# Patient Record
Sex: MALE | Race: WHITE | URBAN - METROPOLITAN AREA
[De-identification: names, ages, dates, MRNs, and addresses within clinical notes are randomized per-mention and may not be internally consistent; named-entity substitution may affect disease eponyms.]

---

## 2017-01-29 ENCOUNTER — TELEPHONE (OUTPATIENT)
Dept: FAMILY MEDICINE | Facility: CLINIC | Age: 72
End: 2017-01-29

## 2017-01-29 NOTE — Clinical Note
[facility]  [address]  [facility] [address]  [phone]      January 29, 2017      Abebe Christensen  [address]  [facility]    [place] [address]        Dear Abebe,    The last tests showed abnormal blood count [anemia] that was too low. Please come in for recheck appointment, to see how this is going. Also need blood sugar recheck.         Sincerely,      Steven Tapia MD

## 2017-03-29 DIAGNOSIS — E53.8 FOLATE DEFICIENCY: ICD-10-CM

## 2017-03-30 NOTE — TELEPHONE ENCOUNTER
folic acid (FOLVITE) 1 MG tablet      Last Written Prescription Date: 11/23/16  Last Fill Quantity: 100,  # refills: 3   Last Office Visit with [facility], [facility] or [facility] prescribing provider: 11/23/16

## 2017-04-03 RX ORDER — FOLIC ACID 1 MG/1
TABLET ORAL
Qty: 120 TABLET | Refills: 7 | Status: SHIPPED | OUTPATIENT
Start: 2017-04-03 | End: 2018-02-07

## 2017-04-05 ENCOUNTER — OFFICE VISIT (OUTPATIENT)
Dept: FAMILY MEDICINE | Facility: CLINIC | Age: 72
End: 2017-04-05
Payer: COMMERCIAL

## 2017-04-05 VITALS
OXYGEN SATURATION: 100 % | BODY MASS INDEX: 25.47 KG/M2 | TEMPERATURE: 97.9 F | HEART RATE: 91 BPM | SYSTOLIC BLOOD PRESSURE: 94 MMHG | RESPIRATION RATE: 18 BRPM | WEIGHT: 188 LBS | DIASTOLIC BLOOD PRESSURE: 60 MMHG | HEIGHT: 72 IN

## 2017-04-05 DIAGNOSIS — D64.9 ANEMIA, UNSPECIFIED TYPE: ICD-10-CM

## 2017-04-05 DIAGNOSIS — K21.00 GASTROESOPHAGEAL REFLUX DISEASE WITH ESOPHAGITIS: Primary | ICD-10-CM

## 2017-04-05 DIAGNOSIS — R82.90 NONSPECIFIC FINDING ON EXAMINATION OF URINE: ICD-10-CM

## 2017-04-05 DIAGNOSIS — E11.9 TYPE 2 DIABETES MELLITUS WITHOUT COMPLICATION, WITHOUT LONG-TERM CURRENT USE OF INSULIN (H): ICD-10-CM

## 2017-04-05 DIAGNOSIS — Z83.3 FAMILY HISTORY OF DIABETES MELLITUS: ICD-10-CM

## 2017-04-05 DIAGNOSIS — R30.0 DYSURIA: ICD-10-CM

## 2017-04-05 DIAGNOSIS — I10 ESSENTIAL HYPERTENSION, BENIGN: ICD-10-CM

## 2017-04-05 DIAGNOSIS — R53.82 CHRONIC FATIGUE: ICD-10-CM

## 2017-04-05 DIAGNOSIS — Z13.220 LIPID SCREENING: ICD-10-CM

## 2017-04-05 LAB
ALBUMIN UR-MCNC: ABNORMAL MG/DL
APPEARANCE UR: CLEAR
BILIRUB UR QL STRIP: ABNORMAL
COLOR UR AUTO: YELLOW
ERYTHROCYTE [DISTWIDTH] IN BLOOD BY AUTOMATED COUNT: 13.4 % (ref 10–15)
ERYTHROCYTE [SEDIMENTATION RATE] IN BLOOD BY WESTERGREN METHOD: 18 MM/H (ref 0–20)
FOLATE SERPL-MCNC: 20.8 NG/ML
GLUCOSE UR STRIP-MCNC: 100 MG/DL
HCT VFR BLD AUTO: 38 % (ref 40–53)
HGB BLD-MCNC: 13.5 G/DL (ref 13.3–17.7)
HGB UR QL STRIP: NEGATIVE
HYALINE CASTS #/AREA URNS LPF: ABNORMAL /LPF (ref 0–2)
KETONES UR STRIP-MCNC: ABNORMAL MG/DL
LEUKOCYTE ESTERASE UR QL STRIP: NEGATIVE
MCH RBC QN AUTO: 38.1 PG (ref 26.5–33)
MCHC RBC AUTO-ENTMCNC: 35.5 G/DL (ref 31.5–36.5)
MCV RBC AUTO: 107 FL (ref 78–100)
NITRATE UR QL: POSITIVE
PH UR STRIP: 5.5 PH (ref 5–7)
PLATELET # BLD AUTO: 253 10E9/L (ref 150–450)
RBC # BLD AUTO: 3.54 10E12/L (ref 4.4–5.9)
RBC #/AREA URNS AUTO: ABNORMAL /HPF (ref 0–2)
RETICS # AUTO: 96.3 10E9/L (ref 25–95)
RETICS/RBC NFR AUTO: 2.6 % (ref 0.5–2)
SP GR UR STRIP: 1.01 (ref 1–1.03)
URN SPEC COLLECT METH UR: ABNORMAL
UROBILINOGEN UR STRIP-ACNC: >=8 EU/DL (ref 0.2–1)
VIT B12 SERPL-MCNC: 450 PG/ML (ref 193–986)
WBC # BLD AUTO: 12.3 10E9/L (ref 4–11)
WBC #/AREA URNS AUTO: ABNORMAL /HPF (ref 0–2)

## 2017-04-05 PROCEDURE — 80053 COMPREHEN METABOLIC PANEL: CPT | Performed by: FAMILY MEDICINE

## 2017-04-05 PROCEDURE — 87086 URINE CULTURE/COLONY COUNT: CPT | Performed by: FAMILY MEDICINE

## 2017-04-05 PROCEDURE — 80061 LIPID PANEL: CPT | Performed by: FAMILY MEDICINE

## 2017-04-05 PROCEDURE — 82746 ASSAY OF FOLIC ACID SERUM: CPT | Performed by: FAMILY MEDICINE

## 2017-04-05 PROCEDURE — 85652 RBC SED RATE AUTOMATED: CPT | Performed by: FAMILY MEDICINE

## 2017-04-05 PROCEDURE — 81001 URINALYSIS AUTO W/SCOPE: CPT | Performed by: FAMILY MEDICINE

## 2017-04-05 PROCEDURE — 84443 ASSAY THYROID STIM HORMONE: CPT | Performed by: FAMILY MEDICINE

## 2017-04-05 PROCEDURE — 83540 ASSAY OF IRON: CPT | Performed by: FAMILY MEDICINE

## 2017-04-05 PROCEDURE — 85045 AUTOMATED RETICULOCYTE COUNT: CPT | Performed by: FAMILY MEDICINE

## 2017-04-05 PROCEDURE — 85027 COMPLETE CBC AUTOMATED: CPT | Performed by: FAMILY MEDICINE

## 2017-04-05 PROCEDURE — 83550 IRON BINDING TEST: CPT | Performed by: FAMILY MEDICINE

## 2017-04-05 PROCEDURE — 82607 VITAMIN B-12: CPT | Performed by: FAMILY MEDICINE

## 2017-04-05 PROCEDURE — 36415 COLL VENOUS BLD VENIPUNCTURE: CPT | Performed by: FAMILY MEDICINE

## 2017-04-05 PROCEDURE — 99215 OFFICE O/P EST HI 40 MIN: CPT | Performed by: FAMILY MEDICINE

## 2017-04-05 NOTE — NURSING NOTE
Chief Complaint   Patient presents with     Numbness     Hypertension       Initial BP 94/60  Pulse 91  Temp 97.9  F (36.6  C)  Resp 18  Ht 6' (1.829 m)  Wt 188 lb (85.3 kg)  SpO2 100%  BMI 25.5 kg/m2 Estimated body mass index is 25.5 kg/(m^2) as calculated from the following:    Height as of this encounter: 6' (1.829 m).    Weight as of this encounter: 188 lb (85.3 kg).  Medication Reconciliation: josué Alarcon CMA

## 2017-04-05 NOTE — PROGRESS NOTES
SUBJECTIVE:                                                    Abebe Christensen is a 71 year old male who presents to clinic today for the following health issues:    Health Maintenance Due   Topic Date Due     AORTIC ANEURYSM SCREENING (SYSTEM ASSIGNED)  11/24/2010     PNEUMOCOCCAL (2 of 2 - PPSV23) 05/14/2016     Health Maintenance reviewed at today's visit patient asked to schedule/complete:   Immunizations:  Patient declined        Hypertension Follow-up      Outpatient blood pressures are not being checked.    Low Salt Diet: no added salt       Amount of exercise or physical activity: None    Problems taking medications regularly: No    Medication side effects: none    Diet: low salt and low fat/cholesterol    Numbness      Duration: ongoing    Description (location/character/radiation): pt is having numbness in feet.  Pt says he was given folic acid for this and its not helping    Intensity:  moderate    Accompanying signs and symptoms: none    History (similar episodes/previous evaluation): None    Precipitating or alleviating factors: None    Therapies tried and outcome: None     Fatigue decreased appetite; previous abnomral lft, hx polycythemia vera with hx of phlebotimies at [facility]> reviewed las t labs showed low hemoglobin low folate nl vit b-12, elevated bilirubin.   PROBLEMS TO ADD ON...    Problem list and histories reviewed & adjusted, as indicated.  Additional history: as documented    Decreased energy, decreased appetite, has cut down on alcohol use. No cough or wheezing. Has numbness of bottom of feet, some swelling of anles. States since 1-17 sleeps a lot. Has some ear ringing and dizziness. previoiusly had elevated bilirubin.   Has hx of hypertension   In past HAD CONTACTED HIM ABOUT CT OF ABDOMEN IN VIEW OF ABN LFT AND ELEVATED BILIRUBIN, THIS WAS NOT DONE THUS FAR, DISCUSSED GETTING REPEAT LABS.   - has reflux sx. Has never had upper gi endoscopy to his recollection.     Patient Active Problem  List   Diagnosis     GERD (gastroesophageal reflux disease)     CKD (chronic kidney disease) stage 3, GFR 30-59 ml/min     Essential hypertension, benign     H/O polycythemia vera     Glucose intolerance (impaired glucose tolerance)     Family history of diabetes mellitus     Coryza for 20 yrs      Screening for prostate cancer     Tobacco abuse: [age]y/o @ 1ppd=19pk yr hx;only cigars since      COPD (chronic obstructive pulmonary disease): spirometry 5-14-15      Risk for falls     Past Surgical History:   Procedure Laterality Date     EYE SURGERY  2008    cataract and glaucoma       Social History   Substance Use Topics     Smoking status: Current Every Day Smoker     Types: Cigarettes     Smokeless tobacco: Never Used     Alcohol use Yes     Family History   Problem Relation Age of Onset     Eye Disorder Mother      Cancer - colorectal Father            Reviewed and updated as needed this visit by clinical staff       Reviewed and updated as needed this visit by Provider         ROS:  CONSTITUTIONAL:NEGATIVE for fever, chills, change in weight  INTEGUMENTARY/SKIN: some bruising on arms states seems to bruise more easily.   EYES: NEGATIVE for vision changes or irritation  ENT/MOUTH: NEGATIVE for ear, mouth and throat problems  RESP:NEGATIVE for significant cough or SOB  CV: NEGATIVE for chest pain, palpitations or peripheral edema  GI: decreased appetite and gets reflux a lot. No n,v,d,c. PREVIOUSLY HAD ELEVATED BILIRUBIN AND LIVER FUNCTION TESTS WITH NEGATIVE HEPATITIS STUDIES FOR A,B,C. HAS NOT HAD PREVIOUSLY RECOMMENDED CT OF ABDOMEN.   : negative for dysuria, hematuria, decreased urinary stream, erectile dysfunction  MUSCULOSKELETAL: NEGATIVE for significant arthralgias or myalgia  NEURO: feles weak a lot low energy sleeps too much.   ENDOCRINE: NEGATIVE for temperature intolerance, skin/hair changes and has decreased energy.   HEME/ALLERGY/IMMUNE: bruises easily in past had POLYCYTHEMIA VERA AND HAD  PHLEBOTOMIES AT [facility], NOW HAS HAD ANEMIA. PREVIOUSLY FOLATE AS LOW.   PSYCHIATRIC: DECREASED FOCUS AND EMOTIONAL LY FEELS LESS ENERGY. USED TO DRINK MORE HAS CUT DOWN A GREAT DEAL.     OBJECTIVE:                                                    BP 94/60  Pulse 91  Temp 97.9  F (36.6  C)  Resp 18  Ht 6' (1.829 m)  Wt 188 lb (85.3 kg)  SpO2 100%  BMI 25.5 kg/m2  Body mass index is 25.5 kg/(m^2).  GENERAL APPEARANCE: healthy, alert and moderate distress  EYES: Eyes grossly normal to inspection, PERRL and conjunctivae and sclerae normal  NECK: no adenopathy, no asymmetry, masses, or scars and no bruits  RESP: lungs clear to auscultation - no rales, rhonchi or wheezes  CV: regular rates and rhythm, normal S1 S2, no S3 or S4 and no murmur, click or rub  ABDOMEN: soft, nontender, without hepatosplenomegaly or masses and bowel sounds normal  SKIN: no suspicious lesions or rashes  NEURO: Normal strength and tone, mentation intact, speech normal and cranial nerves 2-12 intact  PSYCH: affect flat, anxious and fatigued    Diagnostic test results:  Diagnostic Test Results:  Labs as ordered discussed doing test for previous anemia and previous low folate and abnrmal lft. Also he has reflux and have ordered h. Pylori test. Also hx of family hx of thyroid diseas and diabetes and have orderd thyroid and diabetes tests.      ASSESSMENT/PLAN:                                                    1. Gastroesophageal reflux disease with esophagitis    - H Pylori antigen, stool; Future  - Urine Microscopic    2. Anemia, unspecified type    - Vitamin B12  - Folate  - CBC with platelets  - Reticulocyte count  - Iron and iron binding capacity    3. Essential hypertension, benign      4. Family history of diabetes mellitus      5. Chronic fatigue    - Vitamin B12  - Folate  - ESR: Erythrocyte sedimentation rate  - Comprehensive metabolic panel  - TSH  - UA reflex to Microscopic and Culture    6. Lipid screening    - Lipid Profile  (Chol, Trig, HDL, LDL calc)    Labs as ordered.   Follow up with Provider - await labs disucssed if has diabetes may need near-term follow up.      Steven Tapia MD  [facility]

## 2017-04-05 NOTE — MR AVS SNAPSHOT
"              After Visit Summary   4/5/2017    Abebe Christensen    MRN: [id number]           Patient Information     Date Of Birth          1945        Visit Information        Provider Department      4/5/2017 11:15 AM Steven Tapia MD [facility]        Today's Diagnoses     Gastroesophageal reflux disease with esophagitis    -  1    Anemia, unspecified type        Essential hypertension, benign        Family history of diabetes mellitus        Chronic fatigue        Lipid screening        Nonspecific finding on examination of urine           Follow-ups after your visit        Future tests that were ordered for you today     Open Future Orders        Priority Expected Expires Ordered    H Pylori antigen, stool Routine  5/5/2017 4/5/2017            Who to contact     If you have questions or need follow up information about today's clinic visit or your schedule please contact [facility] directly at [phone].  Normal or non-critical lab and imaging results will be communicated to you by MyChart, letter or phone within 4 business days after the clinic has received the results. If you do not hear from us within 7 days, please contact the clinic through MyChart or phone. If you have a critical or abnormal lab result, we will notify you by phone as soon as possible.  Submit refill requests through [employer] or call your pharmacy and they will forward the refill request to us. Please allow 3 business days for your refill to be completed.          Additional Information About Your Visit        MyChart Information     [employer] lets you send messages to your doctor, view your test results, renew your prescriptions, schedule appointments and more. To sign up, go to [email or web address] . Click on \"Log in\" on the left side of the screen, which will take you to the Welcome page. Then click on \"Sign up Now\" on the right side of the page.     You will " be asked to enter the access code listed below, as well as some personal information. Please follow the directions to create your username and password.     Your access code is: [email or web address]  Expires: 2017  6:24 PM     Your access code will  in 90 days. If you need help or a new code, please call your [facility] or [phone].        Care EveryWhere ID     This is your Care EveryWhere ID. This could be used by other organizations to access your [place] medical records  [id number]        Your Vitals Were     Pulse Temperature Respirations Height Pulse Oximetry BMI (Body Mass Index)    91 97.9  F (36.6  C) 18 6' (1.829 m) 100% 25.5 kg/m2       Blood Pressure from Last 3 Encounters:   17 94/60   16 120/80   16 110/80    Weight from Last 3 Encounters:   17 188 lb (85.3 kg)   16 186 lb (84.4 kg)   16 186 lb (84.4 kg)              We Performed the Following     CBC with platelets     Comprehensive metabolic panel     ESR: Erythrocyte sedimentation rate     Folate     Iron and iron binding capacity     Lipid Profile (Chol, Trig, HDL, LDL calc)     Reticulocyte count     TSH     UA reflex to Microscopic and Culture     Urine Culture Aerobic Bacterial     Urine Microscopic     Vitamin B12        Primary Care Provider Office Phone # Fax #    Dulce Heather Mendoza [phone]       [facility] [address] [facility] [address]        Thank you!     Thank you for choosing [facility]  for your care. Our goal is always to provide you with excellent care. Hearing back from our patients is one way we can continue to improve our services. Please take a few minutes to complete the written survey that you may receive in the mail after your visit with us. Thank you!             Your Updated Medication List - Protect others around you: Learn how to safely use, store and throw away your medicines at  www.disposemymeds.org.          This list is accurate as of: 4/5/17  6:24 PM.  Always use your most recent med list.                   Brand Name Dispense Instructions for use    aspirin 325 MG tablet      Take 81 mg by mouth daily       diltiazem 240 MG 24 hr capsule    CARTIA XT    90 capsule    TAKE 1 CAPSULE BY MOUTH DAILY       dorzolamide-timolol 2-0.5 % ophthalmic solution    COSOPT     1 drop 2 times daily.       folic acid 1 MG tablet    FOLVITE    120 tablet    TAKE 4 TABLETS BY MOUTH DAILY       lisinopril-hydrochlorothiazide 20-12.5 MG per tablet    PRINZIDE/ZESTORETIC    90 tablet    Take 1 tablet by mouth daily       omeprazole 20 MG tablet    priLOSEC OTC    90 tablet    Take 1 tablet (20 mg) by mouth daily       TRAVATAN 0.004 % ophthalmic solution   Generic drug:  travoprost Z (benzalkonium)      1 drop every evening.       vitamin D 2000 UNITS Caps      Take 1 capsule by mouth daily

## 2017-04-05 NOTE — LETTER
[facility]  [address]  [address]  [place] [address] [phone]                                                                                                           Abebe Christensen  [address]  [facility]    [place] [address]    April 10, 2017      Dear Abebe,    The results of your recent tests were reviewed and are enclosed.     Results for orders placed or performed in visit on 04/05/17   Vitamin B12   Result Value Ref Range    Vitamin B12 450 193 - 986 pg/mL   Folate   Result Value Ref Range    Folate 20.8 >5.4 ng/mL   CBC with platelets   Result Value Ref Range    WBC 12.3 (H) 4.0 - 11.0 10e9/L    RBC Count 3.54 (L) 4.4 - 5.9 10e12/L    Hemoglobin 13.5 13.3 - 17.7 g/dL    Hematocrit 38.0 (L) 40.0 - 53.0 %     (H) 78 - 100 fl    MCH 38.1 (H) 26.5 - 33.0 pg    MCHC 35.5 31.5 - 36.5 g/dL    RDW 13.4 10.0 - 15.0 %    Platelet Count 253 150 - 450 10e9/L   ESR: Erythrocyte sedimentation rate   Result Value Ref Range    Sed Rate 18 0 - 20 mm/h   Comprehensive metabolic panel   Result Value Ref Range    Sodium 138 133 - 144 mmol/L    Potassium 4.0 3.4 - 5.3 mmol/L    Chloride 103 94 - 109 mmol/L    Carbon Dioxide 27 20 - 32 mmol/L    Anion Gap 8 3 - 14 mmol/L    Glucose 119 (H) 70 - 99 mg/dL    Urea Nitrogen 9 7 - 30 mg/dL    Creatinine 1.26 (H) 0.66 - 1.25 mg/dL    GFR Estimate 56 (L) >60 mL/min/1.7m2    GFR Estimate If Black 68 >60 mL/min/1.7m2    Calcium 8.8 8.5 - 10.1 mg/dL    Bilirubin Total 1.6 (H) 0.2 - 1.3 mg/dL    Albumin 2.5 (L) 3.4 - 5.0 g/dL    Protein Total 6.8 6.8 - 8.8 g/dL    Alkaline Phosphatase 252 (H) 40 - 150 U/L    ALT 21 0 - 70 U/L     (H) 0 - 45 U/L   TSH   Result Value Ref Range    TSH 6.53 (H) 0.40 - 4.00 mU/L   UA reflex to Microscopic and Culture   Result Value Ref Range    Color Urine Yellow     Appearance Urine Clear     Glucose Urine 100 (A) NEG mg/dL    Bilirubin Urine (A) NEG     Moderate  This is an  unconfirmed screening test result. A positive result may be false.      Ketones Urine Trace (A) NEG mg/dL    Specific Gravity Urine 1.010 1.003 - 1.035    Blood Urine Negative NEG    pH Urine 5.5 5.0 - 7.0 pH    Protein Albumin Urine Trace (A) NEG mg/dL    Urobilinogen Urine >=8.0 0.2 - 1.0 EU/dL    Nitrite Urine Positive (A) NEG    Leukocyte Esterase Urine Negative NEG    Source Midstream Urine    Reticulocyte count   Result Value Ref Range    % Retic 2.6 (H) 0.5 - 2.0 %    Absolute Retic 96.3 (H) 25 - 95 10e9/L   Iron and iron binding capacity   Result Value Ref Range    Iron 150 35 - 180 ug/dL    Iron Binding Cap 158 (L) 240 - 430 ug/dL    Iron Saturation Index 95 (H) 15 - 46 %   Lipid Profile (Chol, Trig, HDL, LDL calc)   Result Value Ref Range    Cholesterol 141 <200 mg/dL    Triglycerides 179 (H) <150 mg/dL    HDL Cholesterol 22 (L) >39 mg/dL    LDL Cholesterol Calculated 83 <100 mg/dL    Non HDL Cholesterol 119 <130 mg/dL   Urine Microscopic   Result Value Ref Range    WBC Urine  0 - 2 /HPF     O - 2  unconcentrated, urine was QNS for concentration      RBC Urine  0 - 2 /HPF     O - 2  unconcentrated, urine was QNS for concentration      Hyaline Casts (A) 0 - 2 /LPF     2-5  unconcentrated, urine was QNS for concentration     Urine Culture Aerobic Bacterial   Result Value Ref Range    Specimen Description Midstream Urine     Culture Micro       10,000 to 50,000 colonies/mL mixed urogenital seda Susceptibility testing not   routinely done      Micro Report Status FINAL 04/06/2017                Result was abnormal please make an appointment to come in and review the tests and schedule the liver test. .         Thank you for choosing [facility].  We appreciate the opportunity to serve you and look forward to supporting your healthcare needs in the future.    If you have any questions or concerns, please call me or my staff at [phone].      Sincerely,    Steven Tapia MD

## 2017-04-06 ENCOUNTER — TELEPHONE (OUTPATIENT)
Dept: FAMILY MEDICINE | Facility: CLINIC | Age: 72
End: 2017-04-06

## 2017-04-06 LAB
ALBUMIN SERPL-MCNC: 2.5 G/DL (ref 3.4–5)
ALP SERPL-CCNC: 252 U/L (ref 40–150)
ALT SERPL W P-5'-P-CCNC: 21 U/L (ref 0–70)
ANION GAP SERPL CALCULATED.3IONS-SCNC: 8 MMOL/L (ref 3–14)
AST SERPL W P-5'-P-CCNC: 157 U/L (ref 0–45)
BACTERIA SPEC CULT: NORMAL
BILIRUB SERPL-MCNC: 1.6 MG/DL (ref 0.2–1.3)
BUN SERPL-MCNC: 9 MG/DL (ref 7–30)
CALCIUM SERPL-MCNC: 8.8 MG/DL (ref 8.5–10.1)
CHLORIDE SERPL-SCNC: 103 MMOL/L (ref 94–109)
CHOLEST SERPL-MCNC: 141 MG/DL
CO2 SERPL-SCNC: 27 MMOL/L (ref 20–32)
CREAT SERPL-MCNC: 1.26 MG/DL (ref 0.66–1.25)
GFR SERPL CREATININE-BSD FRML MDRD: 56 ML/MIN/1.7M2
GLUCOSE SERPL-MCNC: 119 MG/DL (ref 70–99)
HDLC SERPL-MCNC: 22 MG/DL
IRON SATN MFR SERPL: 95 % (ref 15–46)
IRON SERPL-MCNC: 150 UG/DL (ref 35–180)
LDLC SERPL CALC-MCNC: 83 MG/DL
MICRO REPORT STATUS: NORMAL
NONHDLC SERPL-MCNC: 119 MG/DL
POTASSIUM SERPL-SCNC: 4 MMOL/L (ref 3.4–5.3)
PROT SERPL-MCNC: 6.8 G/DL (ref 6.8–8.8)
SODIUM SERPL-SCNC: 138 MMOL/L (ref 133–144)
SPECIMEN SOURCE: NORMAL
TIBC SERPL-MCNC: 158 UG/DL (ref 240–430)
TRIGL SERPL-MCNC: 179 MG/DL
TSH SERPL DL<=0.05 MIU/L-ACNC: 6.53 MU/L (ref 0.4–4)

## 2017-04-06 RX ORDER — CIPROFLOXACIN 500 MG/1
500 TABLET, FILM COATED ORAL 2 TIMES DAILY
Qty: 20 TABLET | Refills: 0 | Status: SHIPPED | OUTPATIENT
Start: 2017-04-06 | End: 2017-08-23

## 2017-04-06 NOTE — TELEPHONE ENCOUNTER
cipro rx for 500 mg bid, 10 days and metformin 500 mg prior to dinner, one daily #30 sen to phamracy please let him know to start these and follow up next week. I will be in [place] on Wednesday.     Steven Tapia MD

## 2017-04-06 NOTE — TELEPHONE ENCOUNTER
i called him no answer. Message left on ans machine needs to start antibiotic and metformin abnomral urinalyssis and has glycosuria. Appears willneed additional eval related to anemia, low iron, elevated bilirubin.  Previously ct of abdomen was ordered he did not have this done yet.       Steven Tapia MD

## 2017-04-07 DIAGNOSIS — K21.00 GASTROESOPHAGEAL REFLUX DISEASE WITH ESOPHAGITIS: ICD-10-CM

## 2017-04-07 PROCEDURE — 87338 HPYLORI STOOL AG IA: CPT | Performed by: FAMILY MEDICINE

## 2017-04-09 NOTE — PROGRESS NOTES
Result was abnormal please make an appointment to come in and review the tests and schedule the liver test. .      Please let patient know by calling or sending a letter.

## 2017-04-11 LAB
H PYLORI AG STL QL IA: NORMAL
MICRO REPORT STATUS: NORMAL
SPECIMEN SOURCE: NORMAL

## 2017-04-12 ENCOUNTER — OFFICE VISIT (OUTPATIENT)
Dept: FAMILY MEDICINE | Facility: CLINIC | Age: 72
End: 2017-04-12
Payer: COMMERCIAL

## 2017-04-12 VITALS
SYSTOLIC BLOOD PRESSURE: 100 MMHG | HEART RATE: 96 BPM | DIASTOLIC BLOOD PRESSURE: 50 MMHG | OXYGEN SATURATION: 99 % | BODY MASS INDEX: 25.77 KG/M2 | TEMPERATURE: 97.4 F | RESPIRATION RATE: 16 BRPM | WEIGHT: 190 LBS

## 2017-04-12 DIAGNOSIS — E80.6 CONJUGATED HYPERBILIRUBINEMIA: ICD-10-CM

## 2017-04-12 DIAGNOSIS — Z86.03 HX OF POLYCYTHEMIA VERA: ICD-10-CM

## 2017-04-12 DIAGNOSIS — R73.01 IMPAIRED FASTING GLUCOSE: ICD-10-CM

## 2017-04-12 DIAGNOSIS — Z23 NEED FOR PROPHYLACTIC VACCINATION AGAINST STREPTOCOCCUS PNEUMONIAE (PNEUMOCOCCUS): ICD-10-CM

## 2017-04-12 DIAGNOSIS — R10.84 ABDOMINAL PAIN, GENERALIZED: Primary | ICD-10-CM

## 2017-04-12 DIAGNOSIS — E11.9 TYPE 2 DIABETES MELLITUS WITHOUT COMPLICATION, WITHOUT LONG-TERM CURRENT USE OF INSULIN (H): ICD-10-CM

## 2017-04-12 DIAGNOSIS — Z13.89 SCREENING FOR DIABETIC PERIPHERAL NEUROPATHY: ICD-10-CM

## 2017-04-12 LAB
HBA1C MFR BLD: 4.6 % (ref 4.3–6)
LIPASE SERPL-CCNC: 291 U/L (ref 73–393)

## 2017-04-12 PROCEDURE — 82150 ASSAY OF AMYLASE: CPT | Performed by: FAMILY MEDICINE

## 2017-04-12 PROCEDURE — 80076 HEPATIC FUNCTION PANEL: CPT | Performed by: FAMILY MEDICINE

## 2017-04-12 PROCEDURE — 99214 OFFICE O/P EST MOD 30 MIN: CPT | Performed by: FAMILY MEDICINE

## 2017-04-12 PROCEDURE — 36415 COLL VENOUS BLD VENIPUNCTURE: CPT | Performed by: FAMILY MEDICINE

## 2017-04-12 PROCEDURE — 83036 HEMOGLOBIN GLYCOSYLATED A1C: CPT | Performed by: FAMILY MEDICINE

## 2017-04-12 PROCEDURE — 83690 ASSAY OF LIPASE: CPT | Performed by: FAMILY MEDICINE

## 2017-04-12 NOTE — PROGRESS NOTES
SUBJECTIVE:                                                    Abebe Christensen is a 71 year old male who presents to clinic today for the following health issues:    Health Maintenance Due   Topic Date Due     FOOT EXAM Q1 YEAR( NO INBASKET)  11/24/1946     EYE EXAM Q1 YEAR( NO INBASKET)  11/24/1946     AORTIC ANEURYSM SCREENING (SYSTEM ASSIGNED)  11/24/2010     A1C Q6 MO( NO INBASKET)  09/04/2014     PNEUMOCOCCAL (2 of 2 - PPSV23) 05/14/2016     Health Maintenance reviewed at today's visit patient asked to schedule/complete:   Diabetes:  Patient agrees to schedule        Results      Duration: 4/5/2017    Description (location/character/radiation): pt here for lab results    Intensity:      Accompanying signs and symptoms:     History (similar episodes/previous evaluation): None    Precipitating or alleviating factors: None    Therapies tried and outcome: None     Feels a little better lab tests reviewed.d sicussed getting additional liver tests and pancreas test discussed depending on labs may still need colonoscopy and may indicate other direction of evaluation has abdn lft, anemia elevated bilirubin, elevated glucose.   PROBLEMS TO ADD ON...    Problem list and histories reviewed & adjusted, as indicated.  Additional history: as documented    Patient Active Problem List   Diagnosis     GERD (gastroesophageal reflux disease)     CKD (chronic kidney disease) stage 3, GFR 30-59 ml/min     Essential hypertension, benign     H/O polycythemia vera     Glucose intolerance (impaired glucose tolerance)     Family history of diabetes mellitus     Coryza for 20 yrs      Screening for prostate cancer     Tobacco abuse: [age]y/o @ 1ppd=19pk yr hx;only cigars since      COPD (chronic obstructive pulmonary disease): spirometry 5-14-15      Risk for falls     Past Surgical History:   Procedure Laterality Date     EYE SURGERY  2008    cataract and glaucoma       Social History   Substance Use Topics     Smoking status: Current  Every Day Smoker     Types: Cigarettes     Smokeless tobacco: Never Used     Alcohol use Yes     Family History   Problem Relation Age of Onset     Eye Disorder Mother      Cancer - colorectal Father            Reviewed and updated as needed this visit by clinical staff       Reviewed and updated as needed this visit by Provider         ROS:  CONSTITUTIONAL:NEGATIVE for fever, chills, change in weight  INTEGUMENTARY/SKIN: NEGATIVE for worrisome rashes, moles or lesions  RESP:NEGATIVE for significant cough or SOB  CV: NEGATIVE for chest pain, palpitations or peripheral edema  GI: less abd sx and no n,v.   : negative for dysuria, hematuria, decreased urinary stream, erectile dysfunction  NEURO: fatigue is improved,   ENDOCRINE: NEGATIVE for temperature intolerance, skin/hair changes and elevated glucose    OBJECTIVE:                                                    /50  Pulse 96  Temp 97.4  F (36.3  C)  Resp 16  Wt 190 lb (86.2 kg)  SpO2 99%  BMI 25.77 kg/m2  Body mass index is 25.77 kg/(m^2).  GENERAL APPEARANCE: healthy, alert and mild distress  EYES: Eyes grossly normal to inspection, PERRL and conjunctivae and sclerae normal  RESP: lungs clear to auscultation - no rales, rhonchi or wheezes  CV: regular rates and rhythm, normal S1 S2, no S3 or S4 and no murmur, click or rub  ABDOMEN: soft, nontender, without hepatosplenomegaly or masses and bowel sounds normal    Diagnostic test results:  Diagnostic Test Results:  Labs as ordered discussed may need colonoscopy and possibley upper gi endoscopy.      ASSESSMENT/PLAN:                                                    1. Screening for diabetic peripheral neuropathy    - FOOT EXAM  NO CHARGE [98046.964]    2. Need for prophylactic vaccination against Streptococcus pneumoniae (pneumococcus)      3. Abdominal pain, generalized    - Amylase  - Lipase  - Hepatic panel    4. Hx of polycythemia vera    - CT Abdomen Pelvis w Contrast; Future    5. Type 2  diabetes mellitus without complication, without long-term current use of insulin (H)    - HEMOGLOBIN A1C    6. Conjugated hyperbilirubinemia    - Hepatic panel    7. Impaired fasting glucose    - HEMOGLOBIN A1C      Labs as ordered.   Follow up with Provider - rtc 2-3 weeks  Or as needed.      Steven Tapia MD  [facility]

## 2017-04-12 NOTE — NURSING NOTE
Chief Complaint   Patient presents with     Results       Initial /50  Pulse 96  Temp 97.4  F (36.3  C)  Resp 16  Wt 190 lb (86.2 kg)  SpO2 99%  BMI 25.77 kg/m2 Estimated body mass index is 25.77 kg/(m^2) as calculated from the following:    Height as of 4/5/17: 6' (1.829 m).    Weight as of this encounter: 190 lb (86.2 kg).  Medication Reconciliation: {Medication Reconciliation:[id number]}

## 2017-04-12 NOTE — NURSING NOTE
Chief Complaint   Patient presents with     Results       Initial /50  Pulse 96  Temp 97.4  F (36.3  C)  Resp 16  Wt 190 lb (86.2 kg)  SpO2 99%  BMI 25.77 kg/m2 Estimated body mass index is 25.77 kg/(m^2) as calculated from the following:    Height as of 4/5/17: 6' (1.829 m).    Weight as of this encounter: 190 lb (86.2 kg).  Medication Reconciliation: josué Alarcon CMA    Oral contrast was given to patient by RN with directions.    Claudia Addison LPN

## 2017-04-12 NOTE — LETTER
[facility]  [address]  [address]  [place] [facility] [address]  [phone]                                                                                                           Abebe Christensen  [address]    [place] [address]    April 24, 2017      Dear Abebe,    The results of your recent tests were reviewed and are enclosed.   Result was abnormal The liver tests which were abnormal are improving.  The pancreas tests, the amylase and lipase are in a good range. We can have you scheduled for a colon and stomach test, please call if we can get this scheduled.  Results for orders placed or performed in visit on 04/12/17   HEMOGLOBIN A1C   Result Value Ref Range    Hemoglobin A1C 4.6 4.3 - 6.0 %   Amylase   Result Value Ref Range    Amylase 39 30 - 110 U/L   Lipase   Result Value Ref Range    Lipase 291 73 - 393 U/L   Hepatic panel   Result Value Ref Range    Bilirubin Direct 0.6 (H) 0.0 - 0.2 mg/dL    Bilirubin Total 1.3 0.2 - 1.3 mg/dL    Albumin 2.5 (L) 3.4 - 5.0 g/dL    Protein Total 6.3 (L) 6.8 - 8.8 g/dL    Alkaline Phosphatase 197 (H) 40 - 150 U/L    ALT 16 0 - 70 U/L     (H) 0 - 45 U/L           Thank you for choosing [facility].  We appreciate the opportunity to serve you and look forward to supporting your healthcare needs in the future.    If you have any questions or concerns, please call me or my staff at [phone].      Sincerely,    Steven Tapia MD

## 2017-04-12 NOTE — MR AVS SNAPSHOT
After Visit Summary   4/12/2017    Abebe Christensen    MRN: [id number]           Patient Information     Date Of Birth          1945        Visit Information        Provider Department      4/12/2017 10:45 AM Steven Tapia MD [facility]        Today's Diagnoses     Abdominal pain, generalized    -  1    Screening for diabetic peripheral neuropathy        Need for prophylactic vaccination against Streptococcus pneumoniae (pneumococcus)        Hx of polycythemia vera        Type 2 diabetes mellitus without complication, without long-term current use of insulin (H)        Conjugated hyperbilirubinemia        Impaired fasting glucose          Care Instructions    [facility]    Phone- [phone]          Follow-ups after your visit        Future tests that were ordered for you today     Open Future Orders        Priority Expected Expires Ordered    CT Abdomen Pelvis w Contrast Routine  4/13/2018 4/12/2017            Who to contact     If you have questions or need follow up information about today's clinic visit or your schedule please contact [facility] directly at [phone].  Normal or non-critical lab and imaging results will be communicated to you by Superhumanhart, letter or phone within 4 business days after the clinic has received the results. If you do not hear from us within 7 days, please contact the clinic through [employer] or phone. If you have a critical or abnormal lab result, we will notify you by phone as soon as possible.  Submit refill requests through BuzzSumo or call your pharmacy and they will forward the refill request to us. Please allow 3 business days for your refill to be completed.          Additional Information About Your Visit        SuperhumanharCenoplex Information     BuzzSumo lets you send messages to your doctor, view your test results, renew your prescriptions, schedule appointments and more. To sign up, go to  [email or web address] . Click on \"Log in\" on the left side of the screen, which will take you to the Welcome page. Then click on \"Sign up Now\" on the right side of the page.     You will be asked to enter the access code listed below, as well as some personal information. Please follow the directions to create your username and password.     Your access code is: [email or web address]  Expires: 2017  6:24 PM     Your access code will  in 90 days. If you need help or a new code, please call your [place] clinic or [phone].        Care EveryWhere ID     This is your Care EveryWhere ID. This could be used by other organizations to access your [place] medical records  [id number]        Your Vitals Were     Pulse Temperature Respirations Pulse Oximetry BMI (Body Mass Index)       96 97.4  F (36.3  C) 16 99% 25.77 kg/m2        Blood Pressure from Last 3 Encounters:   17 100/50   17 94/60   16 120/80    Weight from Last 3 Encounters:   17 190 lb (86.2 kg)   17 188 lb (85.3 kg)   16 186 lb (84.4 kg)              We Performed the Following     Amylase     FOOT EXAM  NO CHARGE [[id number]]     HEMOGLOBIN A1C     Hepatic panel     Lipase        Primary Care Provider Office Phone # Fax #    Dulce Heather Mendoza [phone]       [facility] [address] [place] [address]        Thank you!     Thank you for choosing [facility]  for your care. Our goal is always to provide you with excellent care. Hearing back from our patients is one way we can continue to improve our services. Please take a few minutes to complete the written survey that you may receive in the mail after your visit with us. Thank you!             Your Updated Medication List - Protect others around you: Learn how to safely use, store and throw away your medicines at www.disposemymeds.org.          This list is accurate as of: 17 11:36 " AM.  Always use your most recent med list.                   Brand Name Dispense Instructions for use    aspirin 325 MG tablet      Take 81 mg by mouth daily       ciprofloxacin 500 MG tablet    CIPRO    20 tablet    Take 1 tablet (500 mg) by mouth 2 times daily       diltiazem 240 MG 24 hr capsule    CARTIA XT    90 capsule    TAKE 1 CAPSULE BY MOUTH DAILY       dorzolamide-timolol 2-0.5 % ophthalmic solution    COSOPT     1 drop 2 times daily.       folic acid 1 MG tablet    FOLVITE    120 tablet    TAKE 4 TABLETS BY MOUTH DAILY       lisinopril-hydrochlorothiazide 20-12.5 MG per tablet    PRINZIDE/ZESTORETIC    90 tablet    Take 1 tablet by mouth daily       metFORMIN 500 MG tablet    GLUCOPHAGE    30 tablet    Take 1 tablet (500 mg) by mouth daily (with dinner) 20 minutes before dinner       omeprazole 20 MG tablet    priLOSEC OTC    90 tablet    Take 1 tablet (20 mg) by mouth daily       TRAVATAN 0.004 % ophthalmic solution   Generic drug:  travoprost Z (benzalkonium)      1 drop every evening.       vitamin D 2000 UNITS Caps      Take 1 capsule by mouth daily

## 2017-04-13 LAB
ALBUMIN SERPL-MCNC: 2.5 G/DL (ref 3.4–5)
ALP SERPL-CCNC: 197 U/L (ref 40–150)
ALT SERPL W P-5'-P-CCNC: 16 U/L (ref 0–70)
AMYLASE SERPL-CCNC: 39 U/L (ref 30–110)
AST SERPL W P-5'-P-CCNC: 111 U/L (ref 0–45)
BILIRUB DIRECT SERPL-MCNC: 0.6 MG/DL (ref 0–0.2)
BILIRUB SERPL-MCNC: 1.3 MG/DL (ref 0.2–1.3)
PROT SERPL-MCNC: 6.3 G/DL (ref 6.8–8.8)

## 2017-04-23 NOTE — PROGRESS NOTES
Result was abnormal The liver tests which were abnormal are improving.  The pancreas tests, the amylase and lipase are in a good range. We can have you scheduled for a colon and stomach test, please call if we can get this scheduled.       Please let patient know by calling or sending a letter.

## 2017-05-26 ENCOUNTER — HOSPITAL ENCOUNTER (EMERGENCY)
Facility: CLINIC | Age: 72
Discharge: HOME | End: 2017-05-26
Attending: EMERGENCY MEDICINE | Admitting: EMERGENCY MEDICINE
Payer: MEDICARE

## 2017-05-26 ENCOUNTER — TELEPHONE (OUTPATIENT)
Dept: FAMILY MEDICINE | Facility: CLINIC | Age: 72
End: 2017-05-26

## 2017-05-26 ENCOUNTER — APPOINTMENT (OUTPATIENT)
Dept: CT IMAGING | Facility: CLINIC | Age: 72
End: 2017-05-26
Attending: EMERGENCY MEDICINE
Payer: MEDICARE

## 2017-05-26 VITALS
RESPIRATION RATE: 18 BRPM | WEIGHT: 185 LBS | HEIGHT: 73 IN | DIASTOLIC BLOOD PRESSURE: 74 MMHG | TEMPERATURE: 97.7 F | HEART RATE: 95 BPM | BODY MASS INDEX: 24.52 KG/M2 | OXYGEN SATURATION: 99 % | SYSTOLIC BLOOD PRESSURE: 98 MMHG

## 2017-05-26 DIAGNOSIS — R11.2 NON-INTRACTABLE VOMITING WITH NAUSEA, UNSPECIFIED VOMITING TYPE: ICD-10-CM

## 2017-05-26 DIAGNOSIS — R18.8 OTHER ASCITES: ICD-10-CM

## 2017-05-26 DIAGNOSIS — D64.9 ANEMIA, UNSPECIFIED TYPE: ICD-10-CM

## 2017-05-26 DIAGNOSIS — R63.4 WEIGHT LOSS: ICD-10-CM

## 2017-05-26 DIAGNOSIS — R79.89 ELEVATED LFTS: ICD-10-CM

## 2017-05-26 LAB
ALBUMIN SERPL-MCNC: 2.4 G/DL (ref 3.4–5)
ALBUMIN UR-MCNC: NEGATIVE MG/DL
ALP SERPL-CCNC: 164 U/L (ref 40–150)
ALT SERPL W P-5'-P-CCNC: 12 U/L (ref 0–70)
ANION GAP SERPL CALCULATED.3IONS-SCNC: 11 MMOL/L (ref 3–14)
APPEARANCE UR: CLEAR
AST SERPL W P-5'-P-CCNC: 50 U/L (ref 0–45)
BASOPHILS # BLD AUTO: 0 10E9/L (ref 0–0.2)
BASOPHILS NFR BLD AUTO: 0.1 %
BILIRUB DIRECT SERPL-MCNC: 0.5 MG/DL (ref 0–0.2)
BILIRUB SERPL-MCNC: 1.3 MG/DL (ref 0.2–1.3)
BILIRUB UR QL STRIP: NEGATIVE
BUN SERPL-MCNC: 13 MG/DL (ref 7–30)
CALCIUM SERPL-MCNC: 8.3 MG/DL (ref 8.5–10.1)
CHLORIDE SERPL-SCNC: 106 MMOL/L (ref 94–109)
CO2 SERPL-SCNC: 24 MMOL/L (ref 20–32)
COLOR UR AUTO: YELLOW
CREAT SERPL-MCNC: 1.58 MG/DL (ref 0.66–1.25)
DIFFERENTIAL METHOD BLD: ABNORMAL
EOSINOPHIL # BLD AUTO: 0.1 10E9/L (ref 0–0.7)
EOSINOPHIL NFR BLD AUTO: 1.1 %
ERYTHROCYTE [DISTWIDTH] IN BLOOD BY AUTOMATED COUNT: 13.2 % (ref 10–15)
GFR SERPL CREATININE-BSD FRML MDRD: 43 ML/MIN/1.7M2
GLUCOSE SERPL-MCNC: 115 MG/DL (ref 70–99)
GLUCOSE UR STRIP-MCNC: NEGATIVE MG/DL
HCT VFR BLD AUTO: 27.7 % (ref 40–53)
HGB BLD-MCNC: 9.3 G/DL (ref 13.3–17.7)
HGB UR QL STRIP: NEGATIVE
IMM GRANULOCYTES # BLD: 0 10E9/L (ref 0–0.4)
IMM GRANULOCYTES NFR BLD: 0.3 %
KETONES UR STRIP-MCNC: NEGATIVE MG/DL
LACTATE BLD-SCNC: 1.2 MMOL/L (ref 0.7–2.1)
LACTATE SERPL-SCNC: 2.4 MMOL/L (ref 0.4–2)
LEUKOCYTE ESTERASE UR QL STRIP: NEGATIVE
LIPASE SERPL-CCNC: 166 U/L (ref 73–393)
LYMPHOCYTES # BLD AUTO: 1.1 10E9/L (ref 0.8–5.3)
LYMPHOCYTES NFR BLD AUTO: 14.2 %
MCH RBC QN AUTO: 35.4 PG (ref 26.5–33)
MCHC RBC AUTO-ENTMCNC: 33.6 G/DL (ref 31.5–36.5)
MCV RBC AUTO: 105 FL (ref 78–100)
MONOCYTES # BLD AUTO: 0.6 10E9/L (ref 0–1.3)
MONOCYTES NFR BLD AUTO: 7.5 %
NEUTROPHILS # BLD AUTO: 5.7 10E9/L (ref 1.6–8.3)
NEUTROPHILS NFR BLD AUTO: 76.8 %
NITRATE UR QL: NEGATIVE
NRBC # BLD AUTO: 0 10*3/UL
NRBC BLD AUTO-RTO: 0 /100
PH UR STRIP: 6.5 PH (ref 5–7)
PLATELET # BLD AUTO: 194 10E9/L (ref 150–450)
POTASSIUM SERPL-SCNC: 3.5 MMOL/L (ref 3.4–5.3)
PROT SERPL-MCNC: 6.1 G/DL (ref 6.8–8.8)
RBC # BLD AUTO: 2.63 10E12/L (ref 4.4–5.9)
RBC #/AREA URNS AUTO: 1 /HPF (ref 0–2)
SODIUM SERPL-SCNC: 141 MMOL/L (ref 133–144)
SP GR UR STRIP: 1.03 (ref 1–1.03)
SQUAMOUS #/AREA URNS AUTO: <1 /HPF (ref 0–1)
URN SPEC COLLECT METH UR: NORMAL
UROBILINOGEN UR STRIP-MCNC: NORMAL MG/DL (ref 0–2)
WBC # BLD AUTO: 7.4 10E9/L (ref 4–11)
WBC #/AREA URNS AUTO: 1 /HPF (ref 0–2)

## 2017-05-26 PROCEDURE — 25000128 H RX IP 250 OP 636: Performed by: EMERGENCY MEDICINE

## 2017-05-26 PROCEDURE — 83690 ASSAY OF LIPASE: CPT | Performed by: EMERGENCY MEDICINE

## 2017-05-26 PROCEDURE — 80076 HEPATIC FUNCTION PANEL: CPT | Performed by: EMERGENCY MEDICINE

## 2017-05-26 PROCEDURE — 25000125 ZZHC RX 250: Performed by: EMERGENCY MEDICINE

## 2017-05-26 PROCEDURE — 99285 EMERGENCY DEPT VISIT HI MDM: CPT | Mod: 25

## 2017-05-26 PROCEDURE — 96365 THER/PROPH/DIAG IV INF INIT: CPT | Mod: 59

## 2017-05-26 PROCEDURE — 81001 URINALYSIS AUTO W/SCOPE: CPT | Performed by: EMERGENCY MEDICINE

## 2017-05-26 PROCEDURE — 80048 BASIC METABOLIC PNL TOTAL CA: CPT | Performed by: EMERGENCY MEDICINE

## 2017-05-26 PROCEDURE — 83605 ASSAY OF LACTIC ACID: CPT | Mod: 91 | Performed by: EMERGENCY MEDICINE

## 2017-05-26 PROCEDURE — 74177 CT ABD & PELVIS W/CONTRAST: CPT

## 2017-05-26 PROCEDURE — 96375 TX/PRO/DX INJ NEW DRUG ADDON: CPT

## 2017-05-26 PROCEDURE — 85025 COMPLETE CBC W/AUTO DIFF WBC: CPT | Performed by: EMERGENCY MEDICINE

## 2017-05-26 RX ORDER — ONDANSETRON 4 MG/1
4 TABLET, ORALLY DISINTEGRATING ORAL EVERY 6 HOURS PRN
Qty: 20 TABLET | Refills: 0 | Status: SHIPPED | OUTPATIENT
Start: 2017-05-26 | End: 2017-08-23

## 2017-05-26 RX ORDER — DEXTROSE, SODIUM CHLORIDE, SODIUM LACTATE, POTASSIUM CHLORIDE, AND CALCIUM CHLORIDE 5; .6; .31; .03; .02 G/100ML; G/100ML; G/100ML; G/100ML; G/100ML
1000 INJECTION, SOLUTION INTRAVENOUS ONCE
Status: COMPLETED | OUTPATIENT
Start: 2017-05-26 | End: 2017-05-26

## 2017-05-26 RX ORDER — ONDANSETRON 2 MG/ML
4 INJECTION INTRAMUSCULAR; INTRAVENOUS ONCE
Status: COMPLETED | OUTPATIENT
Start: 2017-05-26 | End: 2017-05-26

## 2017-05-26 RX ORDER — IOPAMIDOL 755 MG/ML
93 INJECTION, SOLUTION INTRAVASCULAR ONCE
Status: COMPLETED | OUTPATIENT
Start: 2017-05-26 | End: 2017-05-26

## 2017-05-26 RX ADMIN — SODIUM CHLORIDE 68 ML: 9 INJECTION, SOLUTION INTRAVENOUS at 12:36

## 2017-05-26 RX ADMIN — SODIUM CHLORIDE 1000 ML: 9 INJECTION, SOLUTION INTRAVENOUS at 12:46

## 2017-05-26 RX ADMIN — SODIUM CHLORIDE, SODIUM LACTATE, POTASSIUM CHLORIDE, CALCIUM CHLORIDE AND DEXTROSE MONOHYDRATE 1000 ML: 5; 600; 310; 30; 20 INJECTION, SOLUTION INTRAVENOUS at 13:19

## 2017-05-26 RX ADMIN — SODIUM CHLORIDE 1000 ML: 9 INJECTION, SOLUTION INTRAVENOUS at 11:16

## 2017-05-26 RX ADMIN — IOPAMIDOL 93 ML: 755 INJECTION, SOLUTION INTRAVENOUS at 12:36

## 2017-05-26 RX ADMIN — ONDANSETRON 4 MG: 2 SOLUTION INTRAMUSCULAR; INTRAVENOUS at 11:19

## 2017-05-26 ASSESSMENT — ENCOUNTER SYMPTOMS
APPETITE CHANGE: 1
VOMITING: 1
ABDOMINAL PAIN: 1
UNEXPECTED WEIGHT CHANGE: 1

## 2017-05-26 NOTE — ED PROVIDER NOTES
History     Chief Complaint:  Vomiting       HPI   Abebe Christensen is a 71 year old male who presents to the emergency department today for evaluation of vomiting. The patient complains of large volume non-bloody vomiting that began Wednesday afternoon. Its continued to Thursday with the last episode occurring 2am today. Patient only drinks water for medication but he has been unable to eat, secondary to vomiting. Patient also complains of associated lower abdominal pain. He describes his pain as a sharp sensation. He also reports unintentional weight loss over the last 6 months as well as loss in muscle tone and mass. He has lost 25 pounds so far. He denies recent diarrhea but reports history of diarrhea. He denies dark or tarry stools. He denies history of bowel obstruction, abdominal surgery, or history of hypotension. He denies chills or diaphoresis. Patient reports that he was advised to get a routine colonoscopy but he does not want to.  Of note, patient has had outpatient laboratories last month. His creatinine was 1.26. He had mild LFT elevation and a CT scan of the abdomen on 4/14/2017 which revealed finding as noted below.      CT Abdomen Pelvis w/ Contrast:  1. Hepatomegaly without suspicious mass. Small to moderate ascites.  2. Biliary sludge without CT evidence of acute cholecystitis.  3. Hiatal hernia.   4. Colonic diverticulosis without diverticulitis.  5. Atherosclerotic calcifications throughout the abdomen.  6. 3.1 cm infrarenal abdominal aortic aneurysm.    Allergies:  No Known Drug Allergies    Medications:    ciprofloxacin (CIPRO) 500 MG tablet  metFORMIN (GLUCOPHAGE) 500 MG tablet  folic acid (FOLVITE) 1 MG tablet  diltiazem (CARTIA XT) 240 MG 24 hr CD capsule  lisinopril-hydrochlorothiazide (PRINZIDE,ZESTORETIC) 20-12.5 MG per tablet  omeprazole (PRILOSEC OTC) 20 MG tablet  Cholecalciferol (VITAMIN D) 2000 UNITS CAPS  aspirin 325 MG tablet  travoprost Z, benzalkonium, (TRAVATAN) 0.004 %  "ophthalmic solution  dorzolamide-timolol (COSOPT) 2-0.5 % ophthalmic solution    Past Medical History:    GERD (gastroesophageal reflux disease)   Hyperlipidemia   Hypertension    Renal insufficiency, mild     Past Surgical History:    EYE SURGERY-cataract and glaucoma  Colonoscopy     Family History:    Mother: Eye disorder  Father: Colorectal cancer     Social History:  The patient presents alone.  Smoking Status: Current every day smoker  Smokeless Tobacco: Never used  Alcohol Use: Yes  Marital Status:  Single [1]       Review of Systems   Constitutional: Positive for appetite change and unexpected weight change.   Gastrointestinal: Positive for abdominal pain and vomiting.   All other systems reviewed and are negative.    Physical Exam   Vitals:  Patient Vitals for the past 24 hrs:   BP Temp Temp src Pulse Resp SpO2 Height Weight   05/26/17 1032 95/66 97.7  F (36.5  C) Oral 95 18 99 % 1.854 m (6' 1\") 83.9 kg (185 lb)         Physical Exam  General: Resting comfortably on the gurney  Head:  The scalp, face, and head appear normal  Eyes:  The pupils are equal, round, and reactive to light    There is no nystagmus    Extraocular muscles are intact    Conjunctivae and sclerae are normal  ENT:    The nose is normal    Pinnae are normal    External acoustic canals are normal    Tympanic membranes are normal    The oropharynx is normal    Uvula is in the midline  Neck:  Normal range of motion    There is no rigidity noted    There is no midline cervical spine pain/tenderness    Trachea is in the midline    No mass is detected  CV:  Regular rate and underlying rhythm     Normal S1 and S2    No S3 or S4    No pathological murmur detected  Resp:  Lungs are clear    There is no tachypnea    Non-labored    No rales    No wheezing   GI:  Abdomen is soft, there is no rigidity    No distension is noted     No tympani is detected     No rebound tenderness     There is no focal pain in the abdomen at this time    Non-surgical " without peritoneal features  :  Penis is normal    No urethral discharge    Circumcised  Rectal:  Mild prostatic enlargement, no masses, the prostate is non tender, no stool is detected in the   rectum.     Testicles normal and non-tender, no mass    Epididymis normal    No inguinal hernia  MS:  Normal muscular tone    Symmetric motor strength    No major joint effusions    No asymmetric swelling    No calf tenderness  Skin:  No rash or acute skin lesions noted  Neuro: Speech is normal and fluent  Psych:  Awake. Alert.  Normal affect.  Appropriate interactions.  Lymph: No anterior or posterior cervical lymphadenopathy noted    Emergency Department Course   Imaging:  Radiology findings were communicated with the patient who voiced understanding of the findings.    CT Abdomen Pelvis w/ Contrast:  1. Increasing ascites since prior exam. New peritoneal nodularity  along the greater omentum raises the possibility of peritoneal  metastatic disease, but no obvious intra-abdominal or pelvic mass is  appreciated.  2. Moderate-sized hiatal hernia.  3. Cholelithiasis, unchanged.  4. 3.1 cm infrarenal abdominal aortic aneurysm, stable.  Reading per radiology      Laboratory:  Laboratory findings were communicated with the patient who voiced understanding of the findings.    CBC: HGB: 9.3(L) o/w WNL. (WBC 7.4, )     basic metabolic panel: Glucose: 115(H), Creatinine: 1.58(H), GFR: 43(L)    Hepatic panel: Bilirubin Direct: 0.5(H), Albumin: 2.4(L), Protein total: 6.1(L), Alkphos: 164(H), AST: 50(H)    Lipase: 166    Lactic acid (1115): 2.4(H)    Lactic acid (1254): 1.2     UA with Microscopic: Negative     Interventions:  1116- NS 1000 mL IV  1119- Zofran 4 mg IV  1246- NS 1000 mL IV  1319- lactated ringers 1000 mg IV       Emergency Department Course:  Nursing notes and vitals reviewed.  I performed an exam of the patient as documented above.       IV was inserted and blood was drawn for laboratory testing, results  above.    The patient was sent for a CT while in the emergency department, results above.     At 1355 the patient was rechecked and was updated on the results of  laboratory and imaging studies.     The patient provided a urine sample here in the emergency department. This was sent for laboratory testing, findings above.    I discussed the treatment plan with the patient. They expressed understanding of this plan and consented to discharge.    I personally reviewed the laboratory results with the Patient and answered all related questions prior to discharge.    Impression & Plan      Medical Decision Making:  Abebe Christensen is a 71 year old male who presents with vomiting as noted above. The patient was noted to have a ascites on CT scan month ago and on repeat CT scan today the ascites has intensified. He is becoming increasingly more anemic. He does not have hematemesis, hematochezia, or melena. His lactate was  elevated from dehydration.  There is no evidence of severe sepsis or septic shock. The elevated lactate resolved with hydration. There are some suspicious findings both on the laboratory and CT that are worrisome for malignancy. The exact etiology of the malignant process is not yet clear but it may account for the ascites. The patient will likely require detailed internal medicine and  Oncology consultations to look for and/or workup a primary or metastatic malignancy. Given the ascites, the LFT elevations, the weight loss, weakness, and vomiting an underlying malignancy is certainly possible. The patient did not wish to stay in the hospital at this time.        Diagnosis:    ICD-10-CM    1. Non-intractable vomiting with nausea, unspecified vomiting type R11.2    2. Weight loss R63.4    3. Elevated LFTs R79.89    4. Anemia, unspecified type D64.9    5. Other ascites R18.8          Disposition:   The patient was discharged. Follow up with Oncology and primary care this coming week. Zofran as needed for  nausea.     Discharge Medications:  New Prescriptions    ONDANSETRON (ZOFRAN ODT) 4 MG ODT TAB    Take 1 tablet (4 mg) by mouth every 6 hours as needed for nausea       Scribe Disclosure:  JONATHAN, Cindy Douglas, am serving as a scribe at 10:39 AM on 5/26/2017 to document services personally performed by Eloy Burdick MD, based on my observations and the provider's statements to me.    5/26/2017    EMERGENCY DEPARTMENT       Eloy Burdick MD  05/26/17 8416

## 2017-05-26 NOTE — DISCHARGE INSTRUCTIONS
Ascites    Ascites is fluid collecting in the abdomen (stomach area). Symptoms include swelling of the abdomen and a feeling of pressure. Shortness of breath may also occur. In severe cases, the feet, ankles and legs may also swell.   There are many causes of ascites. The most common are related to the liver. They include:    Long-term alcohol abuse    Hepatitis    Diseases such as congestive heart failure, kidney failure, pancreatitis, or cancer  To treat the condition, a low-salt diet may be recommended. Medicines that help fluid leave the body (diuretics) may be prescribed. In some cases, a procedure is done to drain the abdomen of fluid. This is called paracentesis. Unless the underlying cause is treated, the fluid is likely to return.  If liver damage is due to alcohol, stopping all alcohol will help slow the progress of the disease. If liver damage is from hepatitis B or C, treatments may be given to fight the virus. If liver damage becomes life threatening, a liver transplant may be needed.  Home care    Certain medicines can worsen liver damage. Talk to your healthcare provider or pharmacist about any medicines you currently take. Ask your healthcare provider or pharmacist before taking any new medicines. Also ask before taking herbs, vitamins, or minerals. Certain ones affect the liver.    Do not taking acetaminophen or ibuprofen without taking to your healthcare provider first. Both can affect your liver.     Stop all alcohol use. If you abuse alcohol, talk to your healthcare provider about getting help and support to stop.     If you use IV drugs, seek help to stop. Never share needles or other equipment.    Follow-up care  Follow up with your healthcare provider as advised. If a culture was done, call as directed for the results. Depending on the results, your treatment may change.  The following sources can tell you more about ascites and help you find support.    American [employer] Foundation  260.766.8995 www.liverfoundation.org    Hepatitis Foundation International www.hepfi.org    Alcoholics Anonymous www.aa.org    National [place] on Alcoholism and Drug Dependence 853-927-0256 www.ncadd.org  When to seek medical advice  Call your healthcare provider right away if you have any of the following:    Sudden weight gain with increased size of your abdomen or leg swelling    Increasing jaundice (yellowing of skin or eyes)    Excess bleeding from cuts or injuries    Blood in vomit or stool (black or red color)    Trouble breathing    Increasing abdominal pain    Fever of 100.4 F (38 C) or higher, or as directed by your healthcare provider    0094-9417 [employer]. [address] [place] [address], [place] [address]. All rights reserved. This information is not intended as a substitute for professional medical care. Always follow your healthcare professional's instructions.          Anemia  Anemia is a condition that occurs when your body does not have enough healthy red blood cells (RBCs). Your RBCs are the parts of your blood that carry oxygen throughout your body. A protein called hemoglobin allows your RBCs to absorb and release oxygen. Without enough RBCs or hemoglobin, your body doesn't get enough oxygen. Symptoms of anemia may then occur.    Symptoms of anemia  Some people with anemia have no symptoms. But most people have symptoms that range from mild to severe. These can include:    Tiredness (fatigue)    Weakness    Pale skin    Shortness of breath    Dizziness or fainting    Rapid heartbeat    Trouble doing normal amounts of activity    Jaundice (yellowing of your eyes, skin, or mouth; dark urine)  Causes of anemia  Anemia can occur when your body:    Loses too much blood    Does not make enough RBCs    Destroys your RBCs at a faster rate than it can replace them    Does not make a normal amount of hemoglobin in your RBCs  These problems can occur for many reasons, including:    A condition  that you are born with (congenital or inherited). This includes sickle cell disease or thalassemia.    Heavy bleeding for any reason, including injury, surgery, childbirth, or even heavy menstrual periods.    Being low in certain nutrients, such as iron, folate, or vitamin B12. This may be due to poor diet. Also, a condition like celiac disease or Crohn's disease can cause poor absorption of these nutrients    Certain chronic conditions like diabetes, arthritis, or kidney disease.    Certain chronic infections like tuberculosis or HIV.    Exposure to certain medications, such as those used for chemotherapy.  There are different types of anemia. Your doctor can tell you more about the type of anemia you have and what may have caused it.  Diagnosing anemia  To diagnose anemia, your doctor gives you blood tests. These can include:    Complete blood cell count (CBC). This test measures the amounts of the different types of blood cells.    Blood smear. This test checks the size and shape of your blood cells. To perform the test, your doctor views a drop of your blood under a microscope. Your doctor uses a stain to make the blood cells easier to see.    Iron studies. These tests measure the amount of iron in your blood. Your body needs iron to make hemoglobin in your RBCs.    Vitamin B12 and folate studies. These tests check for some of the components that help give RBCs a normal size and shape.    Reticulocyte count. This test measures the amount of new RBCs that your bone marrow makes.    Hemoglobin electrophoresis. This test checks for problems with your hemoglobin in RBCs.  Treating anemia  Treatment for anemia is based on the type of anemia, its cause, and the severity of your symptoms. Treatments may include:    Diet changes. This involves increasing the amount of certain nutrients in your diet, such as iron, vitamin B12, or folate. Your doctor may also prescribe nutrient supplements.    Medications. Certain  medications treat the cause of your anemia. Others help build new RBCs or relieve symptoms. If a medication is the cause of your anemia, you may need to stop or change it.    Blood transfusions. Replacing some of your blood can increase the number of healthy RBCs in your body.    Surgery. In some cases, your doctor can do surgery to treat the underlying cause of anemia. If you need surgery, your doctor will explain the procedure and outline the risks and benefits for you.  Long-term concerns  If you have a certain type of anemia, you can expect a full recovery after treatment. If you have other types of anemia (especially a type you're born with), you will need to manage it for life. Your doctor can tell you more.    2469-4170 [employer]. [address] [place] [address], [place] [address]. All rights reserved. This information is not intended as a substitute for professional medical care. Always follow your healthcare professional's instructions.          See primary care and ONCOLOGY next week for consultations.

## 2017-05-26 NOTE — PROGRESS NOTES
"Reported throwing up large amounts of yellow fluids , look like \"urine\",. Last night he threw up at 02: 00 a.m. . Bowel sounds are present, abdomen is soft. Reported weight loss of 25 lbs, in the last 6 months.  "

## 2017-05-26 NOTE — ED NOTES
Vital signs stable, received 2 liters of IV fluids.  Zofran given for nausea, at this time he denies any nausea or vomiting.  Reviewed discharge orders with patient.

## 2017-05-26 NOTE — ED AVS SNAPSHOT
Emergency Department    [address] [place] [id number]    Phone:  [phone]    Fax:  [phone]                                       Abebe Christensen   MRN: [id number]    Department:   Emergency Department   Date of Visit:  5/26/2017           After Visit Summary Signature Page     I have received my discharge instructions, and my questions have been answered. I have discussed any challenges I see with this plan with the nurse or doctor.    ..........................................................................................................................................  Patient/Patient Representative Signature      ..........................................................................................................................................  Patient Representative Print Name and Relationship to Patient    ..................................................               ................................................  Date                                            Time    ..........................................................................................................................................  Reviewed by Signature/Title    ...................................................              ..............................................  Date                                                            Time

## 2017-05-26 NOTE — TELEPHONE ENCOUNTER
"Abebe Christensen is a 71 year old male who calls with vomiting. Patient was advised to follow up with ED for further tx of vomiting and weakness. He has not scheduled with GI doctor.  He is requesting CT test results from 04/2017. Please review CT and advice.    NURSING ASSESSMENT:  Description:  Pt called reporting he had 4 episodes of vomiting large amounts of \"urine\". States he had severe abdominal pain yesterday. He also feels weak and has decrease appetite. Denies other symptoms.   Onset/duration:  3 days -since Wednesday   Precip. factors:  unknown  Associated symptoms:  See above  Improves/worsens symptoms:  none  Pain scale (0-10)  States abdominal pain is mild today  LMP/preg/breast feeding:  n/a  Last exam/Treatment:    Allergies: No Known Allergies    MEDICATIONS:   Taking medication(s) as prescribed? Yes  Taking over the counter medication(s?) No  Any medication side effects? No significant side effects    Any barriers to taking medication(s) as prescribed?  No  Medication(s) improving/managing symptoms?  N/A  Medication reconciliation completed: No      NURSING PLAN: Routed to provider Yes and Nursing advice to patient See ED for further evalution & Tx of weakness and vomiting.     RECOMMENDED DISPOSITION:  To ED, another person to drive - see above  Will comply with recommendation: Yes  If further questions/concerns or if symptoms do not improve, worsen or new symptoms develop, call your PCP or [facility] as soon as possible.      Guideline used:  Telephone Triage Protocols for Nurses, Fourth Edition, Jessie Bland RN      "

## 2017-05-26 NOTE — TELEPHONE ENCOUNTER
"CT scan and chart briefly reviewed. CT scan shows some abnormalities but they are essentially non-specific: liver is enlarged, but no evidence of a mass, tumor, or cirrhosis. Gallbladder has \"sludge\" but no evidence of stone or infection resulting in inflammation. Hiatal hernia, which can cause acid reflux, but nothing acute. Given intractable vomiting, I agree patient should be seen in ED today. Longer term, in light of abnormal CT and abnormal blood work, patient needs to schedule f/u with GI specialists (patient was referred by his PCP last month).  "

## 2017-05-26 NOTE — ED AVS SNAPSHOT
Emergency Department    [address] [facility] [address]    Phone:  [phone]    Fax:  [phone]                                       Abeeb Christensen   MRN: [id number]    Department:   Emergency Department   Date of Visit:  5/26/2017           Patient Information     Date Of Birth          1945        Your diagnoses for this visit were:     Non-intractable vomiting with nausea, unspecified vomiting type     Weight loss     Elevated LFTs     Anemia, unspecified type     Other ascites        You were seen by Eloy Burdick MD.      Follow-up Information     Schedule an appointment as soon as possible for a visit with Nirmal Ashford MD.    Specialty:  Oncology    Contact information:    [facility]  [address] [address] [phone]          Discharge Instructions         Ascites    Ascites is fluid collecting in the abdomen (stomach area). Symptoms include swelling of the abdomen and a feeling of pressure. Shortness of breath may also occur. In severe cases, the feet, ankles and legs may also swell.   There are many causes of ascites. The most common are related to the liver. They include:    Long-term alcohol abuse    Hepatitis    Diseases such as congestive heart failure, kidney failure, pancreatitis, or cancer  To treat the condition, a low-salt diet may be recommended. Medicines that help fluid leave the body (diuretics) may be prescribed. In some cases, a procedure is done to drain the abdomen of fluid. This is called paracentesis. Unless the underlying cause is treated, the fluid is likely to return.  If liver damage is due to alcohol, stopping all alcohol will help slow the progress of the disease. If liver damage is from hepatitis B or C, treatments may be given to fight the virus. If liver damage becomes life threatening, a liver transplant may be needed.  Home care    Certain medicines can worsen liver damage. Talk to your healthcare  provider or pharmacist about any medicines you currently take. Ask your healthcare provider or pharmacist before taking any new medicines. Also ask before taking herbs, vitamins, or minerals. Certain ones affect the liver.    Do not taking acetaminophen or ibuprofen without taking to your healthcare provider first. Both can affect your liver.     Stop all alcohol use. If you abuse alcohol, talk to your healthcare provider about getting help and support to stop.     If you use IV drugs, seek help to stop. Never share needles or other equipment.    Follow-up care  Follow up with your healthcare provider as advised. If a culture was done, call as directed for the results. Depending on the results, your treatment may change.  The following sources can tell you more about ascites and help you find support.    [employer] 228-464-2344 www.liverfoundation.org    Hepatitis Foundation International www.hepfi.org    Alcoholics Anonymous www.aa.org    National [place] on Alcoholism and Drug Dependence 194-294-5962 www.ncadd.org  When to seek medical advice  Call your healthcare provider right away if you have any of the following:    Sudden weight gain with increased size of your abdomen or leg swelling    Increasing jaundice (yellowing of skin or eyes)    Excess bleeding from cuts or injuries    Blood in vomit or stool (black or red color)    Trouble breathing    Increasing abdominal pain    Fever of 100.4 F (38 C) or higher, or as directed by your healthcare provider    [phone] [employer]. [address] [place] [address], [place] [id number]. All rights reserved. This information is not intended as a substitute for professional medical care. Always follow your healthcare professional's instructions.          Anemia  Anemia is a condition that occurs when your body does not have enough healthy red blood cells (RBCs). Your RBCs are the parts of your blood that carry oxygen throughout your body. A protein  called hemoglobin allows your RBCs to absorb and release oxygen. Without enough RBCs or hemoglobin, your body doesn't get enough oxygen. Symptoms of anemia may then occur.    Symptoms of anemia  Some people with anemia have no symptoms. But most people have symptoms that range from mild to severe. These can include:    Tiredness (fatigue)    Weakness    Pale skin    Shortness of breath    Dizziness or fainting    Rapid heartbeat    Trouble doing normal amounts of activity    Jaundice (yellowing of your eyes, skin, or mouth; dark urine)  Causes of anemia  Anemia can occur when your body:    Loses too much blood    Does not make enough RBCs    Destroys your RBCs at a faster rate than it can replace them    Does not make a normal amount of hemoglobin in your RBCs  These problems can occur for many reasons, including:    A condition that you are born with (congenital or inherited). This includes sickle cell disease or thalassemia.    Heavy bleeding for any reason, including injury, surgery, childbirth, or even heavy menstrual periods.    Being low in certain nutrients, such as iron, folate, or vitamin B12. This may be due to poor diet. Also, a condition like celiac disease or Crohn's disease can cause poor absorption of these nutrients    Certain chronic conditions like diabetes, arthritis, or kidney disease.    Certain chronic infections like tuberculosis or HIV.    Exposure to certain medications, such as those used for chemotherapy.  There are different types of anemia. Your doctor can tell you more about the type of anemia you have and what may have caused it.  Diagnosing anemia  To diagnose anemia, your doctor gives you blood tests. These can include:    Complete blood cell count (CBC). This test measures the amounts of the different types of blood cells.    Blood smear. This test checks the size and shape of your blood cells. To perform the test, your doctor views a drop of your blood under a microscope. Your  doctor uses a stain to make the blood cells easier to see.    Iron studies. These tests measure the amount of iron in your blood. Your body needs iron to make hemoglobin in your RBCs.    Vitamin B12 and folate studies. These tests check for some of the components that help give RBCs a normal size and shape.    Reticulocyte count. This test measures the amount of new RBCs that your bone marrow makes.    Hemoglobin electrophoresis. This test checks for problems with your hemoglobin in RBCs.  Treating anemia  Treatment for anemia is based on the type of anemia, its cause, and the severity of your symptoms. Treatments may include:    Diet changes. This involves increasing the amount of certain nutrients in your diet, such as iron, vitamin B12, or folate. Your doctor may also prescribe nutrient supplements.    Medications. Certain medications treat the cause of your anemia. Others help build new RBCs or relieve symptoms. If a medication is the cause of your anemia, you may need to stop or change it.    Blood transfusions. Replacing some of your blood can increase the number of healthy RBCs in your body.    Surgery. In some cases, your doctor can do surgery to treat the underlying cause of anemia. If you need surgery, your doctor will explain the procedure and outline the risks and benefits for you.  Long-term concerns  If you have a certain type of anemia, you can expect a full recovery after treatment. If you have other types of anemia (especially a type you're born with), you will need to manage it for life. Your doctor can tell you more.    [id number] [employer]. [address] [place] [address], [place] [address]. All rights reserved. This information is not intended as a substitute for professional medical care. Always follow your healthcare professional's instructions.          See primary care and ONCOLOGY next week for consultations.    Discharge References/Attachments     NAUSEA AND VOMITING, HOW TO CONTROL  (ENGLISH)      24 Hour Appointment Hotline       To make an appointment at any [facility], call [phone]). If you don't have a family doctor or clinic, we will help you find one. [facility] clinics are conveniently located to serve the needs of you and your family.             Review of your medicines      START taking        Dose / Directions Last dose taken    ondansetron 4 MG ODT tab   Commonly known as:  ZOFRAN ODT   Dose:  4 mg   Quantity:  20 tablet        Take 1 tablet (4 mg) by mouth every 6 hours as needed for nausea   Refills:  0          Our records show that you are taking the medicines listed below. If these are incorrect, please call your family doctor or clinic.        Dose / Directions Last dose taken    aspirin 325 MG tablet   Dose:  81 mg        Take 81 mg by mouth daily   Refills:  0        ciprofloxacin 500 MG tablet   Commonly known as:  CIPRO   Dose:  500 mg   Quantity:  20 tablet        Take 1 tablet (500 mg) by mouth 2 times daily   Refills:  0        diltiazem 240 MG 24 hr capsule   Commonly known as:  CARTIA XT   Quantity:  90 capsule        TAKE 1 CAPSULE BY MOUTH DAILY   Refills:  3        dorzolamide-timolol 2-0.5 % ophthalmic solution   Commonly known as:  COSOPT   Dose:  1 drop        1 drop 2 times daily.   Refills:  0        folic acid 1 MG tablet   Commonly known as:  FOLVITE   Quantity:  120 tablet        TAKE 4 TABLETS BY MOUTH DAILY   Refills:  7        lisinopril-hydrochlorothiazide 20-12.5 MG per tablet   Commonly known as:  PRINZIDE/ZESTORETIC   Dose:  1 tablet   Quantity:  90 tablet        Take 1 tablet by mouth daily   Refills:  3        metFORMIN 500 MG tablet   Commonly known as:  GLUCOPHAGE   Dose:  500 mg   Quantity:  30 tablet        Take 1 tablet (500 mg) by mouth daily (with dinner) 20 minutes before dinner   Refills:  5        omeprazole 20 MG tablet   Commonly known as:  priLOSEC OTC   Dose:  20 mg   Quantity:  90 tablet        Take 1 tablet  (20 mg) by mouth daily   Refills:  3        TRAVATAN 0.004 % ophthalmic solution   Dose:  1 drop   Generic drug:  travoprost Z (benzalkonium)        1 drop every evening.   Refills:  0        vitamin D 2000 UNITS Caps   Dose:  1 capsule        Take 1 capsule by mouth daily   Refills:  0                Prescriptions were sent or printed at these locations (1 Prescription)                   Other Prescriptions                Printed at Department/Unit printer (1 of 1)         ondansetron (ZOFRAN ODT) 4 MG ODT tab                Procedures and tests performed during your visit     Basic metabolic panel    CBC with platelets differential    CT Abdomen Pelvis w Contrast    Hepatic panel    Lactic acid    Lactic acid    Lipase    UA with Microscopic      Orders Needing Specimen Collection     None      Pending Results     No orders found from 5/24/2017 to 5/27/2017.            Pending Culture Results     No orders found from 5/24/2017 to 5/27/2017.            Pending Results Instructions     If you had any lab results that were not finalized at the time of your Discharge, you can call the ED Lab Result RN at [phone]. You will be contacted by this team for any positive Lab results or changes in treatment. The nurses are available 7 days a week from 10A to 6:30P.  You can leave a message 24 hours per day and they will return your call.        Test Results From Your Hospital Stay        5/26/2017 11:28 AM      Component Results     Component Value Ref Range & Units Status    WBC 7.4 4.0 - 11.0 10e9/L Final    RBC Count 2.63 (L) 4.4 - 5.9 10e12/L Final    Hemoglobin 9.3 (L) 13.3 - 17.7 g/dL Final    Hematocrit 27.7 (L) 40.0 - 53.0 % Final     (H) 78 - 100 fl Final    MCH 35.4 (H) 26.5 - 33.0 pg Final    MCHC 33.6 31.5 - 36.5 g/dL Final    RDW 13.2 10.0 - 15.0 % Final    Platelet Count 194 150 - 450 10e9/L Final    Diff Method Automated Method  Final    % Neutrophils 76.8 % Final    % Lymphocytes 14.2 % Final    %  Monocytes 7.5 % Final    % Eosinophils 1.1 % Final    % Basophils 0.1 % Final    % Immature Granulocytes 0.3 % Final    Nucleated RBCs 0 0 /100 Final    Absolute Neutrophil 5.7 1.6 - 8.3 10e9/L Final    Absolute Lymphocytes 1.1 0.8 - 5.3 10e9/L Final    Absolute Monocytes 0.6 0.0 - 1.3 10e9/L Final    Absolute Eosinophils 0.1 0.0 - 0.7 10e9/L Final    Absolute Basophils 0.0 0.0 - 0.2 10e9/L Final    Abs Immature Granulocytes 0.0 0 - 0.4 10e9/L Final    Absolute Nucleated RBC 0.0  Final         5/26/2017 11:45 AM      Component Results     Component Value Ref Range & Units Status    Sodium 141 133 - 144 mmol/L Final    Potassium 3.5 3.4 - 5.3 mmol/L Final    Chloride 106 94 - 109 mmol/L Final    Carbon Dioxide 24 20 - 32 mmol/L Final    Anion Gap 11 3 - 14 mmol/L Final    Glucose 115 (H) 70 - 99 mg/dL Final    Urea Nitrogen 13 7 - 30 mg/dL Final    Creatinine 1.58 (H) 0.66 - 1.25 mg/dL Final    GFR Estimate 43 (L) >60 mL/min/1.7m2 Final    Non  GFR Calc    GFR Estimate If Black 53 (L) >60 mL/min/1.7m2 Final    African American GFR Calc    Calcium 8.3 (L) 8.5 - 10.1 mg/dL Final         5/26/2017 11:39 AM      Component Results     Component Value Ref Range & Units Status    Lactic Acid 2.4 (H) 0.4 - 2.0 mmol/L Final         5/26/2017 11:45 AM      Component Results     Component Value Ref Range & Units Status    Bilirubin Direct 0.5 (H) 0.0 - 0.2 mg/dL Final    Bilirubin Total 1.3 0.2 - 1.3 mg/dL Final    Albumin 2.4 (L) 3.4 - 5.0 g/dL Final    Protein Total 6.1 (L) 6.8 - 8.8 g/dL Final    Alkaline Phosphatase 164 (H) 40 - 150 U/L Final    ALT 12 0 - 70 U/L Final    AST 50 (H) 0 - 45 U/L Final         5/26/2017 11:45 AM      Component Results     Component Value Ref Range & Units Status    Lipase 166 73 - 393 U/L Final         5/26/2017  1:30 PM      Narrative     CT ABDOMEN AND PELVIS WITH CONTRAST  5/26/2017 12:47 PM     HISTORY: Vomiting 2 days, large volumes.  Obstruction. Gastric  outlet  obstruction.     COMPARISON: CT abdomen and pelvis 4/14/2017.    TECHNIQUE: Axial images from the lung bases to the symphysis are  performed with additional coronal reformatted images. 93 mL of Isovue  370 are given intravenously.  Radiation dose for this scan was reduced  using automated exposure control, adjustment of the mA and/or kV  according to patient size, or iterative reconstruction technique.    FINDINGS: Atelectatic left lung base changes are noted with a trace  amount of left pleural fluid. No right pleural fluid is evident. No  pericardial fluid. Moderate-sized hiatal hernia is present.    Abdomen: The liver, spleen, pancreas, adrenal glands and kidneys are  unremarkable. Subcentimeter left renal cortical cyst is present on  image 20 of series 2 and is stable. Gallstone or sludge is noted in  the gallbladder which is unchanged. Infrarenal abdominal aortic  aneurysm demonstrates calcified and noncalcified plaque and is stable  in size measuring 3.1 cm on image 42. Small alexander hepatis lymph nodes  are present. No enlarged lymph nodes are appreciated. Colonic  diverticulosis without evidence of diverticulitis. Appendix is normal.  There has been a significant increase in patient's abdominal ascites.  A few dilated loops of proximal small bowel are present without  evidence of a transition point, possibly reflecting adynamic ileus  rather than obstruction. Areas of peritoneal nodularity are noted  involving the greater omentum raising concern for the potential of  peritoneal metastatic disease along with increasing ascites. Portal  veins and hepatic veins remain patent along with the splenic vein and  SMV.    Pelvis: The bladder, prostate and rectum are unremarkable. Moderate to  large amount of free pelvic fluid has increased since prior exam. No  enlarged pelvic lymph nodes. Bone window examination is within normal  limits.        Impression     IMPRESSION:  1. Increasing ascites since prior exam.  New peritoneal nodularity  along the greater omentum raises the possibility of peritoneal  metastatic disease, but no obvious intra-abdominal or pelvic mass is  appreciated.  2. Moderate-sized hiatal hernia.  3. Cholelithiasis, unchanged.  4. 3.1 cm infrarenal abdominal aortic aneurysm, stable.    RUPINDER FIGUEROA MD         5/26/2017  1:35 PM      Component Results     Component Value Ref Range & Units Status    Color Urine Yellow  Final    Appearance Urine Clear  Final    Glucose Urine Negative NEG mg/dL Final    Bilirubin Urine Negative NEG Final    Ketones Urine Negative NEG mg/dL Final    Specific Gravity Urine 1.033 1.003 - 1.035 Final    Blood Urine Negative NEG Final    pH Urine 6.5 5.0 - 7.0 pH Final    Protein Albumin Urine Negative NEG mg/dL Final    Urobilinogen mg/dL Normal 0.0 - 2.0 mg/dL Final    Nitrite Urine Negative NEG Final    Leukocyte Esterase Urine Negative NEG Final    Source Midstream Urine  Final    WBC Urine 1 0 - 2 /HPF Final    RBC Urine 1 0 - 2 /HPF Final    Squamous Epithelial /HPF Urine <1 0 - 1 /HPF Final         5/26/2017  1:11 PM      Component Results     Component Value Ref Range & Units Status    Lactic Acid 1.2 0.7 - 2.1 mmol/L Final                Clinical Quality Measure: Blood Pressure Screening     Your blood pressure was checked while you were in the emergency department today. The last reading we obtained was  BP: 98/74 . Please read the guidelines below about what these numbers mean and what you should do about them.  If your systolic blood pressure (the top number) is less than 120 and your diastolic blood pressure (the bottom number) is less than 80, then your blood pressure is normal. There is nothing more that you need to do about it.  If your systolic blood pressure (the top number) is 120-139 or your diastolic blood pressure (the bottom number) is 80-89, your blood pressure may be higher than it should be. You should have your blood pressure rechecked within a year by a  "primary care provider.  If your systolic blood pressure (the top number) is 140 or greater or your diastolic blood pressure (the bottom number) is 90 or greater, you may have high blood pressure. High blood pressure is treatable, but if left untreated over time it can put you at risk for heart attack, stroke, or kidney failure. You should have your blood pressure rechecked by a primary care provider within the next 4 weeks.  If your provider in the emergency department today gave you specific instructions to follow-up with your doctor or provider even sooner than that, you should follow that instruction and not wait for up to 4 weeks for your follow-up visit.        Thank you for choosing [facility]       Thank you for choosing [facility] for your care. Our goal is always to provide you with excellent care. Hearing back from our patients is one way we can continue to improve our services. Please take a few minutes to complete the written survey that you may receive in the mail after you visit with us. Thank you!        [employer] Information     [facility] lets you send messages to your doctor, view your test results, renew your prescriptions, schedule appointments and more. To sign up, go to [email or web address] . Click on \"Log in\" on the left side of the screen, which will take you to the Welcome page. Then click on \"Sign up Now\" on the right side of the page.     You will be asked to enter the access code listed below, as well as some personal information. Please follow the directions to create your username and password.     Your access code is: [email or web address]  Expires: 2017  6:24 PM     Your access code will  in 90 days. If you need help or a new code, please call your [facility] clinic or [phone].        Care EveryWhere ID     This is your Care EveryWhere ID. This could be used by other organizations to access your [facility] medical records  [id number]        After Visit Summary       This is your record. " Keep this with you and show to your community pharmacist(s) and doctor(s) at your next visit.

## 2017-05-29 ENCOUNTER — TELEPHONE (OUTPATIENT)
Dept: FAMILY MEDICINE | Facility: CLINIC | Age: 72
End: 2017-05-29

## 2017-05-29 NOTE — TELEPHONE ENCOUNTER
Had abnormal liver tests    pleae call and have him come in for recheck, has multiple abnomral liver tests.     Steven Tapia MD

## 2017-06-07 ENCOUNTER — OFFICE VISIT (OUTPATIENT)
Dept: FAMILY MEDICINE | Facility: CLINIC | Age: 72
End: 2017-06-07
Payer: COMMERCIAL

## 2017-06-07 VITALS
OXYGEN SATURATION: 100 % | BODY MASS INDEX: 23.48 KG/M2 | RESPIRATION RATE: 15 BRPM | TEMPERATURE: 97.4 F | SYSTOLIC BLOOD PRESSURE: 112 MMHG | DIASTOLIC BLOOD PRESSURE: 64 MMHG | HEART RATE: 84 BPM | WEIGHT: 178 LBS

## 2017-06-07 DIAGNOSIS — R20.0 NUMBNESS OF FEET: ICD-10-CM

## 2017-06-07 DIAGNOSIS — E11.9 DIABETES MELLITUS WITHOUT COMPLICATION (H): ICD-10-CM

## 2017-06-07 DIAGNOSIS — R53.83 FATIGUE, UNSPECIFIED TYPE: ICD-10-CM

## 2017-06-07 DIAGNOSIS — E03.9 HYPOTHYROIDISM, UNSPECIFIED TYPE: ICD-10-CM

## 2017-06-07 DIAGNOSIS — R63.4 LOSS OF WEIGHT: ICD-10-CM

## 2017-06-07 DIAGNOSIS — Z23 NEED FOR PROPHYLACTIC VACCINATION AGAINST STREPTOCOCCUS PNEUMONIAE (PNEUMOCOCCUS): Primary | ICD-10-CM

## 2017-06-07 DIAGNOSIS — D64.9 ANEMIA, UNSPECIFIED TYPE: ICD-10-CM

## 2017-06-07 LAB
ANION GAP SERPL CALCULATED.3IONS-SCNC: 10 MMOL/L (ref 3–14)
BUN SERPL-MCNC: 7 MG/DL (ref 7–30)
CALCIUM SERPL-MCNC: 8.4 MG/DL (ref 8.5–10.1)
CHLORIDE SERPL-SCNC: 105 MMOL/L (ref 94–109)
CO2 SERPL-SCNC: 23 MMOL/L (ref 20–32)
CREAT SERPL-MCNC: 1.13 MG/DL (ref 0.66–1.25)
ERYTHROCYTE [SEDIMENTATION RATE] IN BLOOD BY WESTERGREN METHOD: 29 MM/H (ref 0–20)
FOLATE SERPL-MCNC: >100 NG/ML
GFR SERPL CREATININE-BSD FRML MDRD: 64 ML/MIN/1.7M2
GLUCOSE SERPL-MCNC: 118 MG/DL (ref 70–99)
POTASSIUM SERPL-SCNC: 3.1 MMOL/L (ref 3.4–5.3)
SODIUM SERPL-SCNC: 138 MMOL/L (ref 133–144)
TSH SERPL DL<=0.05 MIU/L-ACNC: 13.73 MU/L (ref 0.4–4)
VIT B12 SERPL-MCNC: 384 PG/ML (ref 193–986)

## 2017-06-07 PROCEDURE — 99214 OFFICE O/P EST MOD 30 MIN: CPT | Performed by: FAMILY MEDICINE

## 2017-06-07 PROCEDURE — 85652 RBC SED RATE AUTOMATED: CPT | Performed by: FAMILY MEDICINE

## 2017-06-07 PROCEDURE — 00000402 ZZHCL STATISTIC TOTAL PROTEIN: Performed by: FAMILY MEDICINE

## 2017-06-07 PROCEDURE — 82607 VITAMIN B-12: CPT | Performed by: FAMILY MEDICINE

## 2017-06-07 PROCEDURE — 84165 PROTEIN E-PHORESIS SERUM: CPT | Performed by: FAMILY MEDICINE

## 2017-06-07 PROCEDURE — 86038 ANTINUCLEAR ANTIBODIES: CPT | Performed by: FAMILY MEDICINE

## 2017-06-07 PROCEDURE — 36415 COLL VENOUS BLD VENIPUNCTURE: CPT | Performed by: FAMILY MEDICINE

## 2017-06-07 PROCEDURE — 82746 ASSAY OF FOLIC ACID SERUM: CPT | Performed by: FAMILY MEDICINE

## 2017-06-07 PROCEDURE — 86431 RHEUMATOID FACTOR QUANT: CPT | Performed by: FAMILY MEDICINE

## 2017-06-07 PROCEDURE — 84443 ASSAY THYROID STIM HORMONE: CPT | Performed by: FAMILY MEDICINE

## 2017-06-07 PROCEDURE — 80048 BASIC METABOLIC PNL TOTAL CA: CPT | Performed by: FAMILY MEDICINE

## 2017-06-07 NOTE — PROGRESS NOTES
SUBJECTIVE:                                                    Abebe Christensen is a 71 year old male who presents to clinic today for the following health issues:      ED/UC Followup:    Facility:  [facility]  Date of visit: 5/26/17  Reason for visit: Vomiting, weight loss, loss of appetite  Current Status: patient states that he is feeling better, no episodes of vomiting     Feet tingle  Previous vit b-12 and folate were normal.  Some vomiting before last er visit., no nausea.   Recently in er. Vomiting and had elevated lft. He has stopped alcohol use.     PROBLEMS TO ADD ON...    Problem list and histories reviewed & adjusted, as indicated.  Additional history: as documented    Patient Active Problem List   Diagnosis     GERD (gastroesophageal reflux disease)     CKD (chronic kidney disease) stage 3, GFR 30-59 ml/min     Essential hypertension, benign     H/O polycythemia vera     Glucose intolerance (impaired glucose tolerance)     Family history of diabetes mellitus     Coryza for 20 yrs      Screening for prostate cancer     Tobacco abuse: [age]y/o @ 1ppd=19pk yr hx;only cigars since      COPD (chronic obstructive pulmonary disease): spirometry 5-14-15      Risk for falls     Past Surgical History:   Procedure Laterality Date     EYE SURGERY  2008    cataract and glaucoma       Social History   Substance Use Topics     Smoking status: Current Every Day Smoker     Types: Cigarettes     Smokeless tobacco: Never Used     Alcohol use Yes     Family History   Problem Relation Age of Onset     Eye Disorder Mother      Cancer - colorectal Father          Recently in er was recommended to follow up in view of anemia and lft and other lab tests and brad some tingling and numbness in the feet.   Reviewed and updated as needed this visit by clinical staff       Reviewed and updated as needed this visit by Provider         ROS:  CONSTITUTIONAL:NEGATIVE for fever, chills,he has had a change in weight- lost  weight  INTEGUMENTARY/SKIN: NEGATIVE for worrisome rashes, moles or lesions  EYES: NEGATIVE for vision changes or irritation  ENT/MOUTH: NEGATIVE for ear, mouth and throat problems  RESP:NEGATIVE for significant cough or SOB  CV: NEGATIVE for chest pain, palpitations or peripheral edema  GI: no nausea, was vomiting previously and saw er. Ct showed some ascites.   : negative for dysuria, hematuria, decreased urinary stream, erectile dysfunction  NEURO: fatigue and loosing weight.   ENDOCRINE: NEGATIVE for temperature intolerance, skin/hair changes   Heme/onc.          OBJECTIVE:                                                    /64 (BP Location: Left arm, Patient Position: Chair, Cuff Size: Adult Regular)  Pulse 84  Temp 97.4  F (36.3  C) (Tympanic)  Resp 15  Wt 178 lb (80.7 kg)  SpO2 100%  BMI 23.48 kg/m2  Body mass index is 23.48 kg/(m^2).  GENERAL APPEARANCE: healthy, alert and moderate distress  EYES: Eyes grossly normal to inspection, PERRL and conjunctivae and sclerae normal  RESP: lungs clear to auscultation - no rales, rhonchi or wheezes  CV: regular rates and rhythm, normal S1 S2, no S3 or S4 and no murmur, click or rub  ABDOMEN: soft, nontender, without hepatosplenomegaly or masses and bowel sounds normal   (male): not examined  MS: extremities normal- no gross deformities noted  SKIN: no suspicious lesions or rashes  PSYCH: mentation appears normal, affect normal/bright and anxious    Diagnostic test results:  Diagnostic Test Results:  Has already scheduled upper gi endoscoopy and colonosopcy. Labs as ordered. Discussed the anemia may be related to gi problems such as ulcer or colon pppolyp and the gi evaluation will be helpful to address these. Discussed that cause of the foot sensations and paresthesias is unlcear and will need further evlauation to clarify this labs as ordered may need neuro eval or oncology eval discussed waiting until the gi procedures are comlpleted. Although can  proced with current labs and serum protein electrophoresis.      ASSESSMENT/PLAN:                                                    1. Need for prophylactic vaccination against Streptococcus pneumoniae (pneumococcus)      2. Fatigue, unspecified type    - Protein electrophoresis  - ONC/HEME ADULT REFERRAL    3. Loss of weight    - Protein electrophoresis  - TSH  - ONC/HEME ADULT REFERRAL    4. Numbness of feet    - Vitamin B12  - Folate    5. Diabetes mellitus without complication (H)    - Basic metabolic panel    6. Anemia, unspecified type    - ESR: Erythrocyte sedimentation rate  - Antinuclear antibody screen by EIA  - Rheumatoid factor  - ONC/HEME ADULT REFERRAL      Labs and gi procedures   Follow up with Provider - 2-3 weeks soone lee needed.      Steven Tapia MD  [facility]

## 2017-06-07 NOTE — LETTER
[facility]  [address]  [address]  [place] [address] [phone]                                                                                                           Abebe Christensen  [address] [address]    [place] [facility] [address]    [place] 10, 2017      Dear Abebe,    The results of your recent tests were reviewed and are enclosed.   The thyroid test is low. I will send  a prescription for replacement to the pharmacy.  The protein test for abnormal proteins is borderline and does not show a reason for you feeling ill.    Results for orders placed or performed in visit on 06/07/17   Protein electrophoresis   Result Value Ref Range    Albumin Fraction 2.9 (L) 3.7 - 5.1 g/dL    Alpha 1 Fraction 0.5 (H) 0.2 - 0.4 g/dL    Alpha 2 Fraction 0.8 0.5 - 0.9 g/dL    Beta Fraction 0.9 0.6 - 1.0 g/dL    Gamma Fraction 1.6 0.7 - 1.6 g/dL    Monoclonal Peak 0.2 (H) 0.0 g/dL    ELP Interpretation:       Small monoclonal protein ( 0.2 g/dL) seen in the gamma fraction, not previously   characterized in our laboratory. Recommend serum and urine immunofixation for   confirmation and further characterization if not previously performed   elsewhere. Hypoalbuminemia with increased alpha 1 globulins. Pathologic   significance requires clinical correlation.  CECILE Brumfield M.D., Ph.D.,   Pathologist.     TSH   Result Value Ref Range    TSH 13.73 (H) 0.40 - 4.00 mU/L   Basic metabolic panel   Result Value Ref Range    Sodium 138 133 - 144 mmol/L    Potassium 3.1 (L) 3.4 - 5.3 mmol/L    Chloride 105 94 - 109 mmol/L    Carbon Dioxide 23 20 - 32 mmol/L    Anion Gap 10 3 - 14 mmol/L    Glucose 118 (H) 70 - 99 mg/dL    Urea Nitrogen 7 7 - 30 mg/dL    Creatinine 1.13 0.66 - 1.25 mg/dL    GFR Estimate 64 >60 mL/min/1.7m2    GFR Estimate If Black 77 >60 mL/min/1.7m2    Calcium 8.4 (L) 8.5 - 10.1 mg/dL   Vitamin B12   Result Value Ref Range    Vitamin B12 384 193 - 986 pg/mL   Folate    Result Value Ref Range    Folate >100.0 >5.4 ng/mL   ESR: Erythrocyte sedimentation rate   Result Value Ref Range    Sed Rate 29 (H) 0 - 20 mm/h   Antinuclear antibody screen by EIA   Result Value Ref Range    DIONY Screen by EIA <1.0  Interpretation:  Negative   <1.0   Rheumatoid factor   Result Value Ref Range    Rheumatoid Factor <20 <20 IU/mL       Thank you for choosing [facility].  We appreciate the opportunity to serve you and look forward to supporting your healthcare needs in the future.    If you have any questions or concerns, please call me or my staff at [phone].      Sincerely,    Steven Tapia MD

## 2017-06-07 NOTE — NURSING NOTE
"Chief Complaint   Patient presents with     ER F/U       Initial /64 (BP Location: Left arm, Patient Position: Chair, Cuff Size: Adult Regular)  Pulse 84  Temp 97.4  F (36.3  C) (Tympanic)  Resp 15  Wt 178 lb (80.7 kg)  SpO2 100%  BMI 23.48 kg/m2 Estimated body mass index is 23.48 kg/(m^2) as calculated from the following:    Height as of 5/26/17: 6' 1\" (1.854 m).    Weight as of this encounter: 178 lb (80.7 kg).  Medication Reconciliation: complete      "

## 2017-06-08 LAB
ALBUMIN SERPL ELPH-MCNC: 2.9 G/DL (ref 3.7–5.1)
ALPHA1 GLOB SERPL ELPH-MCNC: 0.5 G/DL (ref 0.2–0.4)
ALPHA2 GLOB SERPL ELPH-MCNC: 0.8 G/DL (ref 0.5–0.9)
ANA SER QL IA: NORMAL
B-GLOBULIN SERPL ELPH-MCNC: 0.9 G/DL (ref 0.6–1)
GAMMA GLOB SERPL ELPH-MCNC: 1.6 G/DL (ref 0.7–1.6)
M PROTEIN SERPL ELPH-MCNC: 0.2 G/DL
PROT PATTERN SERPL ELPH-IMP: ABNORMAL
RHEUMATOID FACT SER NEPH-ACNC: <20 IU/ML (ref 0–20)

## 2017-06-10 RX ORDER — LEVOTHYROXINE SODIUM 100 UG/1
100 TABLET ORAL DAILY
Qty: 90 TABLET | Refills: 1 | Status: SHIPPED | OUTPATIENT
Start: 2017-06-10 | End: 2018-01-19

## 2017-06-10 NOTE — PROGRESS NOTES
Result was abnormal The thyroid test is low. I will send  A prescription for replacement to the pharmacy.  The protein test for abnormal proteins is borderline and does not show a reason for you feeling ill. .      Please let patient know by calling  or sending a  letter.

## 2017-06-20 PROBLEM — E03.9 ACQUIRED HYPOTHYROIDISM: Status: ACTIVE | Noted: 2017-06-20

## 2017-06-21 ENCOUNTER — TRANSFERRED RECORDS (OUTPATIENT)
Dept: HEALTH INFORMATION MANAGEMENT | Facility: CLINIC | Age: 72
End: 2017-06-21

## 2017-07-06 ENCOUNTER — TELEPHONE (OUTPATIENT)
Dept: FAMILY MEDICINE | Facility: CLINIC | Age: 72
End: 2017-07-06

## 2017-07-06 DIAGNOSIS — R63.4 WEIGHT LOSS: Primary | ICD-10-CM

## 2017-07-06 NOTE — TELEPHONE ENCOUNTER
Reason for Call:  Other    Referral to Hematologist    Detailed comments:   Patient states that Dr Tapia was going to refer him to a hematologist following his colonoscopy    He had the colonoscopy last week and now needs to do the next step    Please call him to discuss    Phone Number Patient can be reached at: Home number on file [phone] (home)    Best Time:   Anytime  Can we leave a detailed message on this number? YES    Call taken on 7/6/2017 at 2:08 PM by RAINE VIVEROS

## 2017-07-06 NOTE — TELEPHONE ENCOUNTER
Please find out where the colonosopcy was done we need those results and biopsy results before he can see hematologist.     [facility]  [facility]  Endoscopy Centers & Clinics  All [facility] clinics and endoscopy centers can be reached at [phone].

## 2017-07-06 NOTE — LETTER
[facility]  [address]  [address]  [place] [address] [phone]      July 10, 2017        [facility]  [place] [address] [facility]   [address], [address]  [facility] [place] [facility], [address]  Main Phone: [phone]  Main Fax:[phone]     Abebe Christensen  [address]  [facility]    [facility] [address]      To Whom It May Concern:  Mr. Christensen is being referred as per his request. He is a gentleman who was seen some months ago with abdominal dicomfort and some abdominal bloating a nd weight loss. Since htat time a number of studies have been been and he has gone to several Emergency Rooms for evaluation. He has newly discovered HYPOTHYROIDISM AND DIABETES, and has been informed by several ER docs that with his weight loss he needs to  See an oncologist, or at least he has interest in seeing an oncologist.  He has been able to decrease and apparently stop his alcohol consumption and with  This hei liver tests have returned to lcose to normal.  He is taking his metformin regularly. His lab studies reveal a significant anemia, and he has recently completed gi studies including upper gi endoscopy and colonoscopy.  He has been noted to have some irritation to his stomach. These studies are to be sent to you with this letter.    Imaging of abdomen pelvis and chest have been negative for any apparent metastatic disease.     Current Outpatient Prescriptions   Medication Sig Dispense Refill     levothyroxine (SYNTHROID/LEVOTHROID) 100 MCG tablet Take 1 tablet (100 mcg) by mouth daily 90 tablet 1     ondansetron (ZOFRAN ODT) 4 MG ODT tab Take 1 tablet (4 mg) by mouth every 6 hours as needed for nausea 20 tablet 0     ciprofloxacin (CIPRO) 500 MG tablet Take 1 tablet (500 mg) by mouth 2 times daily 20 tablet 0     metFORMIN (GLUCOPHAGE) 500 MG tablet Take 1 tablet (500 mg) by mouth daily (with dinner) 20 minutes before dinner 30 tablet 5     folic acid  (FOLVITE) 1 MG tablet TAKE 4 TABLETS BY MOUTH DAILY 120 tablet 7     diltiazem (CARTIA XT) 240 MG 24 hr CD capsule TAKE 1 CAPSULE BY MOUTH DAILY 90 capsule 3     lisinopril-hydrochlorothiazide (PRINZIDE,ZESTORETIC) 20-12.5 MG per tablet Take 1 tablet by mouth daily 90 tablet 3     omeprazole (PRILOSEC OTC) 20 MG tablet Take 1 tablet (20 mg) by mouth daily 90 tablet 3     Cholecalciferol (VITAMIN D) 2000 UNITS CAPS Take 1 capsule by mouth daily       aspirin 325 MG tablet Take 81 mg by mouth daily        travoprost Z, benzalkonium, (TRAVATAN) 0.004 % ophthalmic solution 1 drop every evening.       dorzolamide-timolol (COSOPT) 2-0.5 % ophthalmic solution 1 drop 2 times daily.       Patient Active Problem List   Diagnosis     GERD (gastroesophageal reflux disease)     CKD (chronic kidney disease) stage 3, GFR 30-59 ml/min     Essential hypertension, benign     H/O polycythemia vera     Glucose intolerance (impaired glucose tolerance)     Family history of diabetes mellitus     Coryza for 20 yrs      Screening for prostate cancer     Tobacco abuse: [age]y/o @ 1ppd=19pk yr hx;only cigars since      COPD (chronic obstructive pulmonary disease): spirometry 5-14-15      Risk for falls     Acquired hypothyroidism     Thank you for seeing Mr. Christensen and reviewing if he has additional significant healht problems. He has not had a bone marrow test.   He has asked that we send his tests to you for review.     Sincerely,        Steven Tapia MD

## 2017-07-10 NOTE — TELEPHONE ENCOUNTER
referral faxed to [facility] with notes and labs.  Pt notified and given phone number to schedule appointment

## 2017-07-10 NOTE — TELEPHONE ENCOUNTER
Gi stuff was normal, please let him know referrral sent to [facility] address below. And we will send info if htat is ok. They are the cancer people.     Steven Tapia MD    [facility]    [place] [address] [place] [address], [address]  [facility], [address]  Main Phone: [phone]  Main Fax:[phone]       [address]  [facility], [address]  Main Phone: [phone]  Main Fax: [phone]    Steven Tapia MD

## 2017-07-18 DIAGNOSIS — I10 ESSENTIAL HYPERTENSION WITH GOAL BLOOD PRESSURE LESS THAN 130/85: ICD-10-CM

## 2017-07-18 DIAGNOSIS — I10 ESSENTIAL HYPERTENSION, BENIGN: ICD-10-CM

## 2017-07-18 NOTE — TELEPHONE ENCOUNTER
Lisinopril      Last Written Prescription Date: 6/8/16  Last Fill Quantity: 90, # refills: 3  Last Office Visit with [facility] or [facility] prescribing provider: 6/7/17       Potassium   Date Value Ref Range Status   06/07/2017 3.1 (L) 3.4 - 5.3 mmol/L Final     Creatinine   Date Value Ref Range Status   06/07/2017 1.13 0.66 - 1.25 mg/dL Final     BP Readings from Last 3 Encounters:   06/07/17 112/64   05/26/17 98/74   04/12/17 100/50     Omeprazole      Last Written Prescription Date: 6/8/16  Last Fill Quantity: 90,  # refills: 3   Last Office Visit with [facility], [facility] or [facility] prescribing provider: 6/7/17

## 2017-07-19 RX ORDER — LISINOPRIL AND HYDROCHLOROTHIAZIDE 12.5; 2 MG/1; MG/1
TABLET ORAL
Qty: 90 TABLET | Refills: 0 | Status: SHIPPED | OUTPATIENT
Start: 2017-07-19 | End: 2017-11-08

## 2017-07-19 NOTE — TELEPHONE ENCOUNTER
Routing refill request to provider for review/approval because:  Labs out of range:  K+ low in June 2017. Nothing mentioned in lab letter-thanks

## 2017-08-10 ENCOUNTER — TRANSFERRED RECORDS (OUTPATIENT)
Dept: HEALTH INFORMATION MANAGEMENT | Facility: CLINIC | Age: 72
End: 2017-08-10

## 2017-08-15 ENCOUNTER — TRANSFERRED RECORDS (OUTPATIENT)
Dept: HEALTH INFORMATION MANAGEMENT | Facility: CLINIC | Age: 72
End: 2017-08-15

## 2017-08-18 ENCOUNTER — TELEPHONE (OUTPATIENT)
Dept: FAMILY MEDICINE | Facility: CLINIC | Age: 72
End: 2017-08-18

## 2017-08-18 NOTE — TELEPHONE ENCOUNTER
Reason for Call: Request for an order or referral:    Order or referral being requested: Patient was at oncology 8/18/17.  They told him he should get a referral to neurology.  Would like someone near [facility].    Date needed: by next week    Has the patient been seen by the PCP for this problem? YES    Additional comments: none    Phone number Patient can be reached at:  Home number on file [phone] (home)    Best Time:  any    Can we leave a detailed message on this number?  YES    Call taken on 8/18/2017 at 1:54 PM by MNIG GERARD

## 2017-08-18 NOTE — TELEPHONE ENCOUNTER
Patient called requesting neurology referral for numbness of feet as suggested by oncologist. Per pt, symptoms is ongoing. He was advised to establish care with a new provider since doctor Willie is no longer at clinic. Patient verbalized agreement with plan. Pt will discuss referral at OV.

## 2017-08-22 ENCOUNTER — HOSPITAL ENCOUNTER (OUTPATIENT)
Dept: ULTRASOUND IMAGING | Facility: CLINIC | Age: 72
End: 2017-08-22
Attending: INTERNAL MEDICINE | Admitting: INTERNAL MEDICINE
Payer: MEDICARE

## 2017-08-22 ENCOUNTER — HOSPITAL ENCOUNTER (OUTPATIENT)
Facility: CLINIC | Age: 72
Discharge: HOME | End: 2017-08-22
Attending: INTERNAL MEDICINE | Admitting: INTERNAL MEDICINE
Payer: MEDICARE

## 2017-08-22 VITALS
TEMPERATURE: 96.2 F | DIASTOLIC BLOOD PRESSURE: 67 MMHG | BODY MASS INDEX: 23.19 KG/M2 | OXYGEN SATURATION: 99 % | SYSTOLIC BLOOD PRESSURE: 103 MMHG | HEART RATE: 74 BPM | WEIGHT: 175 LBS | RESPIRATION RATE: 16 BRPM | HEIGHT: 73 IN

## 2017-08-22 DIAGNOSIS — D47.2 GAMMOPATHY, MONOCLONAL: ICD-10-CM

## 2017-08-22 LAB
INR PPP: 1.09 (ref 0.86–1.14)
PLATELET # BLD AUTO: 249 10E9/L (ref 150–450)

## 2017-08-22 PROCEDURE — 40000863 ZZH STATISTIC RADIOLOGY XRAY, US, CT, MAR, NM

## 2017-08-22 PROCEDURE — 88305 TISSUE EXAM BY PATHOLOGIST: CPT | Performed by: INTERNAL MEDICINE

## 2017-08-22 PROCEDURE — 88305 TISSUE EXAM BY PATHOLOGIST: CPT | Mod: 26 | Performed by: INTERNAL MEDICINE

## 2017-08-22 PROCEDURE — 85610 PROTHROMBIN TIME: CPT | Performed by: INTERNAL MEDICINE

## 2017-08-22 PROCEDURE — 00000155 ZZHCL STATISTIC H-CELL BLOCK W/STAIN: Performed by: INTERNAL MEDICINE

## 2017-08-22 PROCEDURE — 88112 CYTOPATH CELL ENHANCE TECH: CPT | Mod: 26 | Performed by: INTERNAL MEDICINE

## 2017-08-22 PROCEDURE — 27210190 US PARACENTESIS

## 2017-08-22 PROCEDURE — 88112 CYTOPATH CELL ENHANCE TECH: CPT | Performed by: INTERNAL MEDICINE

## 2017-08-22 PROCEDURE — 85049 AUTOMATED PLATELET COUNT: CPT | Performed by: INTERNAL MEDICINE

## 2017-08-22 PROCEDURE — 36415 COLL VENOUS BLD VENIPUNCTURE: CPT | Performed by: INTERNAL MEDICINE

## 2017-08-22 PROCEDURE — 25000125 ZZHC RX 250: Performed by: INTERNAL MEDICINE

## 2017-08-22 RX ORDER — LIDOCAINE HYDROCHLORIDE 10 MG/ML
10 INJECTION, SOLUTION EPIDURAL; INFILTRATION; INTRACAUDAL; PERINEURAL ONCE
Status: COMPLETED | OUTPATIENT
Start: 2017-08-22 | End: 2017-08-22

## 2017-08-22 RX ADMIN — LIDOCAINE HYDROCHLORIDE 100 MG: 10 INJECTION, SOLUTION EPIDURAL; INFILTRATION; INTRACAUDAL; PERINEURAL at 13:14

## 2017-08-22 NOTE — DISCHARGE INSTRUCTIONS

## 2017-08-22 NOTE — IP AVS SNAPSHOT
MRN:[id number]                      After Visit Summary   8/22/2017    Abebe Christensen    MRN: [id number]           Visit Information        Department      8/22/2017 11:28 AM [facility]          Review of your medicines      UNREVIEWED medicines. Ask your doctor about these medicines        Dose / Directions    aspirin 325 MG tablet   Used for:  Hypertension        Dose:  81 mg   Take 81 mg by mouth daily   Refills:  0       ciprofloxacin 500 MG tablet   Commonly known as:  CIPRO   Used for:  Dysuria        Dose:  500 mg   Take 1 tablet (500 mg) by mouth 2 times daily   Quantity:  20 tablet   Refills:  0       diltiazem 240 MG 24 hr capsule   Commonly known as:  CARTIA XT   Used for:  Essential hypertension, benign        TAKE 1 CAPSULE BY MOUTH DAILY   Quantity:  90 capsule   Refills:  3       dorzolamide-timolol 2-0.5 % ophthalmic solution   Commonly known as:  COSOPT   Used for:  Hypertension        Dose:  1 drop   1 drop 2 times daily.   Refills:  0       folic acid 1 MG tablet   Commonly known as:  FOLVITE   Used for:  Folate deficiency        TAKE 4 TABLETS BY MOUTH DAILY   Quantity:  120 tablet   Refills:  7       levothyroxine 100 MCG tablet   Commonly known as:  SYNTHROID/LEVOTHROID   Used for:  Hypothyroidism, unspecified type        Dose:  100 mcg   Take 1 tablet (100 mcg) by mouth daily   Quantity:  90 tablet   Refills:  1       lisinopril-hydrochlorothiazide 20-12.5 MG per tablet   Commonly known as:  PRINZIDE/ZESTORETIC   Used for:  Essential hypertension, benign        TAKE 1 TABLET BY MOUTH DAILY   Quantity:  90 tablet   Refills:  0       metFORMIN 500 MG tablet   Commonly known as:  GLUCOPHAGE   Used for:  Type 2 diabetes mellitus without complication, without long-term current use of insulin (H)        Dose:  500 mg   Take 1 tablet (500 mg) by mouth daily (with dinner) 20 minutes before dinner   Quantity:  30 tablet   Refills:  5       omeprazole 20 MG CR  capsule   Commonly known as:  priLOSEC   Used for:  Essential hypertension with goal blood pressure less than 130/85        TAKE ONE CAPSULE BY MOUTH EVERY DAY   Quantity:  90 capsule   Refills:  3       ondansetron 4 MG ODT tab   Commonly known as:  ZOFRAN ODT        Dose:  4 mg   Take 1 tablet (4 mg) by mouth every 6 hours as needed for nausea   Quantity:  20 tablet   Refills:  0       TRAVATAN 0.004 % ophthalmic solution   Used for:  Hypertension   Generic drug:  travoprost Z (benzalkonium)        Dose:  1 drop   1 drop every evening.   Refills:  0       vitamin D 2000 UNITS Caps        Dose:  1 capsule   Take 1 capsule by mouth daily   Refills:  0                Protect others around you: Learn how to safely use, store and throw away your medicines at www.disposemymeds.org.         Follow-ups after your visit        Your next 10 appointments already scheduled     Aug 22, 2017 12:45 PM CDT   US PARACENTESIS with SHUS4, SH BODY RAD   [facility]ound ([facility])    [address] [place] [address] [phone]           Tell us in advance if there s any chance you may be pregnant.  Bring a list of your medicines to the exam. Include vitamins, minerals and over-the-counter drugs.  If you take blood thinners, you may need to stop taking them a few days before treatment. Talk to your doctor before stopping these medicines. You will need a blood test the morning of your exam.   Stop taking Coumadin (warfarin) 3 days before your exam. Restart the day after your exam.   If you take aspirin, you may need to stop taking it 3 days before your scan.   If you take Plavix, Ticlid, Pletal or Persantine, you may need to stop taking them 5 days before your scan. Please talk to your doctor before stopping these medicines.  IF YOU WILL RECEIVE SEDATION (medicine to help you relax):   See your family doctor for an exam within 30 days of treatment.   Plan for an adult to drive you home  and stay with you for at least 6 hours.   Follow the eating and drinking guidelines checked below:   No eating or drinking for 4 hours before your test. You may take medicine with small sips of water.   If you have diabetes:If you take insulin, call your diabetes care team. Do not take diabetes pills on the morning of your test. If you take metformin (Avandamet, Glucophage, Glucovance, Metaglip) and received contrast, wait 48 hours before re-starting this medicine.  Please call the Imaging Department at your exam site with any questions.            Aug 23, 2017  9:30 AM CDT   Office Visit with Carson Fried MD   [facility] ([facility]    [address] [place] [address] [phone]           Bring a current list of meds and any records pertaining to this visit. For Physicals, please bring immunization records and any forms needing to be filled out. Please arrive 10 minutes early to complete paperwork.               Care Instructions        Further instructions from your care team       Paracentesis Discharge Instructions     After you go home:      You may resume your normal diet.    Care of Puncture Site:      For the first 48 hrs, check your puncture site every couple hours while you are awake     If there is a bandaid - you may remove it tomorrow morning    You may shower tomorrow    No tub baths, whirlpools or swimming until your puncture site has fully healed    Fluid may leak from the site. Change the bandaid as needed - keep the site dry    If the fluid leaks for more than 48 hours, call your ordering provider     Activity:      You may go back to normal activity in 24 hours     Wait 48 hours before lifting, straining, exercise or other strenuous activity    Medicines:      You may resume all your medications    For minor pain, you may take Acetaminophen (Tylenol) or Ibuprofen (Advil)            Call the  "provider who ordered this procedure if:      The site is red, swollen, hot or tender    Blood or fluid is draining from the site    Chills or a fever greater than 101 F (38 C)    Pain that is getting worse    Leaking from the site that does not stop    Any questions or concerns      If you have questions call:        [facility] Radiology Dept @ [phone]      The provider who performed your procedure was _________________.     Additional Information About Your Visit        [employer] Information     [facility] lets you send messages to your doctor, view your test results, renew your prescriptions, schedule appointments and more. To sign up, go to [email or web address] . Click on \"Log in\" on the left side of the screen, which will take you to the Welcome page. Then click on \"Sign up Now\" on the right side of the page.     You will be asked to enter the access code listed below, as well as some personal information. Please follow the directions to create your username and password.     Your access code is: [email or web address]  Expires: 2017 12:03 PM     Your access code will  in 90 days. If you need help or a new code, please call your [place] clinic or [phone].        Care EveryWhere ID     This is your Care EveryWhere ID. This could be used by other organizations to access your [place] medical records  [id number]        Your Vitals Were     Blood Pressure Pulse Temperature Respirations Height Weight    114/72 (BP Location: Left arm) 85 96.2  F (35.7  C) (Oral) 16 1.854 m (6' 1\") 79.4 kg (175 lb)    Pulse Oximetry BMI (Body Mass Index)                99% 23.09 kg/m2           Primary Care Provider Office Phone # Fax #    Steven Tapia [phone]      Equal Access to Services     TABITHA SANCHEZ : Robina Younger, waalok narayanan, qaybta kaaltamara madrigal. [facility] [phone].    ATENCIÓN: Si jaelyn lopez " francis disposición servicios gratuitos de asistencia lingüística. Rashi ortiz [phone].    We comply with applicable federal civil rights laws and [place] laws. We do not discriminate on the basis of race, color, national origin, age, disability sex, sexual orientation or gender identity.            Thank you!     Thank you for choosing [facility] for your care. Our goal is always to provide you with excellent care. Hearing back from our patients is one way we can continue to improve our services. Please take a few minutes to complete the written survey that you may receive in the mail after you visit with us. Thank you!             Medication List: This is a list of all your medications and when to take them. Check marks below indicate your daily home schedule. Keep this list as a reference.      Medications           Morning Afternoon Evening Bedtime As Needed    aspirin 325 MG tablet   Take 81 mg by mouth daily                                ciprofloxacin 500 MG tablet   Commonly known as:  CIPRO   Take 1 tablet (500 mg) by mouth 2 times daily                                diltiazem 240 MG 24 hr capsule   Commonly known as:  CARTIA XT   TAKE 1 CAPSULE BY MOUTH DAILY                                dorzolamide-timolol 2-0.5 % ophthalmic solution   Commonly known as:  COSOPT   1 drop 2 times daily.                                folic acid 1 MG tablet   Commonly known as:  FOLVITE   TAKE 4 TABLETS BY MOUTH DAILY                                levothyroxine 100 MCG tablet   Commonly known as:  SYNTHROID/LEVOTHROID   Take 1 tablet (100 mcg) by mouth daily                                lisinopril-hydrochlorothiazide 20-12.5 MG per tablet   Commonly known as:  PRINZIDE/ZESTORETIC   TAKE 1 TABLET BY MOUTH DAILY                                metFORMIN 500 MG tablet   Commonly known as:  GLUCOPHAGE   Take 1 tablet (500 mg) by mouth daily (with dinner) 20 minutes before dinner                                omeprazole 20 MG  CR capsule   Commonly known as:  priLOSEC   TAKE ONE CAPSULE BY MOUTH EVERY DAY                                ondansetron 4 MG ODT tab   Commonly known as:  ZOFRAN ODT   Take 1 tablet (4 mg) by mouth every 6 hours as needed for nausea                                TRAVATAN 0.004 % ophthalmic solution   1 drop every evening.   Generic drug:  travoprost Z (benzalkonium)                                vitamin D 2000 UNITS Caps   Take 1 capsule by mouth daily

## 2017-08-22 NOTE — IP AVS SNAPSHOT
[facility] [address]    [place] [address]    Phone:  [phone]                                       After Visit Summary   8/22/2017    Abebe Christensen    MRN: [id number]           After Visit Summary Signature Page     I have received my discharge instructions, and my questions have been answered. I have discussed any challenges I see with this plan with the nurse or doctor.    ..........................................................................................................................................  Patient/Patient Representative Signature      ..........................................................................................................................................  Patient Representative Print Name and Relationship to Patient    ..................................................               ................................................  Date                                            Time    ..........................................................................................................................................  Reviewed by Signature/Title    ...................................................              ..............................................  Date                                                            Time

## 2017-08-22 NOTE — PROGRESS NOTES
US guided paracentesis per Dr Pak. Pt tolerated procedure well. Drained 950 cc jana colored fluid. VSS. Denies pain. Site D/I. Back to care Suites.    1315 Back to room post procedure. Paracentesis site D/I. VSS. Denies c/o. Declines food or drink.    1345 Site D/I. No c/o. States understanding of discharge instructions. Discharged to lobby.

## 2017-08-23 ENCOUNTER — OFFICE VISIT (OUTPATIENT)
Dept: FAMILY MEDICINE | Facility: CLINIC | Age: 72
End: 2017-08-23
Payer: COMMERCIAL

## 2017-08-23 VITALS
WEIGHT: 170 LBS | OXYGEN SATURATION: 100 % | DIASTOLIC BLOOD PRESSURE: 60 MMHG | HEART RATE: 100 BPM | SYSTOLIC BLOOD PRESSURE: 110 MMHG | BODY MASS INDEX: 22.43 KG/M2 | TEMPERATURE: 97 F | RESPIRATION RATE: 16 BRPM

## 2017-08-23 DIAGNOSIS — I10 ESSENTIAL HYPERTENSION, BENIGN: Primary | ICD-10-CM

## 2017-08-23 DIAGNOSIS — R73.02 GLUCOSE INTOLERANCE (IMPAIRED GLUCOSE TOLERANCE): Chronic | ICD-10-CM

## 2017-08-23 DIAGNOSIS — R20.0 NUMBNESS AND TINGLING OF BOTH FEET: ICD-10-CM

## 2017-08-23 DIAGNOSIS — R20.2 NUMBNESS AND TINGLING OF BOTH FEET: ICD-10-CM

## 2017-08-23 DIAGNOSIS — N18.30 CKD (CHRONIC KIDNEY DISEASE) STAGE 3, GFR 30-59 ML/MIN (H): Chronic | ICD-10-CM

## 2017-08-23 LAB
ALBUMIN SERPL-MCNC: 2.6 G/DL (ref 3.4–5)
ALP SERPL-CCNC: 186 U/L (ref 40–150)
ALT SERPL W P-5'-P-CCNC: 17 U/L (ref 0–70)
ANION GAP SERPL CALCULATED.3IONS-SCNC: 9 MMOL/L (ref 3–14)
AST SERPL W P-5'-P-CCNC: 70 U/L (ref 0–45)
BASOPHILS # BLD AUTO: 0 10E9/L (ref 0–0.2)
BASOPHILS NFR BLD AUTO: 0.4 %
BILIRUB SERPL-MCNC: 0.7 MG/DL (ref 0.2–1.3)
BUN SERPL-MCNC: 8 MG/DL (ref 7–30)
CALCIUM SERPL-MCNC: 8.9 MG/DL (ref 8.5–10.1)
CHLORIDE SERPL-SCNC: 100 MMOL/L (ref 94–109)
CO2 SERPL-SCNC: 24 MMOL/L (ref 20–32)
CREAT SERPL-MCNC: 1.25 MG/DL (ref 0.66–1.25)
CREAT UR-MCNC: 348 MG/DL
DIFFERENTIAL METHOD BLD: ABNORMAL
EOSINOPHIL # BLD AUTO: 0.3 10E9/L (ref 0–0.7)
EOSINOPHIL NFR BLD AUTO: 3 %
ERYTHROCYTE [DISTWIDTH] IN BLOOD BY AUTOMATED COUNT: 12.7 % (ref 10–15)
FOLATE SERPL-MCNC: >100 NG/ML
GFR SERPL CREATININE-BSD FRML MDRD: 57 ML/MIN/1.7M2
GLUCOSE SERPL-MCNC: 110 MG/DL (ref 70–99)
HBA1C MFR BLD: 4.5 % (ref 4.3–6)
HCT VFR BLD AUTO: 37.8 % (ref 40–53)
HGB BLD-MCNC: 13.6 G/DL (ref 13.3–17.7)
LYMPHOCYTES # BLD AUTO: 1.3 10E9/L (ref 0.8–5.3)
LYMPHOCYTES NFR BLD AUTO: 16 %
MCH RBC QN AUTO: 36.9 PG (ref 26.5–33)
MCHC RBC AUTO-ENTMCNC: 36 G/DL (ref 31.5–36.5)
MCV RBC AUTO: 102 FL (ref 78–100)
MICROALBUMIN UR-MCNC: 46 MG/L
MICROALBUMIN/CREAT UR: 13.28 MG/G CR (ref 0–17)
MONOCYTES # BLD AUTO: 0.8 10E9/L (ref 0–1.3)
MONOCYTES NFR BLD AUTO: 9.5 %
NEUTROPHILS # BLD AUTO: 5.9 10E9/L (ref 1.6–8.3)
NEUTROPHILS NFR BLD AUTO: 71.1 %
PLATELET # BLD AUTO: 241 10E9/L (ref 150–450)
POTASSIUM SERPL-SCNC: 4.3 MMOL/L (ref 3.4–5.3)
PROT SERPL-MCNC: 7 G/DL (ref 6.8–8.8)
RBC # BLD AUTO: 3.69 10E12/L (ref 4.4–5.9)
SODIUM SERPL-SCNC: 133 MMOL/L (ref 133–144)
TSH SERPL DL<=0.005 MIU/L-ACNC: 0.86 MU/L (ref 0.4–4)
VIT B12 SERPL-MCNC: 296 PG/ML (ref 193–986)
WBC # BLD AUTO: 8.3 10E9/L (ref 4–11)

## 2017-08-23 PROCEDURE — 82043 UR ALBUMIN QUANTITATIVE: CPT | Performed by: FAMILY MEDICINE

## 2017-08-23 PROCEDURE — 82607 VITAMIN B-12: CPT | Performed by: FAMILY MEDICINE

## 2017-08-23 PROCEDURE — 99214 OFFICE O/P EST MOD 30 MIN: CPT | Performed by: FAMILY MEDICINE

## 2017-08-23 PROCEDURE — 36415 COLL VENOUS BLD VENIPUNCTURE: CPT | Performed by: FAMILY MEDICINE

## 2017-08-23 PROCEDURE — 82746 ASSAY OF FOLIC ACID SERUM: CPT | Performed by: FAMILY MEDICINE

## 2017-08-23 PROCEDURE — 83036 HEMOGLOBIN GLYCOSYLATED A1C: CPT | Performed by: FAMILY MEDICINE

## 2017-08-23 PROCEDURE — 80050 GENERAL HEALTH PANEL: CPT | Performed by: FAMILY MEDICINE

## 2017-08-23 NOTE — MR AVS SNAPSHOT
After Visit Summary   8/23/2017    Abebe Christensen    MRN: [id number]           Patient Information     Date Of Birth          1945        Visit Information        Provider Department      8/23/2017 9:30 AM Carson Fried MD [facility]        Today's Diagnoses     Essential hypertension, benign    -  1    CKD (chronic kidney disease) stage 3, GFR 30-59 ml/min        Glucose intolerance (impaired glucose tolerance)        Numbness and tingling of both feet           Follow-ups after your visit        Additional Services     NEUROLOGY ADULT REFERRAL       Your provider has referred you for the following:   EMG at [facility]: [facility] [phone]   [email or web address]    Please be aware that coverage of these services is subject to the terms and limitations of your health insurance plan.  Call member services at your health plan with any benefit or coverage questions.      Please bring the following with you to your appointment:    (1) Any X-Rays, CTs or MRIs which have been performed.  Contact the facility where they were done to arrange for  prior to your scheduled appointment.    (2) List of current medications  (3) This referral request   (4) Any documents/labs given to you for this referral                  Who to contact     If you have questions or need follow up information about today's clinic visit or your schedule please contact [facility] directly at [phone].  Normal or non-critical lab and imaging results will be communicated to you by MyChart, letter or phone within 4 business days after the clinic has received the results. If you do not hear from us within 7 days, please contact the clinic through MyChart or phone. If you have a critical or abnormal lab result, we will notify you by phone as soon as possible.  Submit refill requests through [employer] or  "call your pharmacy and they will forward the refill request to us. Please allow 3 business days for your refill to be completed.          Additional Information About Your Visit        MyChart Information     [employer] lets you send messages to your doctor, view your test results, renew your prescriptions, schedule appointments and more. To sign up, go to [email or web address] . Click on \"Log in\" on the left side of the screen, which will take you to the Welcome page. Then click on \"Sign up Now\" on the right side of the page.     You will be asked to enter the access code listed below, as well as some personal information. Please follow the directions to create your username and password.     Your access code is: [email or web address]  Expires: 2017 12:03 PM     Your access code will  in 90 days. If you need help or a new code, please call your [place] clinic or [phone].        Care EveryWhere ID     This is your Care EveryWhere ID. This could be used by other organizations to access your [place] medical records  [id number]        Your Vitals Were     Pulse Temperature Respirations Pulse Oximetry BMI (Body Mass Index)       100 97  F (36.1  C) (Tympanic) 16 100% 22.43 kg/m2        Blood Pressure from Last 3 Encounters:   17 110/60   17 103/67   17 112/64    Weight from Last 3 Encounters:   17 170 lb (77.1 kg)   17 175 lb (79.4 kg)   17 178 lb (80.7 kg)              We Performed the Following     Albumin Random Urine Quantitative with Creat Ratio     CBC with platelets differential     Comprehensive metabolic panel     Folate     Hemoglobin A1c     NEUROLOGY ADULT REFERRAL     TSH     Vitamin B12          Today's Medication Changes          These changes are accurate as of: 17 10:26 AM.  If you have any questions, ask your nurse or doctor.               Stop taking these medicines if you haven't already. Please contact your care team if you have questions.     " ondansetron 4 MG ODT tab   Commonly known as:  ZOFRAN ODT   Stopped by:  Carson Fried MD                    Primary Care Provider Office Phone # Fax #    Steven Hammad Tapia [phone] [address] [facility] [address]        Equal Access to Services     BROOK LAURA : Hadii aad ku hadasho Soomaali, waaxda luqadaha, qaybta kaalmada adeegyada, waxay idiin hayaan adeeg kharash la'aan ah. So [facility] [phone].    ATENCIÓN: Si habla español, tiene a francis disposición servicios gratuitos de asistencia lingüística. Llame al [phone].    We comply with applicable federal civil rights laws and [place] laws. We do not discriminate on the basis of race, color, national origin, age, disability sex, sexual orientation or gender identity.            Thank you!     [facility]. Our goal is always to provide you with excellent care. Hearing back from our patients is one way we can continue to improve our services. Please take a few minutes to complete the written survey that you may receive in the mail after your visit with us. Thank you!             Your Updated Medication List - Protect others around you: Learn how to safely use, store and throw away your medicines at www.disposemymeds.org.          This list is accurate as of: 8/23/17 10:26 AM.  Always use your most recent med list.                   Brand Name Dispense Instructions for use Diagnosis    aspirin 325 MG tablet      Take 81 mg by mouth daily    Hypertension       diltiazem 240 MG 24 hr capsule    CARTIA XT    90 capsule    TAKE 1 CAPSULE BY MOUTH DAILY    Essential hypertension, benign       dorzolamide-timolol 2-0.5 % ophthalmic solution    COSOPT     1 drop 2 times daily.    Hypertension       folic acid 1 MG tablet    FOLVITE    120 tablet    TAKE 4 TABLETS BY MOUTH DAILY    Folate deficiency       levothyroxine 100 MCG tablet    SYNTHROID/LEVOTHROID     90 tablet    Take 1 tablet (100 mcg) by mouth daily    Hypothyroidism, unspecified type       lisinopril-hydrochlorothiazide 20-12.5 MG per tablet    PRINZIDE/ZESTORETIC    90 tablet    TAKE 1 TABLET BY MOUTH DAILY    Essential hypertension, benign       metFORMIN 500 MG tablet    GLUCOPHAGE    30 tablet    Take 1 tablet (500 mg) by mouth daily (with dinner) 20 minutes before dinner    Type 2 diabetes mellitus without complication, without long-term current use of insulin (H)       omeprazole 20 MG CR capsule    priLOSEC    90 capsule    TAKE ONE CAPSULE BY MOUTH EVERY DAY    Essential hypertension with goal blood pressure less than 130/85       TRAVATAN 0.004 % ophthalmic solution   Generic drug:  travoprost Z (benzalkonium)      1 drop every evening.    Hypertension       vitamin D 2000 UNITS Caps      Take 1 capsule by mouth daily

## 2017-08-23 NOTE — NURSING NOTE
"Chief Complaint   Patient presents with     Establish Care       Initial /60  Pulse 100  Temp 97  F (36.1  C) (Tympanic)  Resp 16  Wt 170 lb (77.1 kg)  SpO2 100%  BMI 22.43 kg/m2 Estimated body mass index is 22.43 kg/(m^2) as calculated from the following:    Height as of 8/22/17: 6' 1\" (1.854 m).    Weight as of this encounter: 170 lb (77.1 kg).  Medication Reconciliation: complete     Jazlyn Moreno CMA      "

## 2017-08-23 NOTE — PATIENT INSTRUCTIONS
I will send the patient for an EMG of his feet.  We will check labs as noted.  He has had an elevated blood sugar in the past so we did a hemoglobin A1c.  His folate, B12 and thyroid will be checked to see if there is a cause for the numbness and tingling in his feet.his medication list was updated. Follow-up will be depending on what we find on his lab tests.

## 2017-08-23 NOTE — PROGRESS NOTES
SUBJECTIVE:   Abebe Christensen is a 71 year old male who presents to clinic today for the following health issues:    Establish care with Dr Fried. Pt was Dr Tapia's patient    Diabetes Follow-up      Patient is checking blood sugars: not at all    Diabetic concerns: None     Symptoms of hypoglycemia (low blood sugar): none     Paresthesias (numbness or burning in feet) or sores: No     Date of last diabetic eye exam: due now    Hypertension Follow-up      Outpatient blood pressures are not being checked.    Low Salt Diet: no added salt          Amount of exercise or physical activity: None    Problems taking medications regularly: No    Medication side effects: none  Diet: regular (no restrictions)      Numbness in the feet      Duration: one year    Description (location/character/radiation): bilaterally in the feet    Intensity:  moderate    Accompanying signs and symptoms: problems with balance    History (similar episodes/previous evaluation): None    Precipitating or alleviating factors: None    Therapies tried and outcome: None         Problem list and histories reviewed & adjusted, as indicated.  Additional history: as documented    Patient Active Problem List   Diagnosis     GERD (gastroesophageal reflux disease)     CKD (chronic kidney disease) stage 3, GFR 30-59 ml/min     Essential hypertension, benign     H/O polycythemia vera     Glucose intolerance (impaired glucose tolerance)     Family history of diabetes mellitus     Coryza for 20 yrs      Screening for prostate cancer     Tobacco abuse: [age]y/o @ 1ppd=19pk yr hx;only cigars since      COPD (chronic obstructive pulmonary disease): spirometry 5-14-15      Risk for falls     Acquired hypothyroidism     Past Surgical History:   Procedure Laterality Date     EYE SURGERY  2008    cataract and glaucoma       Social History   Substance Use Topics     Smoking status: Current Every Day Smoker     Types: Cigarettes     Smokeless tobacco: Never Used      Alcohol use Yes     Family History   Problem Relation Age of Onset     Eye Disorder Mother      Cancer - colorectal Father              Reviewed and updated as needed this visit by clinical staffTobacco  Allergies  Meds  Med Hx  Surg Hx  Fam Hx  Soc Hx      Reviewed and updated as needed this visit by Provider         ROS:  Constitutional, neuro, ENT, endocrine, pulmonary, cardiac, gastrointestinal, genitourinary, musculoskeletal, integument and psychiatric systems are negative, except as otherwise noted.  NEURO: NEGATIVE for weakness, dizziness or paresthesias and POSITIVE for gait disturbance and numbness or tingling feet      OBJECTIVE:                                                    /60  Pulse 100  Temp 97  F (36.1  C) (Tympanic)  Resp 16  Wt 170 lb (77.1 kg)  SpO2 100%  BMI 22.43 kg/m2  Body mass index is 22.43 kg/(m^2).  GENERAL APPEARANCE: healthy, alert and no distress  RESP: lungs clear to auscultation - no rales, rhonchi or wheezes  CV: regular rates and rhythm, normal S1 S2, no S3 or S4 and no murmur, click or rub         ASSESSMENT/PLAN:                                                        ICD-10-CM    1. Essential hypertension, benign I10 CBC with platelets differential   2. CKD (chronic kidney disease) stage 3, GFR 30-59 ml/min N18.3 Comprehensive metabolic panel   3. Glucose intolerance (impaired glucose tolerance) R73.02 Albumin Random Urine Quantitative with Creat Ratio     Hemoglobin A1c   4. Numbness and tingling of both feet R20.0 Folate    R20.2 Vitamin B12     TSH     NEUROLOGY ADULT REFERRAL       Patient Instructions   I will send the patient for an EMG of his feet.  We will check labs as noted.  He has had an elevated blood sugar in the past so we did a hemoglobin A1c.  His folate, B12 and thyroid will be checked to see if there is a cause for the numbness and tingling in his feet.his medication list was updated. Follow-up will be depending on what we find on his lab  tests.      Carson Fried MD  [facility]

## 2017-08-23 NOTE — LETTER
August 26, 2017      Abebe Christensen  [address] [address]        Dear ,    We are writing to inform you of your test results.    Your test results fall within the expected range(s) or remain unchanged from previous results.  Please continue with current treatment plan.    Resulted Orders   CBC with platelets differential   Result Value Ref Range    WBC 8.3 4.0 - 11.0 10e9/L    RBC Count 3.69 (L) 4.4 - 5.9 10e12/L    Hemoglobin 13.6 13.3 - 17.7 g/dL    Hematocrit 37.8 (L) 40.0 - 53.0 %     (H) 78 - 100 fl    MCH 36.9 (H) 26.5 - 33.0 pg    MCHC 36.0 31.5 - 36.5 g/dL    RDW 12.7 10.0 - 15.0 %    Platelet Count 241 150 - 450 10e9/L    Diff Method Automated Method     % Neutrophils 71.1 %    % Lymphocytes 16.0 %    % Monocytes 9.5 %    % Eosinophils 3.0 %    % Basophils 0.4 %    Absolute Neutrophil 5.9 1.6 - 8.3 10e9/L    Absolute Lymphocytes 1.3 0.8 - 5.3 10e9/L    Absolute Monocytes 0.8 0.0 - 1.3 10e9/L    Absolute Eosinophils 0.3 0.0 - 0.7 10e9/L    Absolute Basophils 0.0 0.0 - 0.2 10e9/L   Comprehensive metabolic panel   Result Value Ref Range    Sodium 133 133 - 144 mmol/L    Potassium 4.3 3.4 - 5.3 mmol/L    Chloride 100 94 - 109 mmol/L    Carbon Dioxide 24 20 - 32 mmol/L    Anion Gap 9 3 - 14 mmol/L    Glucose 110 (H) 70 - 99 mg/dL      Comment:      Fasting specimen    Urea Nitrogen 8 7 - 30 mg/dL    Creatinine 1.25 0.66 - 1.25 mg/dL    GFR Estimate 57 (L) >60 mL/min/1.7m2      Comment:      Non  GFR Calc    GFR Estimate If Black 69 >60 mL/min/1.7m2      Comment:       GFR Calc    Calcium 8.9 8.5 - 10.1 mg/dL    Bilirubin Total 0.7 0.2 - 1.3 mg/dL    Albumin 2.6 (L) 3.4 - 5.0 g/dL    Protein Total 7.0 6.8 - 8.8 g/dL    Alkaline Phosphatase 186 (H) 40 - 150 U/L    ALT 17 0 - 70 U/L    AST 70 (H) 0 - 45 U/L   Albumin Random Urine Quantitative with Creat Ratio   Result Value Ref Range    Creatinine Urine 348 mg/dL    Albumin Urine mg/L 46  mg/L    Albumin Urine mg/g Cr 13.28 0 - 17 mg/g Cr   Hemoglobin A1c   Result Value Ref Range    Hemoglobin A1C 4.5 4.3 - 6.0 %   Folate   Result Value Ref Range    Folate >100.0 >5.4 ng/mL   Vitamin B12   Result Value Ref Range    Vitamin B12 296 193 - 986 pg/mL   TSH   Result Value Ref Range    TSH 0.86 0.40 - 4.00 mU/L       If you have any questions or concerns, please call the clinic at the number listed above.       Sincerely,        Carson Fried MD

## 2017-08-24 LAB — COPATH REPORT: NORMAL

## 2017-09-06 ENCOUNTER — TRANSFERRED RECORDS (OUTPATIENT)
Dept: HEALTH INFORMATION MANAGEMENT | Facility: CLINIC | Age: 72
End: 2017-09-06

## 2017-09-08 ENCOUNTER — TELEPHONE (OUTPATIENT)
Dept: INTERVENTIONAL RADIOLOGY/VASCULAR | Facility: CLINIC | Age: 72
End: 2017-09-08

## 2017-09-08 NOTE — TELEPHONE ENCOUNTER
Interventional Radiology   Interventional Radiology at [facility] has been requested to perform an Omental nodularity biopsy from Dr Sloan.  Abebe Christensen is a 71 year old male with a past medical history of smoking, myeloproliferative disorder and peripheral neuropathy and more recent h/o anorexia and weight loss over the past year of 50 lbs, weakness and fatigue. CT scan done in May showed some omental nodularity and ascites. Diagnostic paracentesis was negative for malignancy. He had a recent PET which showed moderate omental thickening with low metabolic activity.   Reviewed imaging and clinical information with IR staff Dr Salas  who did not approve the biopsy. She did not feel there was anything big enough to biopsy. There was some generalized thickening about 1 mm in thickness which was not great.  This was passed on to Phyllis MULLINS with Dr Sloan and Dr Salas reading room number was also given in case Dr Sloan would like to discuss further.   The patient will be scheduled for a Diagnostic paracentesis as ordered.   Thanks [facility] Interventional Radiology CNP ([phone])

## 2017-09-12 ENCOUNTER — MEDICAL CORRESPONDENCE (OUTPATIENT)
Dept: HEALTH INFORMATION MANAGEMENT | Facility: CLINIC | Age: 72
End: 2017-09-12

## 2017-09-12 ENCOUNTER — TRANSFERRED RECORDS (OUTPATIENT)
Dept: HEALTH INFORMATION MANAGEMENT | Facility: CLINIC | Age: 72
End: 2017-09-12

## 2017-09-25 ENCOUNTER — OFFICE VISIT (OUTPATIENT)
Dept: SURGERY | Facility: CLINIC | Age: 72
End: 2017-09-25
Payer: COMMERCIAL

## 2017-09-25 VITALS
HEIGHT: 73 IN | SYSTOLIC BLOOD PRESSURE: 100 MMHG | HEART RATE: 113 BPM | WEIGHT: 170 LBS | BODY MASS INDEX: 22.53 KG/M2 | DIASTOLIC BLOOD PRESSURE: 66 MMHG

## 2017-09-25 DIAGNOSIS — R19.00 ABDOMINAL MASS, UNSPECIFIED LOCATION: Primary | ICD-10-CM

## 2017-09-25 PROCEDURE — 99204 OFFICE O/P NEW MOD 45 MIN: CPT | Performed by: SURGERY

## 2017-09-25 NOTE — LETTER
"   2017      RE:  Abebe Christensen-:  [birth year]    HISTORY OF PRESENT ILLNESS:  Abebe Christensen is a 71 year old male who is seen in consultation at the request of Dr. Ames for evaluation of need for diagnostic laparoscopy and biopsy of omental mass/peritoneum.  He has been loosing a considerable amount of weight and developing weakness over the last several months.  Other studies and biopsies have been unsuccessful.  PET/CT shows concerns for omental/peritoneal disease that is not amenable to percutaneous biopsy.      REVIEW OF SYSTEMS:  Constitutional:  See hpi.  Eyes:  Negative for new vision problems.  ENT:  Negative for ENT pain.  Cardiovascular:  Negative for chest pain, palpitations.  Respiratory:  Negative for cough, dyspnea.  Gastrointestinal:  Negative for abdominal pain  Musculoskeletal:  Negative for new arthralgias or myalgias.     Vitals: /66  Pulse 113  Ht 6' 1\" (1.854 m)  Wt 170 lb (77.1 kg)  BMI 22.43 kg/m2  BMI= Body mass index is 22.43 kg/(m^2).     EXAM:  GENERAL: thin, alert and mild distress   HEENT: moist mucus membranes, no scleral icterus  CARDIOVASCULAR:  RRR, No JVD  RESPIRATORY: non labored breathing  NECK: Neck supple. No noticeable masses.  ABDOMEN: soft, nontender, mild distention/ascites, reducible umbilical hernia  Extremities: warm and well perfused, no edema  SKIN: No suspicious lesions or rashes     LABS/Imaging: reviewed recent imaging studies     ASSESSMENT:  Abebe Christensen suffers from abdominal mass and unintentional weight loss.     PLAN:  Laparoscopic abdominal exploration and biopsies as needed.  Abebe Christensen understands the risk, benefits, hopeful outcomes, and possible complications, both in the short and in the long term.  All his questions answered, he will like to proceed with the propose procedure in the near future.     It is my pleasure to participate in the care of Abebe Christensen. Thank you for this consultation.      If you have any " questions please give me a call.     Best regards,    Kendell Kenny MD

## 2017-09-25 NOTE — MR AVS SNAPSHOT
"              After Visit Summary   9/25/2017    Abebe Christensen    MRN: [id number]           Patient Information     Date Of Birth          1945        Visit Information        Provider Department      9/25/2017 9:15 AM Kendell Kenny MD [facility]       Follow-ups after your visit        Your next 10 appointments already scheduled     Oct 03, 2017   Procedure with Kendell Kenny MD   [facility] (--)    [address] [place] [address] [place], [address]  [facility] [address] [phone]              Who to contact     If you have questions or need follow up information about today's clinic visit or your schedule please contact [facility] directly at [phone].  Normal or non-critical lab and imaging results will be communicated to you by MyChart, letter or phone within 4 business days after the clinic has received the results. If you do not hear from us within 7 days, please contact the clinic through MyChart or phone. If you have a critical or abnormal lab result, we will notify you by phone as soon as possible.  Submit refill requests through goOutMap or call your pharmacy and they will forward the refill request to us. Please allow 3 business days for your refill to be completed.          Additional Information About Your Visit        MyChart Information     goOutMap lets you send messages to your doctor, view your test results, renew your prescriptions, schedule appointments and more. To sign up, go to [email or web address] . Click on \"Log in\" on the left side of the screen, which will take you to the Welcome page. Then click on \"Sign up Now\" on the right side of the page.     You will be asked to enter the access code listed below, as well as some personal information. Please follow the directions to create your username and password.     Your access code is: [email or web address]  Expires: 11/20/2017 12:03 PM     Your access code " "will  in 90 days. If you need help or a new code, please call your [facility] clinic or [phone].        Care EveryWhere ID     This is your Care EveryWhere ID. This could be used by other organizations to access your [facility] medical records  [id number]        Your Vitals Were     Pulse Height BMI (Body Mass Index)             113 6' 1\" (1.854 m) 22.43 kg/m2          Blood Pressure from Last 3 Encounters:   17 100/66   17 110/60   17 103/67    Weight from Last 3 Encounters:   17 170 lb (77.1 kg)   17 170 lb (77.1 kg)   17 175 lb (79.4 kg)              Today, you had the following     No orders found for display       Primary Care Provider Office Phone # Fax #    Steven Tapia [phone] [address] [facility] [address]        Equal Access to Services     [facility]: Hadii aad ku hadasho Soomaali, waaxda luqadaha, qaybta kaalmada adeegyada, waxay idiin hayaan jeny euceda . So [facility] [phone].    ATENCIÓN: Si habla español, tiene a francis disposición servicios gratuitos de asistencia lingüística. Llame al [phone].    We comply with applicable federal civil rights laws and [place] laws. We do not discriminate on the basis of race, color, national origin, age, disability sex, sexual orientation or gender identity.            Thank you!     Thank you for choosing [facility]  for your care. Our goal is always to provide you with excellent care. Hearing back from our patients is one way we can continue to improve our services. Please take a few minutes to complete the written survey that you may receive in the mail after your visit with us. Thank you!             Your Updated Medication List - Protect others around you: Learn how to safely use, store and throw away your medicines at www.disposemymeds.org.          This list is accurate as of: 17 10:56 AM.  Always use your most recent med list.       "             Brand Name Dispense Instructions for use Diagnosis    aspirin 325 MG tablet      Take 81 mg by mouth daily    Hypertension       diltiazem 240 MG 24 hr capsule    CARTIA XT    90 capsule    TAKE 1 CAPSULE BY MOUTH DAILY    Essential hypertension, benign       dorzolamide-timolol 2-0.5 % ophthalmic solution    COSOPT     1 drop 2 times daily.    Hypertension       folic acid 1 MG tablet    FOLVITE    120 tablet    TAKE 4 TABLETS BY MOUTH DAILY    Folate deficiency       levothyroxine 100 MCG tablet    SYNTHROID/LEVOTHROID    90 tablet    Take 1 tablet (100 mcg) by mouth daily    Hypothyroidism, unspecified type       lisinopril-hydrochlorothiazide 20-12.5 MG per tablet    PRINZIDE/ZESTORETIC    90 tablet    TAKE 1 TABLET BY MOUTH DAILY    Essential hypertension, benign       metFORMIN 500 MG tablet    GLUCOPHAGE    30 tablet    Take 1 tablet (500 mg) by mouth daily (with dinner) 20 minutes before dinner    Type 2 diabetes mellitus without complication, without long-term current use of insulin (H)       omeprazole 20 MG CR capsule    priLOSEC    90 capsule    TAKE ONE CAPSULE BY MOUTH EVERY DAY    Essential hypertension with goal blood pressure less than 130/85       TRAVATAN 0.004 % ophthalmic solution   Generic drug:  travoprost Z (benzalkonium)      1 drop every evening.    Hypertension       vitamin D 2000 UNITS Caps      Take 1 capsule by mouth daily

## 2017-09-26 NOTE — PROGRESS NOTES
[facility] General Surgery Clinic Consultation    CHIEF COMPLAINT:  Chief Complaint   Patient presents with     Consult     Abdominal mass        HISTORY OF PRESENT ILLNESS:  Abebe Christensen is a 71 year old male who is seen in consultation at the request of Dr. Ames for evaluation of need for diagnostic laparoscopy and biopsy of omental mass/peritoneum.  He has been loosing a considerable amount of weight and developing weakness over the last several months.  Other studies and biopsies have been unsuccessful.  PET/CT shows concerns for omental/peritoneal disease that is not amenable to percutaneous biopsy.     REVIEW OF SYSTEMS:  Constitutional:  See hpi.  Eyes:  Negative for new vision problems.  ENT:  Negative for ENT pain.  Cardiovascular:  Negative for chest pain, palpitations.  Respiratory:  Negative for cough, dyspnea.  Gastrointestinal:  Negative for abdominal pain  Musculoskeletal:  Negative for new arthralgias or myalgias.    Past Medical History:   Diagnosis Date     GERD (gastroesophageal reflux disease)      Hyperlipidemia      Hypertension      Hypertension      Renal insufficiency, mild        Past Surgical History:   Procedure Laterality Date     EYE SURGERY  2008    cataract and glaucoma       Family History   Problem Relation Age of Onset     Eye Disorder Mother      Cancer - colorectal Father        Social History   Substance Use Topics     Smoking status: Current Every Day Smoker     Types: Cigarettes     Smokeless tobacco: Never Used     Alcohol use Yes       Patient Active Problem List   Diagnosis     GERD (gastroesophageal reflux disease)     CKD (chronic kidney disease) stage 3, GFR 30-59 ml/min     Essential hypertension, benign     H/O polycythemia vera     Glucose intolerance (impaired glucose tolerance)     Family history of diabetes mellitus     Coryza for 20 yrs      Screening for prostate cancer     Tobacco abuse: [age]y/o @ 1ppd=19pk yr hx;only cigars since      COPD (chronic  "obstructive pulmonary disease): spirometry 5-14-15      Risk for falls     Acquired hypothyroidism       No Known Allergies    Current Outpatient Prescriptions   Medication Sig Dispense Refill     lisinopril-hydrochlorothiazide (PRINZIDE/ZESTORETIC) 20-12.5 MG per tablet TAKE 1 TABLET BY MOUTH DAILY 90 tablet 0     omeprazole (PRILOSEC) 20 MG CR capsule TAKE ONE CAPSULE BY MOUTH EVERY DAY 90 capsule 3     levothyroxine (SYNTHROID/LEVOTHROID) 100 MCG tablet Take 1 tablet (100 mcg) by mouth daily 90 tablet 1     metFORMIN (GLUCOPHAGE) 500 MG tablet Take 1 tablet (500 mg) by mouth daily (with dinner) 20 minutes before dinner 30 tablet 5     folic acid (FOLVITE) 1 MG tablet TAKE 4 TABLETS BY MOUTH DAILY 120 tablet 7     diltiazem (CARTIA XT) 240 MG 24 hr CD capsule TAKE 1 CAPSULE BY MOUTH DAILY 90 capsule 3     Cholecalciferol (VITAMIN D) 2000 UNITS CAPS Take 1 capsule by mouth daily       aspirin 325 MG tablet Take 81 mg by mouth daily        travoprost Z, benzalkonium, (TRAVATAN) 0.004 % ophthalmic solution 1 drop every evening.       dorzolamide-timolol (COSOPT) 2-0.5 % ophthalmic solution 1 drop 2 times daily.         Vitals: /66  Pulse 113  Ht 6' 1\" (1.854 m)  Wt 170 lb (77.1 kg)  BMI 22.43 kg/m2  BMI= Body mass index is 22.43 kg/(m^2).    EXAM:  GENERAL: thin, alert and mild distress   HEENT: moist mucus membranes, no scleral icterus  CARDIOVASCULAR:  RRR, No JVD  RESPIRATORY: non labored breathing  NECK: Neck supple. No noticeable masses.  ABDOMEN: soft, nontender, mild distention/ascites, reducible umbilical hernia  Extremities: warm and well perfused, no edema  SKIN: No suspicious lesions or rashes    LABS/Imaging: reviewed recent imaging studies    ASSESSMENT:  Abebe Christensen suffers from abdominal mass and unintentional weight loss.    PLAN:  Laparoscopic abdominal exploration and biopsies as needed.  Abebe Christensen understands the risk, benefits, hopeful outcomes, and possible complications, both in " the short and in the long term.  All his questions answered, he will like to proceed with the propose procedure in the near future.    It is my pleasure to participate in the care of Abebe Christensen. Thank you for this consultation.     If you have any questions please give me a call.    Best regards,  Kendell Kenny MD    Please route or send letter to:  Primary Care Provider (PCP), Referring Provider and Include Progress Note    Total time with patient visit: 45 minutes more than half spent in counseling, explanation of procedures and coordination of care.

## 2017-10-02 ENCOUNTER — OFFICE VISIT (OUTPATIENT)
Dept: FAMILY MEDICINE | Facility: CLINIC | Age: 72
End: 2017-10-02
Payer: COMMERCIAL

## 2017-10-02 VITALS
OXYGEN SATURATION: 100 % | SYSTOLIC BLOOD PRESSURE: 110 MMHG | HEIGHT: 73 IN | HEART RATE: 96 BPM | WEIGHT: 175 LBS | TEMPERATURE: 97 F | BODY MASS INDEX: 23.19 KG/M2 | RESPIRATION RATE: 14 BRPM | DIASTOLIC BLOOD PRESSURE: 64 MMHG

## 2017-10-02 VITALS
HEART RATE: 96 BPM | BODY MASS INDEX: 23.09 KG/M2 | OXYGEN SATURATION: 100 % | WEIGHT: 175 LBS | RESPIRATION RATE: 16 BRPM | TEMPERATURE: 97 F | SYSTOLIC BLOOD PRESSURE: 110 MMHG | DIASTOLIC BLOOD PRESSURE: 64 MMHG

## 2017-10-02 DIAGNOSIS — Z01.818 PREOP GENERAL PHYSICAL EXAM: Primary | ICD-10-CM

## 2017-10-02 DIAGNOSIS — G62.89 PERIPHERAL AXONAL NEUROPATHY: Primary | ICD-10-CM

## 2017-10-02 DIAGNOSIS — R63.4 WEIGHT LOSS: ICD-10-CM

## 2017-10-02 DIAGNOSIS — N18.30 CKD (CHRONIC KIDNEY DISEASE) STAGE 3, GFR 30-59 ML/MIN (H): Chronic | ICD-10-CM

## 2017-10-02 DIAGNOSIS — I10 ESSENTIAL HYPERTENSION, BENIGN: ICD-10-CM

## 2017-10-02 DIAGNOSIS — R20.0 NUMBNESS IN FEET: ICD-10-CM

## 2017-10-02 DIAGNOSIS — D47.2 MONOCLONAL PARAPROTEINEMIA: ICD-10-CM

## 2017-10-02 LAB
ALBUMIN UR-MCNC: 30 MG/DL
ANION GAP SERPL CALCULATED.3IONS-SCNC: 8 MMOL/L (ref 3–14)
APPEARANCE UR: CLEAR
BASOPHILS # BLD AUTO: 0 10E9/L (ref 0–0.2)
BASOPHILS NFR BLD AUTO: 0.4 %
BILIRUB UR QL STRIP: ABNORMAL
BUN SERPL-MCNC: 9 MG/DL (ref 7–30)
CALCIUM SERPL-MCNC: 9.1 MG/DL (ref 8.5–10.1)
CHLORIDE SERPL-SCNC: 102 MMOL/L (ref 94–109)
CO2 SERPL-SCNC: 25 MMOL/L (ref 20–32)
COLOR UR AUTO: YELLOW
CREAT SERPL-MCNC: 1.11 MG/DL (ref 0.66–1.25)
DIFFERENTIAL METHOD BLD: ABNORMAL
EOSINOPHIL # BLD AUTO: 0.2 10E9/L (ref 0–0.7)
EOSINOPHIL NFR BLD AUTO: 2.5 %
ERYTHROCYTE [DISTWIDTH] IN BLOOD BY AUTOMATED COUNT: 13.6 % (ref 10–15)
GFR SERPL CREATININE-BSD FRML MDRD: 65 ML/MIN/1.7M2
GLUCOSE SERPL-MCNC: 110 MG/DL (ref 70–99)
GLUCOSE UR STRIP-MCNC: NEGATIVE MG/DL
HCT VFR BLD AUTO: 41.4 % (ref 40–53)
HGB BLD-MCNC: 14 G/DL (ref 13.3–17.7)
HGB UR QL STRIP: NEGATIVE
KETONES UR STRIP-MCNC: ABNORMAL MG/DL
LEUKOCYTE ESTERASE UR QL STRIP: NEGATIVE
LYMPHOCYTES # BLD AUTO: 1.4 10E9/L (ref 0.8–5.3)
LYMPHOCYTES NFR BLD AUTO: 14.8 %
MCH RBC QN AUTO: 36.2 PG (ref 26.5–33)
MCHC RBC AUTO-ENTMCNC: 33.8 G/DL (ref 31.5–36.5)
MCV RBC AUTO: 107 FL (ref 78–100)
MONOCYTES # BLD AUTO: 0.9 10E9/L (ref 0–1.3)
MONOCYTES NFR BLD AUTO: 9.7 %
NEUTROPHILS # BLD AUTO: 6.7 10E9/L (ref 1.6–8.3)
NEUTROPHILS NFR BLD AUTO: 72.6 %
NITRATE UR QL: NEGATIVE
PH UR STRIP: 6.5 PH (ref 5–7)
PLATELET # BLD AUTO: 235 10E9/L (ref 150–450)
POTASSIUM SERPL-SCNC: 4.7 MMOL/L (ref 3.4–5.3)
RBC # BLD AUTO: 3.87 10E12/L (ref 4.4–5.9)
RBC #/AREA URNS AUTO: ABNORMAL /HPF
SODIUM SERPL-SCNC: 135 MMOL/L (ref 133–144)
SOURCE: ABNORMAL
SP GR UR STRIP: 1.01 (ref 1–1.03)
UROBILINOGEN UR STRIP-ACNC: 1 EU/DL (ref 0.2–1)
WBC # BLD AUTO: 9.3 10E9/L (ref 4–11)
WBC #/AREA URNS AUTO: ABNORMAL /HPF

## 2017-10-02 PROCEDURE — 80048 BASIC METABOLIC PNL TOTAL CA: CPT | Performed by: FAMILY MEDICINE

## 2017-10-02 PROCEDURE — 81001 URINALYSIS AUTO W/SCOPE: CPT | Performed by: FAMILY MEDICINE

## 2017-10-02 PROCEDURE — 36415 COLL VENOUS BLD VENIPUNCTURE: CPT | Performed by: FAMILY MEDICINE

## 2017-10-02 PROCEDURE — 85025 COMPLETE CBC W/AUTO DIFF WBC: CPT | Performed by: FAMILY MEDICINE

## 2017-10-02 PROCEDURE — 99214 OFFICE O/P EST MOD 30 MIN: CPT | Performed by: FAMILY MEDICINE

## 2017-10-02 NOTE — PROGRESS NOTES
[facility]  [address]  [place] [address]  [phone]  Dept: [phone]    PRE-OP EVALUATION:  Today's date: 10/2/2017    Abebe Christensen (: 1945) presents for pre-operative evaluation assessment as requested by Dr. Kenny.  He requires evaluation and anesthesia risk assessment prior to undergoing surgery/procedure for treatment of colon .  Proposed procedure:  LAPAROSCOPIC exploratory surgery with biopsies    Date of Surgery/ Procedure: 10/3/17  Time of Surgery/ Procedure:   Hospital/Surgical Facility: [facility]  Fax number for surgical facility:   Primary Physician: NEGIN BLACK  Type of Anesthesia Anticipated: General    Patient has a Health Care Directive or Living Will:  NO    1. NO - Do you have a history of heart attack, stroke, stent, bypass or surgery on an artery in the head, neck, heart or legs?  2. NO - Do you ever have any pain or discomfort in your chest?  3. NO - Do you have a history of  Heart Failure?  4. NO - Are you troubled by shortness of breath when: walking on the level, up a slight hill or at night?  5. NO - Do you currently have a cold, bronchitis or other respiratory infection?  6. NO - Do you have a cough, shortness of breath or wheezing?  7. NO - Do you sometimes get pains in the calves of your legs when you walk?  8. NO - Do you or anyone in your family have previous history of blood clots?  9. NO - Do you or does anyone in your family have a serious bleeding problem such as prolonged bleeding following surgeries or cuts?  10. NO - Have you ever had problems with anemia or been told to take iron pills?  11. NO - Have you had any abnormal blood loss such as black, tarry or bloody stools, or abnormal vaginal bleeding?  12. NO - Have you ever had a blood transfusion?  13. NO - Have you or any of your relatives ever had problems with anesthesia?  14. NO - Do you have sleep apnea, excessive snoring or daytime  drowsiness?  15. NO - Do you have any prosthetic heart valves?  16. NO - Do you have prosthetic joints?  17. NO - Is there any chance that you may be pregnant?        HPI:                                                      Brief HPI related to upcoming procedure: Patient with weight loss and a weakly positive pet scan of the abdomen with thickening of the omentum.  2 paracenteses have been negative for malignancy.  Patient is undergoing laparoscopic exploratory surgery to see if there are abnormal areas to biopsy.      See problem list for active medical problems.  Problems all longstanding and stable, except as noted/documented.  See ROS for pertinent symptoms related to these conditions.                                                                                                  .    MEDICAL HISTORY:                                                    Patient Active Problem List    Diagnosis Date Noted     Numbness in feet 10/02/2017     Priority: Medium     Monoclonal paraproteinemia 10/02/2017     Priority: Medium     Acquired hypothyroidism 06/20/2017     Priority: Medium     Coryza for 20 yrs  05/14/2015     Priority: Medium     Screening for prostate cancer 05/14/2015     Priority: Medium     Tobacco abuse: [age]y/o @ 1ppd=19pk yr hx;only cigars since  05/14/2015     Priority: Medium     Risk for falls 05/14/2015     Priority: Medium     COPD (chronic obstructive pulmonary disease): spirometry 5-14-15  12/14/2014     Priority: Medium     Family history of diabetes mellitus 09/02/2014     Priority: Medium     Glucose intolerance (impaired glucose tolerance) 03/04/2014     Priority: Medium     GERD (gastroesophageal reflux disease)      Priority: Medium     Essential hypertension, benign      Priority: Medium     CKD (chronic kidney disease) stage 3, GFR 30-59 ml/min 01/13/2013     Priority: Medium     H/O polycythemia vera 01/01/2002     Priority: Low      Past Medical History:   Diagnosis Date     GERD  (gastroesophageal reflux disease)      Hyperlipidemia      Hypertension      Hypertension      Renal insufficiency, mild      Past Surgical History:   Procedure Laterality Date     EYE SURGERY  2008    cataract and glaucoma     Current Outpatient Prescriptions   Medication Sig Dispense Refill     lisinopril-hydrochlorothiazide (PRINZIDE/ZESTORETIC) 20-12.5 MG per tablet TAKE 1 TABLET BY MOUTH DAILY 90 tablet 0     omeprazole (PRILOSEC) 20 MG CR capsule TAKE ONE CAPSULE BY MOUTH EVERY DAY 90 capsule 3     levothyroxine (SYNTHROID/LEVOTHROID) 100 MCG tablet Take 1 tablet (100 mcg) by mouth daily 90 tablet 1     metFORMIN (GLUCOPHAGE) 500 MG tablet Take 1 tablet (500 mg) by mouth daily (with dinner) 20 minutes before dinner 30 tablet 5     folic acid (FOLVITE) 1 MG tablet TAKE 4 TABLETS BY MOUTH DAILY 120 tablet 7     diltiazem (CARTIA XT) 240 MG 24 hr CD capsule TAKE 1 CAPSULE BY MOUTH DAILY 90 capsule 3     Cholecalciferol (VITAMIN D) 2000 UNITS CAPS Take 1 capsule by mouth daily       aspirin 325 MG tablet Take 81 mg by mouth daily        travoprost Z, benzalkonium, (TRAVATAN) 0.004 % ophthalmic solution 1 drop every evening.       dorzolamide-timolol (COSOPT) 2-0.5 % ophthalmic solution 1 drop 2 times daily.       OTC products: None, except as noted above    No Known Allergies   Latex Allergy: NO    Social History   Substance Use Topics     Smoking status: Current Every Day Smoker     Types: Cigarettes     Smokeless tobacco: Never Used     Alcohol use Yes     History   Drug Use No       REVIEW OF SYSTEMS:                                                    CONSTITUTIONAL:POSITIVE  for fatigue, weight loss and bloating and NEGATIVE  for fever  and sweats  I: NEGATIVE for worrisome rashes, moles or lesions  E: NEGATIVE for vision changes or irritation  E/M: NEGATIVE for ear, mouth and throat problems  R: NEGATIVE for significant cough or SOB  B: NEGATIVE for masses, tenderness or discharge  CV: NEGATIVE for chest  "pain, palpitations or peripheral edema  GI: POSITIVE for gas or bloating, poor appetite and weight loss  : NEGATIVE for frequency, dysuria, or hematuria  M: NEGATIVE for significant arthralgias or myalgia  N: NEGATIVE for weakness, dizziness or paresthesias  E: NEGATIVE for temperature intolerance, skin/hair changes  H: NEGATIVE for bleeding problems  P: NEGATIVE for changes in mood or affect    EXAM:                                                    /64  Pulse 96  Temp 97  F (36.1  C) (Tympanic)  Resp 14  Ht 6' 1\" (1.854 m)  Wt 175 lb (79.4 kg)  SpO2 100%  BMI 23.09 kg/m2    GENERAL APPEARANCE: healthy, alert and no distress     EYES: EOMI,  PERRL     HENT: ear canals and TM's normal and nose and mouth without ulcers or lesions     NECK: no adenopathy, no asymmetry, masses, or scars and thyroid normal to palpation     RESP: lungs clear to auscultation - no rales, rhonchi or wheezes     CV: regular rates and rhythm, normal S1 S2, no S3 or S4 and no murmur, click or rub     ABDOMEN:  soft, nontender, no HSM or masses and bowel sounds normal     MS: extremities normal- no gross deformities noted, no evidence of inflammation in joints, FROM in all extremities.     SKIN: no suspicious lesions or rashes     NEURO: Normal strength and tone, sensory exam grossly normal, mentation intact and speech normal     PSYCH: mentation appears normal. and affect normal/bright     LYMPHATICS: No axillary, cervical, or supraclavicular nodes    DIAGNOSTICS:                                                        Recent Labs   Lab Test  08/23/17   1028  08/22/17   1205  06/07/17   1115  05/26/17   1115  04/12/17   1147   HGB  13.6   --    --   9.3*   --    PLT  241  249   --   194   --    INR   --   1.09   --    --    --    NA  133   --   138  141   --    POTASSIUM  4.3   --   3.1*  3.5   --    CR  1.25   --   1.13  1.58*   --    A1C  4.5   --    --    --   4.6        IMPRESSION:                                             "        Reason for surgery/procedure: Weight loss, decreased appetite, intra-abdominal fluid accumulation and an abnormal PET scan.      The proposed surgical procedure is considered INTERMEDIATE risk.    REVISED CARDIAC RISK INDEX  The patient has the following serious cardiovascular risks for perioperative complications such as (MI, PE, VFib and 3  AV Block):  No serious cardiac risks  INTERPRETATION: 0 risks: Class I (very low risk - 0.4% complication rate)    The patient has the following additional risks for perioperative complications:  No identified additional risks      ICD-10-CM    1. Preop general physical exam Z01.818 UA with Microscopic     CBC with platelets differential     Basic metabolic panel   2. Weight loss R63.4    3. CKD (chronic kidney disease) stage 3, GFR 30-59 ml/min N18.3    4. Essential hypertension, benign I10    5. Numbness in feet R20.0    6. Monoclonal paraproteinemia D47.2        RECOMMENDATIONS:                                                      --Consult hospital rounder / IM to assist post-op medical management        APPROVAL GIVEN to proceed with proposed procedure, without further diagnostic evaluation       Signed Electronically by: Carson Fried MD    Copy of this evaluation report is provided to requesting physician.    [facility] Preop Guidelines

## 2017-10-02 NOTE — NURSING NOTE
"Chief Complaint   Patient presents with     Pre-Op Exam       Initial /64  Pulse 96  Temp 97  F (36.1  C) (Tympanic)  Resp 14  Ht 6' 1\" (1.854 m)  Wt 175 lb (79.4 kg)  SpO2 100%  BMI 23.09 kg/m2 Estimated body mass index is 23.09 kg/(m^2) as calculated from the following:    Height as of this encounter: 6' 1\" (1.854 m).    Weight as of this encounter: 175 lb (79.4 kg).  Medication Reconciliation: complete     Jazlyn Moreno CMA      "

## 2017-10-02 NOTE — MR AVS SNAPSHOT
After Visit Summary   10/2/2017    Abebe Christensen    MRN: [id number]           Patient Information     Date Of Birth          1945        Visit Information        Provider Department      10/2/2017 10:30 AM Carson Fried MD [facility]        Today's Diagnoses     Preop general physical exam    -  1    Weight loss        CKD (chronic kidney disease) stage 3, GFR 30-59 ml/min        Essential hypertension, benign        Numbness in feet        Monoclonal paraproteinemia          Care Instructions      Before Your Surgery      Call your surgeon if there is any change in your health. This includes signs of a cold or flu (such as a sore throat, runny nose, cough, rash or fever).    Do not smoke, drink alcohol or take over the counter medicine (unless your surgeon or primary care doctor tells you to) for the 24 hours before and after surgery.    If you take prescribed drugs: Follow your doctor s orders about which medicines to take and which to stop until after surgery.    Eating and drinking prior to surgery: follow the instructions from your surgeon    Take a shower or bath the night before surgery. Use the soap your surgeon gave you to gently clean your skin. If you do not have soap from your surgeon, use your regular soap. Do not shave or scrub the surgery site.  Wear clean pajamas and have clean sheets on your bed.           Follow-ups after your visit        Your next 10 appointments already scheduled     Oct 03, 2017 10:00 AM CDT   [facility] Same Day Surgery with Kendell Kenny MD, Phyllis Salazar PA-C   [facility] ([facility])    [facility] ([facility])   [phone]            Oct 03, 2017   Procedure with Kendell Kenny MD   [facility] (--)    [address]  [facility] [id number] [phone]              Who to  "contact     If you have questions or need follow up information about today's clinic visit or your schedule please contact [facility] directly at [phone].  Normal or non-critical lab and imaging results will be communicated to you by MyChart, letter or phone within 4 business days after the clinic has received the results. If you do not hear from us within 7 days, please contact the clinic through MyChart or phone. If you have a critical or abnormal lab result, we will notify you by phone as soon as possible.  Submit refill requests through [employer] or call your pharmacy and they will forward the refill request to us. Please allow 3 business days for your refill to be completed.          Additional Information About Your Visit        [facility] Information     [facility] lets you send messages to your doctor, view your test results, renew your prescriptions, schedule appointments and more. To sign up, go to [email or web address] . Click on \"Log in\" on the left side of the screen, which will take you to the Welcome page. Then click on \"Sign up Now\" on the right side of the page.     You will be asked to enter the access code listed below, as well as some personal information. Please follow the directions to create your username and password.     Your access code is: [email or web address]  Expires: 2017 12:03 PM     Your access code will  in 90 days. If you need help or a new code, please call your [facility] clinic or [phone].        Care EveryWhere ID     This is your Care EveryWhere ID. This could be used by other organizations to access your [facility] medical records  [id number]        Your Vitals Were     Pulse Temperature Respirations Height Pulse Oximetry BMI (Body Mass Index)    96 97  F (36.1  C) (Tympanic) 14 6' 1\" (1.854 m) 100% 23.09 kg/m2       Blood Pressure from Last 3 Encounters:   10/02/17 110/64   10/02/17 110/64   17 100/66    Weight from Last 3 Encounters: "   10/02/17 175 lb (79.4 kg)   10/02/17 175 lb (79.4 kg)   09/25/17 170 lb (77.1 kg)              We Performed the Following     Basic metabolic panel     CBC with platelets differential     UA with Microscopic        Primary Care Provider Office Phone # Fax #    Steven Tapia [phone]       [address] [facility] [address]        Equal Access to Services     TABITHA SANCHEZ : Hadii aad ku hadasho Soomaali, waaxda luqadaha, qaybta kaalmada adeegyada, waxay idiin hayaan adeeg kharash la'vazquezn . So [facility] [phone].    ATENCIÓN: Si habla español, tiene a francis disposición servicios gratuitos de asistencia lingüística. Llame al [phone].    We comply with applicable federal civil rights laws and [place] laws. We do not discriminate on the basis of race, color, national origin, age, disability, sex, sexual orientation, or gender identity.            Thank you!     Thank you for choosing [facility]  for your care. Our goal is always to provide you with excellent care. Hearing back from our patients is one way we can continue to improve our services. Please take a few minutes to complete the written survey that you may receive in the mail after your visit with us. Thank you!             Your Updated Medication List - Protect others around you: Learn how to safely use, store and throw away your medicines at www.disposemymeds.org.          This list is accurate as of: 10/2/17  5:32 PM.  Always use your most recent med list.                   Brand Name Dispense Instructions for use Diagnosis    aspirin 325 MG tablet      Take 81 mg by mouth daily    Hypertension       diltiazem 240 MG 24 hr capsule    CARTIA XT    90 capsule    TAKE 1 CAPSULE BY MOUTH DAILY    Essential hypertension, benign       dorzolamide-timolol 2-0.5 % ophthalmic solution    COSOPT     1 drop 2 times daily.    Hypertension       folic acid 1 MG tablet    FOLVITE    120 tablet    TAKE 4  TABLETS BY MOUTH DAILY    Folate deficiency       levothyroxine 100 MCG tablet    SYNTHROID/LEVOTHROID    90 tablet    Take 1 tablet (100 mcg) by mouth daily    Hypothyroidism, unspecified type       lisinopril-hydrochlorothiazide 20-12.5 MG per tablet    PRINZIDE/ZESTORETIC    90 tablet    TAKE 1 TABLET BY MOUTH DAILY    Essential hypertension, benign       metFORMIN 500 MG tablet    GLUCOPHAGE    30 tablet    Take 1 tablet (500 mg) by mouth daily (with dinner) 20 minutes before dinner    Type 2 diabetes mellitus without complication, without long-term current use of insulin (H)       omeprazole 20 MG CR capsule    priLOSEC    90 capsule    TAKE ONE CAPSULE BY MOUTH EVERY DAY    Essential hypertension with goal blood pressure less than 130/85       TRAVATAN 0.004 % ophthalmic solution   Generic drug:  travoprost Z (benzalkonium)      1 drop every evening.    Hypertension       vitamin D 2000 UNITS Caps      Take 1 capsule by mouth daily

## 2017-10-02 NOTE — PATIENT INSTRUCTIONS
I get the patient is test results on his EMG.  We will plan on getting a neurology consult once he gets his abdominal issues straightened out.  He was very comfortable with that plan.

## 2017-10-02 NOTE — PROGRESS NOTES
SUBJECTIVE:   Abebe Christensen is a 71 year old male who presents to clinic today for the following health issues:      Follow Up       Duration: f/u from neurology appt    Description (location/character/radiation): seen for numbness in feet    Intensity:  moderate    Accompanying signs and symptoms: had an EMG    History (similar episodes/previous evaluation): None    Precipitating or alleviating factors: None    Therapies tried and outcome: None             Problem list and histories reviewed & adjusted, as indicated.  Additional history: as documented    Patient Active Problem List   Diagnosis     GERD (gastroesophageal reflux disease)     CKD (chronic kidney disease) stage 3, GFR 30-59 ml/min     Essential hypertension, benign     H/O polycythemia vera     Glucose intolerance (impaired glucose tolerance)     Family history of diabetes mellitus     Coryza for 20 yrs      Screening for prostate cancer     Tobacco abuse: 21-39y/o @ 1ppd=19pk yr hx;only cigars since      COPD (chronic obstructive pulmonary disease): spirometry 5-14-15      Risk for falls     Acquired hypothyroidism     Numbness in feet     Monoclonal paraproteinemia     Peripheral axonal neuropathy     Past Surgical History:   Procedure Laterality Date     EYE SURGERY  2008    cataract and glaucoma       Social History   Substance Use Topics     Smoking status: Current Every Day Smoker     Types: Cigarettes     Smokeless tobacco: Never Used     Alcohol use Yes     Family History   Problem Relation Age of Onset     Eye Disorder Mother      Cancer - colorectal Father              Reviewed and updated as needed this visit by clinical staffTobacco  Allergies  Meds  Med Hx  Surg Hx  Fam Hx  Soc Hx      Reviewed and updated as needed this visit by Provider         ROS:  NEURO: NEGATIVE for weakness, dizziness. Numbness or tingling  and paresthesias in the feet    OBJECTIVE:                                                    /64  Pulse 96   Temp 97  F (36.1  C) (Tympanic)  Resp 16  Wt 175 lb (79.4 kg)  SpO2 100%  BMI 23.09 kg/m2  Body mass index is 23.09 kg/(m^2).  GENERAL APPEARANCE: healthy, alert and no distress    Diagnostic test results:  [facility]  He has a moderately severe axonal sensorimotor peripheral neuropathy that is distally symmetric.       ASSESSMENT/PLAN:                                                        ICD-10-CM    1. Peripheral axonal neuropathy G60.8        Patient Instructions   I get the patient is test results on his EMG.  We will plan on getting a neurology consult once he gets his abdominal issues straightened out.  He was very comfortable with that plan.      Carson Fried MD  [facility]

## 2017-10-02 NOTE — NURSING NOTE
"Chief Complaint   Patient presents with     Neurologic Problem     f/u from neurologist       Initial /64  Pulse 96  Temp 97  F (36.1  C) (Tympanic)  Resp 16  Wt 175 lb (79.4 kg)  SpO2 100%  BMI 23.09 kg/m2 Estimated body mass index is 23.09 kg/(m^2) as calculated from the following:    Height as of an earlier encounter on 10/2/17: 6' 1\" (1.854 m).    Weight as of this encounter: 175 lb (79.4 kg).  Medication Reconciliation: complete       Jazlyn Moreno CMA      "

## 2017-10-02 NOTE — MR AVS SNAPSHOT
After Visit Summary   10/2/2017    Abebe Christensen    MRN: [id number]           Patient Information     Date Of Birth          1945        Visit Information        Provider Department      10/2/2017 11:00 AM Carson Fried MD [facility]        Today's Diagnoses     Peripheral axonal neuropathy    -  1      Care Instructions    I get the patient is test results on his EMG.  We will plan on getting a neurology consult once he gets his abdominal issues straightened out.  He was very comfortable with that plan.          Follow-ups after your visit        Your next 10 appointments already scheduled     Oct 03, 2017 10:00 AM CDT   [facility] Same Day Surgery with Kendell Kenny MD, Phyllis Salazar PA-C   [facility]uling ([facility])    [facility] ([facility])   [phone]            Oct 03, 2017   Procedure with Kendell Kenny MD   [facility] PeriOP Services (--)    [address]  [facility] [address] [phone]              Who to contact     If you have questions or need follow up information about today's clinic visit or your schedule please contact [facility] directly at [phone].  Normal or non-critical lab and imaging results will be communicated to you by MyChart, letter or phone within 4 business days after the clinic has received the results. If you do not hear from us within 7 days, please contact the clinic through MyChart or phone. If you have a critical or abnormal lab result, we will notify you by phone as soon as possible.  Submit refill requests through [employer] or call your pharmacy and they will forward the refill request to us. Please allow 3 business days for your refill to be completed.          Additional Information About Your Visit        [employer] Information     [employer] lets you send  "messages to your doctor, view your test results, renew your prescriptions, schedule appointments and more. To sign up, go to [email or web address] . Click on \"Log in\" on the left side of the screen, which will take you to the Welcome page. Then click on \"Sign up Now\" on the right side of the page.     You will be asked to enter the access code listed below, as well as some personal information. Please follow the directions to create your username and password.     Your access code is: [email or web address]  Expires: 2017 12:03 PM     Your access code will  in 90 days. If you need help or a new code, please call your [facility] clinic or [phone].        Care EveryWhere ID     This is your Care EveryWhere ID. This could be used by other organizations to access your [facility] medical records  [id number]        Your Vitals Were     Pulse Temperature Respirations Pulse Oximetry BMI (Body Mass Index)       96 97  F (36.1  C) (Tympanic) 16 100% 23.09 kg/m2        Blood Pressure from Last 3 Encounters:   10/02/17 110/64   10/02/17 110/64   17 100/66    Weight from Last 3 Encounters:   10/02/17 175 lb (79.4 kg)   10/02/17 175 lb (79.4 kg)   17 170 lb (77.1 kg)              Today, you had the following     No orders found for display       Primary Care Provider Office Phone # Fax #    Steven Tapia [phone] [address] [facility] [address]        Equal Access to Services     [facility] [place] : Hadii jerod solis hadashwillis Sobryce, waaxda luqadaha, qaybta kaalmada tamara luna. So [facility] [phone].    ATENCIÓN: Si habla español, tiene a francis disposición servicios gratuitos de asistencia lingüística. Rashi al [phone].    We comply with applicable federal civil rights laws and [place] laws. We do not discriminate on the basis of race, color, national origin, age, disability, sex, sexual orientation, or gender identity.          "   Thank you!     Thank you for choosing [facility]  for your care. Our goal is always to provide you with excellent care. Hearing back from our patients is one way we can continue to improve our services. Please take a few minutes to complete the written survey that you may receive in the mail after your visit with us. Thank you!             Your Updated Medication List - Protect others around you: Learn how to safely use, store and throw away your medicines at www.disposemymeds.org.          This list is accurate as of: 10/2/17  5:36 PM.  Always use your most recent med list.                   Brand Name Dispense Instructions for use Diagnosis    aspirin 325 MG tablet      Take 81 mg by mouth daily    Hypertension       diltiazem 240 MG 24 hr capsule    CARTIA XT    90 capsule    TAKE 1 CAPSULE BY MOUTH DAILY    Essential hypertension, benign       dorzolamide-timolol 2-0.5 % ophthalmic solution    COSOPT     1 drop 2 times daily.    Hypertension       folic acid 1 MG tablet    FOLVITE    120 tablet    TAKE 4 TABLETS BY MOUTH DAILY    Folate deficiency       levothyroxine 100 MCG tablet    SYNTHROID/LEVOTHROID    90 tablet    Take 1 tablet (100 mcg) by mouth daily    Hypothyroidism, unspecified type       lisinopril-hydrochlorothiazide 20-12.5 MG per tablet    PRINZIDE/ZESTORETIC    90 tablet    TAKE 1 TABLET BY MOUTH DAILY    Essential hypertension, benign       metFORMIN 500 MG tablet    GLUCOPHAGE    30 tablet    Take 1 tablet (500 mg) by mouth daily (with dinner) 20 minutes before dinner    Type 2 diabetes mellitus without complication, without long-term current use of insulin (H)       omeprazole 20 MG CR capsule    priLOSEC    90 capsule    TAKE ONE CAPSULE BY MOUTH EVERY DAY    Essential hypertension with goal blood pressure less than 130/85       TRAVATAN 0.004 % ophthalmic solution   Generic drug:  travoprost Z (benzalkonium)      1 drop every evening.    Hypertension        vitamin D 2000 UNITS Caps      Take 1 capsule by mouth daily

## 2017-10-03 ENCOUNTER — ANESTHESIA (OUTPATIENT)
Dept: SURGERY | Facility: CLINIC | Age: 72
End: 2017-10-03
Payer: MEDICARE

## 2017-10-03 ENCOUNTER — APPOINTMENT (OUTPATIENT)
Dept: SURGERY | Facility: PHYSICIAN GROUP | Age: 72
End: 2017-10-03
Payer: COMMERCIAL

## 2017-10-03 ENCOUNTER — SURGERY (OUTPATIENT)
Age: 72
End: 2017-10-03

## 2017-10-03 ENCOUNTER — HOSPITAL ENCOUNTER (OUTPATIENT)
Facility: CLINIC | Age: 72
Discharge: HOME | End: 2017-10-03
Attending: SURGERY | Admitting: SURGERY
Payer: MEDICARE

## 2017-10-03 ENCOUNTER — ANESTHESIA EVENT (OUTPATIENT)
Dept: SURGERY | Facility: CLINIC | Age: 72
End: 2017-10-03
Payer: MEDICARE

## 2017-10-03 VITALS
OXYGEN SATURATION: 94 % | TEMPERATURE: 97.6 F | WEIGHT: 174.7 LBS | SYSTOLIC BLOOD PRESSURE: 117 MMHG | BODY MASS INDEX: 23.15 KG/M2 | DIASTOLIC BLOOD PRESSURE: 79 MMHG | RESPIRATION RATE: 16 BRPM | HEIGHT: 73 IN

## 2017-10-03 DIAGNOSIS — Z98.890 S/P LAPAROSCOPY: Primary | ICD-10-CM

## 2017-10-03 LAB
GLUCOSE BLDC GLUCOMTR-MCNC: 109 MG/DL (ref 70–99)
POTASSIUM SERPL-SCNC: 4.7 MMOL/L (ref 3.4–5.3)

## 2017-10-03 PROCEDURE — 82962 GLUCOSE BLOOD TEST: CPT

## 2017-10-03 PROCEDURE — 36000063 ZZH SURGERY LEVEL 4 EA 15 ADDTL MIN: Performed by: SURGERY

## 2017-10-03 PROCEDURE — 25000128 H RX IP 250 OP 636: Performed by: NURSE ANESTHETIST, CERTIFIED REGISTERED

## 2017-10-03 PROCEDURE — 88112 CYTOPATH CELL ENHANCE TECH: CPT | Performed by: SURGERY

## 2017-10-03 PROCEDURE — 25000132 ZZH RX MED GY IP 250 OP 250 PS 637: Mod: GY | Performed by: PHYSICIAN ASSISTANT

## 2017-10-03 PROCEDURE — 71000013 ZZH RECOVERY PHASE 1 LEVEL 1 EA ADDTL HR: Performed by: SURGERY

## 2017-10-03 PROCEDURE — 88112 CYTOPATH CELL ENHANCE TECH: CPT | Mod: 26 | Performed by: SURGERY

## 2017-10-03 PROCEDURE — 40000170 ZZH STATISTIC PRE-PROCEDURE ASSESSMENT II: Performed by: SURGERY

## 2017-10-03 PROCEDURE — A9270 NON-COVERED ITEM OR SERVICE: HCPCS | Mod: GY | Performed by: PHYSICIAN ASSISTANT

## 2017-10-03 PROCEDURE — P9041 ALBUMIN (HUMAN),5%, 50ML: HCPCS | Performed by: NURSE ANESTHETIST, CERTIFIED REGISTERED

## 2017-10-03 PROCEDURE — 27210995 ZZH RX 272: Performed by: SURGERY

## 2017-10-03 PROCEDURE — 27210794 ZZH OR GENERAL SUPPLY STERILE: Performed by: SURGERY

## 2017-10-03 PROCEDURE — 37000009 ZZH ANESTHESIA TECHNICAL FEE, EACH ADDTL 15 MIN: Performed by: SURGERY

## 2017-10-03 PROCEDURE — 88313 SPECIAL STAINS GROUP 2: CPT | Mod: 26 | Performed by: SURGERY

## 2017-10-03 PROCEDURE — 47379 UNLISTED LAPS PX LIVER: CPT | Mod: AS | Performed by: PHYSICIAN ASSISTANT

## 2017-10-03 PROCEDURE — 88313 SPECIAL STAINS GROUP 2: CPT | Performed by: SURGERY

## 2017-10-03 PROCEDURE — 25000125 ZZHC RX 250: Performed by: NURSE ANESTHETIST, CERTIFIED REGISTERED

## 2017-10-03 PROCEDURE — 88331 PATH CONSLTJ SURG 1 BLK 1SPC: CPT | Mod: 91 | Performed by: SURGERY

## 2017-10-03 PROCEDURE — 25000132 ZZH RX MED GY IP 250 OP 250 PS 637: Mod: GY | Performed by: NURSE ANESTHETIST, CERTIFIED REGISTERED

## 2017-10-03 PROCEDURE — 36000093 ZZH SURGERY LEVEL 4 1ST 30 MIN: Performed by: SURGERY

## 2017-10-03 PROCEDURE — A9270 NON-COVERED ITEM OR SERVICE: HCPCS | Mod: GY | Performed by: NURSE ANESTHETIST, CERTIFIED REGISTERED

## 2017-10-03 PROCEDURE — 25800025 ZZH RX 258: Performed by: SURGERY

## 2017-10-03 PROCEDURE — 88305 TISSUE EXAM BY PATHOLOGIST: CPT | Performed by: SURGERY

## 2017-10-03 PROCEDURE — 47379 UNLISTED LAPS PX LIVER: CPT | Performed by: SURGERY

## 2017-10-03 PROCEDURE — 25000128 H RX IP 250 OP 636: Performed by: SURGERY

## 2017-10-03 PROCEDURE — 88305 TISSUE EXAM BY PATHOLOGIST: CPT | Mod: 26 | Performed by: SURGERY

## 2017-10-03 PROCEDURE — 88307 TISSUE EXAM BY PATHOLOGIST: CPT | Mod: 26 | Performed by: SURGERY

## 2017-10-03 PROCEDURE — 37000008 ZZH ANESTHESIA TECHNICAL FEE, 1ST 30 MIN: Performed by: SURGERY

## 2017-10-03 PROCEDURE — 84132 ASSAY OF SERUM POTASSIUM: CPT | Performed by: ANESTHESIOLOGY

## 2017-10-03 PROCEDURE — 25000128 H RX IP 250 OP 636: Performed by: ANESTHESIOLOGY

## 2017-10-03 PROCEDURE — 71000027 ZZH RECOVERY PHASE 2 EACH 15 MINS: Performed by: SURGERY

## 2017-10-03 PROCEDURE — 71000012 ZZH RECOVERY PHASE 1 LEVEL 1 FIRST HR: Performed by: SURGERY

## 2017-10-03 PROCEDURE — 00000155 ZZHCL STATISTIC H-CELL BLOCK W/STAIN: Performed by: SURGERY

## 2017-10-03 PROCEDURE — 88307 TISSUE EXAM BY PATHOLOGIST: CPT | Performed by: SURGERY

## 2017-10-03 PROCEDURE — 88331 PATH CONSLTJ SURG 1 BLK 1SPC: CPT | Mod: 26 | Performed by: SURGERY

## 2017-10-03 PROCEDURE — 25000566 ZZH SEVOFLURANE, EA 15 MIN: Performed by: SURGERY

## 2017-10-03 PROCEDURE — 88305 TISSUE EXAM BY PATHOLOGIST: CPT | Mod: 26,76 | Performed by: SURGERY

## 2017-10-03 PROCEDURE — 36415 COLL VENOUS BLD VENIPUNCTURE: CPT | Performed by: ANESTHESIOLOGY

## 2017-10-03 PROCEDURE — 25000125 ZZHC RX 250: Performed by: SURGERY

## 2017-10-03 RX ORDER — NALOXONE HYDROCHLORIDE 0.4 MG/ML
.1-.4 INJECTION, SOLUTION INTRAMUSCULAR; INTRAVENOUS; SUBCUTANEOUS
Status: DISCONTINUED | OUTPATIENT
Start: 2017-10-03 | End: 2017-10-03 | Stop reason: HOSPADM

## 2017-10-03 RX ORDER — VECURONIUM BROMIDE 1 MG/ML
INJECTION, POWDER, LYOPHILIZED, FOR SOLUTION INTRAVENOUS PRN
Status: DISCONTINUED | OUTPATIENT
Start: 2017-10-03 | End: 2017-10-03

## 2017-10-03 RX ORDER — HYDROCODONE BITARTRATE AND ACETAMINOPHEN 5; 325 MG/1; MG/1
1-2 TABLET ORAL EVERY 4 HOURS PRN
Qty: 20 TABLET | Refills: 0 | Status: SHIPPED | OUTPATIENT
Start: 2017-10-03 | End: 2018-03-27

## 2017-10-03 RX ORDER — NEOSTIGMINE METHYLSULFATE 1 MG/ML
VIAL (ML) INJECTION PRN
Status: DISCONTINUED | OUTPATIENT
Start: 2017-10-03 | End: 2017-10-03

## 2017-10-03 RX ORDER — SODIUM CHLORIDE, SODIUM LACTATE, POTASSIUM CHLORIDE, CALCIUM CHLORIDE 600; 310; 30; 20 MG/100ML; MG/100ML; MG/100ML; MG/100ML
INJECTION, SOLUTION INTRAVENOUS CONTINUOUS
Status: DISCONTINUED | OUTPATIENT
Start: 2017-10-03 | End: 2017-10-03 | Stop reason: HOSPADM

## 2017-10-03 RX ORDER — DEXAMETHASONE SODIUM PHOSPHATE 4 MG/ML
INJECTION, SOLUTION INTRA-ARTICULAR; INTRALESIONAL; INTRAMUSCULAR; INTRAVENOUS; SOFT TISSUE PRN
Status: DISCONTINUED | OUTPATIENT
Start: 2017-10-03 | End: 2017-10-03

## 2017-10-03 RX ORDER — LIDOCAINE HYDROCHLORIDE 20 MG/ML
INJECTION, SOLUTION INFILTRATION; PERINEURAL PRN
Status: DISCONTINUED | OUTPATIENT
Start: 2017-10-03 | End: 2017-10-03

## 2017-10-03 RX ORDER — MEPERIDINE HYDROCHLORIDE 25 MG/ML
12.5 INJECTION INTRAMUSCULAR; INTRAVENOUS; SUBCUTANEOUS
Status: DISCONTINUED | OUTPATIENT
Start: 2017-10-03 | End: 2017-10-03 | Stop reason: HOSPADM

## 2017-10-03 RX ORDER — GLYCOPYRROLATE 0.2 MG/ML
INJECTION, SOLUTION INTRAMUSCULAR; INTRAVENOUS PRN
Status: DISCONTINUED | OUTPATIENT
Start: 2017-10-03 | End: 2017-10-03

## 2017-10-03 RX ORDER — MAGNESIUM HYDROXIDE 1200 MG/15ML
LIQUID ORAL PRN
Status: DISCONTINUED | OUTPATIENT
Start: 2017-10-03 | End: 2017-10-03 | Stop reason: HOSPADM

## 2017-10-03 RX ORDER — CEFAZOLIN SODIUM 1 G/3ML
INJECTION, POWDER, FOR SOLUTION INTRAMUSCULAR; INTRAVENOUS PRN
Status: DISCONTINUED | OUTPATIENT
Start: 2017-10-03 | End: 2017-10-03

## 2017-10-03 RX ORDER — CEFAZOLIN SODIUM 2 G/100ML
2 INJECTION, SOLUTION INTRAVENOUS
Status: COMPLETED | OUTPATIENT
Start: 2017-10-03 | End: 2017-10-03

## 2017-10-03 RX ORDER — HYDROCODONE BITARTRATE AND ACETAMINOPHEN 5; 325 MG/1; MG/1
1-2 TABLET ORAL
Status: COMPLETED | OUTPATIENT
Start: 2017-10-03 | End: 2017-10-03

## 2017-10-03 RX ORDER — HYDROMORPHONE HYDROCHLORIDE 1 MG/ML
.3-.5 INJECTION, SOLUTION INTRAMUSCULAR; INTRAVENOUS; SUBCUTANEOUS EVERY 10 MIN PRN
Status: DISCONTINUED | OUTPATIENT
Start: 2017-10-03 | End: 2017-10-03 | Stop reason: HOSPADM

## 2017-10-03 RX ORDER — ALBUMIN, HUMAN INJ 5% 5 %
SOLUTION INTRAVENOUS CONTINUOUS PRN
Status: DISCONTINUED | OUTPATIENT
Start: 2017-10-03 | End: 2017-10-03

## 2017-10-03 RX ORDER — CEFAZOLIN SODIUM 1 G/3ML
1 INJECTION, POWDER, FOR SOLUTION INTRAMUSCULAR; INTRAVENOUS SEE ADMIN INSTRUCTIONS
Status: DISCONTINUED | OUTPATIENT
Start: 2017-10-03 | End: 2017-10-03 | Stop reason: HOSPADM

## 2017-10-03 RX ORDER — ONDANSETRON 2 MG/ML
4 INJECTION INTRAMUSCULAR; INTRAVENOUS EVERY 30 MIN PRN
Status: DISCONTINUED | OUTPATIENT
Start: 2017-10-03 | End: 2017-10-03 | Stop reason: HOSPADM

## 2017-10-03 RX ORDER — ONDANSETRON 4 MG/1
4 TABLET, ORALLY DISINTEGRATING ORAL EVERY 30 MIN PRN
Status: DISCONTINUED | OUTPATIENT
Start: 2017-10-03 | End: 2017-10-03 | Stop reason: HOSPADM

## 2017-10-03 RX ORDER — FENTANYL CITRATE 0.05 MG/ML
25-50 INJECTION, SOLUTION INTRAMUSCULAR; INTRAVENOUS
Status: DISCONTINUED | OUTPATIENT
Start: 2017-10-03 | End: 2017-10-03 | Stop reason: HOSPADM

## 2017-10-03 RX ORDER — SODIUM CHLORIDE, SODIUM LACTATE, POTASSIUM CHLORIDE, CALCIUM CHLORIDE 600; 310; 30; 20 MG/100ML; MG/100ML; MG/100ML; MG/100ML
INJECTION, SOLUTION INTRAVENOUS CONTINUOUS PRN
Status: DISCONTINUED | OUTPATIENT
Start: 2017-10-03 | End: 2017-10-03

## 2017-10-03 RX ORDER — ALBUTEROL SULFATE 90 UG/1
AEROSOL, METERED RESPIRATORY (INHALATION) PRN
Status: DISCONTINUED | OUTPATIENT
Start: 2017-10-03 | End: 2017-10-03

## 2017-10-03 RX ORDER — PROPOFOL 10 MG/ML
INJECTION, EMULSION INTRAVENOUS PRN
Status: DISCONTINUED | OUTPATIENT
Start: 2017-10-03 | End: 2017-10-03

## 2017-10-03 RX ORDER — FENTANYL CITRATE 50 UG/ML
INJECTION, SOLUTION INTRAMUSCULAR; INTRAVENOUS PRN
Status: DISCONTINUED | OUTPATIENT
Start: 2017-10-03 | End: 2017-10-03

## 2017-10-03 RX ORDER — ONDANSETRON 2 MG/ML
INJECTION INTRAMUSCULAR; INTRAVENOUS PRN
Status: DISCONTINUED | OUTPATIENT
Start: 2017-10-03 | End: 2017-10-03

## 2017-10-03 RX ADMIN — DEXAMETHASONE SODIUM PHOSPHATE 4 MG: 4 INJECTION, SOLUTION INTRA-ARTICULAR; INTRALESIONAL; INTRAMUSCULAR; INTRAVENOUS; SOFT TISSUE at 10:57

## 2017-10-03 RX ADMIN — CEFAZOLIN 1 G: 1 INJECTION, POWDER, FOR SOLUTION INTRAMUSCULAR; INTRAVENOUS at 12:50

## 2017-10-03 RX ADMIN — SODIUM CHLORIDE 3000 ML: 900 IRRIGANT IRRIGATION at 11:10

## 2017-10-03 RX ADMIN — ROCURONIUM BROMIDE 50 MG: 10 INJECTION INTRAVENOUS at 10:45

## 2017-10-03 RX ADMIN — PROPOFOL 20 MG: 10 INJECTION, EMULSION INTRAVENOUS at 13:21

## 2017-10-03 RX ADMIN — SODIUM CHLORIDE, POTASSIUM CHLORIDE, SODIUM LACTATE AND CALCIUM CHLORIDE: 600; 310; 30; 20 INJECTION, SOLUTION INTRAVENOUS at 11:27

## 2017-10-03 RX ADMIN — VECURONIUM BROMIDE 1 MG: 1 INJECTION, POWDER, LYOPHILIZED, FOR SOLUTION INTRAVENOUS at 12:38

## 2017-10-03 RX ADMIN — FENTANYL CITRATE 25 MCG: 50 INJECTION, SOLUTION INTRAMUSCULAR; INTRAVENOUS at 13:25

## 2017-10-03 RX ADMIN — FENTANYL CITRATE 50 MCG: 50 INJECTION, SOLUTION INTRAMUSCULAR; INTRAVENOUS at 11:22

## 2017-10-03 RX ADMIN — PROPOFOL 30 MG: 10 INJECTION, EMULSION INTRAVENOUS at 13:18

## 2017-10-03 RX ADMIN — MIDAZOLAM HYDROCHLORIDE 1 MG: 1 INJECTION, SOLUTION INTRAMUSCULAR; INTRAVENOUS at 10:39

## 2017-10-03 RX ADMIN — PROPOFOL 20 MG: 10 INJECTION, EMULSION INTRAVENOUS at 13:15

## 2017-10-03 RX ADMIN — FENTANYL CITRATE 50 MCG: 50 INJECTION, SOLUTION INTRAMUSCULAR; INTRAVENOUS at 11:26

## 2017-10-03 RX ADMIN — DEXMEDETOMIDINE HYDROCHLORIDE 12 MCG: 100 INJECTION, SOLUTION INTRAVENOUS at 11:30

## 2017-10-03 RX ADMIN — DEXMEDETOMIDINE HYDROCHLORIDE 8 MCG: 100 INJECTION, SOLUTION INTRAVENOUS at 11:13

## 2017-10-03 RX ADMIN — PROPOFOL 20 MG: 10 INJECTION, EMULSION INTRAVENOUS at 13:10

## 2017-10-03 RX ADMIN — NEOSTIGMINE METHYLSULFATE 4 MG: 1 INJECTION INTRAMUSCULAR; INTRAVENOUS; SUBCUTANEOUS at 13:15

## 2017-10-03 RX ADMIN — LIDOCAINE HYDROCHLORIDE 50 ML: 10 INJECTION, SOLUTION INFILTRATION; PERINEURAL at 13:17

## 2017-10-03 RX ADMIN — NEOSTIGMINE METHYLSULFATE 1 MG: 1 INJECTION INTRAMUSCULAR; INTRAVENOUS; SUBCUTANEOUS at 13:33

## 2017-10-03 RX ADMIN — PROPOFOL 10 MG: 10 INJECTION, EMULSION INTRAVENOUS at 13:23

## 2017-10-03 RX ADMIN — PHENYLEPHRINE HYDROCHLORIDE 0.25 MCG/KG/MIN: 10 INJECTION, SOLUTION INTRAMUSCULAR; INTRAVENOUS; SUBCUTANEOUS at 11:02

## 2017-10-03 RX ADMIN — FIBRINOGEN HUMAN AND THROMBIN HUMAN 2 ML: KIT at 12:49

## 2017-10-03 RX ADMIN — PHENYLEPHRINE HYDROCHLORIDE 150 MCG: 10 INJECTION, SOLUTION INTRAMUSCULAR; INTRAVENOUS; SUBCUTANEOUS at 10:45

## 2017-10-03 RX ADMIN — PROPOFOL 20 MG: 10 INJECTION, EMULSION INTRAVENOUS at 13:19

## 2017-10-03 RX ADMIN — HYDROCODONE BITARTRATE AND ACETAMINOPHEN 1 TABLET: 5; 325 TABLET ORAL at 15:34

## 2017-10-03 RX ADMIN — SODIUM CHLORIDE 1000 ML: 0.9 IRRIGANT IRRIGATION at 11:10

## 2017-10-03 RX ADMIN — ALBUMIN (HUMAN): 12.5 SOLUTION INTRAVENOUS at 11:41

## 2017-10-03 RX ADMIN — SODIUM CHLORIDE, POTASSIUM CHLORIDE, SODIUM LACTATE AND CALCIUM CHLORIDE: 600; 310; 30; 20 INJECTION, SOLUTION INTRAVENOUS at 14:00

## 2017-10-03 RX ADMIN — ALBUMIN (HUMAN): 12.5 SOLUTION INTRAVENOUS at 12:41

## 2017-10-03 RX ADMIN — PHENYLEPHRINE HYDROCHLORIDE 100 MCG: 10 INJECTION, SOLUTION INTRAMUSCULAR; INTRAVENOUS; SUBCUTANEOUS at 11:35

## 2017-10-03 RX ADMIN — ALBUMIN (HUMAN): 12.5 SOLUTION INTRAVENOUS at 12:34

## 2017-10-03 RX ADMIN — CEFAZOLIN SODIUM 2 G: 2 INJECTION, SOLUTION INTRAVENOUS at 10:55

## 2017-10-03 RX ADMIN — ONDANSETRON 4 MG: 2 INJECTION INTRAMUSCULAR; INTRAVENOUS at 11:35

## 2017-10-03 RX ADMIN — FENTANYL CITRATE 50 MCG: 50 INJECTION, SOLUTION INTRAMUSCULAR; INTRAVENOUS at 12:05

## 2017-10-03 RX ADMIN — PHENYLEPHRINE HYDROCHLORIDE 100 MCG: 10 INJECTION, SOLUTION INTRAMUSCULAR; INTRAVENOUS; SUBCUTANEOUS at 12:09

## 2017-10-03 RX ADMIN — PHENYLEPHRINE HYDROCHLORIDE 150 MCG: 10 INJECTION, SOLUTION INTRAMUSCULAR; INTRAVENOUS; SUBCUTANEOUS at 10:53

## 2017-10-03 RX ADMIN — FENTANYL CITRATE 50 MCG: 50 INJECTION, SOLUTION INTRAMUSCULAR; INTRAVENOUS at 10:45

## 2017-10-03 RX ADMIN — GLYCOPYRROLATE 0.6 MG: 0.2 INJECTION, SOLUTION INTRAMUSCULAR; INTRAVENOUS at 13:15

## 2017-10-03 RX ADMIN — PROPOFOL 60 MG: 10 INJECTION, EMULSION INTRAVENOUS at 10:44

## 2017-10-03 RX ADMIN — PROPOFOL 20 MG: 10 INJECTION, EMULSION INTRAVENOUS at 13:12

## 2017-10-03 RX ADMIN — SODIUM CHLORIDE, POTASSIUM CHLORIDE, SODIUM LACTATE AND CALCIUM CHLORIDE: 600; 310; 30; 20 INJECTION, SOLUTION INTRAVENOUS at 10:39

## 2017-10-03 RX ADMIN — FENTANYL CITRATE 25 MCG: 50 INJECTION, SOLUTION INTRAMUSCULAR; INTRAVENOUS at 13:26

## 2017-10-03 RX ADMIN — LIDOCAINE HYDROCHLORIDE 100 MG: 20 INJECTION, SOLUTION INFILTRATION; PERINEURAL at 10:45

## 2017-10-03 RX ADMIN — ALBUTEROL SULFATE 6 PUFF: 90 AEROSOL, METERED RESPIRATORY (INHALATION) at 13:09

## 2017-10-03 RX ADMIN — DEXAMETHASONE SODIUM PHOSPHATE 4 MG: 4 INJECTION, SOLUTION INTRA-ARTICULAR; INTRALESIONAL; INTRAMUSCULAR; INTRAVENOUS; SOFT TISSUE at 13:01

## 2017-10-03 RX ADMIN — PHENYLEPHRINE HYDROCHLORIDE 200 MCG: 10 INJECTION, SOLUTION INTRAMUSCULAR; INTRAVENOUS; SUBCUTANEOUS at 10:57

## 2017-10-03 RX ADMIN — FENTANYL CITRATE 50 MCG: 50 INJECTION, SOLUTION INTRAMUSCULAR; INTRAVENOUS at 13:23

## 2017-10-03 RX ADMIN — FENTANYL CITRATE 50 MCG: 50 INJECTION, SOLUTION INTRAMUSCULAR; INTRAVENOUS at 10:39

## 2017-10-03 ASSESSMENT — COPD QUESTIONNAIRES: COPD: 1

## 2017-10-03 ASSESSMENT — LIFESTYLE VARIABLES: TOBACCO_USE: 1

## 2017-10-03 ASSESSMENT — ENCOUNTER SYMPTOMS: SEIZURES: 0

## 2017-10-03 NOTE — IP AVS SNAPSHOT
[place] [address]    [place] [address]    Phone:  [phone]                                       After Visit Summary   10/3/2017    Abebe Christensen    MRN: [id number]           After Visit Summary Signature Page     I have received my discharge instructions, and my questions have been answered. I have discussed any challenges I see with this plan with the nurse or doctor.    ..........................................................................................................................................  Patient/Patient Representative Signature      ..........................................................................................................................................  Patient Representative Print Name and Relationship to Patient    ..................................................               ................................................  Date                                            Time    ..........................................................................................................................................  Reviewed by Signature/Title    ...................................................              ..............................................  Date                                                            Time

## 2017-10-03 NOTE — ANESTHESIA CARE TRANSFER NOTE
Patient: Abebe Christensen    Procedure(s):  LAPAROSCOPIC ABDOMINAL EXPLORATION; OMENTECTOMY; PERITONEAL UMBILICAL HERNIA REPAIR, WEDGE LIVER BIOPSY - Wound Class: II-Clean Contaminated   - Wound Class: II-Clean Contaminated   - Wound Class: II-Clean Contaminated   - Wound Class: II-Clean Contaminated    Diagnosis: ABDOMINAL MASSES  Diagnosis Additional Information: No value filed.    Anesthesia Type:   General, ETT     Note:  Airway :Face Mask  Patient transferred to:PACU  Comments: Transferred to [facility] PACU  arousable breathing adequately by self.  Report to Jj.  HARPREET      Vitals: (Last set prior to Anesthesia Care Transfer)    CRNA VITALS  10/3/2017 1305 - 10/3/2017 1338      10/3/2017             Pulse: 102    SpO2: 100 %    Resp Rate (observed): 20    Resp Rate (set): 10                Electronically Signed By: PAYAL Pitts CRNA  October 3, 2017  1:38 PM

## 2017-10-03 NOTE — DISCHARGE INSTRUCTIONS
Same Day Surgery Discharge Instructions for  Sedation and General Anesthesia       It's not unusual to feel dizzy, light-headed or faint for up to 24 hours after surgery or while taking pain medication.  If you have these symptoms: sit for a few minutes before standing and have someone assist you when you get up to walk or use the bathroom.      You should rest and relax for the next 24 hours. We recommend you make arrangements to have an adult stay with you for at least 24 hours after your discharge.  Avoid hazardous and strenuous activity.      DO NOT DRIVE any vehicle or operate mechanical equipment for 24 hours following the end of your surgery.  Even though you may feel normal, your reactions may be affected by the medication you have received.      Do not drink alcoholic beverages for 24 hours following surgery.       Slowly progress to your regular diet as you feel able. It's not unusual to feel nauseated and/or vomit after receiving anesthesia.  If you develop these symptoms, drink clear liquids (apple juice, ginger ale, broth, 7-up, etc. ) until you feel better.  If your nausea and vomiting persists for 24 hours, please notify your surgeon.        All narcotic pain medications, along with inactivity and anesthesia, can cause constipation. Drinking plenty of liquids and increasing fiber intake will help.      For any questions of a medical nature, call your surgeon.      Do not make important decisions for 24 hours.      If you had general anesthesia, you may have a sore throat for a couple of days related to the breathing tube used during surgery.  You may use Cepacol lozenges to help with this discomfort.  If it worsens or if you develop a fever, contact your surgeon.       If you feel your pain is not well managed with the pain medications prescribed by your surgeon, please contact your surgeon's office to let them know so they can address your concerns.              [facility] -  [facility]  Discharge Instructions: Post-Operative Umbilical Hernia    ACTIVITY    Increase your activity gradually.  Avoid strenuous physical activity or heavy lifting greater than 15lbs. for 3-4 weeks.  You may climb stairs.    You may drive without restrictions when you are not using any prescription pain medication and comfortable in a car.    You may return to work/school when you are comfortable without any prescription pain medication.    WOUND CARE    You may remove your bandage and shower 48 hours after the surgery.  Pat your incisions dry and leave open to air.  Re-apply dressing (Band-aid or gauze/tape) as needed for drainage.    You may have steri-strips (looks like tape) or Dermabond (looks like glue) on your incision.  Leave it alone, it will peel up and fall off on its own.     Do not soak your incisions in a tub or pool for 2 weeks.     A lump or ridge under the incision is normal and will gradually resolve.    DIET    Return to the diet you were on before surgery.    Pain medications can cause constipation.  Limit use when possible.  Take over the counter stool softener/stimulant, such as Colace or Senna, with plenty of water.        PAIN    Expect some tenderness and discomfort at the incision site(s).  Use the prescribed pain medication at your discretion.  Expect gradual resolution of your pain over several days.    You may take ibuprofen with food (unless you have been told not to) instead of or in addition to your prescribed pain medication.  If you are taking Norco or Percocet, do not take any additional acetaminophen/APAP/Tylenol.      Do not drink alcohol or drive while you are taking pain medications.    You may apply ice to your incisions in 20 minute intervals as needed for the next 48 hours.  After that time, consider switching to heat if you prefer.    RETURN APPOINTMENT    Follow up with your surgeon or a PA in 2 weeks.  Please call the office at [phone] to schedule your  appointment.    CALL OUR OFFICE IF YOU HAVE:     Chills or fever above 101.5 F.    Increased redness or drainage at your incisions.    Significant bleeding.    Pain not relieved by your pain medication or rest.    Increasing pain after the first 48 hours.    Any other concerns or questions.    Revised August 2017          HOME CARE FOLLOWING LAPAROSCOPY    Diet  You have no restrictions on your diet.  During the evening following surgery, drink plenty of fluids and eat a light supper.    Nausea  The anesthesia may produce some nausea.  If you feel nauseated, stay in bed and try drinking fluids such as 7-Up, tea, or soup.    Discomfort  The amount of discomfort you can expect is very unpredictable.  If you have pain that cannot be controlled with Tylenol or with the prescription you may have received, you should notify your physician.  The following complaints are not uncommon and should not be cause for concern:   1.  Abdominal tenderness; abdominal cramping   2.  Low backache or pain radiating to your shoulders, chest or back. This is a result of the gas used to inflate your abdomen during surgery. Lying flat in bed seems to help relieve this.   3.  Sore throat for a day or two resulting from the anesthesia tube used during surgery.   4.  Black or blue ryann on your abdomen.     Drainage  You may expect a small amount of drainage from the incision on your abdomen and you may change the bandage when necessary.  You will also have a small amount of vaginal drainage for several days; this is normal and no cause for concern.  If excessive bleeding occurs, notify your physician.      If dye was used during your procedure, your urine will initially be bright blue. It will gradually return to yellow throughout the day. Drinking plenty of fluids will help to filter the dye from your urine.    Fever  A low grade fever (not over 100 F) is usual after this procedure.  Do not hesitate to notify your physician if your fever  seems excessive.    Stitches  If your stitches are the type that must be removed, your doctor will instruct you to return to their office.    Activity  Rest on the day of surgery then you may resume your normal activity, as tolerated. Avoid heavy lifting for one week.    You may shower.  Do not douche, and do not use tampons.  If you also had a D&C, do not resume intercourse until bleeding has ceased.        Emergency Care  Contact your physician if you have any of these problems:   1.  A fever over 100 F   2.  A large amount of bleeding or drainage   3.  Severe pain        **If you have questions or concerns about your procedure,  call Dr. Kenny at [phone]**

## 2017-10-03 NOTE — OP NOTE
Preoperative diagnosis: Peritoneal mass, weight loss and Umbilical hernia.    Postoperative diagnosis:  1. Omental lesions and scarring.  2.  Abnormal appearing liver.  3.  Umbilical hernia  4.  Ascites.      Operative procedure:   1.  Laparoscopic abdominal exploration peritoneal biopsies, sampling of ascites for cytology.  2.  Omentectomy.  3.  Wedge liver biopsy.  4.  Umbilical hernia repair.    Surgeon: Kendell Kenny MD.  Assistant:  Phyllis Salazar PA-C,The physicians assistant was medically necessary for their expertise in camera management, suctioning, suturing, and retraction.    Anesthesia: GETA.  EBL: Less than 50 mL's.      Events:  After induction of general endotracheal anesthesia, Abebe Christensen abdomen was prepped and draped in the usual sterile fashion.  Using a the direct visualization trocar in the left upper quadrant.  The abdomen was entered and pneumoperitoneum was established.  A total of 2 additional trocars were ultimately utilized in addition to the initial entry site.  Initial exploration revealed over 2 L of ascitic fluid, it was clear in nature.  This was sampled and sent to cytology.  Multiple adhesions from omentum to the anterior abdominal wall were encountered,neovascularization could be seen in this area.  The appearance of the liver was somewhat cobblestone-like and he felt quite firm.  We then used the LigaSure device to take down the adhesions to the abdominal wall and to remove the omentum from the transverse colon.  A wedge liver biopsy was taken from the segment three.  All specimens were removed in Endo Catch bag and sent to pathology.  The periumbilical port that needed to be expanded for removal of the large omentum was then closed with multiple interrupted 0 Vicryl sutures while at the same time repairing the patient's umbilical hernia.  The abdomen was surveyed no evidence of masses were noted in the small bowel nor the surface of the stomach nor colon.  Hemostasis was  secured at the liver biopsy site with evicel material and Surgicel.  The abdomen was surveyed and no evidence of injuries noted.  Trocar were removed under direct visualization without evidence of bleeding.  Pneumoperitoneum was released.  Incisions were closed appropriately and Band-Aids applied. Sterile dressings applied.  No immediate complications.  Counts reported correct.

## 2017-10-03 NOTE — ANESTHESIA PREPROCEDURE EVALUATION
Anesthesia Evaluation     . Pt has had prior anesthetic.     No history of anesthetic complications          ROS/MED HX    ENT/Pulmonary:     (+)tobacco use, COPD, , . .   (-) sleep apnea   Neurologic:      (-) seizures and CVA   Cardiovascular:     (+) Dyslipidemia, hypertension----. : . . . :. .       METS/Exercise Tolerance:     Hematologic:         Musculoskeletal:         GI/Hepatic:     (+) GERD Asymptomatic on medication,      (-) liver disease   Renal/Genitourinary:     (+) chronic renal disease, type: CRI,       Endo:     (+) type II DM Not using insulin thyroid problem hypothyroidism, .      Psychiatric:        (-) psychiatric history   Infectious Disease:         Malignancy:         Other:                     Physical Exam  Normal systems: cardiovascular, pulmonary and dental    Airway   Mallampati: II  TM distance: >3 FB  Neck ROM: full    Dental     Cardiovascular       Pulmonary                     Anesthesia Plan      History & Physical Review  History and physical reviewed and following examination; no interval change.    ASA Status:  2 .    NPO Status:  > 8 hours    Plan for General and ETT with Propofol induction. Maintenance will be Balanced.    PONV prophylaxis:  Ondansetron (or other 5HT-3) and Dexamethasone or Solumedrol       Postoperative Care  Postoperative pain management:  IV analgesics, Oral pain medications and Multi-modal analgesia.      Consents  Anesthetic plan, risks, benefits and alternatives discussed with:  Patient..        Procedure: Procedure(s):  LAPAROSCOPIC ASSISTED COLECTOMY  Preop diagnosis: ABDOMINAL MASSES    No Known Allergies  Past Medical History:   Diagnosis Date     GERD (gastroesophageal reflux disease)      Hyperlipidemia      Hypertension      Hypertension      Renal insufficiency, mild      Past Surgical History:   Procedure Laterality Date     EYE SURGERY  2008    cataract and glaucoma     Prior to Admission medications    Medication Sig Start Date End Date  Taking? Authorizing Provider   ASPIRIN PO Take 81 mg by mouth daily   Yes Reported, Patient   lisinopril-hydrochlorothiazide (PRINZIDE/ZESTORETIC) 20-12.5 MG per tablet TAKE 1 TABLET BY MOUTH DAILY 7/19/17  Yes Steven Tapia MD   omeprazole (PRILOSEC) 20 MG CR capsule TAKE ONE CAPSULE BY MOUTH EVERY DAY 7/19/17  Yes Steven Tapia MD   levothyroxine (SYNTHROID/LEVOTHROID) 100 MCG tablet Take 1 tablet (100 mcg) by mouth daily 6/10/17  Yes Steven Tapia MD   metFORMIN (GLUCOPHAGE) 500 MG tablet Take 1 tablet (500 mg) by mouth daily (with dinner) 20 minutes before dinner 4/6/17  Yes Steven Tapia MD   folic acid (FOLVITE) 1 MG tablet TAKE 4 TABLETS BY MOUTH DAILY 4/3/17  Yes Steven Tapia MD   diltiazem (CARTIA XT) 240 MG 24 hr CD capsule TAKE 1 CAPSULE BY MOUTH DAILY 11/2/16  Yes Steven Tapia MD   Cholecalciferol (VITAMIN D) 2000 UNITS CAPS Take 1 capsule by mouth daily   Yes Reported, Patient   travoprost Z, benzalkonium, (TRAVATAN) 0.004 % ophthalmic solution 1 drop every evening.   Yes Reported, Patient   dorzolamide-timolol (COSOPT) 2-0.5 % ophthalmic solution 1 drop 2 times daily.   Yes Reported, Patient     Current Facility-Administered Medications Ordered in Epic   Medication Dose Route Frequency Last Rate Last Dose     ceFAZolin sodium-dextrose (ANCEF) infusion 2 g  2 g Intravenous Pre-Op/Pre-procedure x 1 dose         ceFAZolin (ANCEF) 1 g vial to attach to  ml bag for ADULT or 50 ml bag for PEDS  1 g Intravenous See Admin Instructions         No current [facility]-ordered outpatient prescriptions on file.     Wt Readings from Last 1 Encounters:   10/03/17 79.2 kg (174 lb 11.2 oz)     Temp Readings from Last 1 Encounters:   10/03/17 35.9  C (96.6  F) (Oral)     BP Readings from Last 6 Encounters:   10/03/17 102/76   10/02/17 110/64   10/02/17 110/64   09/25/17 100/66   08/23/17 110/60   08/22/17 103/67     Pulse Readings from Last 4 Encounters:   10/02/17  96   10/02/17 96   09/25/17 113   08/23/17 100     Resp Readings from Last 1 Encounters:   10/03/17 16     SpO2 Readings from Last 1 Encounters:   10/03/17 98%     Recent Labs   Lab Test  10/03/17   0930  10/02/17   1108  08/23/17   1028   NA   --   135  133   POTASSIUM  4.7  4.7  4.3   CHLORIDE   --   102  100   CO2   --   25  24   ANIONGAP   --   8  9   GLC   --   110*  110*   BUN   --   9  8   CR   --   1.11  1.25   JESSICA   --   9.1  8.9     Recent Labs   Lab Test  10/02/17   1108  08/23/17   1028   WBC  9.3  8.3   HGB  14.0  13.6   PLT  235  241     Recent Labs   Lab Test  08/22/17   1205   INR  1.09      RECENT LABS:   ECG:   ECHO:   CXR:                        .

## 2017-10-03 NOTE — BRIEF OP NOTE
[facility] Brief Operative Note    Pre-operative diagnosis: ABDOMINAL MASSES   Post-operative diagnosis Omental mass   Procedure: Procedure(s):  LAPAROSCOPIC ABDOMINAL EXPLORATION; OMENTECTOMY; PRIMARY UMBILICAL HERNIA REPAIR, WEDGE LIVER BIOPSY - Wound Class: II-Clean Contaminated      Surgeon(s): Surgeon(s) and Role:     * Kendell Kenny MD - Primary     * Phyllis Salazar PA-C - Assisting   Estimated blood loss: 50 mL    Specimens:   ID Type Source Tests Collected by Time Destination   A : ABDOMINAL FLUID Fluid Abdomen CYTOLOGY NON GYN Kendell Kenny MD 10/3/2017 11:18 AM    B : OMENTUM & PERITONEAL BIOPSIES Tissue Omentum SURGICAL PATHOLOGY EXAM Kendell Kenny MD 10/3/2017 11:26 AM    C :  Biopsy Liver SURGICAL PATHOLOGY EXAM Kendell Kenny MD 10/3/2017 12:43 PM    D : OMENTECTOMY Tissue Omentum SURGICAL PATHOLOGY EXAM Kendell Kenny MD 10/3/2017  1:04 PM       Findings: 2L ascites removed, sent for cytology. Cirrhotic liver. Thickened omentum, adhered to abdominal wall in several locations.  No immediate complications. See operative report for full details.         Phyllis Salazar PA-C  [facility]  [phone]

## 2017-10-03 NOTE — H&P (VIEW-ONLY)
[facility]  [address]  [facility] [address]  [phone]  Dept: [phone]    PRE-OP EVALUATION:  Today's date: 10/2/2017    Abebe Christensen (: 1945) presents for pre-operative evaluation assessment as requested by Dr. Kenny.  He requires evaluation and anesthesia risk assessment prior to undergoing surgery/procedure for treatment of colon .  Proposed procedure:  LAPAROSCOPIC exploratory surgery with biopsies    Date of Surgery/ Procedure: 10/3/17  Time of Surgery/ Procedure:   Hospital/Surgical Facility: [facility]  Fax number for surgical facility:   Primary Physician: NEGIN BLACK  Type of Anesthesia Anticipated: General    Patient has a Health Care Directive or Living Will:  NO    1. NO - Do you have a history of heart attack, stroke, stent, bypass or surgery on an artery in the head, neck, heart or legs?  2. NO - Do you ever have any pain or discomfort in your chest?  3. NO - Do you have a history of  Heart Failure?  4. NO - Are you troubled by shortness of breath when: walking on the level, up a slight hill or at night?  5. NO - Do you currently have a cold, bronchitis or other respiratory infection?  6. NO - Do you have a cough, shortness of breath or wheezing?  7. NO - Do you sometimes get pains in the calves of your legs when you walk?  8. NO - Do you or anyone in your family have previous history of blood clots?  9. NO - Do you or does anyone in your family have a serious bleeding problem such as prolonged bleeding following surgeries or cuts?  10. NO - Have you ever had problems with anemia or been told to take iron pills?  11. NO - Have you had any abnormal blood loss such as black, tarry or bloody stools, or abnormal vaginal bleeding?  12. NO - Have you ever had a blood transfusion?  13. NO - Have you or any of your relatives ever had problems with anesthesia?  14. NO - Do you have sleep apnea, excessive snoring or daytime  drowsiness?  15. NO - Do you have any prosthetic heart valves?  16. NO - Do you have prosthetic joints?  17. NO - Is there any chance that you may be pregnant?        HPI:                                                      Brief HPI related to upcoming procedure: Patient with weight loss and a weakly positive pet scan of the abdomen with thickening of the omentum.  2 paracenteses have been negative for malignancy.  Patient is undergoing laparoscopic exploratory surgery to see if there are abnormal areas to biopsy.      See problem list for active medical problems.  Problems all longstanding and stable, except as noted/documented.  See ROS for pertinent symptoms related to these conditions.                                                                                                  .    MEDICAL HISTORY:                                                    Patient Active Problem List    Diagnosis Date Noted     Numbness in feet 10/02/2017     Priority: Medium     Monoclonal paraproteinemia 10/02/2017     Priority: Medium     Acquired hypothyroidism 06/20/2017     Priority: Medium     Coryza for 20 yrs  05/14/2015     Priority: Medium     Screening for prostate cancer 05/14/2015     Priority: Medium     Tobacco abuse: [age]y/o @ 1ppd=19pk yr hx;only cigars since  05/14/2015     Priority: Medium     Risk for falls 05/14/2015     Priority: Medium     COPD (chronic obstructive pulmonary disease): spirometry 5-14-15  12/14/2014     Priority: Medium     Family history of diabetes mellitus 09/02/2014     Priority: Medium     Glucose intolerance (impaired glucose tolerance) 03/04/2014     Priority: Medium     GERD (gastroesophageal reflux disease)      Priority: Medium     Essential hypertension, benign      Priority: Medium     CKD (chronic kidney disease) stage 3, GFR 30-59 ml/min 01/13/2013     Priority: Medium     H/O polycythemia vera 01/01/2002     Priority: Low      Past Medical History:   Diagnosis Date     GERD  (gastroesophageal reflux disease)      Hyperlipidemia      Hypertension      Hypertension      Renal insufficiency, mild      Past Surgical History:   Procedure Laterality Date     EYE SURGERY  2008    cataract and glaucoma     Current Outpatient Prescriptions   Medication Sig Dispense Refill     lisinopril-hydrochlorothiazide (PRINZIDE/ZESTORETIC) 20-12.5 MG per tablet TAKE 1 TABLET BY MOUTH DAILY 90 tablet 0     omeprazole (PRILOSEC) 20 MG CR capsule TAKE ONE CAPSULE BY MOUTH EVERY DAY 90 capsule 3     levothyroxine (SYNTHROID/LEVOTHROID) 100 MCG tablet Take 1 tablet (100 mcg) by mouth daily 90 tablet 1     metFORMIN (GLUCOPHAGE) 500 MG tablet Take 1 tablet (500 mg) by mouth daily (with dinner) 20 minutes before dinner 30 tablet 5     folic acid (FOLVITE) 1 MG tablet TAKE 4 TABLETS BY MOUTH DAILY 120 tablet 7     diltiazem (CARTIA XT) 240 MG 24 hr CD capsule TAKE 1 CAPSULE BY MOUTH DAILY 90 capsule 3     Cholecalciferol (VITAMIN D) 2000 UNITS CAPS Take 1 capsule by mouth daily       aspirin 325 MG tablet Take 81 mg by mouth daily        travoprost Z, benzalkonium, (TRAVATAN) 0.004 % ophthalmic solution 1 drop every evening.       dorzolamide-timolol (COSOPT) 2-0.5 % ophthalmic solution 1 drop 2 times daily.       OTC products: None, except as noted above    No Known Allergies   Latex Allergy: NO    Social History   Substance Use Topics     Smoking status: Current Every Day Smoker     Types: Cigarettes     Smokeless tobacco: Never Used     Alcohol use Yes     History   Drug Use No       REVIEW OF SYSTEMS:                                                    CONSTITUTIONAL:POSITIVE  for fatigue, weight loss and bloating and NEGATIVE  for fever  and sweats  I: NEGATIVE for worrisome rashes, moles or lesions  E: NEGATIVE for vision changes or irritation  E/M: NEGATIVE for ear, mouth and throat problems  R: NEGATIVE for significant cough or SOB  B: NEGATIVE for masses, tenderness or discharge  CV: NEGATIVE for chest  "pain, palpitations or peripheral edema  GI: POSITIVE for gas or bloating, poor appetite and weight loss  : NEGATIVE for frequency, dysuria, or hematuria  M: NEGATIVE for significant arthralgias or myalgia  N: NEGATIVE for weakness, dizziness or paresthesias  E: NEGATIVE for temperature intolerance, skin/hair changes  H: NEGATIVE for bleeding problems  P: NEGATIVE for changes in mood or affect    EXAM:                                                    /64  Pulse 96  Temp 97  F (36.1  C) (Tympanic)  Resp 14  Ht 6' 1\" (1.854 m)  Wt 175 lb (79.4 kg)  SpO2 100%  BMI 23.09 kg/m2    GENERAL APPEARANCE: healthy, alert and no distress     EYES: EOMI,  PERRL     HENT: ear canals and TM's normal and nose and mouth without ulcers or lesions     NECK: no adenopathy, no asymmetry, masses, or scars and thyroid normal to palpation     RESP: lungs clear to auscultation - no rales, rhonchi or wheezes     CV: regular rates and rhythm, normal S1 S2, no S3 or S4 and no murmur, click or rub     ABDOMEN:  soft, nontender, no HSM or masses and bowel sounds normal     MS: extremities normal- no gross deformities noted, no evidence of inflammation in joints, FROM in all extremities.     SKIN: no suspicious lesions or rashes     NEURO: Normal strength and tone, sensory exam grossly normal, mentation intact and speech normal     PSYCH: mentation appears normal. and affect normal/bright     LYMPHATICS: No axillary, cervical, or supraclavicular nodes    DIAGNOSTICS:                                                        Recent Labs   Lab Test  08/23/17   1028  08/22/17   1205  06/07/17   1115  05/26/17   1115  04/12/17   1147   HGB  13.6   --    --   9.3*   --    PLT  241  249   --   194   --    INR   --   1.09   --    --    --    NA  133   --   138  141   --    POTASSIUM  4.3   --   3.1*  3.5   --    CR  1.25   --   1.13  1.58*   --    A1C  4.5   --    --    --   4.6        IMPRESSION:                                             "        Reason for surgery/procedure: Weight loss, decreased appetite, intra-abdominal fluid accumulation and an abnormal PET scan.      The proposed surgical procedure is considered INTERMEDIATE risk.    REVISED CARDIAC RISK INDEX  The patient has the following serious cardiovascular risks for perioperative complications such as (MI, PE, VFib and 3  AV Block):  No serious cardiac risks  INTERPRETATION: 0 risks: Class I (very low risk - 0.4% complication rate)    The patient has the following additional risks for perioperative complications:  No identified additional risks      ICD-10-CM    1. Preop general physical exam Z01.818 UA with Microscopic     CBC with platelets differential     Basic metabolic panel   2. Weight loss R63.4    3. CKD (chronic kidney disease) stage 3, GFR 30-59 ml/min N18.3    4. Essential hypertension, benign I10    5. Numbness in feet R20.0    6. Monoclonal paraproteinemia D47.2        RECOMMENDATIONS:                                                      --Consult hospital rounder / IM to assist post-op medical management        APPROVAL GIVEN to proceed with proposed procedure, without further diagnostic evaluation       Signed Electronically by: Carson Fried MD    Copy of this evaluation report is provided to requesting physician.    [facility] Preop Guidelines

## 2017-10-03 NOTE — IP AVS SNAPSHOT
MRN:[id number]                      After Visit Summary   10/3/2017    Abebe Christensen    MRN: [id number]           Thank you!     Thank you for choosing [facility] for your care. Our goal is always to provide you with excellent care. Hearing back from our patients is one way we can continue to improve our services. Please take a few minutes to complete the written survey that you may receive in the mail after you visit with us. Thank you!        Patient Information     Date Of Birth          1945        About your hospital stay     You were admitted on:  October 3, 2017 You last received care in the:  [facility] PACU    You were discharged on:  October 3, 2017       Who to Call     For medical emergencies, please call 911.  For non-urgent questions about your medical care, please call your primary care provider or clinic, [phone]  For questions related to your surgery, please call your surgery clinic        Attending Provider     Provider Kendell Ardon MD Surgery       Primary Care Provider Office Phone # Fax #    Steven Hammad Tapia [phone]      After Care Instructions     Diet Instructions       Resume pre-procedure diet            Discharge Instructions       Please follow-up in 2 weeks in Dr. Kenny's office for your first postoperative appointment. Call [phone] to schedule. [facility] name [facility]. The address is [address] [place] [address], [facility] [address] [address], [id number].    If your biopsy results are back before then, Dr. Kenny will call you.            Discharge Instructions       CONSTIPATION PREVENTION  We recommend that you go to a pharmacy and purchase ONE of the following stool softeners/laxatives and start taking today to prevent constipation. You can stop this bowel regimen once you are no longer taking your narcotic pain medication or are having regular bowel movements:    - Senokot oral once daily  - Milk of  magnesium once daily  - Docusate Sodium 1 pill twice daily (stool softener)  - Miralax 1 scoop/packet 1-2 times daily (laxative)    In addition to the above options, if constipation continues, you can add:   - 4 oz Prune juice or Plum juice daily for constipation            Dressing       Keep your dressings clean and dry.  Remove outer dressing and bandaids on post-op day #2 (Thursday). White steri strips that are over the incision are to remain in place and will typically fall off on their own in 7-10 days. Do not attempt to replace these if they should fall off sooner. Do not submerge or soak your incision under water for 2 weeks. You can shower normally, allowing warm water and soap to run over the incision on post-op day #2 after you have removed the outer dressing. After showering always pat the incision dry.            No Alcohol       For 24 hours post procedure            No lifting        No lifting over 20 lbs and no strenuous physical activity for 2-3 weeks                  Further instructions from your care team         Same Day Surgery Discharge Instructions for  Sedation and General Anesthesia       It's not unusual to feel dizzy, light-headed or faint for up to 24 hours after surgery or while taking pain medication.  If you have these symptoms: sit for a few minutes before standing and have someone assist you when you get up to walk or use the bathroom.      You should rest and relax for the next 24 hours. We recommend you make arrangements to have an adult stay with you for at least 24 hours after your discharge.  Avoid hazardous and strenuous activity.      DO NOT DRIVE any vehicle or operate mechanical equipment for 24 hours following the end of your surgery.  Even though you may feel normal, your reactions may be affected by the medication you have received.      Do not drink alcoholic beverages for 24 hours following surgery.       Slowly progress to your regular diet as you feel able. It's not  unusual to feel nauseated and/or vomit after receiving anesthesia.  If you develop these symptoms, drink clear liquids (apple juice, ginger ale, broth, 7-up, etc. ) until you feel better.  If your nausea and vomiting persists for 24 hours, please notify your surgeon.        All narcotic pain medications, along with inactivity and anesthesia, can cause constipation. Drinking plenty of liquids and increasing fiber intake will help.      For any questions of a medical nature, call your surgeon.      Do not make important decisions for 24 hours.      If you had general anesthesia, you may have a sore throat for a couple of days related to the breathing tube used during surgery.  You may use Cepacol lozenges to help with this discomfort.  If it worsens or if you develop a fever, contact your surgeon.       If you feel your pain is not well managed with the pain medications prescribed by your surgeon, please contact your surgeon's office to let them know so they can address your concerns.              [facility]  Discharge Instructions: Post-Operative Umbilical Hernia    ACTIVITY    Increase your activity gradually.  Avoid strenuous physical activity or heavy lifting greater than 15lbs. for 3-4 weeks.  You may climb stairs.    You may drive without restrictions when you are not using any prescription pain medication and comfortable in a car.    You may return to work/school when you are comfortable without any prescription pain medication.    WOUND CARE    You may remove your bandage and shower 48 hours after the surgery.  Pat your incisions dry and leave open to air.  Re-apply dressing (Band-aid or gauze/tape) as needed for drainage.    You may have steri-strips (looks like tape) or Dermabond (looks like glue) on your incision.  Leave it alone, it will peel up and fall off on its own.     Do not soak your incisions in a tub or pool for 2 weeks.     A lump or ridge under the  incision is normal and will gradually resolve.    DIET    Return to the diet you were on before surgery.    Pain medications can cause constipation.  Limit use when possible.  Take over the counter stool softener/stimulant, such as Colace or Senna, with plenty of water.        PAIN    Expect some tenderness and discomfort at the incision site(s).  Use the prescribed pain medication at your discretion.  Expect gradual resolution of your pain over several days.    You may take ibuprofen with food (unless you have been told not to) instead of or in addition to your prescribed pain medication.  If you are taking Norco or Percocet, do not take any additional acetaminophen/APAP/Tylenol.      Do not drink alcohol or drive while you are taking pain medications.    You may apply ice to your incisions in 20 minute intervals as needed for the next 48 hours.  After that time, consider switching to heat if you prefer.    RETURN APPOINTMENT    Follow up with your surgeon or a PA in 2 weeks.  Please call the office at [phone] to schedule your appointment.    CALL OUR OFFICE IF YOU HAVE:     Chills or fever above 101.5 F.    Increased redness or drainage at your incisions.    Significant bleeding.    Pain not relieved by your pain medication or rest.    Increasing pain after the first 48 hours.    Any other concerns or questions.    Revised August 2017          HOME CARE FOLLOWING LAPAROSCOPY    Diet  You have no restrictions on your diet.  During the evening following surgery, drink plenty of fluids and eat a light supper.    Nausea  The anesthesia may produce some nausea.  If you feel nauseated, stay in bed and try drinking fluids such as 7-Up, tea, or soup.    Discomfort  The amount of discomfort you can expect is very unpredictable.  If you have pain that cannot be controlled with Tylenol or with the prescription you may have received, you should notify your physician.  The following complaints are not uncommon and should  not be cause for concern:   1.  Abdominal tenderness; abdominal cramping   2.  Low backache or pain radiating to your shoulders, chest or back. This is a result of the gas used to inflate your abdomen during surgery. Lying flat in bed seems to help relieve this.   3.  Sore throat for a day or two resulting from the anesthesia tube used during surgery.   4.  Black or blue ryann on your abdomen.     Drainage  You may expect a small amount of drainage from the incision on your abdomen and you may change the bandage when necessary.  You will also have a small amount of vaginal drainage for several days; this is normal and no cause for concern.  If excessive bleeding occurs, notify your physician.      If dye was used during your procedure, your urine will initially be bright blue. It will gradually return to yellow throughout the day. Drinking plenty of fluids will help to filter the dye from your urine.    Fever  A low grade fever (not over 100 F) is usual after this procedure.  Do not hesitate to notify your physician if your fever seems excessive.    Stitches  If your stitches are the type that must be removed, your doctor will instruct you to return to their office.    Activity  Rest on the day of surgery then you may resume your normal activity, as tolerated. Avoid heavy lifting for one week.    You may shower.  Do not douche, and do not use tampons.  If you also had a D&C, do not resume intercourse until bleeding has ceased.        Emergency Care  Contact your physician if you have any of these problems:   1.  A fever over 100 F   2.  A large amount of bleeding or drainage   3.  Severe pain        **If you have questions or concerns about your procedure,  call Dr. Kenny at [phone]**                    Pending Results     Date and Time Order Name Status Description    10/3/2017 1135 Surgical pathology exam In process     10/3/2017 1122 Cytology non gyn In process             Admission Information     Date &  "Time Provider Department Dept. Phone    10/3/2017 Kendell Kenny MD [facility] [phone]      Your Vitals Were     Blood Pressure Temperature Respirations Height Weight Pulse Oximetry    110/72 97.6  F (36.4  C) (Temporal) 21 1.854 m (6' 1\") 79.2 kg (174 lb 11.2 oz) 92%    BMI (Body Mass Index)                   23.05 kg/m2           [employer] Information     [employer] lets you send messages to your doctor, view your test results, renew your prescriptions, schedule appointments and more. To sign up, go to [email or web address] . Click on \"Log in\" on the left side of the screen, which will take you to the Welcome page. Then click on \"Sign up Now\" on the right side of the page.     You will be asked to enter the access code listed below, as well as some personal information. Please follow the directions to create your username and password.     Your access code is: [email or web address]  Expires: 2017 12:03 PM     Your access code will  in 90 days. If you need help or a new code, please call your [facility] clinic or [phone].        Care EveryWhere ID     This is your Care EveryWhere ID. This could be used by other organizations to access your [facility] medical records  [id number]        Equal Access to Services     TABITHA SANCHEZ : Hadii jerod Younger, waaxda lady, qaybta kaalperry luna, tamara euceda . So [facility] [phone].    ATENCIÓN: Si habla español, tiene a francis disposición servicios gratuitos de asistencia lingüística. Rashi al [phone].    We comply with applicable federal civil rights laws and [place] laws. We do not discriminate on the basis of race, color, national origin, age, disability, sex, sexual orientation, or gender identity.               Review of your medicines      START taking        Dose / Directions    HYDROcodone-acetaminophen 5-325 MG per tablet   Commonly known as:  NORCO   Used for:  S/P laparoscopy   Notes to Patient:  " You had 1 Norco at 3:45 pm today.        Dose:  1-2 tablet   Take 1-2 tablets by mouth every 4 hours as needed for other (Moderate to Severe Pain)   Quantity:  20 tablet   Refills:  0         CONTINUE these medicines which have NOT CHANGED        Dose / Directions    ASPIRIN PO        Dose:  81 mg   Take 81 mg by mouth daily   Refills:  0       diltiazem 240 MG 24 hr capsule   Commonly known as:  CARTIA XT   Used for:  Essential hypertension, benign        TAKE 1 CAPSULE BY MOUTH DAILY   Quantity:  90 capsule   Refills:  3       dorzolamide-timolol 2-0.5 % ophthalmic solution   Commonly known as:  COSOPT   Used for:  Hypertension        Dose:  1 drop   1 drop 2 times daily.   Refills:  0       folic acid 1 MG tablet   Commonly known as:  FOLVITE   Used for:  Folate deficiency        TAKE 4 TABLETS BY MOUTH DAILY   Quantity:  120 tablet   Refills:  7       levothyroxine 100 MCG tablet   Commonly known as:  SYNTHROID/LEVOTHROID   Used for:  Hypothyroidism, unspecified type        Dose:  100 mcg   Take 1 tablet (100 mcg) by mouth daily   Quantity:  90 tablet   Refills:  1       lisinopril-hydrochlorothiazide 20-12.5 MG per tablet   Commonly known as:  PRINZIDE/ZESTORETIC   Used for:  Essential hypertension, benign        TAKE 1 TABLET BY MOUTH DAILY   Quantity:  90 tablet   Refills:  0       metFORMIN 500 MG tablet   Commonly known as:  GLUCOPHAGE   Used for:  Type 2 diabetes mellitus without complication, without long-term current use of insulin (H)        Dose:  500 mg   Take 1 tablet (500 mg) by mouth daily (with dinner) 20 minutes before dinner   Quantity:  30 tablet   Refills:  5       omeprazole 20 MG CR capsule   Commonly known as:  priLOSEC   Used for:  Essential hypertension with goal blood pressure less than 130/85        TAKE ONE CAPSULE BY MOUTH EVERY DAY   Quantity:  90 capsule   Refills:  3       TRAVATAN 0.004 % ophthalmic solution   Used for:  Hypertension   Generic drug:  travoprost Z (benzalkonium)         Dose:  1 drop   1 drop every evening.   Refills:  0       vitamin D 2000 UNITS Caps        Dose:  1 capsule   Take 1 capsule by mouth daily   Refills:  0            Where to get your medicines      Some of these will need a paper prescription and others can be bought over the counter. Ask your nurse if you have questions.     Bring a paper prescription for each of these medications     HYDROcodone-acetaminophen 5-325 MG per tablet                Protect others around you: Learn how to safely use, store and throw away your medicines at www.disposemymeds.org.             Medication List: This is a list of all your medications and when to take them. Check marks below indicate your daily home schedule. Keep this list as a reference.      Medications           Morning Afternoon Evening Bedtime As Needed    ASPIRIN PO   Take 81 mg by mouth daily                                diltiazem 240 MG 24 hr capsule   Commonly known as:  CARTIA XT   TAKE 1 CAPSULE BY MOUTH DAILY                                dorzolamide-timolol 2-0.5 % ophthalmic solution   Commonly known as:  COSOPT   1 drop 2 times daily.                                folic acid 1 MG tablet   Commonly known as:  FOLVITE   TAKE 4 TABLETS BY MOUTH DAILY                                HYDROcodone-acetaminophen 5-325 MG per tablet   Commonly known as:  NORCO   Take 1-2 tablets by mouth every 4 hours as needed for other (Moderate to Severe Pain)   Last time this was given:  1 tablet on 10/3/2017  3:34 PM   Notes to Patient:  You had 1 [place] at 3:45 pm today.                                levothyroxine 100 MCG tablet   Commonly known as:  SYNTHROID/LEVOTHROID   Take 1 tablet (100 mcg) by mouth daily                                lisinopril-hydrochlorothiazide 20-12.5 MG per tablet   Commonly known as:  PRINZIDE/ZESTORETIC   TAKE 1 TABLET BY MOUTH DAILY                                metFORMIN 500 MG tablet   Commonly known as:  GLUCOPHAGE   Take 1 tablet (500  mg) by mouth daily (with dinner) 20 minutes before dinner                                omeprazole 20 MG CR capsule   Commonly known as:  priLOSEC   TAKE ONE CAPSULE BY MOUTH EVERY DAY                                TRAVATAN 0.004 % ophthalmic solution   1 drop every evening.   Generic drug:  travoprost Z (benzalkonium)                                vitamin D 2000 UNITS Caps   Take 1 capsule by mouth daily

## 2017-10-03 NOTE — ANESTHESIA POSTPROCEDURE EVALUATION
Patient: Abebe Christensen    Procedure(s):  LAPAROSCOPIC ABDOMINAL EXPLORATION; OMENTECTOMY; PERITONEAL UMBILICAL HERNIA REPAIR, WEDGE LIVER BIOPSY - Wound Class: II-Clean Contaminated   - Wound Class: II-Clean Contaminated   - Wound Class: II-Clean Contaminated   - Wound Class: II-Clean Contaminated    Diagnosis:ABDOMINAL MASSES  Diagnosis Additional Information: No value filed.    Anesthesia Type:  General, ETT    Note:  Anesthesia Post Evaluation    Patient location during evaluation: PACU  Patient participation: Able to fully participate in evaluation  Level of consciousness: awake  Pain management: adequate  Airway patency: patent  Cardiovascular status: acceptable  Respiratory status: acceptable  Hydration status: acceptable  PONV: none     Anesthetic complications: None          Last vitals:  Vitals:    10/03/17 1515 10/03/17 1530 10/03/17 1615   BP: 112/74 110/72 117/79   Resp: 26 21 16   Temp:  36.4  C (97.6  F)    SpO2: 95% 92% 94%         Electronically Signed By: Bryan Salas MD  October 3, 2017  4:48 PM

## 2017-10-05 LAB
COPATH REPORT: NORMAL
COPATH REPORT: NORMAL

## 2017-11-08 DIAGNOSIS — E11.9 TYPE 2 DIABETES MELLITUS WITHOUT COMPLICATION, WITHOUT LONG-TERM CURRENT USE OF INSULIN (H): ICD-10-CM

## 2017-11-08 DIAGNOSIS — I10 ESSENTIAL HYPERTENSION, BENIGN: ICD-10-CM

## 2017-11-08 RX ORDER — LISINOPRIL AND HYDROCHLOROTHIAZIDE 12.5; 2 MG/1; MG/1
TABLET ORAL
Qty: 90 TABLET | Refills: 1 | Status: ON HOLD | OUTPATIENT
Start: 2017-11-08 | End: 2018-08-02

## 2018-02-07 DIAGNOSIS — E53.8 FOLATE DEFICIENCY: ICD-10-CM

## 2018-02-08 RX ORDER — FOLIC ACID 1 MG/1
TABLET ORAL
Qty: 120 TABLET | Refills: 1 | Status: SHIPPED | OUTPATIENT
Start: 2018-02-08 | End: 2018-05-01

## 2018-02-08 NOTE — TELEPHONE ENCOUNTER
"Requested Prescriptions   Pending Prescriptions Disp Refills     folic acid (FOLVITE) 1 MG tablet [Pharmacy Med Name: FOLIC ACID 1MG TABLETS]  Last Written Prescription Date:  4/3/2017  Last Fill Quantity: 120 tablet,  # refills: 7   Last Office Visit  10/2/2017        with  G, P or [facility] prescribing provider:     Future Office Visit:    120 tablet 0     Sig: TAKE 4 TABLETS BY MOUTH DAILY    Vitamin Supplements (Adult) Protocol Passed    2/7/2018  7:34 AM       Passed - High dose Vitamin D not ordered       Passed - Recent or future visit with authorizing provider's specialty    Patient had office visit in the last year or has a visit in the next 30 days with authorizing provider.  See \"Patient Info\" tab in inbasket, or \"Choose Columns\" in Meds & Orders section of the refill encounter.               "

## 2018-03-02 ENCOUNTER — TELEPHONE (OUTPATIENT)
Dept: FAMILY MEDICINE | Facility: CLINIC | Age: 73
End: 2018-03-02

## 2018-03-02 NOTE — TELEPHONE ENCOUNTER
Panel Management Review      Patient has the following on his problem list:     Diabetes    ASA: Passed    Last A1C  Lab Results   Component Value Date    A1C 4.5 08/23/2017    A1C 4.6 04/12/2017    A1C 5.5 03/04/2014     A1C tested: Passed    Last LDL:    Lab Results   Component Value Date    CHOL 141 04/05/2017     Lab Results   Component Value Date    HDL 22 04/05/2017     Lab Results   Component Value Date    LDL 83 04/05/2017     Lab Results   Component Value Date    TRIG 179 04/05/2017     Lab Results   Component Value Date    CHOLHDLRATIO 4.4 05/14/2015     Lab Results   Component Value Date    NHDL 119 04/05/2017       Is the patient on a Statin? NO             Is the patient on Aspirin? YES    Medications     Salicylates    ASPIRIN PO          Last three blood pressure readings:  BP Readings from Last 3 Encounters:   10/03/17 117/79   10/02/17 110/64   10/02/17 110/64       Date of last diabetes office visit: 8/23/17     Tobacco History:     History   Smoking Status     Current Every Day Smoker     Packs/day: 0.50     Types: Cigarettes   Smokeless Tobacco     Never Used           Composite cancer screening  Chart review shows that this patient is due/due soon for the following None  Summary:    Patient is due/failing the following:   A1C and FOLLOW UP    Action needed:   Patient needs office visit for DIABETES.    Type of outreach:    Sent letter.    Questions for provider review:    None                                                                                                                                    Jazlyn Moreno CMA

## 2018-03-02 NOTE — LETTER
March 2, 2018    Abebe Christensen  [address] [address]    Dear Marco Lomas cares about your health and your health plan.  I have reviewed your medical conditions, medication list and lab results, and am making recommendations based on this review to better manage your health.    You are in particular need of attention regarding:  -Diabetes    I am recommending that you:     -schedule a FOLLOWUP OFFICE APPOINTMENT with me.  I will recheck your: A1c test.      Please call us at the [facility] location:  [phone] or use gauzz to address the above recommendations.     Thank you for trusting [facility].  We appreciate the opportunity to serve you and look forward to supporting your healthcare in the future.    If you have (or plan to have) any of these tests done at a facility other than a [facility] or a [facility], please have the results sent to the [facility] location noted above.      Best Regards,    Carson Fried MD

## 2018-03-27 ENCOUNTER — OFFICE VISIT (OUTPATIENT)
Dept: FAMILY MEDICINE | Facility: CLINIC | Age: 73
End: 2018-03-27
Payer: COMMERCIAL

## 2018-03-27 VITALS
HEART RATE: 104 BPM | WEIGHT: 180.5 LBS | SYSTOLIC BLOOD PRESSURE: 100 MMHG | HEIGHT: 73 IN | OXYGEN SATURATION: 98 % | BODY MASS INDEX: 23.92 KG/M2 | DIASTOLIC BLOOD PRESSURE: 58 MMHG

## 2018-03-27 DIAGNOSIS — Z72.0 TOBACCO ABUSE: ICD-10-CM

## 2018-03-27 DIAGNOSIS — I10 ESSENTIAL HYPERTENSION, BENIGN: ICD-10-CM

## 2018-03-27 DIAGNOSIS — Z86.03 H/O POLYCYTHEMIA VERA: ICD-10-CM

## 2018-03-27 DIAGNOSIS — K21.9 GASTROESOPHAGEAL REFLUX DISEASE, ESOPHAGITIS PRESENCE NOT SPECIFIED: ICD-10-CM

## 2018-03-27 DIAGNOSIS — J43.1 PANLOBULAR EMPHYSEMA (H): ICD-10-CM

## 2018-03-27 DIAGNOSIS — R73.02 GLUCOSE INTOLERANCE (IMPAIRED GLUCOSE TOLERANCE): Chronic | ICD-10-CM

## 2018-03-27 DIAGNOSIS — G62.89 PERIPHERAL AXONAL NEUROPATHY: ICD-10-CM

## 2018-03-27 DIAGNOSIS — Z91.81 RISK FOR FALLS: ICD-10-CM

## 2018-03-27 DIAGNOSIS — N18.30 CKD (CHRONIC KIDNEY DISEASE) STAGE 3, GFR 30-59 ML/MIN (H): Primary | Chronic | ICD-10-CM

## 2018-03-27 LAB
ALBUMIN UR-MCNC: NEGATIVE MG/DL
APPEARANCE UR: CLEAR
BASOPHILS # BLD AUTO: 0.1 10E9/L (ref 0–0.2)
BASOPHILS NFR BLD AUTO: 0.6 %
BILIRUB UR QL STRIP: NEGATIVE
COLOR UR AUTO: YELLOW
DIFFERENTIAL METHOD BLD: ABNORMAL
EOSINOPHIL # BLD AUTO: 0.3 10E9/L (ref 0–0.7)
EOSINOPHIL NFR BLD AUTO: 3.6 %
ERYTHROCYTE [DISTWIDTH] IN BLOOD BY AUTOMATED COUNT: 13.7 % (ref 10–15)
GLUCOSE UR STRIP-MCNC: NEGATIVE MG/DL
HBA1C MFR BLD: 4.6 % (ref 4.3–6)
HCT VFR BLD AUTO: 38.3 % (ref 40–53)
HGB BLD-MCNC: 13.7 G/DL (ref 13.3–17.7)
HGB UR QL STRIP: NEGATIVE
KETONES UR STRIP-MCNC: NEGATIVE MG/DL
LEUKOCYTE ESTERASE UR QL STRIP: NEGATIVE
LYMPHOCYTES # BLD AUTO: 1.3 10E9/L (ref 0.8–5.3)
LYMPHOCYTES NFR BLD AUTO: 17 %
MCH RBC QN AUTO: 38.2 PG (ref 26.5–33)
MCHC RBC AUTO-ENTMCNC: 35.8 G/DL (ref 31.5–36.5)
MCV RBC AUTO: 107 FL (ref 78–100)
MONOCYTES # BLD AUTO: 0.8 10E9/L (ref 0–1.3)
MONOCYTES NFR BLD AUTO: 9.9 %
NEUTROPHILS # BLD AUTO: 5.4 10E9/L (ref 1.6–8.3)
NEUTROPHILS NFR BLD AUTO: 68.9 %
NITRATE UR QL: NEGATIVE
PH UR STRIP: 6.5 PH (ref 5–7)
PLATELET # BLD AUTO: 158 10E9/L (ref 150–450)
RBC # BLD AUTO: 3.59 10E12/L (ref 4.4–5.9)
RBC #/AREA URNS AUTO: NORMAL /HPF
SOURCE: NORMAL
SP GR UR STRIP: 1.01 (ref 1–1.03)
UROBILINOGEN UR STRIP-ACNC: 1 EU/DL (ref 0.2–1)
WBC # BLD AUTO: 7.9 10E9/L (ref 4–11)
WBC #/AREA URNS AUTO: NORMAL /HPF

## 2018-03-27 PROCEDURE — 80051 ELECTROLYTE PANEL: CPT | Performed by: FAMILY MEDICINE

## 2018-03-27 PROCEDURE — 99214 OFFICE O/P EST MOD 30 MIN: CPT | Performed by: FAMILY MEDICINE

## 2018-03-27 PROCEDURE — 82043 UR ALBUMIN QUANTITATIVE: CPT | Performed by: FAMILY MEDICINE

## 2018-03-27 PROCEDURE — 36415 COLL VENOUS BLD VENIPUNCTURE: CPT | Performed by: FAMILY MEDICINE

## 2018-03-27 PROCEDURE — 85025 COMPLETE CBC W/AUTO DIFF WBC: CPT | Performed by: FAMILY MEDICINE

## 2018-03-27 PROCEDURE — 81001 URINALYSIS AUTO W/SCOPE: CPT | Performed by: FAMILY MEDICINE

## 2018-03-27 PROCEDURE — 82565 ASSAY OF CREATININE: CPT | Performed by: FAMILY MEDICINE

## 2018-03-27 PROCEDURE — 83036 HEMOGLOBIN GLYCOSYLATED A1C: CPT | Performed by: FAMILY MEDICINE

## 2018-03-27 PROCEDURE — 84520 ASSAY OF UREA NITROGEN: CPT | Performed by: FAMILY MEDICINE

## 2018-03-27 NOTE — NURSING NOTE
"Chief Complaint   Patient presents with     RECHECK       Initial /58 (BP Location: Right arm, Patient Position: Chair, Cuff Size: Adult Regular)  Pulse 104  Ht 6' 1\" (1.854 m)  Wt 180 lb 8 oz (81.9 kg)  SpO2 98%  BMI 23.81 kg/m2 Estimated body mass index is 23.81 kg/(m^2) as calculated from the following:    Height as of this encounter: 6' 1\" (1.854 m).    Weight as of this encounter: 180 lb 8 oz (81.9 kg).  Medication Reconciliation: complete     Capri Mcgovern MA     "

## 2018-03-27 NOTE — PATIENT INSTRUCTIONS
We will check labs as noted.  Discussion with the patient about his peripheral neuropathy.  That has not progressed but also has not improved.  He does have some gait instability because of that and we discussed either using a cane or a rolling walker for gait stability particularly when he is out in public.  He has not had any recent falls.  He does have a history of an elevated blood sugar.  Unfortunately, he ate today so we will not check that test on repeat.  However we will get an A1c done to see where we are at.  He is currently only on metformin for treatment for his elevated blood sugar.

## 2018-03-27 NOTE — PROGRESS NOTES
SUBJECTIVE:   Abebe Christensen is a 72 year old male who presents to clinic today for the following health issues:    Recheck : lab results     Hypertension Follow-up      Outpatient blood pressures are not being checked.    Low Salt Diet: no added salt    COPD Follow-Up    Symptoms are currently: stable    Current fatigue or dyspnea with ambulation: none    Shortness of breath: stable    Increased or change in Cough/Sputum: No    Fever(s): No    Baseline ambulation without stopping to rest: Not discussed    Any ER/UC or hospital admissions since your last visit? No     History   Smoking Status     Current Every Day Smoker     Packs/day: 0.50     Types: Cigarettes   Smokeless Tobacco     Never Used     No results found for: FEV1, KTT7CQM  Chronic Kidney Disease Follow-up      Current NSAID use?  No    Hypothyroidism Follow-up      Since last visit, patient describes the following symptoms: Weight stable, no hair loss, no skin changes, no constipation, no loose stools      Amount of exercise or physical activity: None    Problems taking medications regularly: No    Medication side effects: none    Diet: low salt      Numbness in the feet      Duration: One year    Description (location/character/radiation): Numbness in both feet with gait instability    Intensity:  moderate    Accompanying signs and symptoms: Gait instability    History (similar episodes/previous evaluation): Had an EMG done which showed peripheral neuropathy    Precipitating or alleviating factors: None    Therapies tried and outcome: None           Problem list and histories reviewed & adjusted, as indicated.  Additional history: as documented    Patient Active Problem List   Diagnosis     GERD (gastroesophageal reflux disease)     CKD (chronic kidney disease) stage 3, GFR 30-59 ml/min     Essential hypertension, benign     H/O polycythemia vera     Glucose intolerance (impaired glucose tolerance)     Family history of diabetes mellitus     Coryza  "for 20 yrs      Screening for prostate cancer     Tobacco abuse: [age]y/o @ 1ppd=19pk yr hx;only cigars since      COPD (chronic obstructive pulmonary disease): spirometry 5-14-15      Risk for falls     Acquired hypothyroidism     Numbness in feet     Monoclonal paraproteinemia     Peripheral axonal neuropathy     Past Surgical History:   Procedure Laterality Date     EYE SURGERY  2008    cataract and glaucoma     LAPAROSCOPIC BIOPSY LIVER N/A 10/3/2017    Procedure: LAPAROSCOPIC BIOPSY LIVER;;  Surgeon: Kendell Kenny MD;  Location:  OR     LAPAROSCOPIC HERNIORRHAPHY UMBILICAL N/A 10/3/2017    Procedure: LAPAROSCOPIC HERNIORRHAPHY UMBILICAL;;  Surgeon: Kendell Kenny MD;  Location:  OR     LAPAROTOMY EXPLORATORY N/A 10/3/2017    Procedure: LAPAROTOMY EXPLORATORY;;  Surgeon: Kendell Kenny MD;  Location:  OR     OMENTECTOMY N/A 10/3/2017    Procedure: OMENTECTOMY;  LAPAROSCOPIC ABDOMINAL EXPLORATION; OMENTECTOMY; PERITONEAL UMBILICAL HERNIA REPAIR, WEDGE LIVER BIOPSY;  Surgeon: Kendell Kenny MD;  Location:  OR       Social History   Substance Use Topics     Smoking status: Current Every Day Smoker     Packs/day: 0.50     Types: Cigarettes     Smokeless tobacco: Never Used     Alcohol use Yes      Comment: occasionally     Family History   Problem Relation Age of Onset     Eye Disorder Mother      Cancer - colorectal Father            Reviewed and updated as needed this visit by clinical staff  Tobacco  Allergies  Meds  Med Hx  Surg Hx  Fam Hx  Soc Hx      Reviewed and updated as needed this visit by Provider         ROS:  Constitutional, HEENT, cardiovascular, pulmonary, gi and gu systems are negative, except as otherwise noted.    OBJECTIVE:                                                    /58 (BP Location: Right arm, Patient Position: Chair, Cuff Size: Adult Regular)  Pulse 104  Ht 6' 1\" (1.854 m)  Wt 180 lb 8 oz (81.9 kg)  SpO2 98%  BMI 23.81 kg/m2  Body mass index is 23.81 " kg/(m^2).  GENERAL APPEARANCE: healthy, alert and no distress         ASSESSMENT/PLAN:                                                        ICD-10-CM    1. CKD (chronic kidney disease) stage 3, GFR 30-59 ml/min N18.3 Electrolyte panel (Na, K, Cl, CO2, Anion gap)     Creatinine     Urea nitrogen     Albumin Random Urine Quantitative with Creat Ratio   2. Glucose intolerance (impaired glucose tolerance) R73.02 Hemoglobin A1c   3. Gastroesophageal reflux disease, esophagitis presence not specified K21.9 CBC with platelets differential   4. Essential hypertension, benign I10 UA with Microscopic     CBC with platelets differential   5. Tobacco abuse: [age]y/o @ 1ppd=19pk yr hx;only cigars since  Z72.0    6. Panlobular emphysema (H) J43.1    7. Risk for falls Z91.81    8. Peripheral axonal neuropathy G60.8    9. H/O polycythemia vera Z86.2        Patient Instructions   We will check labs as noted.  Discussion with the patient about his peripheral neuropathy.  That has not progressed but also has not improved.  He does have some gait instability because of that and we discussed either using a cane or a rolling walker for gait stability particularly when he is out in public.  He has not had any recent falls.  He does have a history of an elevated blood sugar.  Unfortunately, he ate today so we will not check that test on repeat.  However we will get an A1c done to see where we are at.  He is currently only on metformin for treatment for his elevated blood sugar.      Carson Fried MD  [facility]

## 2018-03-27 NOTE — MR AVS SNAPSHOT
After Visit Summary   3/27/2018    Abebe Christensen    MRN: [id number]           Patient Information     Date Of Birth          1945        Visit Information        Provider Department      3/27/2018 11:15 AM Carson Fried MD [facility]        Today's Diagnoses     CKD (chronic kidney disease) stage 3, GFR 30-59 ml/min    -  1    Glucose intolerance (impaired glucose tolerance)        Gastroesophageal reflux disease, esophagitis presence not specified        Essential hypertension, benign        Tobacco abuse: [age]y/o @ 1ppd=19pk yr hx;only cigars since         Panlobular emphysema (H)        Risk for falls        Peripheral axonal neuropathy        H/O polycythemia vera          Care Instructions    We will check labs as noted.  Discussion with the patient about his peripheral neuropathy.  That has not progressed but also has not improved.  He does have some gait instability because of that and we discussed either using a cane or a rolling walker for gait stability particularly when he is out in public.  He has not had any recent falls.  He does have a history of an elevated blood sugar.  Unfortunately, he ate today so we will not check that test on repeat.  However we will get an A1c done to see where we are at.  He is currently only on metformin for treatment for his elevated blood sugar.          Follow-ups after your visit        Who to contact     If you have questions or need follow up information about today's clinic visit or your schedule please contact [facility] directly at [phone].  Normal or non-critical lab and imaging results will be communicated to you by MyChart, letter or phone within 4 business days after the clinic has received the results. If you do not hear from us within 7 days, please contact the clinic through MyChart or phone. If you have a critical or abnormal lab result, we will notify you by  "phone as soon as possible.  Submit refill requests through [employer] or call your pharmacy and they will forward the refill request to us. Please allow 3 business days for your refill to be completed.          Additional Information About Your Visit        [employer] Information     [employer] lets you send messages to your doctor, view your test results, renew your prescriptions, schedule appointments and more. To sign up, go to [email or web address] . Click on \"Log in\" on the left side of the screen, which will take you to the Welcome page. Then click on \"Sign up Now\" on the right side of the page.     You will be asked to enter the access code listed below, as well as some personal information. Please follow the directions to create your username and password.     Your access code is: [email or web address]  Expires: 2018 12:54 PM     Your access code will  in 90 days. If you need help or a new code, please call your [place] clinic or [phone].        Care EveryWhere ID     This is your Care EveryWhere ID. This could be used by other organizations to access your [place] medical records  [id number]        Your Vitals Were     Pulse Height Pulse Oximetry BMI (Body Mass Index)          104 6' 1\" (1.854 m) 98% 23.81 kg/m2         Blood Pressure from Last 3 Encounters:   18 100/58   10/03/17 117/79   10/02/17 110/64    Weight from Last 3 Encounters:   18 180 lb 8 oz (81.9 kg)   10/03/17 174 lb 11.2 oz (79.2 kg)   10/02/17 175 lb (79.4 kg)              We Performed the Following     Albumin Random Urine Quantitative with Creat Ratio     CBC with platelets differential     Creatinine     Electrolyte panel (Na, K, Cl, CO2, Anion gap)     Hemoglobin A1c     UA with Microscopic     Urea nitrogen        Primary Care Provider Office Phone # Fax #    Carson Fried [phone] [address]  [facility] [address]        Equal Access to Services     TABITHA SANCHEZ AH: Hadii jerod solis " dorian Younger, waaxda luqadaha, qaybta kaalmada jeremy, tamara santosin hayaan codymelvin zoiedarlene larejimaria alejandra juan. So [facility] [phone].    ATENCIÓN: Si emmanuella keanu, tiene a francis disposición servicios gratuitos de asistencia lingüística. Rashi al [phone].    We comply with applicable federal civil rights laws and [place] laws. We do not discriminate on the basis of race, color, national origin, age, disability, sex, sexual orientation, or gender identity.            Thank you!     Thank you for choosing [facility]  for your care. Our goal is always to provide you with excellent care. Hearing back from our patients is one way we can continue to improve our services. Please take a few minutes to complete the written survey that you may receive in the mail after your visit with us. Thank you!             Your Updated Medication List - Protect others around you: Learn how to safely use, store and throw away your medicines at www.disposemymeds.org.          This list is accurate as of 3/27/18 12:54 PM.  Always use your most recent med list.                   Brand Name Dispense Instructions for use Diagnosis    ASPIRIN PO      Take 81 mg by mouth daily        CARTIA  MG 24 hr capsule   Generic drug:  diltiazem     90 capsule    TAKE 1 CAPSULE BY MOUTH DAILY    Essential hypertension, benign       dorzolamide-timolol 2-0.5 % ophthalmic solution    COSOPT     1 drop 2 times daily.    Hypertension       folic acid 1 MG tablet    FOLVITE    120 tablet    TAKE 4 TABLETS BY MOUTH DAILY    Folate deficiency       levothyroxine 100 MCG tablet    SYNTHROID/LEVOTHROID    90 tablet    TAKE 1 TABLET(100 MCG) BY MOUTH DAILY    Hypothyroidism, unspecified type       lisinopril-hydrochlorothiazide 20-12.5 MG per tablet    PRINZIDE/ZESTORETIC    90 tablet    TAKE 1 TABLET BY MOUTH DAILY    Essential hypertension, benign       metFORMIN 500 MG tablet    GLUCOPHAGE    90 tablet    TAKE 1 TABLET(500 MG) BY  MOUTH DAILY 20 MINUTES BEFORE DINNER    Type 2 diabetes mellitus without complication, without long-term current use of insulin (H)       omeprazole 20 MG CR capsule    priLOSEC    90 capsule    TAKE ONE CAPSULE BY MOUTH EVERY DAY    Essential hypertension with goal blood pressure less than 130/85       TRAVATAN 0.004 % ophthalmic solution   Generic drug:  travoprost Z (benzalkonium)      1 drop every evening.    Hypertension       vitamin D 2000 UNITS Caps      Take 1 capsule by mouth daily

## 2018-03-27 NOTE — LETTER
March 29, 2018      Abebe Christensen  [address] [address]        Dear ,    We are writing to inform you of your test results.    These tests all look pretty normal.  We should repeat them in about 6 months at which time I should see you.  No change in medications at present.    Resulted Orders   UA with Microscopic   Result Value Ref Range    Color Urine Yellow     Appearance Urine Clear     Glucose Urine Negative NEG^Negative mg/dL    Bilirubin Urine Negative NEG^Negative    Ketones Urine Negative NEG^Negative mg/dL    Specific Gravity Urine 1.015 1.003 - 1.035    pH Urine 6.5 5.0 - 7.0 pH    Protein Albumin Urine Negative NEG^Negative mg/dL    Urobilinogen Urine 1.0 0.2 - 1.0 EU/dL    Nitrite Urine Negative NEG^Negative    Blood Urine Negative NEG^Negative    Leukocyte Esterase Urine Negative NEG^Negative    Source Midstream Urine     WBC Urine 0 - 5 OTO5^0 - 5 /HPF    RBC Urine O - 2 OTO2^O - 2 /HPF   CBC with platelets differential   Result Value Ref Range    WBC 7.9 4.0 - 11.0 10e9/L    RBC Count 3.59 (L) 4.4 - 5.9 10e12/L    Hemoglobin 13.7 13.3 - 17.7 g/dL    Hematocrit 38.3 (L) 40.0 - 53.0 %     (H) 78 - 100 fl    MCH 38.2 (H) 26.5 - 33.0 pg    MCHC 35.8 31.5 - 36.5 g/dL    RDW 13.7 10.0 - 15.0 %    Platelet Count 158 150 - 450 10e9/L    Diff Method Automated Method     % Neutrophils 68.9 %    % Lymphocytes 17.0 %    % Monocytes 9.9 %    % Eosinophils 3.6 %    % Basophils 0.6 %    Absolute Neutrophil 5.4 1.6 - 8.3 10e9/L    Absolute Lymphocytes 1.3 0.8 - 5.3 10e9/L    Absolute Monocytes 0.8 0.0 - 1.3 10e9/L    Absolute Eosinophils 0.3 0.0 - 0.7 10e9/L    Absolute Basophils 0.1 0.0 - 0.2 10e9/L   Electrolyte panel (Na, K, Cl, CO2, Anion gap)   Result Value Ref Range    Sodium 139 133 - 144 mmol/L    Potassium 3.8 3.4 - 5.3 mmol/L    Chloride 103 94 - 109 mmol/L    Carbon Dioxide 28 20 - 32 mmol/L    Anion Gap 8 3 - 14 mmol/L   Creatinine   Result Value Ref Range     Creatinine 1.13 0.66 - 1.25 mg/dL    GFR Estimate 64 >60 mL/min/1.7m2      Comment:      Non  GFR Calc    GFR Estimate If Black 77 >60 mL/min/1.7m2      Comment:       GFR Calc   Urea nitrogen   Result Value Ref Range    Urea Nitrogen 8 7 - 30 mg/dL   Albumin Random Urine Quantitative with Creat Ratio   Result Value Ref Range    Creatinine Urine 125 mg/dL    Albumin Urine mg/L 18 mg/L    Albumin Urine mg/g Cr 14.64 0 - 17 mg/g Cr   Hemoglobin A1c   Result Value Ref Range    Hemoglobin A1C 4.6 4.3 - 6.0 %       If you have any questions or concerns, please call the clinic at the number listed above.       Sincerely,        Carson Fried MD

## 2018-03-28 LAB
ANION GAP SERPL CALCULATED.3IONS-SCNC: 8 MMOL/L (ref 3–14)
BUN SERPL-MCNC: 8 MG/DL (ref 7–30)
CHLORIDE SERPL-SCNC: 103 MMOL/L (ref 94–109)
CO2 SERPL-SCNC: 28 MMOL/L (ref 20–32)
CREAT SERPL-MCNC: 1.13 MG/DL (ref 0.66–1.25)
CREAT UR-MCNC: 125 MG/DL
GFR SERPL CREATININE-BSD FRML MDRD: 64 ML/MIN/1.7M2
MICROALBUMIN UR-MCNC: 18 MG/L
MICROALBUMIN/CREAT UR: 14.64 MG/G CR (ref 0–17)
POTASSIUM SERPL-SCNC: 3.8 MMOL/L (ref 3.4–5.3)
SODIUM SERPL-SCNC: 139 MMOL/L (ref 133–144)

## 2018-07-19 DIAGNOSIS — E11.9 TYPE 2 DIABETES MELLITUS WITHOUT COMPLICATION, WITHOUT LONG-TERM CURRENT USE OF INSULIN (H): ICD-10-CM

## 2018-07-19 NOTE — TELEPHONE ENCOUNTER
"Requested Prescriptions   Pending Prescriptions Disp Refills     metFORMIN (GLUCOPHAGE) 500 MG tablet 90 tablet 1    Biguanide Agents Failed    7/19/2018  4:04 PM   .Last Written Prescription Date:  01/08/2017  Last Fill Quantity: 90,  # refills: 1   Last office visit: 3/27/2018 with prescribing provider: Dr. Fried   Future Office Visit:   Next 5 appointments (look out 90 days)     Jul 23, 2018 11:10 AM CDT   Office Visit with Jana Mirza,    [facility]    [address] [address]   [phone]                   Failed - Patient has documented LDL within the past 12 mos.    Recent Labs   Lab Test  04/05/17   1209   LDL  83            Passed - Blood pressure less than 140/90 in past 6 months    BP Readings from Last 3 Encounters:   03/27/18 100/58   10/03/17 117/79   10/02/17 110/64                Passed - Patient has had a Microalbumin in the past 12 mos.    Recent Labs   Lab Test  03/27/18   1148   MICROL  18   UMALCR  14.64            Passed - Patient is age 10 or older       Passed - Patient has documented A1c within the specified period of time.    If HgbA1C is 8 or greater, it needs to be on file within the past 3 months.  If less than 8, must be on file within the past 6 months.     Recent Labs   Lab Test  03/27/18   1149   A1C  4.6            Passed - Patient's CR is NOT>1.4 OR Patient's EGFR is NOT<45 within past 12 mos.    Recent Labs   Lab Test  03/27/18   1149   GFRESTIMATED  64   GFRESTBLACK  77       Recent Labs   Lab Test  03/27/18   1149   CR  1.13            Passed - Patient does NOT have a diagnosis of CHF.       Passed - Recent (6 mo) or future (30 days) visit within the authorizing provider's specialty    Patient had office visit in the last 6 months or has a visit in the next 30 days with authorizing provider or within the authorizing provider's specialty.  See \"Patient " "Info\" tab in inbasket, or \"Choose Columns\" in Meds & Orders section of the refill encounter.            "

## 2018-07-23 ENCOUNTER — APPOINTMENT (OUTPATIENT)
Dept: GENERAL RADIOLOGY | Facility: CLINIC | Age: 73
DRG: 682 | End: 2018-07-23
Attending: EMERGENCY MEDICINE
Payer: MEDICARE

## 2018-07-23 ENCOUNTER — APPOINTMENT (OUTPATIENT)
Dept: CT IMAGING | Facility: CLINIC | Age: 73
DRG: 682 | End: 2018-07-23
Attending: HOSPITALIST
Payer: MEDICARE

## 2018-07-23 ENCOUNTER — APPOINTMENT (OUTPATIENT)
Dept: GENERAL RADIOLOGY | Facility: CLINIC | Age: 73
DRG: 682 | End: 2018-07-23
Attending: HOSPITALIST
Payer: MEDICARE

## 2018-07-23 ENCOUNTER — OFFICE VISIT (OUTPATIENT)
Dept: FAMILY MEDICINE | Facility: CLINIC | Age: 73
End: 2018-07-23
Payer: COMMERCIAL

## 2018-07-23 ENCOUNTER — HOSPITAL ENCOUNTER (INPATIENT)
Facility: CLINIC | Age: 73
LOS: 10 days | Discharge: NURSING, REHAB OR LONG-TERM CARE | DRG: 682 | End: 2018-08-02
Attending: EMERGENCY MEDICINE | Admitting: HOSPITALIST
Payer: MEDICARE

## 2018-07-23 VITALS
RESPIRATION RATE: 13 BRPM | BODY MASS INDEX: 19.88 KG/M2 | HEIGHT: 73 IN | HEART RATE: 79 BPM | WEIGHT: 150 LBS | TEMPERATURE: 97.9 F | OXYGEN SATURATION: 91 %

## 2018-07-23 DIAGNOSIS — E87.1 HYPONATREMIA: ICD-10-CM

## 2018-07-23 DIAGNOSIS — E78.5 HYPERLIPIDEMIA LDL GOAL <100: ICD-10-CM

## 2018-07-23 DIAGNOSIS — J44.1 COPD EXACERBATION (H): ICD-10-CM

## 2018-07-23 DIAGNOSIS — Z72.0 TOBACCO ABUSE: ICD-10-CM

## 2018-07-23 DIAGNOSIS — E86.0 DEHYDRATION: ICD-10-CM

## 2018-07-23 DIAGNOSIS — J43.1 PANLOBULAR EMPHYSEMA (H): ICD-10-CM

## 2018-07-23 DIAGNOSIS — R63.4 WEIGHT LOSS, UNINTENTIONAL: ICD-10-CM

## 2018-07-23 DIAGNOSIS — N17.9 ACUTE RENAL FAILURE, UNSPECIFIED ACUTE RENAL FAILURE TYPE (H): Primary | ICD-10-CM

## 2018-07-23 DIAGNOSIS — I95.89 OTHER SPECIFIED HYPOTENSION: Primary | ICD-10-CM

## 2018-07-23 DIAGNOSIS — I95.9 HYPOTENSION, UNSPECIFIED HYPOTENSION TYPE: ICD-10-CM

## 2018-07-23 DIAGNOSIS — R09.02 HYPOXEMIA: ICD-10-CM

## 2018-07-23 DIAGNOSIS — I95.1 ORTHOSTATIC HYPOTENSION: ICD-10-CM

## 2018-07-23 PROBLEM — N39.0 URINARY TRACT INFECTION: Status: ACTIVE | Noted: 2018-07-23

## 2018-07-23 LAB
ALBUMIN UR-MCNC: NEGATIVE MG/DL
ANION GAP SERPL CALCULATED.3IONS-SCNC: 9 MMOL/L (ref 3–14)
APPEARANCE UR: CLEAR
BASOPHILS # BLD AUTO: 0 10E9/L (ref 0–0.2)
BASOPHILS NFR BLD AUTO: 0.5 %
BILIRUB UR QL STRIP: NEGATIVE
BUN SERPL-MCNC: 14 MG/DL (ref 7–30)
CALCIUM SERPL-MCNC: 8.9 MG/DL (ref 8.5–10.1)
CHLORIDE SERPL-SCNC: 98 MMOL/L (ref 94–109)
CO2 SERPL-SCNC: 23 MMOL/L (ref 20–32)
COLOR UR AUTO: YELLOW
CREAT SERPL-MCNC: 1.84 MG/DL (ref 0.66–1.25)
DIFFERENTIAL METHOD BLD: ABNORMAL
EOSINOPHIL # BLD AUTO: 0.1 10E9/L (ref 0–0.7)
EOSINOPHIL NFR BLD AUTO: 1.8 %
ERYTHROCYTE [DISTWIDTH] IN BLOOD BY AUTOMATED COUNT: 13.2 % (ref 10–15)
GFR SERPL CREATININE-BSD FRML MDRD: 36 ML/MIN/1.7M2
GLUCOSE BLDC GLUCOMTR-MCNC: 104 MG/DL (ref 70–99)
GLUCOSE SERPL-MCNC: 129 MG/DL (ref 70–99)
GLUCOSE UR STRIP-MCNC: NEGATIVE MG/DL
HBA1C MFR BLD: 4 % (ref 0–5.6)
HCT VFR BLD AUTO: 31.1 % (ref 40–53)
HGB BLD-MCNC: 11 G/DL (ref 13.3–17.7)
HGB UR QL STRIP: NEGATIVE
IMM GRANULOCYTES # BLD: 0 10E9/L (ref 0–0.4)
IMM GRANULOCYTES NFR BLD: 0.4 %
KETONES UR STRIP-MCNC: NEGATIVE MG/DL
LACTATE BLD-SCNC: 1.5 MMOL/L (ref 0.7–2)
LEUKOCYTE ESTERASE UR QL STRIP: ABNORMAL
LYMPHOCYTES # BLD AUTO: 1.1 10E9/L (ref 0.8–5.3)
LYMPHOCYTES NFR BLD AUTO: 14.3 %
MCH RBC QN AUTO: 35.4 PG (ref 26.5–33)
MCHC RBC AUTO-ENTMCNC: 35.4 G/DL (ref 31.5–36.5)
MCV RBC AUTO: 100 FL (ref 78–100)
MONOCYTES # BLD AUTO: 0.7 10E9/L (ref 0–1.3)
MONOCYTES NFR BLD AUTO: 9.3 %
NEUTROPHILS # BLD AUTO: 5.7 10E9/L (ref 1.6–8.3)
NEUTROPHILS NFR BLD AUTO: 73.7 %
NITRATE UR QL: NEGATIVE
NRBC # BLD AUTO: 0 10*3/UL
NRBC BLD AUTO-RTO: 0 /100
NT-PROBNP SERPL-MCNC: 1931 PG/ML (ref 0–900)
PH UR STRIP: 6.5 PH (ref 5–7)
PLATELET # BLD AUTO: 172 10E9/L (ref 150–450)
POTASSIUM SERPL-SCNC: 3.6 MMOL/L (ref 3.4–5.3)
RBC # BLD AUTO: 3.11 10E12/L (ref 4.4–5.9)
RBC #/AREA URNS AUTO: 1 /HPF (ref 0–2)
SODIUM SERPL-SCNC: 130 MMOL/L (ref 133–144)
SOURCE: ABNORMAL
SP GR UR STRIP: 1 (ref 1–1.03)
TROPONIN I SERPL-MCNC: <0.015 UG/L (ref 0–0.04)
TSH SERPL DL<=0.005 MIU/L-ACNC: 0.77 MU/L (ref 0.4–4)
UROBILINOGEN UR STRIP-MCNC: NORMAL MG/DL (ref 0–2)
WBC # BLD AUTO: 7.8 10E9/L (ref 4–11)
WBC #/AREA URNS AUTO: 45 /HPF (ref 0–5)

## 2018-07-23 PROCEDURE — 80048 BASIC METABOLIC PNL TOTAL CA: CPT | Performed by: EMERGENCY MEDICINE

## 2018-07-23 PROCEDURE — 25000128 H RX IP 250 OP 636: Performed by: HOSPITALIST

## 2018-07-23 PROCEDURE — 12000000 ZZH R&B MED SURG/OB

## 2018-07-23 PROCEDURE — 83036 HEMOGLOBIN GLYCOSYLATED A1C: CPT | Performed by: HOSPITALIST

## 2018-07-23 PROCEDURE — 84484 ASSAY OF TROPONIN QUANT: CPT | Performed by: EMERGENCY MEDICINE

## 2018-07-23 PROCEDURE — 99222 1ST HOSP IP/OBS MODERATE 55: CPT | Mod: AI | Performed by: HOSPITALIST

## 2018-07-23 PROCEDURE — 36415 COLL VENOUS BLD VENIPUNCTURE: CPT | Performed by: HOSPITALIST

## 2018-07-23 PROCEDURE — 71046 X-RAY EXAM CHEST 2 VIEWS: CPT

## 2018-07-23 PROCEDURE — 00000146 ZZHCL STATISTIC GLUCOSE BY METER IP

## 2018-07-23 PROCEDURE — 85025 COMPLETE CBC W/AUTO DIFF WBC: CPT | Performed by: EMERGENCY MEDICINE

## 2018-07-23 PROCEDURE — 25000128 H RX IP 250 OP 636: Performed by: EMERGENCY MEDICINE

## 2018-07-23 PROCEDURE — 25000132 ZZH RX MED GY IP 250 OP 250 PS 637: Mod: GY | Performed by: HOSPITALIST

## 2018-07-23 PROCEDURE — 84166 PROTEIN E-PHORESIS/URINE/CSF: CPT | Performed by: HOSPITALIST

## 2018-07-23 PROCEDURE — 99207 ZZC OFFICE-HOSPITAL ADMIT: CPT | Performed by: FAMILY MEDICINE

## 2018-07-23 PROCEDURE — 81001 URINALYSIS AUTO W/SCOPE: CPT | Performed by: EMERGENCY MEDICINE

## 2018-07-23 PROCEDURE — 96365 THER/PROPH/DIAG IV INF INIT: CPT

## 2018-07-23 PROCEDURE — 72100 X-RAY EXAM L-S SPINE 2/3 VWS: CPT

## 2018-07-23 PROCEDURE — 83605 ASSAY OF LACTIC ACID: CPT | Performed by: EMERGENCY MEDICINE

## 2018-07-23 PROCEDURE — 96361 HYDRATE IV INFUSION ADD-ON: CPT

## 2018-07-23 PROCEDURE — 87040 BLOOD CULTURE FOR BACTERIA: CPT | Performed by: HOSPITALIST

## 2018-07-23 PROCEDURE — 83880 ASSAY OF NATRIURETIC PEPTIDE: CPT | Performed by: EMERGENCY MEDICINE

## 2018-07-23 PROCEDURE — 83036 HEMOGLOBIN GLYCOSYLATED A1C: CPT | Performed by: EMERGENCY MEDICINE

## 2018-07-23 PROCEDURE — 99285 EMERGENCY DEPT VISIT HI MDM: CPT | Mod: 25

## 2018-07-23 PROCEDURE — 87086 URINE CULTURE/COLONY COUNT: CPT | Performed by: EMERGENCY MEDICINE

## 2018-07-23 PROCEDURE — 84443 ASSAY THYROID STIM HORMONE: CPT | Performed by: EMERGENCY MEDICINE

## 2018-07-23 PROCEDURE — 74176 CT ABD & PELVIS W/O CONTRAST: CPT

## 2018-07-23 RX ORDER — CEFTRIAXONE 1 G/1
1 INJECTION, POWDER, FOR SOLUTION INTRAMUSCULAR; INTRAVENOUS ONCE
Status: COMPLETED | OUTPATIENT
Start: 2018-07-23 | End: 2018-07-23

## 2018-07-23 RX ORDER — NICOTINE POLACRILEX 4 MG
15-30 LOZENGE BUCCAL
Status: DISCONTINUED | OUTPATIENT
Start: 2018-07-23 | End: 2018-08-02 | Stop reason: HOSPADM

## 2018-07-23 RX ORDER — LEVOTHYROXINE SODIUM 100 UG/1
100 TABLET ORAL
Status: DISCONTINUED | OUTPATIENT
Start: 2018-07-24 | End: 2018-08-02 | Stop reason: HOSPADM

## 2018-07-23 RX ORDER — ONDANSETRON 2 MG/ML
4 INJECTION INTRAMUSCULAR; INTRAVENOUS EVERY 6 HOURS PRN
Status: DISCONTINUED | OUTPATIENT
Start: 2018-07-23 | End: 2018-08-02 | Stop reason: HOSPADM

## 2018-07-23 RX ORDER — SODIUM CHLORIDE 9 MG/ML
1000 INJECTION, SOLUTION INTRAVENOUS CONTINUOUS
Status: DISCONTINUED | OUTPATIENT
Start: 2018-07-23 | End: 2018-07-23

## 2018-07-23 RX ORDER — DORZOLAMIDE HYDROCHLORIDE AND TIMOLOL MALEATE 20; 5 MG/ML; MG/ML
1 SOLUTION/ DROPS OPHTHALMIC 2 TIMES DAILY
Status: DISCONTINUED | OUTPATIENT
Start: 2018-07-23 | End: 2018-07-23

## 2018-07-23 RX ORDER — DEXTROSE MONOHYDRATE 25 G/50ML
25-50 INJECTION, SOLUTION INTRAVENOUS
Status: DISCONTINUED | OUTPATIENT
Start: 2018-07-23 | End: 2018-08-02 | Stop reason: HOSPADM

## 2018-07-23 RX ORDER — CEFTRIAXONE 1 G/1
1 INJECTION, POWDER, FOR SOLUTION INTRAMUSCULAR; INTRAVENOUS EVERY 24 HOURS
Status: DISCONTINUED | OUTPATIENT
Start: 2018-07-24 | End: 2018-07-25

## 2018-07-23 RX ORDER — ONDANSETRON 4 MG/1
4 TABLET, ORALLY DISINTEGRATING ORAL EVERY 6 HOURS PRN
Status: DISCONTINUED | OUTPATIENT
Start: 2018-07-23 | End: 2018-08-02 | Stop reason: HOSPADM

## 2018-07-23 RX ORDER — ASPIRIN 81 MG/1
81 TABLET, CHEWABLE ORAL DAILY
Status: DISCONTINUED | OUTPATIENT
Start: 2018-07-24 | End: 2018-07-29

## 2018-07-23 RX ORDER — NALOXONE HYDROCHLORIDE 0.4 MG/ML
.1-.4 INJECTION, SOLUTION INTRAMUSCULAR; INTRAVENOUS; SUBCUTANEOUS
Status: DISCONTINUED | OUTPATIENT
Start: 2018-07-23 | End: 2018-08-02 | Stop reason: HOSPADM

## 2018-07-23 RX ORDER — SODIUM CHLORIDE 9 MG/ML
INJECTION, SOLUTION INTRAVENOUS CONTINUOUS
Status: DISCONTINUED | OUTPATIENT
Start: 2018-07-23 | End: 2018-07-26

## 2018-07-23 RX ORDER — ACETAMINOPHEN 325 MG/1
650 TABLET ORAL EVERY 4 HOURS PRN
Status: DISCONTINUED | OUTPATIENT
Start: 2018-07-23 | End: 2018-08-02 | Stop reason: HOSPADM

## 2018-07-23 RX ADMIN — DORZOLAMIDE HYDROCHLORIDE AND TIMOLOL MALEATE 1 DROP: 20; 5 SOLUTION/ DROPS OPHTHALMIC at 22:11

## 2018-07-23 RX ADMIN — SODIUM CHLORIDE 1000 ML: 9 INJECTION, SOLUTION INTRAVENOUS at 13:21

## 2018-07-23 RX ADMIN — CEFTRIAXONE SODIUM 1 G: 1 INJECTION, POWDER, FOR SOLUTION INTRAMUSCULAR; INTRAVENOUS at 16:34

## 2018-07-23 RX ADMIN — SODIUM CHLORIDE: 9 INJECTION, SOLUTION INTRAVENOUS at 19:33

## 2018-07-23 RX ADMIN — SODIUM CHLORIDE 500 ML: 9 INJECTION, SOLUTION INTRAVENOUS at 20:54

## 2018-07-23 RX ADMIN — SODIUM CHLORIDE 1000 ML: 9 INJECTION, SOLUTION INTRAVENOUS at 14:54

## 2018-07-23 ASSESSMENT — ACTIVITIES OF DAILY LIVING (ADL)
AMBULATION: 1-->ASSISTIVE EQUIPMENT
BATHING: 0-->INDEPENDENT
SWALLOWING: 0-->SWALLOWS FOODS/LIQUIDS WITHOUT DIFFICULTY
TRANSFERRING: 1-->ASSISTIVE EQUIPMENT
COGNITION: 0 - NO COGNITION ISSUES REPORTED
FALL_HISTORY_WITHIN_LAST_SIX_MONTHS: YES
ADLS_ACUITY_SCORE: 12
DRESS: 0-->INDEPENDENT
NUMBER_OF_TIMES_PATIENT_HAS_FALLEN_WITHIN_LAST_SIX_MONTHS: 20
RETIRED_EATING: 0-->INDEPENDENT
TOILETING: 0-->INDEPENDENT
RETIRED_COMMUNICATION: 0-->UNDERSTANDS/COMMUNICATES WITHOUT DIFFICULTY

## 2018-07-23 ASSESSMENT — ENCOUNTER SYMPTOMS
DIZZINESS: 1
NUMBNESS: 1
FEVER: 0
VOMITING: 0
LIGHT-HEADEDNESS: 1
UNEXPECTED WEIGHT CHANGE: 1

## 2018-07-23 NOTE — LETTER
"Transition Communication Hand-off for Care Transitions to Next Level of Care Provider    Name: Abebe Chrisetnsen  : 1945  MRN #: [id number]  Primary Care Provider: Carson Fried     Primary Clinic: [address]  [facility] [address]     Reason for Hospitalization:  Dehydration [E86.0]  Hyponatremia [E87.1]  Hypotension, unspecified hypotension type [I95.9]  Urinary tract infection without hematuria, site unspecified [N39.0]  Acute renal failure, unspecified acute renal failure type (H) [N17.9]  Admit Date/Time: 2018 12:14 PM  Discharge Date: 18  Payor Source: Payor: [employer] / Plan: [employer] PLAN / Product Type: HMO /     Readmission Assessment Measure (YUE) Risk Score/category: average             Reason for Communication Hand-off Referral: Admission diagnoses: PN    Discharge Plan: Melissa Manning [facility]       Concern for non-adherence with plan of care:   Y/N :\"  Discharge Needs Assessment:  Needs       Most Recent Value    Equipment Currently Used at Home walker, rolling, cane, straight, shower chair    Transportation Available car    # of Referrals Placed by C[facility] Specialty Providers, Scheduled Follow-up appointments [MOHPA]          Already enrolled in Tele-monitoring program and name of program:  no    Follow-up specialty is recommended: yes  Follow-up plan:  Future Appointments  Date Time Provider Department Center   8/3/2018 5:30 AM Kleist, Lupe, [facility]      -A follow-up appointment has been made for you with Dr. Ames on  at 11:00 AM at [facility]. Please arrive at 10:40 AM to have your labs drawn.  Please call the clinic at [phone] should you need to change or cancel your appointment.  Please arrive 15 minutes early to your scheduled appointment and bring your photo ID, insurance card and co-payment.     [address], [address]   [place] [address] "   Phone: [phone]     Any outstanding tests or procedures:    Procedures     Future Labs/Procedures    Oxygen - Nasal cannula     Comments:    2 Lpm by nasal cannula to keep O2 sats 92% or greater.          Referrals     Future Labs/Procedures    Occupational Therapy Adult Consult     Comments:    Evaluate and treat as clinically indicated.    Reason:  weakness    Physical Therapy Adult Consult     Comments:    Evaluate and treat as clinically indicated.    Reason:  weakness            Key Recommendations:  FYI pt is discharging to [facility]. See above for follow-up scheduled with Dr. Kwaku Mckeon RN    AVS/Discharge Summary is the source of truth; this is a helpful guide for improved communication of patient story

## 2018-07-23 NOTE — IP AVS SNAPSHOT
` ` Patient Information     Patient Name Sex     Abebe Christensen ([id number]) Male 1945       Room Bed    0246 0246-01      Patient Demographics     Address Phone    [address]    [facility] [id number] [phone] (Home)  NONE (Mobile)      Patient Ethnicity & Race     Ethnic Group Patient Race    American White      Emergency Contact(s)     Name Relation Home Work Mobile    Jennifer Dyson [phone]      Documents on File        Status Date Received Description       Documents for the Patient    Privacy Notice - [place] Received 01/15/13     External Medication Information Consent Accepted 01/15/13     Consent for Services - Hospital/Clinic  ()      Consent for Services - Hospital/Clinic Received () 01/15/13     Consent for EHR Access  13 Copied from existing Consent for services - C/HOD collected on 01/15/2013    [facility] Specified Other       Consent for Services - Hospital/Clinic Received () 14     Consent to Communicate   Consent to Communicate    Consent for Services - Hospital/Clinic Received () 05/14/15     HIM ISHA Authorization  05/18/15 [facility]    Consent for Services/Privacy Notice - Hospital/Clinic Received () 16     Insurance Card Received 16 Medicare    Consent for Services - [facility]       Consent for Services/Privacy Notice - Hospital/Clinic Received () 17     Patient ID Received 17     Consent for EHR Access Received 17     Consent to Communicate Received 17     Care Everywhere Prospective Auth Received 18     Consent for Services - Hospital and Clinic Received 18     HIE Auth Received 18     Insurance Card Received 18 NEW MEDICARE    Insurance Card Received 18 HP    Insurance Card Received (Deleted) 01/15/13 [employer]    Patient ID Received (Deleted) 14     Insurance Card Received (Deleted) 14 [employer]    Insurance Card Received  (Deleted) 12/03/15 [employer]    Insurance Card Received (Deleted) 04/13/16 HP    Insurance Card Received (Deleted) 11/23/16 HP    Insurance Card Received (Deleted) 09/25/17     Insurance Card Received (Deleted) 09/25/17        Documents for the Encounter    CMS IM for Patient Signature Received 07/24/18     EMS/Ambulance Record  07/23/18 [facility]    CMS IM for Patient Signature Received 07/28/18 2MM    Discharge Attachment   PREDNISONE TABLETS (ENGLISH)    CMS IM for Patient Signature Received 08/02/18 3MM    CE Point of Care Auth Received        Admission Information     Attending Provider Admitting Provider Admission Type Admission Date/Time    Nirmal Laird, Nirmal Blackwell,  Emergency 07/23/18  1214    Discharge Date Hospital Service Auth/Cert Status Service Area     Hospitalist Incomplete [facility] SERVICES    Unit Room/Bed Admission Status        CARDIAC SPEC CARE 0246/0246-01 Admission (Confirmed)       Admission     Complaint    Urinary tract infection      Hospital Account     Name Acct ID Class Status Primary Coverage    Abebe Christensen [id number] Inpatient Open MEDICARE - MEDICARE FOR HB SUPPLEMENT            Guarantor Account (for Hospital Account #[id number])     Name Relation to Pt Service Area Active? Acct Type    Abebe Christensen  FCS Yes Personal/Family    Address Phone          [address]  [facility]  [address] [place] [address] [phone](H)              Coverage Information (for Hospital Account #[id number])     1. MEDICARE/MEDICARE FOR HB SUPPLEMENT     F/O Payor/Plan Precert #    MEDICARE/MEDICARE FOR HB SUPPLEMENT     Subscriber Subscriber #    Abebe Christensen [id number]    Address Phone    ATT[employer] CLAIMS  [address]  [place] [address] [phone]          2. HEALTH[employer]NERS/HEALTH[employer]ERS FREEDOM PLAN     F/O Payor/Plan Precert #    HEALTHPARTNERS/HEALTH[employer]NERS FREEDOM PLAN     Subscriber Subscriber #    Abebe Christensen [id number]     Address Phone    [address]  [place] [address] [phone]

## 2018-07-23 NOTE — IP AVS SNAPSHOT
` `     [facility]: [phone]                 INTERAGENCY TRANSFER FORM - NOTES (H&P, Discharge Summary, Consults, Procedures, Therapies)   2018                    Hospital Admission Date: 2018  ABEBE CHRISTENSEN   : 1945  Sex: Male        Patient PCP Information     Provider PCP Type    Carson Fried MD General         History & Physicals      H&P by Nirmal Laird DO at 2018  9:04 PM     Author:  Nirmal Laird DO Service:  Hospitalist Author Type:  Physician    Filed:  2018 10:16 PM Date of Service:  2018  9:04 PM Creation Time:  2018  9:02 PM    Status:  Signed :  Nirmal Laird DO (Physician)         [facility]    History and Physical  Hospitalist       Date of Admission:  2018    Assessment & Plan   Abebe Christensen is a 72 year old male who presents with weakness, falling, lightheadness, and unintentional weight loss    Urinary tract infection[ES1.1]  - IV ceftriaxone  - follow cultures  - CT abdomen without contrast[ES1.2]    Orthostatic hypotension[ES1.1]  - fluids and recheck  - discontinue the bp meds[ES1.2]    SOHEILA[ES1.1]  - likely prenreal , continue fluids, bolus as needed[ES1.2]    100 lb unintentional weight loss  History of MGUS and PV[ES1.1]  History of omental lesions concerning for peritoneal mets, s/p exploratory lap  Appears like the pathology is negative, sounds like he was lost for followup and now family is here to help him navigate the health care system  followed with [facility]  History is concerning for underlying malignancy  Given his history would reimage the abdomen and have [facility] consult to see if they have other concerns  Send myeloma labs  - cons oncology  - protein and urine electrophoresis with immunofixation  - CT abdomen    Failure to thrive  - PT and OT consults, likely needs placment    HTN  - holding meds[ES1.2]    DVT Prophylaxis:[ES1.1]  Pneumatic Compression Devices[ES1.2]  Code Status:[ES1.1] Full Code[ES1.2]    Disposition: Expected discharge in[ES1.1] 2-3[ES1.2] days once[ES1.1] data returns[ES1.2].    Nirmal Laird DO    Primary Care Physician   Carson Fried    Chief Complaint[ES1.1]   weakness     History is obtained from the patient and family members[ES1.2]    History of Present Illness   Abebe Christensen is a 72 year old male who presents with[ES1.1] weakness, falling, accompanied by sister and niece. Basically his overall health has been deteriorating over the past year with about 100 lbs of unintentional weight loss. He has now became very unsteady, and fell 2 days ago. He lives in an apartment on the second floor and can barely navigate the steps. Family feel his blood pressure is overmedicated. He also states that he had a biopsy done last year that he never received results from. He notes that he feels some distention in his abdomen. He also has some back pain, chronically but this is worse.[ES1.2]     Past Medical History[ES1.1]    I have reviewed this patient's medical history and updated it with pertinent information if needed.[ES1.2]   Past Medical History:   Diagnosis Date     GERD (gastroesophageal reflux disease)      Hyperlipidemia      Hypertension      Hypertension      MGUS (monoclonal gammopathy of unknown significance)      Renal insufficiency, mild[ES1.3]        Past Surgical History[ES1.1]   I have reviewed this patient's surgical history and updated it with pertinent information if needed.[ES1.2]  Past Surgical History:   Procedure Laterality Date     EYE SURGERY  2008    cataract and glaucoma     LAPAROSCOPIC BIOPSY LIVER N/A 10/3/2017    Procedure: LAPAROSCOPIC BIOPSY LIVER;;  Surgeon: Kendell Kenny MD;  Location:  OR     LAPAROSCOPIC HERNIORRHAPHY UMBILICAL N/A 10/3/2017    Procedure: LAPAROSCOPIC HERNIORRHAPHY UMBILICAL;;  Surgeon: Kendell Kenny MD;  Location:  OR     LAPAROTOMY EXPLORATORY N/A  10/3/2017    Procedure: LAPAROTOMY EXPLORATORY;;  Surgeon: Kendell Kenny MD;  Location: [facility] OR     OMENTECTOMY N/A 10/3/2017    Procedure: OMENTECTOMY;  LAPAROSCOPIC ABDOMINAL EXPLORATION; OMENTECTOMY; PERITONEAL UMBILICAL HERNIA REPAIR, WEDGE LIVER BIOPSY;  Surgeon: Kendell Kenny MD;  Location:  OR[ES1.3]       Prior to Admission Medications   Prior to Admission Medications   Prescriptions Last Dose Informant Patient Reported? Taking?   ASPIRIN PO 7/23/2018 at Unknown time Self Yes Yes   Sig: Take 81 mg by mouth daily   CARTIA  MG 24 hr capsule 7/23/2018 at Unknown time Self No Yes   Sig: TAKE 1 CAPSULE BY MOUTH DAILY   Cholecalciferol (VITAMIN D) 2000 UNITS CAPS 7/23/2018 at Unknown time Self Yes Yes   Sig: Take 1 capsule by mouth daily   dorzolamide-timolol (COSOPT) 2-0.5 % ophthalmic solution 7/22/2018 at Unknown time Self Yes Yes   Sig: Place 1 drop into both eyes 2 times daily    folic acid (FOLVITE) 1 MG tablet 7/23/2018 at Unknown time Self No Yes   Sig: TAKE 4 TABLETS BY MOUTH DAILY   levothyroxine (SYNTHROID/LEVOTHROID) 100 MCG tablet 7/23/2018 at Unknown time Self No Yes   Sig: TAKE 1 TABLET(100 MCG) BY MOUTH DAILY   lisinopril-hydrochlorothiazide (PRINZIDE/ZESTORETIC) 20-12.5 MG per tablet 7/23/2018 at Unknown time Self No Yes   Sig: TAKE 1 TABLET BY MOUTH DAILY   metFORMIN (GLUCOPHAGE) 500 MG tablet 7/22/2018 at Unknown time Self No Yes   Sig: TAKE 1 TABLET(500 MG) BY MOUTH DAILY 20 MINUTES BEFORE DINNER   omeprazole (PRILOSEC) 20 MG CR capsule 7/23/2018 at Unknown time Self No Yes   Sig: TAKE ONE CAPSULE BY MOUTH EVERY DAY   travoprost Z, benzalkonium, (TRAVATAN) 0.004 % ophthalmic solution 7/22/2018 at Unknown time Self Yes Yes   Sig: Place 1 drop into both eyes every evening       Facility-Administered Medications: None     Allergies   No Known Allergies    Social History[ES1.1]   I have reviewed this patient's social history and updated it with pertinent information if needed. Abebe MONTEJO  Fermin[ES1.2]  reports that he has been smoking Cigarettes.  He has been smoking about 0.50 packs per day. He has never used smokeless tobacco. He reports that he drinks alcohol. He reports that he does not use illicit drugs.[ES1.3]    Family History[ES1.1]   I have reviewed this patient's family history and updated it with pertinent information if needed.[ES1.2]   Family History   Problem Relation Age of Onset     Eye Disorder Mother      Cancer - colorectal Father[ES1.3]        Review of Systems[ES1.1]   The 10 point Review of Systems is negative other than noted in the HPI or here.1[ES1.2]    Physical Exam   Temp: 98  F (36.7  C) Temp src: Oral BP: 95/56 Pulse: 71 Heart Rate: 75 Resp: 18 SpO2: 99 % O2 Device: None (Room air)    Vital Signs with Ranges  Temp:  [97.5  F (36.4  C)-98  F (36.7  C)] 98  F (36.7  C)  Pulse:  [71-79] 71  Heart Rate:  [66-78] 75  Resp:  [13-21] 18  BP: ()/(53-75) 95/56  SpO2:  [87 %-100 %] 99 %  160 lbs 0 oz    Constitutional: Awake, alert, cooperative, no apparent distress.[ES1.1] Appears cachectic[ES1.2]  Eyes: Conjunctiva and pupils examined and normal.  HEENT: Moist mucous membranes, normal dentition.  Respiratory: Clear to auscultation bilaterally, no crackles or wheezing.  Cardiovascular: Regular rate and rhythm, normal S1 and S2, and no murmur noted.  GI: Soft, non-distended, non-tender, normal bowel sounds.  Lymph/Hematologic: No anterior cervical or supraclavicular adenopathy.  Skin: No rashes, no cyanosis, no edema.  Musculoskeletal: No joint swelling, erythema or tenderness.  Neurologic: Cranial nerves 2-12 intact, normal strength and sensation.  Psychiatric: Alert, oriented to person, place and time, no obvious anxiety or depression.    Data   Data reviewed today:  I personally reviewed[ES1.1] the chest x-ray image(s) showing clear lungs[ES1.2].    Recent Labs  Lab 07/23/18  1225   WBC 7.8   HGB 11.0*         *   POTASSIUM 3.6   CHLORIDE 98   CO2 23    BUN 14   CR 1.84*   ANIONGAP 9   JESSICA 8.9   *   TROPI <0.015       Imaging:  Recent Results (from the past 24 hour(s))   XR Chest 2 Views    Narrative    XR CHEST 2 VW 7/23/2018 2:32 PM    HISTORY: weakness hypotension;       Impression    IMPRESSION: Negative.    JULITO OREILLY MD[ES1.1]          Revision History        User Key Date/Time User Provider Type Action    > ES1.3 7/23/2018 10:16 PM Nirmal Laird DO Physician Sign     ES1.2 7/23/2018 10:04 PM Nirmal Laird DO Physician      ES1.1 7/23/2018  9:02 PM Nirmal Laird DO Physician                      Discharge Summaries      Discharge Summaries by Siobhan Cline MD at 8/2/2018  1:23 PM     Author:  Siobhan Cline MD Service:  Hospitalist Author Type:  Physician    Filed:  8/2/2018  1:31 PM Date of Service:  8/2/2018  1:23 PM Creation Time:  8/2/2018  1:23 PM    Status:  Signed :  Siobhan Cline MD (Physician)         [facility]  Discharge Summary        Abebe Christensen MRN# [id number]   YOB: 1945 Age: 72 year old     Date of Admission:  7/23/2018  Date of Discharge:  8/2/2018  Admitting Physician:  Nirmal Laird DO  Discharge Physician: Siobhan Cline MD  Discharging Service: Hospitalist     Primary Provider: Carson Fried  Primary Care Physician Phone Number: [phone]         Discharge Diagnoses/Problem Oriented Hospital Course (Providers):    Abebe Christensen was admitted on 7/23/2018 by Nirmal Laird DO and I would refer you to their history and physical.  The following problems were addressed during his hospitalization:  Abebe Christensen is a 72 year old male, admitted on 7/23/2018,  with PMHx of CKD III, MGUS, HTN, hyperlipidemia, and GERD who presented to the ED on 7/23/18 with weakness, recurrent falls, lightheadness, and unintentional weight loss with findings of abnormal UA and SOHEILA. Registered under inpatient status for further evaluation and  treatment.     Generalized weakness/memory issues/hallucinations the last 2 nights   Multifactorial in the context of below. Pt lives alone in an apartment. Recently purchased a walker/cane. No elevator in apartment.   --- PT and OT to assess, recommending TCU at discharge which patient is agreeable to  -- pt did fall at home and hit head, he has a laceration on the back of his head  -he is weak noni legs and also right arm  -sister here 7/27 and she says that pt has different speech that prior  -patient has had hallucinations night on 7/25 and 7/26  - head CT and head MRI done last night without acute hemorrhage or infarct  MRI of the brain showed atrophy of the brain for possible underlying dementia contributing to the confusion and memory issues  Needs outpatient evaluation by neuropsych testing for possible underlying dementia     Probable pneumonia with possible acute COPD exacerbation   Patient is wheezing today  He has a 30 year history of smoking  Started  him on duo nebs 4 times daily  Prednisone 40 mg p.o. daily to help with the wheezing  Breo Ellipta inhaler added to help with the COPD  CXR on 7/28 w/ mild infiltrate and/or atelectasis right base.  Pulm vascularity WNL  -due to his sx of cough, looks more SOB and CXR change am going to start abx  -since he has been in the hospital will start zosyn for possibe HCAP   -BC x3 remain negative  -afebrile, WBC normal  -tobacco abuse  -sputum for gram stain and culture showed greater than 10 squamous cells consistent with oral contamination  Shortness of breath improved today   Switched  IV Zosyn to oral Augmentin on July 31.  Completed 7 day course of antibiotics during this hospitalization. So  Antibiotics were discontinued on discharge  Needs outpatient pulmonary function test        Mild elevated troponin  Cardiology consulted and follow on the following  Elevated troponin is thought to be due to demand ischemia in the setting of pneumonia and  hypertension  -EKG done for the tachycardia showed sinus tachycardia but also possible ischemia with TWI inferiorly and anterolaterally  -due to EKG, ordered troponin which was elevated at 0.148  Serial troponins remained flat  -pt denies chest pain  Lexiscan stress test was done and was nondiagnostic  Echocardiogram showed normal EF  Cardiology is recommending conservative management as patient is asymptomatic and has multiple comorbid issues  We will check a fasting LPA tomorrow and start him on Lipitor 20 mg p.o. Daily  Unable to start beta-blocker due to low blood pressure issues  No aspirin due to thrombocytopenia and risk of bleeding with anemia  Nonsustained ventricular tachycardia;  He had a run of nonsustained ventricular tachycardia[ST1.1] on July 31 overnight[ST1.2].[ST1.1]  He was asymptomatic during the episode.[ST1.2]   Electrolytes and magnesium were checked and magnesium was low at 1.6 so replacement was started  Cardiology is aware of the ventricular tachycardia and recommending conservative management            Orthostatic hypotension and persistent hypotension[ST1.1] due to blood pressure being low in the arms due to peripheral arterial disease with chronic smoking[ST1.2]  History of benign essential hypertension: Positive on admission. Likely secondary to SOHEILA below with ongoing PTA medication administration including BP meds, poor PO intake. Echocardiogram with EF 60-65%, no RWMA, mild valvular aortic stenosis   - Has received total of 3500 L IVF bolus plus maintenance fluids  - Continue to hold PTA BP meds (Cartia  mg po every day, Prinzide 20/12.5 mg po every day--last dose 0830 on 7/23/18)  - Encourage adequate PO intake   - saline locked 7/27  Patient was given so far multiple liters of fluids and was short of breath overnight on 729 overnight and needed 20 mg of Lasix IV  He was started on Midodrin 10 mg p.o. 3 times daily to help with the hypotension  Blood pressure when checked in  his left thigh is in the systolics in the 110s today[ST1.1]  All blood pressure measurement should be done on left thigh[ST1.2]             Acute metabolic encephalopathy, transient: Episode of delirium reported 2 nights ago  per nursing staff and had some hallucinations last 2 nights   Infectious work-up negative with BC NGTD, Urine culture with urogenital seda. CXR unremarkable. Lumbar XR unremarkable. CT A/P unremarkable, comment on trace fluid and/or capsular implants seen anterolaterally in the liver. WBC WNL. Afebrile.   -- Monitor   -- Delirium prevention protocol in place   -workup of CT of head and MRI of brain did not show anything acute  -recommend cognitive eval as outpatient     SOHEILA on CKD III, resolved: Baseline creatinine appears to be 1.1-1.2. Creatinine on admission 1.84>1.46>1.36>1.19>1.17  after IVF resuscitation. Likely related to poor PO intake in the context of nausea for the past 1-2 weeks.   Creatinine is stable          Hyponatremia, resolved: Sodium 130 on admission, 137 on repeat.   Sodium  141  -- Monitor       Abnormal UA: UA with large LE. Asymptomatic; denies dysuria or increased frequency  - IV ceftriaxone empirically on admission, discontinued 7/25 as urine cultures grew urogenital seda.[ST1.1]   Completed course of antibiotics during this hospitalization for pneumonia which would cover for a UTI as well[ST1.2]      Myeloproliferative disorder (2002)  History of omental lesions concerning for peritoneal mets, s/p exploratory lap  Unintentional weight loss (20-30 lbs in the last year per review of clinic notes): Previously followed by [facility]. CT and PET form 2017 showed moderate omental thickening and nodularity with low metabolic activity, moderate abdominal and pelvic ascited with intermediate metabolic activity. Pt had a lap in 10/2017 which showed cirrhosis, no malignancy. CT A/P on admission showing marked improvement in ascites form prior imaging. Trace fluid and/or capsular  implants seen anterolaterally in the liver, question minimal serosal implants vs fluid adjacent to the liver, otherwise no definite omental lesions demonstrated. B12 392, Folate 23.3. XR Lumbar negative.   -- [facility] consulted, see formal note by Dr. Ames--myeloproliferative neoplasm appears to be progressed moderately with anemia. Consider aranesp if he continues to have kidney dysfunction and anemia to help with improvement in hemoglobin and associated fatigue, consider repeating bone marrow bx depending on clinical progress (outpatient)  -- Regarding malignancy w/u recommended per Oncology: EGD 6/21/17 with normal esophagus, medium hiatal hernia, normal mucosa, possible gastritis. Colonoscopy 6/21/17 with diverticulosis of the sigmoid colon, remainder of exam reported at normal. Consider low dose CT scan of chest given tobacco hx, but this can be done in the outpatient setting   -- Care Coordinator to help facilitate Oncology follow-up at discharge  Oncology with [facility] following and they are recommending follow-up with [ST1.1] Oni[ST1.2]  with CBC in 2 weeks after discharge         Macrocytic anemia, acute   Thrombocytopenia: Platelets 172>123>142. Baseline hemoglobin in the last year has actually been around 13-14. B12 and folate WNL.   -- No active signs of bleeding, monitor   -- Heme/Onc following-appreciate assistance   hgb at 8.2 today  No  Esophageal varices  on prior EGD done in June 2017  No sign of active bleeding so no need for EGD at this point per GI  Continue PPI once daily      Elevated BNP: 1931 on admission. Pt is euvolemic on exam. Denies chest pain, GOMEZ, SOB.   -- Echo with EF 60-65%, no RWMA, mild valvular aortic stenosis   -- Monitor I&Os, daily weights           NIDDM, controlled  Peripheral neuropathy: Maintained on Metformin 500 mg po every day PTA. A1C 4.0 on 7/23/18. Describes bilateral neuropathy. Previously assessed by Neurologist and diagnosed with neuropathy. Not  "currently taking medication for this. B12 and folate WNL.   -- PTA Metformin on hold and to be discontinued at discharge indefinitely based on A1C and sugars during this hospitalization   -- Medium dose sliding scale insulin ordered   -- BS per protocol   -- Monitor for hypoglycemia   -- Consider starting gabapentin once SOHEILA resolved                     Cirrhosis: Prior hx of ascites with paracentesis in 8/2017 with negative cytology, 950 ccs of fluid removed. Pathology showing advanced chronic liver disease (stage IV \"cirrhosis\" with steatohepatitis. AST 51, ALT 11)   -- Defer further monitoring to outpatient setting       Hypothyroidism: TSH WNL. Continue PTA Synthroid       GERD: Continue PTA Prilosec       History of infrarenal AA: Measuring at 3 cm on CT. Stable.       Elevated AST: 51>57>61. Bili 0.3. No abdominal pain.   -- Monitor     Alcohol use disorder: Drinks one mixed drink containing 1 shot of vodka daily. Denies history of withdrawal symptoms including withdrawal seizure.   -- Monitor for signs of withdrawal      Tobacco use: 40 pack year hx.   -- Nicotine replacement available if needed       Severe malnutrition in the context of chronic illness  Hypoalbuminemia   Unintentional weight loss (20-30 lbs in the last year per review of clinic notes): % Weight Loss:  > 7.5% in 3 months (severe malnutrition)  % Intake:  </= 50% for >/= 1 month (severe malnutrition)  -- Nutrition consulted              # Pain Assessment:  Current Pain Score 8/1/2018   Patient currently in pain? denies   Pain score (0-10) -   Pain location -   Pain descriptors -         DVT Prophylaxis: SCD  Code Status: Full Code  Discussed with bedside RN  Disposition:[ST1.1]  to TCU today[ST1.2]                  Code Status:[ST1.1]      Full Code[ST1.2]         Important Results:[ST1.1]      See below[ST1.2]         Pending Results:        Unresulted Labs Ordered in the Past 30 Days of this Admission     Date and Time Order Name Status " Description    7/28/2018 1355 Blood culture Preliminary     7/28/2018 1355 Blood culture Preliminary                Discharge Instructions and Follow-Up:      Follow-up Appointments     Follow Up and recommended labs and tests       Follow up with Nursing home physician in 1week.  The following labs/tests   are recommended: CBC,  BMP .  Needs f/u with  in [facility] in 1month with CBC results  Check blood pressure in left thigh only due to peripheral arterial disease  Recheck CMP in 1month as pt started on lipitor                         Discharge Disposition:[ST1.1]      Discharged to short-term care facility[ST1.2]         Discharge Medications:        Current Discharge Medication List      START taking these medications    Details   atorvastatin (LIPITOR) 20 MG tablet Take 1 tablet (20 mg) by mouth every evening  Qty: 30 tablet, Refills: 0    Associated Diagnoses: Hyperlipidemia LDL goal <100      fluticasone-vilanterol (BREO ELLIPTA) 200-25 MCG/INH oral inhaler Inhale 1 puff into the lungs daily  Qty: 1 Inhaler, Refills: 0    Comments: Future refills by PCP Dr. Carson Fried with phone number [phone].  Associated Diagnoses: COPD exacerbation (H)      ipratropium - albuterol 0.5 mg/2.5 mg/3 mL (DUONEB) 0.5-2.5 (3) MG/3ML neb solution Take 1 vial (3 mLs) by nebulization 4 times daily  Qty: 360 mL, Refills: 1    Comments: Future refills by PCP Dr. Carson Fried with phone number [phone].  Associated Diagnoses: COPD exacerbation (H)      levalbuterol (XOPENEX) 1.25 MG/3ML neb solution Take 3 mLs (1.25 mg) by nebulization every 4 hours as needed for shortness of breath / dyspnea or wheezing  Qty: 270 mL, Refills: 0    Associated Diagnoses: COPD exacerbation (H)      midodrine 10 MG TABS Take 10 mg by mouth 3 times daily (with meals)  Qty: 90 tablet, Refills: 0    Comments: Future refills by PCP Dr. Carson Fried with phone number [phone].  Associated Diagnoses:  Hypotension, unspecified hypotension type      predniSONE (DELTASONE) 10 MG tablet 4 tabs daily for 3 days, then 3 tabs daily for 3 days, then 2 tabs daily for 3 days, then 1 tab daily for 3 days, then stop  Qty: 60 tablet, Refills: 0    Associated Diagnoses: COPD exacerbation (H)         CONTINUE these medications which have NOT CHANGED    Details   ASPIRIN PO Take 81 mg by mouth daily      Cholecalciferol (VITAMIN D) 2000 UNITS CAPS Take 1 capsule by mouth daily      dorzolamide-timolol (COSOPT) 2-0.5 % ophthalmic solution Place 1 drop into both eyes 2 times daily     Associated Diagnoses: Hypertension      folic acid (FOLVITE) 1 MG tablet TAKE 4 TABLETS BY MOUTH DAILY  Qty: 120 tablet, Refills: 9    Associated Diagnoses: Folate deficiency      levothyroxine (SYNTHROID/LEVOTHROID) 100 MCG tablet TAKE 1 TABLET(100 MCG) BY MOUTH DAILY  Qty: 90 tablet, Refills: 2    Associated Diagnoses: Hypothyroidism, unspecified type      metFORMIN (GLUCOPHAGE) 500 MG tablet TAKE 1 TABLET(500 MG) BY MOUTH DAILY 20 MINUTES BEFORE DINNER  Qty: 90 tablet, Refills: 0    Associated Diagnoses: Type 2 diabetes mellitus without complication, without long-term current use of insulin (H)      omeprazole (PRILOSEC) 20 MG CR capsule TAKE ONE CAPSULE BY MOUTH EVERY DAY  Qty: 90 capsule, Refills: 3    Associated Diagnoses: Essential hypertension with goal blood pressure less than 130/85      travoprost Z, benzalkonium, (TRAVATAN) 0.004 % ophthalmic solution Place 1 drop into both eyes every evening     Associated Diagnoses: Hypertension         STOP taking these medications       CARTIA  MG 24 hr capsule Comments:   Reason for Stopping:         lisinopril-hydrochlorothiazide (PRINZIDE/ZESTORETIC) 20-12.5 MG per tablet Comments:   Reason for Stopping:                    Allergies:       No Known Allergies         Consultations This Hospital Stay:[ST1.1]      Consultation during this admission received from cardiology and  "gastroenterology[ST1.2]         Condition and Physical Exam on Discharge:      Discharge condition: Stable   Discharge vitals: Blood pressure 93/81, pulse 106, temperature 97.3  F (36.3  C), temperature source Oral, resp. rate 18, height 1.854 m (6' 1\"), weight 81 kg (178 lb 9.6 oz), SpO2 99 %.     Constitutional:[ST1.1]  alert and awake[ST1.2]    Lungs:[ST1.1]  clear to auscultation, no wheezing[ST1.2]    Cardiovascular:[ST1.1]  normal  rate rhythm regular[ST1.2]    Abdomen:[ST1.1] Soft, Nontender, no distension[ST1.2]    Skin:[ST1.1] Warm and dry[ST1.2]    Other:            Discharge Orders for Skilled Facility (from Discharge Orders):        After Care Instructions     Activity - Up ad ellie           Advance Diet as Tolerated       Follow this diet upon discharge: regular diet            General info for SNF       Length of Stay Estimate: Short Term Care: Estimated # of Days 31-90  Condition at Discharge: Stable  Level of care:skilled   Rehabilitation Potential: Good  Admission H&P remains valid and up-to-date: Yes  Recent Chemotherapy: N/A  Use Nursing Home Standing Orders: Yes            Mantoux instructions       Give two-step Mantoux (PPD) Per Facility Policy Yes            Oxygen - Nasal cannula       2 Lpm by nasal cannula to keep O2 sats 92% or greater.                           Rehab orders for Skilled Facility (from Discharge Orders):      Referrals     Future Labs/Procedures    Occupational Therapy Adult Consult     Comments:    Evaluate and treat as clinically indicated.    Reason:  weakness    Physical Therapy Adult Consult     Comments:    Evaluate and treat as clinically indicated.    Reason:  weakness             Discharge Time:      Greater than 30 minutes.        Image Results From This Hospital Stay (For Non-EPIC Providers):[ST1.1]        Results for orders placed or performed during the hospital encounter of 07/23/18   XR Chest 2 Views    Narrative    XR CHEST 2 VW 7/23/2018 2:32 PM    HISTORY: " weakness hypotension;       Impression    IMPRESSION: Negative.    JULITO OREILLY MD   CT Abdomen Pelvis w/o Contrast    Narrative    CT ABDOMEN AND PELVIS WITHOUT CONTRAST 7/23/2018 9:42 PM     HISTORY:  History of omental masses, weight loss, renal failure.     COMPARISON: May 26, 2017    TECHNIQUE: Volumetric helical acquisition of CT images from the lung  bases through the symphysis pubis without intravenous contrast.  Radiation dose for this scan was reduced using automated exposure  control, adjustment of the mA and/or kV according to patient size, or  iterative reconstruction technique.    FINDINGS: Previously seen ascites is markedly improved from previous.  Trace fluid and/or capsular implants seen anterolaterally in the  liver. Minimal soft tissue stranding seen in the right paracolic  gutter near the liver. No definite focal intrahepatic lesion. Adrenal  glands, kidneys, pancreas, and spleen demonstrate no worrisome focal  lesion. There are no dilated loops of small intestine or large bowel  to suggest ileus or obstruction. There is mild diverticulosis without  evidence for diverticulitis. No free air. 3 cm infrarenal abdominal  aortic aneurysm. This is unchanged from previous. Survey of the  visualized bony structures demonstrates no destructive bony lesions.  Lung bases clear of acute infiltrates.      Impression    IMPRESSION: Marked improvement in ascites since the comparison study.  Question some minimal serosal implants vs. fluid adjacent to the  liver. Otherwise no definite omental lesions demonstrated.    NEGIN VALDEZ MD   XR Lumbar Spine 2/3 Views    Narrative    XR LUMBAR SPINE 2-3 VIEWS 7/23/2018 9:48 PM    COMPARISON: None.    HISTORY: Back pain.      Impression    IMPRESSION: Vertebral heights are preserved without evidence of  fracture. Moderate disc space loss at L5-S1. No significant listhesis  identified at any level. Enteric contrast in the small bowel.    DELIA WALLACE MD   CT Head w/o  Contrast    Narrative    CT SCAN OF THE HEAD WITHOUT CONTRAST   7/27/2018 7:29 PM     HISTORY: recent fall;     TECHNIQUE:  Axial images of the head and coronal reformations without  IV contrast material. Radiation dose for this scan was reduced using  automated exposure control, adjustment of the mA and/or kV according  to patient size, or iterative reconstruction technique.    COMPARISON: None.    FINDINGS:  There is generalized atrophy of the brain.  White matter  changes are present in the cerebral hemispheres that are consistent  with small vessel ischemic disease in this age patient. There is no  evidence of intracranial hemorrhage, mass, acute infarct or anomaly.  Coastal thickening is present in the right maxillary sinus There is no  evidence of trauma.      Impression    IMPRESSION: No acute pathology. No bleed, mass, or infarcts are seen.    No fracture is identified.      STANLEY JOSEPH MD   MR Brain w/o & w Contrast    Narrative    MRI BRAIN WITHOUT AND WITH CONTRAST  7/27/2018 11:14 PM    HISTORY:  Memory issues, also weakness of legs, right arm.      TECHNIQUE:  Multiplanar, multisequence MRI of the brain without and  with 7 mL Gadavist.    COMPARISON: CT on same date.    FINDINGS: Diffusion-weighted images are normal. There is no evidence  for intracranial hemorrhage or any acute infarct. There is a 0.8 cm  area of increased magnetic susceptibility artifact in the right  thalamus which is felt to be due to an old bleed as there is no  definite acute bleed on CT on same date. Mild-to-moderate cerebral  atrophy is present. Several scattered white matter signal  hyperintensities are also noted on T2 and FLAIR images without  enhancement or mass effect. Postcontrast images do not show any  abnormal areas of enhancement or any focal mass lesions. There is an  air-fluid level in the right maxillary sinus. Vascular structures are  patent at the skull base.      Impression    IMPRESSION:  1. 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  2. Air-fluid level in right maxillary sinus.  3. Cerebral atrophy with scattered nonspecific white matter changes  most likely due to chronic small vessel ischemic disease.  4. No evidence for acute hemorrhage, acute infarct, any focal mass  lesions.    NAVEEN HAYNES MD   XR Chest Port 1 View    Narrative    CHEST ONE VIEW PORTABLE   7/28/2018 1:05 PM     HISTORY:  Cough. History of smoking.    COMPARISON: None.      Impression    IMPRESSION: Mild infiltrate and/or atelectasis at the right lung base  could represent a mild pneumonia. Please clinically correlate. Aortic  calcification. No pleural effusions. Heart size appears stable.  Pulmonary vascularity is within normal limits.    BULMARO DIETZ MD   NM Lexiscan stress test (nuc card)    Narrative    GATED MYOCARDIAL PERFUSION SCINTIGRAPHY WITH INTRAVENOUS PHARMACOLOGIC  VASODILATATION LEXISCAN -ONE DAY STUDY     7/31/2018 11:52 AM  BAO MUNIZ  72 years  Male  1945.    Indication/Clinical History: Abnormal echocardiogram with elevated  troponins    Impression-technically difficult study due to significant soft tissue  attenuation, primarily diaphragm attenuation with bowel uptake  artifact.  1.  Myocardial perfusion imaging using single isotope technique  demonstrated moderately large relatively fixed anterior apical defect  extending into the distal inferior wall, rest slightly greater than  stress, consistent with possible nontransmural infarct without  significant ischemia; significant decrease in specificity due to soft  tissue attenuation artifact involving diaphragm attenuation/bowel  uptake. No other significant area of ischemia or infarct noted. Also  cannot totally exclude a nonischemic cardiomyopathy with fixed defect  present..   2. Gated images demonstrated normal size left ventricle with adequate  to decreased overall contractility with mild global hypokinesis with 6  more severe hypokinesis of the anterior apex extending to the  inferior  wall.  The left ventricular systolic function is low normal with  ejection fraction 52%.  3. Compared to the prior study from not applicable .    Procedure  Pharmacologic stress testing was performed with Lexiscan at a rate of  0.08 mg/ml rapid bolus injection, for 15 seconds, 0.4 mg/5ml  intravenously. Low-level exercise was not performed along with the  vasodilator infusion.  The heart rate was 99 at baseline and lakhwinder to  106 beats per minute during the Lexiscan infusion. The rest blood  pressure was 103/60 mmHg and was 97/77 mm Hg during Lexiscan infusion.  The patient experienced no symptoms  during the test.    Myocardial perfusion imaging was performed at rest, approximately 45  minutes after the injection intravenously of 10.1 mCi of Tc-99m  Myoview. At peak pharmacologic effect, 10-20 seconds after Lexiscan,   the patient was injected intravenously with 30.6 mCi of  Tc-99m  Myoview. The post-stress tomographic imaging was performed  approximately 60 minutes after stress.    EKG Findings  The resting EKG demonstrated normal sinus rhythm with diffuse inferior  and anterolateral T-wave inversion. The stress EKG demonstrated sinus  tachycardia with persistent inferior and anterolateral T-wave  inversion without significant ST depression.    Tomographic Findings  Overall, the study quality is suboptimal due to significant soft  tissue attenuation . On the stress images, there is a moderately large  mild anterior apical defect extending into the distal inferior wall.  On the rest images, there is a moderately large mild anterior apical  defect extending the distal inferior wall . Gated images demonstrated  normal size left ventricle with adequate to slightly decreased overall  contractility with mild global hypokinesis with more severe  hypokinesis affecting the anterior apex into the inferior wall. The  left ventricular ejection fraction was calculated to be 52% with  stress 53% rest. TID was  absent.    SARY BARRETO MD[ST1.3]           Most Recent Lab Results In EPIC (For Non-EPIC Providers):    Most Recent 3 CBC's:  Recent Labs   Lab Test  08/02/18   0603  07/31/18   0625  07/29/18   0535  07/28/18   1404   WBC  5.9   --   7.7  7.8   HGB  8.5*  8.2*  8.8*  9.3*   MCV  105*   --   102*  103*   PLT  139*   --   142*  149*      Most Recent 3 BMP's:  Recent Labs   Lab Test  08/02/18   0603  08/01/18   0333  07/29/18   0535  07/28/18   1824   NA   --   138  138  134   POTASSIUM  4.1  3.9  3.6  3.6   CHLORIDE   --   109  108  106   CO2   --   19*  22  17*   BUN   --   9  9  9   CR   --   1.23  1.16  1.10   ANIONGAP   --   10  8  11   JESSICA   --   8.3*  7.9*  7.6*   GLC   --   119*  108*  121*     Most Recent 3 Troponin's:  Recent Labs   Lab Test  07/29/18   0230  07/28/18   2143  07/28/18   1824   TROPI  0.125*  0.135*  0.136*     Most Recent 3 INR's:  Recent Labs   Lab Test  08/22/17   1205   INR  1.09     Most Recent 2 LFT's:  Recent Labs   Lab Test  07/26/18   0817  07/25/18   0808   AST  61*  57*   ALT  14  13   ALKPHOS  114  110   BILITOTAL  0.8  0.5     Most Recent Cholesterol Panel:  Recent Labs   Lab Test  08/02/18   0603   CHOL  107   LDL  55   HDL  34*   TRIG  92     Most Recent 6 Bacteria Isolates From Any Culture (See EPIC Reports for Culture Details):  Recent Labs   Lab Test  07/30/18   0048  07/28/18   2305  07/28/18   1504  07/28/18   1458  07/23/18   2350  07/23/18   1505   CULT  Canceled, Test credited  >10 Squamous epithelial cells/low power field indicates oral contamination. Please   recollect.  *  Notification of test cancellation was given to  Lyubov Zhang RN at [facility] at 0400 on [email or web address]    Canceled, Test credited  >10 Squamous epithelial cells/low power field indicates oral contamination. Please   recollect.  *  Notification of test cancellation was given to  Hemant Davis RN from [facility]. 7.29.18 at 0403. GR.    No growth after 5 days  No growth after 5 days  No growth  50,000 to  100,000 colonies/mL  mixed urogenital seda    Susceptibility testing not routinely done     Most Recent TSH, T4 and HgbA1c:  Recent Labs   Lab Test  07/23/18   1225   TSH  0.77   A1C  4.0[ST1.1]           Revision History        User Key Date/Time User Provider Type Action    > ST1.3 8/2/2018  1:31 PM Siobhan Cline MD Physician Sign     ST1.2 8/2/2018  1:26 PM Siobhan Cline MD Physician      ST1.1 8/2/2018  1:23 PM Siobhan Cline MD Physician                      Consult Notes      Consults by Ania Hillman APRN CNP at 8/2/2018 12:55 PM     Author:  Ania Hillman APRN CNP Service:  Palliative Author Type:  Nurse Practitioner    Filed:  8/2/2018  1:13 PM Date of Service:  8/2/2018 12:55 PM Creation Time:  8/2/2018 12:55 PM    Status:  Signed :  Ania Hillman APRN CNP (Nurse Practitioner)         [facility]    Palliative Care Consultation     Abebe Christensen  MRN# [id number]  Date of Admission:  7/23/2018  Date of Service (when I saw the patient):[KW1.1] 08/02/18[KW1.2]  Reason for consult: Consulted by Dr. Cline for Goals of care    Assessment & Plan   Abebe Christensen is a 72 year old male, admitted on 7/23/2018,  with PMHx of CKD III, MGUS, HTN, hyperlipidemia, and GERD who presented to the ED on 7/23/18 with weakness, recurrent falls, lightheadness, and unintentional weight loss with findings of abnormal UA and SOHEILA.     Symptoms/Recommendations   1. Goals of Care. Met with Mr Christensen and his family including sister and niece. At this time, Mr Christensen confirms his desire to go to TCU and work towards getting better. We discussed advanced care planning and I provided him with a POLST and a long form Health Care Directive to review with his sister. I educated him on what resuscitative measures involve and shared my worry that if he did recover from a stay in the intensive care unit, returning to independent living would be very unlikely. I also assured him if he decided to change  "his code status to DNR/DNI, that would not change other aspects of his health care. He would still be aggressively treated for acute illnesses if he presented to the hospital in the future. Mr Christensen appreciated the information, he would like more time to discuss his wishes with his family before signing any documents.     Support/Coping  -pt's sister and niece at bedside  -Will involve Palliative LICSW, Lorin Cobb, and/or Palliative , Marlee Gallegos    Decisional Support, Goals of Care, Counseling & Coordination  Decisional Capacity Intact?  -Yes  Health Care Directive on File?  -No  Code Status/Resuscitation Preferences?  -FULL  Plan of Care?  -discharge to TCU today    Discussion  Introduced the scope of our practice to Mr Christensen and his family. Discussed our potential roles for symptom management, support/coping, and decisional support (aka goals of care).     Mr Christensen was seen resting comfortably in bed with his sister and niece at bedside. We reviewed his plan to discharge to TCU today. We had a discussion regarding advanced care planning and I provided him with a POLST form and a long form HCD. I explained the importance of documenting his wishes so his family does not have to make decisions in the future without knowing how he would like to be cared for (in the event that he cannot speak for himself). We discussed code status at length. He tells me he wouldn't mind \"a first go around,\" but he wouldn't want to be kept alive on machines. I acknowledged this wish and further discussed the implications of resuscitation measures. I reviewed that unfortunately these measures are invasive, uncomfortable, and often unsuccessful. I further explained that if they are successful and he survives a stay in the ICU, he would have a very long recovery following this and living independently would not likely be possible. Mr Christensen appreciated this information and was very interested in reviewing the forms with his " sister and niece.     Thank you for involving us in the care of this patient and family. We will sign off at this time. Please do not hesitate to contact me with questions or concerns or the on-call provider for our team if evening or weekend.    Ania MOE, [facility]  Palliative Medicine   Pager [phone]    Attestation:  Total time on the floor involved in the patient's care: 75 minutes  Total time spent in counseling/care coordination: >50%    Chief Complaint   Goals of Care    History is obtained from the patient, his family, staff, and extensive chart review.     Adopted from H&P  Abebe Christensen is a 72 year old male who presents with weakness, falling, accompanied by sister and niece. Basically his overall health has been deteriorating over the past year with about 100 lbs of unintentional weight loss. He has now became very unsteady, and fell 2 days ago. He lives in an apartment on the second floor and can barely navigate the steps. Family feel his blood pressure is overmedicated. He also states that he had a biopsy done last year that he never received results from. He notes that he feels some distention in his abdomen. He also has some back pain, chronically but this is worse.        Past Medical History    I have reviewed this patient's medical history and updated it with pertinent information if needed.   Past Medical History:   Diagnosis Date     GERD (gastroesophageal reflux disease)      Hyperlipidemia      Hypertension      Hypertension      MGUS (monoclonal gammopathy of unknown significance)      Renal insufficiency, mild        Past Surgical History   I have reviewed this patient's surgical history and updated it with pertinent information if needed.  Past Surgical History:   Procedure Laterality Date     EYE SURGERY  2008    cataract and glaucoma     LAPAROSCOPIC BIOPSY LIVER N/A 10/3/2017    Procedure: LAPAROSCOPIC BIOPSY LIVER;;  Surgeon: Kendell Kenny MD;  Location:  OR      LAPAROSCOPIC HERNIORRHAPHY UMBILICAL N/A 10/3/2017    Procedure: LAPAROSCOPIC HERNIORRHAPHY UMBILICAL;;  Surgeon: Kendell Kenny MD;  Location:  OR     LAPAROTOMY EXPLORATORY N/A 10/3/2017    Procedure: LAPAROTOMY EXPLORATORY;;  Surgeon: Kendell Kenny MD;  Location:  OR     OMENTECTOMY N/A 10/3/2017    Procedure: OMENTECTOMY;  LAPAROSCOPIC ABDOMINAL EXPLORATION; OMENTECTOMY; PERITONEAL UMBILICAL HERNIA REPAIR, WEDGE LIVER BIOPSY;  Surgeon: Kendell Kenny MD;  Location:  OR       Social History   Living situation: lives independently in [facility]    Family system: not , no children. Has one sister (Jennifer) a niece and nephews    Self-identified support system: family    Employment/education: previously worked as an     Activities/interests: did not assess    Use of community resources: did not assess    Jainism affiliation: did not assess    Involvement in katharine community: did not assess    Impact of illness on patient: did not assess    Family History   I have reviewed this patient's family history and updated it with pertinent information if needed.   Family History   Problem Relation Age of Onset     Eye Disorder Mother      Cancer - colorectal Father        Allergies   No Known Allergies    Medications   Current Facility-Administered Medications Ordered in Epic   Medication Dose Route Frequency Last Rate Last Dose     acetaminophen (TYLENOL) tablet 650 mg  650 mg Oral Q4H PRN         amoxicillin-clavulanate (AUGMENTIN) 875-125 MG per tablet 1 tablet  1 tablet Oral Q12H BESS   1 tablet at 08/02/18 0923     aspirin EC tablet 81 mg  81 mg Oral Daily   81 mg at 08/02/18 0924     atorvastatin (LIPITOR) tablet 20 mg  20 mg Oral QPM   20 mg at 08/01/18 2029     benztropine (COGENTIN) tablet 1-2 mg  1-2 mg Oral TID PRN         cholecalciferol (vitamin D3) tablet 2,000 Units  2,000 Units Oral Daily   2,000 Units at 08/02/18 0924     glucose gel 15-30 g  15-30 g Oral Q15 Min PRN        Or      dextrose 50 % injection 25-50 mL  25-50 mL Intravenous Q15 Min PRN        Or     glucagon injection 1 mg  1 mg Subcutaneous Q15 Min PRN         dorzolamide-timolol PF (COSOPT) opthalmic solution 1 drop  1 drop Both Eyes BID   1 drop at 08/02/18 0928     fluticasone-vilanterol (BREO ELLIPTA) 100-25 MCG/INH oral inhaler 1 puff  1 puff Inhalation Daily   1 puff at 08/02/18 0923     insulin aspart (NovoLOG) inj (RAPID ACTING)  1-7 Units Subcutaneous TID AC   1 Units at 08/01/18 1716     insulin aspart (NovoLOG) inj (RAPID ACTING)  1-5 Units Subcutaneous At Bedtime         ipratropium - albuterol 0.5 mg/2.5 mg/3 mL (DUONEB) neb solution 3 mL  3 mL Nebulization 4x daily   3 mL at 08/02/18 0746     levalbuterol (XOPENEX CONC) neb solution 1.25 mg  1.25 mg Nebulization Q4H PRN   1.25 mg at 08/01/18 0443     levothyroxine (SYNTHROID/LEVOTHROID) tablet 100 mcg  100 mcg Oral QAM AC   100 mcg at 08/02/18 0644     melatonin tablet 1 mg  1 mg Oral At Bedtime PRN         midodrine (PROAMATINE) tablet 10 mg  10 mg Oral TID w/meals   10 mg at 08/02/18 0923     naloxone (NARCAN) injection 0.1-0.4 mg  0.1-0.4 mg Intravenous Q2 Min PRN         ondansetron (ZOFRAN-ODT) ODT tab 4 mg  4 mg Oral Q6H PRN   4 mg at 08/01/18 1100    Or     ondansetron (ZOFRAN) injection 4 mg  4 mg Intravenous Q6H PRN         pantoprazole (PROTONIX) EC tablet 40 mg  40 mg Oral QAM AC   40 mg at 08/02/18 0644     polyethylene glycol (MIRALAX/GLYCOLAX) Packet 17 g  17 g Oral Daily PRN         potassium chloride (KLOR-CON) Packet 20-40 mEq  20-40 mEq Oral or Feeding Tube Q2H PRN         potassium chloride 10 mEq in 100 mL intermittent infusion with 10 mg lidocaine  10 mEq Intravenous Q1H PRN         potassium chloride 10 mEq in 100 mL sterile water intermittent infusion (premix)  10 mEq Intravenous Q1H PRN         potassium chloride 20 mEq in 50 mL intermittent infusion  20 mEq Intravenous Q1H PRN         potassium chloride SA (K-DUR/KLOR-CON M) CR tablet 20-40  mEq  20-40 mEq Oral Q2H PRN   20 mEq at 08/01/18 0441     predniSONE (DELTASONE) tablet 40 mg  40 mg Oral Daily   40 mg at 08/02/18 0923     senna-docusate (SENOKOT-S;PERICOLACE) 8.6-50 MG per tablet 1 tablet  1 tablet Oral BID   Stopped at 07/31/18 2100    Or     senna-docusate (SENOKOT-S;PERICOLACE) 8.6-50 MG per tablet 2 tablet  2 tablet Oral BID   2 tablet at 08/02/18 0923     senna-docusate (SENOKOT-S;PERICOLACE) 8.6-50 MG per tablet 1 tablet  1 tablet Oral BID PRN        Or     senna-docusate (SENOKOT-S;PERICOLACE) 8.6-50 MG per tablet 2 tablet  2 tablet Oral BID PRN         sodium chloride (PF) 0.9% PF flush 3 mL  3 mL Intracatheter Q1H PRN         sodium chloride (PF) 0.9% PF flush 3 mL  3 mL Intracatheter Q8H   3 mL at 08/02/18 0926     Current Outpatient Prescriptions Ordered in Epic   Medication     atorvastatin (LIPITOR) 20 MG tablet     fluticasone-vilanterol (BREO ELLIPTA) 200-25 MCG/INH oral inhaler     ipratropium - albuterol 0.5 mg/2.5 mg/3 mL (DUONEB) 0.5-2.5 (3) MG/3ML neb solution     levalbuterol (XOPENEX) 1.25 MG/3ML neb solution     midodrine 10 MG TABS     predniSONE (DELTASONE) 10 MG tablet       Review of Systems   The comprehensive review of systems is negative other than noted in the assessment/plan.    Physical Exam   Temp: 97.3  F (36.3  C) Temp src: Oral BP: 93/81   Heart Rate: 86 Resp: 18 SpO2: 99 % O2 Device: None (Room air)    Vitals:    07/30/18 0700 07/31/18 0029 08/01/18 0200   Weight: 80.8 kg (178 lb 1.6 oz) 80.2 kg (176 lb 11.2 oz) 81 kg (178 lb 9.6 oz)     CONSTITUTIONAL: NAD, A&Ox3. Calm and cooperative.  HEENT: NCAT, MMM  NECK: Supple  CARDIOVASCULAR: exam deferred  RESPIRATORY: NL respiratory effort on RA  GASTROINTESTINAL: exam deferred  MUSCULOSKELETAL: No extremity edema. Moving freely in bed   SKIN: Warm and intact. No concerning lesions or rashes on exposed skin surfaces   NEUROLOGIC: Appropriately responsive during interview  PSYCH: Affect congruent    Data   Results  for orders placed or performed during the hospital encounter of 07/23/18 (from the past 24 hour(s))   Glucose by meter   Result Value Ref Range    Glucose 142 (H) 70 - 99 mg/dL   Glucose by meter   Result Value Ref Range    Glucose 174 (H) 70 - 99 mg/dL   Glucose by meter   Result Value Ref Range    Glucose 115 (H) 70 - 99 mg/dL   Potassium   Result Value Ref Range    Potassium 4.1 3.4 - 5.3 mmol/L   Magnesium (AM Draw)   Result Value Ref Range    Magnesium 2.0 1.6 - 2.3 mg/dL   Lipid panel reflex to direct LDL   Result Value Ref Range    Cholesterol 107 <200 mg/dL    Triglycerides 92 <150 mg/dL    HDL Cholesterol 34 (L) >39 mg/dL    LDL Cholesterol Calculated 55 <100 mg/dL    Non HDL Cholesterol 73 <130 mg/dL   CBC with platelets   Result Value Ref Range    WBC 5.9 4.0 - 11.0 10e9/L    RBC Count 2.42 (L) 4.4 - 5.9 10e12/L    Hemoglobin 8.5 (L) 13.3 - 17.7 g/dL    Hematocrit 25.4 (L) 40.0 - 53.0 %     (H) 78 - 100 fl    MCH 35.1 (H) 26.5 - 33.0 pg    MCHC 33.5 31.5 - 36.5 g/dL    RDW 15.8 (H) 10.0 - 15.0 %    Platelet Count 139 (L) 150 - 450 10e9/L   Glucose by meter   Result Value Ref Range    Glucose 105 (H) 70 - 99 mg/dL[KW1.1]                    Revision History        User Key Date/Time User Provider Type Action    > KW1.2 8/2/2018  1:13 PM Ania Hillman APRN CNP Nurse Practitioner Sign     KW1.1 8/2/2018 12:55 PM Ania Hillman APRN CNP Nurse Practitioner             Consults signed by Jr Navarrete MD at 7/30/2018  8:51 AM      Author:  Jr Navarrete MD Service:  Gastroenterology Author Type:  Physician    Filed:  7/30/2018  8:51 AM Date of Service:  7/30/2018  8:08 AM Creation Time:  7/30/2018  8:36 AM    Status:  Signed :  Jr Navarrete MD (Physician)         Consult Date:  07/30/2018      DATE OF CONSULTATION: 07/30/2018.      This patient is a 72-year-old man that we are asked to see in GI consultation because of anemia and fatigue.  By history, he is a 72-year-old male  with a history of cirrhosis diagnosed over a year ago.      The patient has a known myeloproliferative disorder and this has been followed by Oncology.      However, he had been recently hospitalized over [facility] Day, in which case he had been found to have evidence of ascites and nodularity in the abdomen omentum because of this had been indeterminate.      With his increasing weakness; however, he was found to have evidence of pneumonia and has been now in the hospital.      With the anemia, he states that he has not been having any nausea or vomiting, he has had some dark stools in the last month, but not aware of any jayce melena or GI bleeding. He does take a baby aspirin.  He does not take anti-inflammatories on a regular basis.  He has no history of ulcer disease.  I note that he did have a colonoscopy and upper endoscopy in 2017, both of which were negative.      The patient has, however, now had diminished appetite.  He has had weight loss of indeterminate amount associated with all this. He had increasing weakness and has had difficulty walking.  The progressive symptoms including 100 pounds of weight loss led him to be admitted at this time.      ALLERGIES:  The patient has no allergies.      MEDICATIONS  PRIOR TO ADMISSION:  Included aspirin, Cartia, vitamins, thyroid, lisinopril, metformin, and omeprazole.      PAST SURGICAL HISTORY:  Surgeries include laparoscopic liver biopsy last year, at which time the diagnosis of cirrhosis was made. He has had umbilical hernia and ocular surgery.      MEDICAL HISTORY:  Medically, he has been treated for reflux, hyperlipidemia, hypertension, monoclonal gammopathy, renal insufficiency, organic heart disease, pneumonia and now has cirrhosis.      SOCIAL HISTORY:  The patient states that he drinks intermittently.  He continues to smoke half a pack a day.  He is not working at the present time.      FAMILY HISTORY:  Shows cancer of the colon in his dad.  He is not  aware of liver disease.      REVIEW OF SYSTEMS:  Shows increasing weight loss, shortness of breath, productive cough, fever.  No swelling in the legs or jaundice.      PHYSICAL EXAMINATION:   GENERAL:  The patient appears in no acute distress, frail, thin with what appears to be obvious muscle wasting.  He has pallor, but no jaundice.  Pharynx is clear, no adenopathy in the neck.   Thyroid is not palpable.  Carotids appear diminished.   LUNGS:  Show coarse breath sounds bilaterally.   HEART:  S1, S2 are noted.   ABDOMEN:  Not distended.  It is flat, soft.  I cannot with certainty feel an enlarged liver or spleen.  He does not have peripheral edema.  He is oriented to person, place and time.  No CVA or spinal tenderness. Ecchymosis are noted on the skin and muscle wasting is seen as well.      LABORATORY DATA:  From two days ago, mild right lower lobe infiltrate is noted.  The patient does have a CAT scan of the abdomen as well that shows only slight cirrhosis.  No other definitive process was described at that point.      Other laboratory data, however, show a hemoglobin which in March had been close to 14, now falling to 8.8.  Associated with this, his white count is 77, platelet count is minimally low at 142.      IMPRESSION:  This is a patient with known cirrhosis admitted to the hospital now with increasing weakness and pneumonia.  The cause of the anemia is indeterminate given his prior history of myeloproliferative disease.  He has not had overt bleeding but with his liver disease certainly an endoscopy can be undertaken.  I note that a year ago he did not have evidence of varices.      At the present time, he is still recovering from his pneumonia and his lungs still sound congested.      I will speak with the Hospitalist Service once the symptoms are improving, we can make arrangements for an upper endoscopy, there is no indication for repeating the colonoscopy.      We appreciate assisting in this  [facility].  Further suggestions to follow.         NAPOLEON NAVARRETE MD             D: 2018   T: 2018   MT: LAYO      Name:     BAO MUNIZ   MRN:      [id number]        Account:       [id number]   :      1945           Consult Date:  2018      Document: [id number]       cc: Napoleon Navarrete MD   [SL1.1]   Revision History        User Key Date/Time User Provider Type Action    > SL1.1 2018  8:51 AM Napoleon Navarrete MD Physician Sign            Consults by Napoleon Navarrete MD at 2018  8:10 AM     Author:  Napoleon Navarrete MD Service:  Gastroenterology Author Type:  Physician    Filed:  2018  8:10 AM Date of Service:  2018  8:10 AM Creation Time:  2018  8:08 AM    Status:  Signed :  Napoleon Navarrete MD (Physician)     Consult Orders:    1. Gastroenterology IP Consult: anemia and hypotension in a pt with h/o cirrhosis; Consultant may enter orders: Yes; Patient to be seen: Routine - within 24 hours; Requested Clinic/Group: [facility]; Requested Provider: [facility] [[id number]] ordered by Siobhan Cline MD at 18 1459                See GI consult    Anemia in setting of myeloproliferative disorder, cirrhosis and pneumonia. Admitted with weakness. Will discuss with HSx. We can ---> EGD once CV/pulm staus is better.    Napoleon Navarrete MD  [facility]   Office: [phone]   Pager: [phone]  Monday-Thursday 8am to 5pm,  8am to 4pm[SL1.1]     Revision History        User Key Date/Time User Provider Type Action    > SL1.1 2018  8:10 AM Napoleon Navarrete MD Physician Sign            Consults by Fernando Preston MD at 2018  3:20 PM     Author:  Fernando Preston MD Service:  Cardiology Author Type:  Physician    Filed:  2018  3:20 PM Date of Service:  2018  3:20 PM Creation Time:  2018  3:06 PM    Status:  Signed :  Fernando Preston MD (Physician)     Consult Orders:    1. Cardiology IP Consult: Patient to be seen: Routine -  within 24 hours; positive troponin of 0.148, denies chest pain, CXR with infiltrate/atelectasis right base; Consultant may enter orders: Yes [[id number]] ordered by Jana Starr MD at 07/28/18 1534                [facility]    Cardiology Consultation     Date of Admission:  7/23/2018    Assessment & Plan   Abebe Christensen is a 72 year old male who was admitted on 7/23/2018. I was asked to see the patient for elevated troponin and ECG changes.     Active Problems:    Elevated troponin in patient without chest pain    T wave inversions anteriorly with tachycardia    Assessment: Troponin trend 0.148, 0.136, 0.135, 0.125    Plan: It was a pleasure seeing Mr. Christensen and his sister today.  I reviewed with them his laboratory testing as well as ECG results.    He has evidence of a mild troponin elevation that is been down trending without signs of significant elevation concerning for active ischemia.  He remains completely asymptomatic and denies any chest pain or chest discomfort of any kind.  I suspect this is a troponin leak in the setting of his hypotension, tachycardia, anemia, and pneumonia.  He had a recent echocardiogram just 5 days ago that showed a preserved ejection fraction of 66-65% with no regional wall motion abnormalities.  His diastolic assessment showed no signs of elevated filling pressures.  He was incidentally found to have mild valvular aortic stenosis in the setting of a fused right and left aortic valve leaflets.  This is potentially functional over time versus a congenital bicuspid valve.    At this time I do not feel he needs invasive testing such as a coronary angiogram.  He does have significant risk factors including diabetes, ongoing tobacco use, hypertension, and hyperlipidemia.  I think that we should risk stratify him at a later date with noninvasive stress testing.  This could be either done prior to dismissal from the hospital or approximately 3-4 weeks after  dismissal from the hospital after he recovers from this hospitalization and build up his strength again.    Fernando Preston MD    Code Status    Full Code    Reason for Consult   Reason for consult: Elevated troponin and ECG changes    Primary Care Physician   Carson Fried    Chief Complaint   Elevated troponin and ECG changes    History is obtained from the patient with his sister present at bedside    History of Present Illness   Abebe Christensen is a 72 year old male with a past medical history of hypertension, hyperlipidemia, chronic kidney disease stage III, diabetes mellitus, ongoing tobacco use, hypothyroidism, cirrhosis, and myeloproliferative disorder causing both anemia and thrombocytopenia.  Cardiology was consulted for recommendations regarding EKG changes and an elevated troponin.    He was initially admitted to the hospital on 7/23/2018.  He was initially admitted for a fall, generalized weakness, and probable pneumonia.  His hospitalization has been complicated by hallucinations secondary to delirium.  During his hospitalization he had an EKG performed due to sinus tachycardia which showed some T-wave inversions inferiorly and anterolaterally.  He was completely asymptomatic at that time.  A troponin value was checked and showed a troponin of 0.148.  Follow-up testing is 0.136, 0.135, and 0.125.    At the time that I visited with him he is completely asymptomatic from a coronary perspective.  He denies any chest pain or chest discomfort of any kind.  He does have shortness of breath and cough from his pneumonia.  He denies any orthopnea or paroxysmal nocturnal dyspnea.  He has some stable lower extremity edema.    Past Medical History   I have reviewed this patient's medical history and updated it with pertinent information if needed.[JB1.1]   Past Medical History:   Diagnosis Date     GERD (gastroesophageal reflux disease)      Hyperlipidemia      Hypertension      Hypertension      MGUS  (monoclonal gammopathy of unknown significance)      Renal insufficiency, mild[JB1.2]        Past Surgical History   I have reviewed this patient's surgical history and updated it with pertinent information if needed.[JB1.1]  Past Surgical History:   Procedure Laterality Date     EYE SURGERY  2008    cataract and glaucoma     LAPAROSCOPIC BIOPSY LIVER N/A 10/3/2017    Procedure: LAPAROSCOPIC BIOPSY LIVER;;  Surgeon: Kendell Kenny MD;  Location: [facility] OR     LAPAROSCOPIC HERNIORRHAPHY UMBILICAL N/A 10/3/2017    Procedure: LAPAROSCOPIC HERNIORRHAPHY UMBILICAL;;  Surgeon: Kendell Kenny MD;  Location: [facility] OR     LAPAROTOMY EXPLORATORY N/A 10/3/2017    Procedure: LAPAROTOMY EXPLORATORY;;  Surgeon: Kendell Kenny MD;  Location: [facility] OR     OMENTECTOMY N/A 10/3/2017    Procedure: OMENTECTOMY;  LAPAROSCOPIC ABDOMINAL EXPLORATION; OMENTECTOMY; PERITONEAL UMBILICAL HERNIA REPAIR, WEDGE LIVER BIOPSY;  Surgeon: Kendell Kenny MD;  Location: [facility] OR[JB1.2]       Prior to Admission Medications   Prior to Admission Medications   Prescriptions Last Dose Informant Patient Reported? Taking?   ASPIRIN PO 7/23/2018 at Unknown time Self Yes Yes   Sig: Take 81 mg by mouth daily   CARTIA  MG 24 hr capsule 7/23/2018 at Unknown time Self No Yes   Sig: TAKE 1 CAPSULE BY MOUTH DAILY   Cholecalciferol (VITAMIN D) 2000 UNITS CAPS 7/23/2018 at Unknown time Self Yes Yes   Sig: Take 1 capsule by mouth daily   dorzolamide-timolol (COSOPT) 2-0.5 % ophthalmic solution 7/22/2018 at Unknown time Self Yes Yes   Sig: Place 1 drop into both eyes 2 times daily    folic acid (FOLVITE) 1 MG tablet 7/23/2018 at Unknown time Self No Yes   Sig: TAKE 4 TABLETS BY MOUTH DAILY   levothyroxine (SYNTHROID/LEVOTHROID) 100 MCG tablet 7/23/2018 at Unknown time Self No Yes   Sig: TAKE 1 TABLET(100 MCG) BY MOUTH DAILY   lisinopril-hydrochlorothiazide (PRINZIDE/ZESTORETIC) 20-12.5 MG per tablet 7/23/2018 at Unknown time Self No Yes   Sig: TAKE 1 TABLET BY MOUTH DAILY    metFORMIN (GLUCOPHAGE) 500 MG tablet 7/22/2018 at Unknown time Self No Yes   Sig: TAKE 1 TABLET(500 MG) BY MOUTH DAILY 20 MINUTES BEFORE DINNER   omeprazole (PRILOSEC) 20 MG CR capsule 7/23/2018 at Unknown time Self No Yes   Sig: TAKE ONE CAPSULE BY MOUTH EVERY DAY   travoprost Z, benzalkonium, (TRAVATAN) 0.004 % ophthalmic solution 7/22/2018 at Unknown time Self Yes Yes   Sig: Place 1 drop into both eyes every evening       Facility-Administered Medications: None     Allergies   No Known Allergies    Social History   I have reviewed this patient's social history and updated it with pertinent information if needed. Abebe Christensen[JB1.1]  reports that he has been smoking Cigarettes.  He has been smoking about 0.50 packs per day. He has never used smokeless tobacco. He reports that he drinks alcohol. He reports that he does not use illicit drugs.[JB1.2]    Family History   I have reviewed this patient's family history and updated it with pertinent information if needed.[JB1.1]   Family History   Problem Relation Age of Onset     Eye Disorder Mother      Cancer - colorectal Father[JB1.2]        Review of Systems   The 10 point Review of Systems is negative other than noted in the HPI or here.     Physical Exam   Temp: 97.8  F (36.6  C) Temp src: Axillary BP: 98/62 Pulse: 107 Heart Rate: 93 Resp: 21 SpO2: 100 % O2 Device: Nasal cannula Oxygen Delivery: 3 LPM  Vital Signs with Ranges  Temp:  [96.6  F (35.9  C)-97.9  F (36.6  C)] 97.8  F (36.6  C)  Pulse:  [106-107] 107  Heart Rate:  [] 93  Resp:  [16-40] 21  BP: ()/(42-81) 98/62  SpO2:  [93 %-100 %] 100 %  178 lbs 6.4 oz    Constitutional: No apparent distress.   Eyes: No xanthelasma or conjunctivitis  HEENT: Moist oral mucosa  Respiratory: Coarse breath sounds and rochi in all lung fields.   Cardiovascular: Distant heart sounds. Regular rate and rhythm. Normal S1 and S2. No extra heart sounds.  Lymph/Hematologic: No purpura or petechiae.  Skin: No stasis  dermatitis. No major rashes.  Extremities: 1+ bilateral peripheral edema.  Neurologic: Moving all extremities. No facial assymmetry.  Psychiatric: Alert and oriented. Answers questions appropriately..    Data[JB1.1]   Results for orders placed or performed during the hospital encounter of 07/23/18 (from the past 24 hour(s))   Glucose by meter   Result Value Ref Range    Glucose 101 (H) 70 - 99 mg/dL   Troponin I   Result Value Ref Range    Troponin I ES 0.136 (HH) 0.000 - 0.045 ug/L   Basic metabolic panel   Result Value Ref Range    Sodium 134 133 - 144 mmol/L    Potassium 3.6 3.4 - 5.3 mmol/L    Chloride 106 94 - 109 mmol/L    Carbon Dioxide 17 (L) 20 - 32 mmol/L    Anion Gap 11 3 - 14 mmol/L    Glucose 121 (H) 70 - 99 mg/dL    Urea Nitrogen 9 7 - 30 mg/dL    Creatinine 1.10 0.66 - 1.25 mg/dL    GFR Estimate 66 >60 mL/min/1.7m2    GFR Estimate If Black 79 >60 mL/min/1.7m2    Calcium 7.6 (L) 8.5 - 10.1 mg/dL   Lactic acid level STAT for sepsis protocol   Result Value Ref Range    Lactate for Sepsis Protocol 2.1 (H) 0.7 - 2.0 mmol/L   Glucose by meter   Result Value Ref Range    Glucose 93 70 - 99 mg/dL   Troponin I   Result Value Ref Range    Troponin I ES 0.135 (HH) 0.000 - 0.045 ug/L   Sputum Culture Aerobic Bacterial   Result Value Ref Range    Specimen Description Sputum     Culture Micro Canceled, Test credited     Culture Micro (A)      >10 Squamous epithelial cells/low power field indicates oral contamination. Please   recollect.      Culture Micro       Notification of test cancellation was given to  Hemant Davis RN from [facility]. 7.29.18 at 0403. GR.     Gram stain   Result Value Ref Range    Specimen Description Sputum     Special Requests Screen     Gram Stain (A)      >10 Squamous epithelial cells/low power field indicates oral contamination. Please   recollect.      Gram Stain <25 PMNs/low power field     Gram Stain       Moderate  Mixed gram positive and gram negative bacteria present.     Glucose by  meter   Result Value Ref Range    Glucose 131 (H) 70 - 99 mg/dL   Troponin I   Result Value Ref Range    Troponin I ES 0.125 (HH) 0.000 - 0.045 ug/L   Basic metabolic panel   Result Value Ref Range    Sodium 138 133 - 144 mmol/L    Potassium 3.6 3.4 - 5.3 mmol/L    Chloride 108 94 - 109 mmol/L    Carbon Dioxide 22 20 - 32 mmol/L    Anion Gap 8 3 - 14 mmol/L    Glucose 108 (H) 70 - 99 mg/dL    Urea Nitrogen 9 7 - 30 mg/dL    Creatinine 1.16 0.66 - 1.25 mg/dL    GFR Estimate 62 >60 mL/min/1.7m2    GFR Estimate If Black 75 >60 mL/min/1.7m2    Calcium 7.9 (L) 8.5 - 10.1 mg/dL   CBC with platelets   Result Value Ref Range    WBC 7.7 4.0 - 11.0 10e9/L    RBC Count 2.48 (L) 4.4 - 5.9 10e12/L    Hemoglobin 8.8 (L) 13.3 - 17.7 g/dL    Hematocrit 25.3 (L) 40.0 - 53.0 %     (H) 78 - 100 fl    MCH 35.5 (H) 26.5 - 33.0 pg    MCHC 34.8 31.5 - 36.5 g/dL    RDW 14.1 10.0 - 15.0 %    Platelet Count 142 (L) 150 - 450 10e9/L   Lactic acid whole blood (AM Draw)   Result Value Ref Range    Lactic Acid 1.3 0.7 - 2.0 mmol/L   Glucose by meter   Result Value Ref Range    Glucose 107 (H) 70 - 99 mg/dL   Glucose by meter   Result Value Ref Range    Glucose 122 (H) 70 - 99 mg/dL   Ammonia   Result Value Ref Range    Ammonia 23 10 - 50 umol/L[JB1.3]                  Revision History        User Key Date/Time User Provider Type Action    > JB1.2 7/29/2018  3:20 PM Fernando Preston MD Physician Sign     JB1.3 7/29/2018  3:07 PM Fernando Preston MD Physician      JB1.1 7/29/2018  3:06 PM Fernando Preston MD Physician             Consults by Mitra Lau LICSW at 7/27/2018  2:23 PM     Author:  Mitra Lau LICSW Service:  (none) Author Type:      Filed:  7/27/2018  2:23 PM Date of Service:  7/27/2018  2:23 PM Creation Time:  7/27/2018  2:14 PM    Status:  Signed :  Mitra Lau LICSW ()         Care Transition Initial Assessment - SW  Consulted for TCU placement  Met with: patient and his  sister  Active Problems:    Orthostatic hypotension       DATA  Lives With: alone  Living Arrangements: apartment  Patient admitted on 7/23 with weakness and substantial weight loss unintentional.  Identified issues/concerns regarding health management: Therapy is recommending TCU and discharged is possibly today based on provider note 7/26.  Met with patient, his sister and her son.  Patient in agreement.  He lives across the street from [facility]  He has relatives who have been patients at [facility] and this is his choice for TCU.  If [facility] is not available, writer reviewed other options and patient is willing to consider [facility] TCU.  Referrals made to both facilities thru DOD.  Reviewed Medicare SNF benefits.  Sister plans to transport.     Transportation Available: family will transport  ASSESSMENT  Cognitive Status:  Oriented though has had periods of hallucinations here.  Concerns to be addressed: none, as patient is in agreement with discharge recommendation.   PLAN  Financial costs for the patient includes none  Patient given options and choices for discharge yes  Patient/family is agreeable to the plan?  yes  Patient Goals and Preferences: TCU prior to home  Patient anticipates discharging to:  TCU[KH1.1]           Revision History        User Key Date/Time User Provider Type Action    > KH1.1 7/27/2018  2:23 PM Mitra Lau, [facility]  Sign            Consults signed by Trish Ames MD at 7/26/2018  4:39 PM      Author:  Trish Ames MD Service:  Oncology Author Type:  Physician    Filed:  7/26/2018  4:39 PM Date of Service:  7/24/2018 12:43 PM Creation Time:  7/24/2018  1:29 PM    Status:  Signed :  Trish Ames MD (Physician)         Consult Date:  07/24/2018      REQUESTING PHYSICIAN:  Nirmal Laird MD      REASON FOR CONSULTATION:  History of myeloproliferative neoplasm and abdominal peritoneal nodularity.       HISTORY.  Medical record reviewed, history obtained, and patient examined.      Buck is a 72-year-old  man with history of myeloproliferative neoplasm for many years, treated with phlebotomy previously, and subsequently did not require any treatment for several years.  He had a weight loss in 2017, over 50 pounds, and abdominal scan showed a nodularity in the omentum.  He also has had fluid retention and eventually was referred to have a laparoscopic evaluation.  This was done by Dr. Kenny on 10/03/2017.  There was no evidence of malignancy.  The omentum and peritoneum biopsy showed fat necrosis, fibrosis, and calcification.  Liver wedge biopsy showed chronic liver disease, stage IV cirrhosis, with steatohepatitis.  Omentectomy revealed mild patchy chronic inflammation without evidence of malignancy.  The liver was described to be a cobblestone appearance.  There were multiple adhesions in the omentum.        Currently, Mr. Christensen is hospitalized with increasing weakness, lightheadedness, falls, and additional weight loss.  He denies any abdominal pain, no GI or  symptoms.  Appetite is low.  CT scan was done during this hospitalization which showed resolution of the ascites that he had previously, with trace fluid at this point and questionable some minimal serosal implants versus fluid adjacent to the liver without other findings.  He is currently feeling weak, but no other symptoms.      PAST MEDICAL HISTORY:  Hypertension, but his blood pressure recently was down and he is off blood pressure medications.  History of dyslipidemia, GERD, myeloproliferative neoplasm required only phlebotomy previously, mild chronic renal insufficiency, liver cirrhosis status post laparoscopy in 10/2017, omentectomy.      MEDICATIONS:  IV saline, aspirin, Rocephin, vitamin D, Cosopt, insulin, Synthroid, omeprazole, potassium, Senna S.      ALLERGIES:  NO KNOWN DRUG ALLERGIES.      SOCIAL HISTORY:  He used to drink 3  alcoholic beverages per day, quit last year.  Smokes half pack a day.      FAMILY HISTORY:  Father had a diagnosis of colon cancer at age 89.      REVIEW OF SYSTEMS:  Significant for fatigue, failure to thrive, a fall, weight loss, decreased appetite.  No melena, hematochezia, hematuria.  He has some prostate symptoms.  The remainder of his 10-point system review is unremarkable.      PHYSICAL EXAMINATION:   GENERAL:  He is in no acute distress.   VITAL SIGNS:  Stable as charted, afebrile.  Blood pressure systolic on the low side, was as low as 72, currently 92.  His weight is 160, and from oncology office on 09/28 was 174.   HEENT:  Normocephalic, atraumatic.  Sclerae are anicteric.   NECK:  Supple, no JVD, no lymphadenopathy.   LUNGS:  Clear to auscultation.   HEART:  Regular rhythm.   ABDOMEN:  Soft, nontender, no organomegaly.   EXTREMITIES:  No cyanosis or edema.   LYMPHATIC:  No lymphadenopathy in cervical, supraclavicular, axillary, epitrochlear, or inguinal areas.   NEUROLOGIC:  Grossly intact.      ANCILLARY DATA:  Creatinine 1.46, GFR 47 estimated.  Potassium 3.3, albumin 2.4, protein 5.4, total bilirubin 1.  AST mildly elevated at 51, ALT 11.  B12 is 392.  TSH 0.77.  White count 6.7, hemoglobin 9.4, was 11 on admission.  Platelet count 123, .  CT scan of the abdomen as detailed.  Chest x-ray unremarkable.  Protein electrophoresis ordered and pending.      IMPRESSION:   1.  Myeloproliferative neoplasm -- polycythemia vera treated previously with phlebotomy but not required for the last several years.   2.  Anemia, macrocytic, likely associated with MPN with possible progression to myelofibrosis state.  Other etiologies: chronic kidney disease associated anemia.  The drop in hemoglobin is likely dilution after admission.   3.  Thrombocytopenia associated with MPN, and possible cirrhosis related.   4.  History of ascites and peritoneal nodularity, status post laparoscopic evaluation in 10/2017.   Negative for malignancy and showed cirrhosis.   5.  Weakness, multifactorial.  Could be associated with liver disease, hypoalbuminemia, anemia, and chronic kidney disease.      DISCUSSION AND RECOMMENDATIONS:  His myeloproliferative neoplasm appears to be progressed moderately with anemia, but his white count and platelets are adequate.  I do not see urgent need to evaluate his anemia at this point, but rather address the other significant symptoms of weight loss, hypoalbuminemia, and to consider rule out malignancy workup.  Chest x-ray was normal, but with a history of smoking recommend to consider low dose CT scan of the chest.  Endoscopy also to be considered.  He does not recall having a recent colonoscopy over many years.  Darbepoetin can be considered if he continues to have kidney dysfunction and anemia to help with improvement in hemoglobin and associated fatigue.  I will consider repeating bone marrow biopsy depending on his clinical progress, but this will be done as an outpatient.      I appreciate the opportunity to participate in the care of Abebe Muniz.  Will follow while hospitalized and will need a followup in my office after discharge.         TRISH PLATT MD             D: 2018   T: 2018   MT: NTS      Name:     ABEBE MUNIZ   MRN:      [id number]        Account:       [id number]   :      1945           Consult Date:  2018      Document: [id number]       cc: Nirmal Laird MD[MN1.1]        Revision History        User Key Date/Time User Provider Type Action    > MN1.1 2018  4:39 PM Trish Platt MD Physician Sign     [N/A] 2018  1:29 PM Trish Platt MD Physician Edit            Consults by Domonique Valdez RD, LD at 2018 12:10 PM     Author:  Domonique Valdez RD, LD Service:  Nutrition Author Type:  Registered Dietitian    Filed:  2018 12:10 PM Date of Service:  2018 12:10 PM Creation Time:  2018 11:56 AM     "Status:  Signed :  Domonique Valdez RD, LD (Registered Dietitian)     Consult Orders:    1. Nutrition Services Adult IP Consult [[id number]] ordered by Nirmal Laird DO at 07/23/18 9496                CLINICAL NUTRITION SERVICES  -  ASSESSMENT NOTE      Recommendations Ordered by Registered Dietitian (RD):   Will send Ensure between meals     Malnutrition:   % Weight Loss:  > 7.5% in 3 months (severe malnutrition)  % Intake:  </= 50% for >/= 1 month (severe malnutrition)  Subcutaneous Fat Loss:  None observed  Muscle Loss:  None observed  Fluid Retention:  None noted    Malnutrition Diagnosis: Severe malnutrition  In Context of:  Chronic illness or disease        REASON FOR ASSESSMENT  Abebe Christensen is a 72 year old male seen by Registered Dietitian for Admission Nutrition Risk Screen - Unintentional weight loss of 10# or more in past 2 months and Provider Order - weight loss      NUTRITION HISTORY  - Information obtained from the pt  - Patient is on a regular diet at home, he states his appetite has been poor for 6 months.  - Typical food/fluid intake is: Breakfast - bowl of cereal or frozen waffle; lunch and dinner are often \"combined\" - soup, pudding. He may have ice cream for a snack.  Note no good protein sources in pt's diet, he states meat hasn't appealed to him.  - Supplements - he hadn't been taking any but his sister had been talking to him about them.        CURRENT NUTRITION ORDERS  Diet Order:     Regular     Current Intake/Tolerance:  Pt had scrambled eggs and coffee for breakfast. He is willing to try supplements between meals.      PHYSICAL FINDINGS  Observed  No nutrition-related physical findings observed  Obtained from Chart/Interdisciplinary Team  Cahectic    ANTHROPOMETRICS  Height: 6' 1\"  Weight: 160 lbs 14.97 oz (73 kg) - 7/24  Body mass index is 21.23 kg/(m^2).  Weight Status:  Normal BMI  IBW: 83.6 kg +/- 10%  % IBW: 87%  Weight History:   Per chart pt reported # wt " loss in the past year. However, the records below do not support this.  It appears he's lost 20# (11%) in the past 4 months although pt does not feel the current wt of 160# is accurate. He states he's more like 125#.[CM1.1]     Wt Readings from Last 10 Encounters:   07/24/18 73 kg (160 lb 15 oz)   07/23/18 68 kg (150 lb)   03/27/18 81.9 kg (180 lb 8 oz)   10/03/17 79.2 kg (174 lb 11.2 oz)   10/02/17 79.4 kg (175 lb)   10/02/17 79.4 kg (175 lb)   09/25/17 77.1 kg (170 lb)   08/23/17 77.1 kg (170 lb)   08/22/17 79.4 kg (175 lb)   06/07/17 80.7 kg (178 lb)[CM1.2]       LABS  Labs reviewed    MEDICATIONS  Medications reviewed      ASSESSED NUTRITION NEEDS PER APPROVED PRACTICE GUIDELINES:    Dosing Weight 73 kg - 7/24  Estimated Energy Needs: 6549-6980 kcals (30-35 Kcal/Kg)  Justification: underweight  Estimated Protein Needs: 110-130 grams protein (1.5-1.8 g pro/Kg)  Justification: Repletion      MALNUTRITION:  % Weight Loss:  > 7.5% in 3 months (severe malnutrition)  % Intake:  </= 50% for >/= 1 month (severe malnutrition)  Subcutaneous Fat Loss:  None observed  Muscle Loss:  None observed  Fluid Retention:  None noted    Malnutrition Diagnosis: Severe malnutrition  In Context of:  Chronic illness or disease    NUTRITION DIAGNOSIS:  Inadequate oral intake related to poor appetite as evidenced by 11% wt loss x 4 months      NUTRITION INTERVENTIONS  Recommendations / Nutrition Prescription  Continue regular diet.      Implementation  Nutrition education: Provided education on nutritional supplements  Medical Food Supplement - Ensure between meals      Nutrition Goals  Pt will consume at least 75% of 3 meals/day and supplements.      MONITORING AND EVALUATION:  Progress towards goals will be monitored and evaluated per protocol and Practice Guidelines    Domonique Valdez RD  Pager [phone] (M-F)            [phone] (W/E & Hol)[CM1.1]                     Revision History        User Key Date/Time User Provider Type  Action    > CM1.2 7/24/2018 12:10 PM Domonique Valdez, SHEBA, LD Registered Dietitian Sign     CM1.1 7/24/2018 11:56 AM Domonique Valdez, SHEBA, LD Registered Dietitian                      Progress Notes - Physician (Notes from 07/30/18 through 08/02/18)      Progress Notes by Arabella Scott LICSW at 8/2/2018 12:31 PM     Author:  Arabella Scott LICSW Service:  Social Work Author Type:      Filed:  8/2/2018 12:34 PM Date of Service:  8/2/2018 12:31 PM Creation Time:  8/2/2018 12:31 PM    Status:  Signed :  Arabella Scott LICSW ()         SW:  D:  Received discharge orders for patient.  Bed available at [facility] for today.  Patient's sister will transport between 13:00-13:30 today.  Patient and family informed of the plan and in agreement to the plan.  Call placed to update [facility] and faxed the orders and the PAS.    PAS-RR    D: Per DHS regulation, SW completed and submitted PAS-RR to [place] [employer] Direct Connect via the Senior Link[employer] Line.  PAS-RR confirmation # is : [id number]    I: JEFF spoke with patient's sister Jennifer and they are aware a PAS-RR has been submitted.  JEFF reviewed with patient's sister Jennifer that they may be contacted for a follow up appointment within 10 days of hospital discharge if their SNF stay is < 30 days.  Contact information for [facility] was also provided.    A: Patient's sister Jennifer verbalized understanding.    P: Further questions may be directed to [facility] at #[phone], option #4 for PAS-RR staff.[SJ1.1]     Revision History        User Key Date/Time User Provider Type Action    > SJ1.1 8/2/2018 12:34 PM Arabella Scott LICSW  Sign            Progress Notes by Arabella Mckeon, RN at 8/2/2018 11:56 AM     Author:  Arabella Mckeon, RN Service:  Care Coordinator Author Type:      Filed:  8/2/2018 11:58 AM Date of Service:  8/2/2018 11:56 AM Creation Time:  8/2/2018 11:56 AM     Status:  Signed :  Arabella Mckeon RN ()         Care Coordination:    -A follow-up appointment has been made for you with Dr. Ames on Tuesday, September 4th at 11:00 AM at [facility]. Please arrive at 10:40 AM to have your labs drawn.  Please call the clinic at [phone] should you need to change or cancel your appointment.  Please arrive 15 minutes early to your scheduled appointment and bring your photo ID, insurance card and co-payment.     [address], [address]   [place] [address]   Phone: [phone]         Arabella Mckeon RN, BAN   Care Coordinator  Ph. [phone][SM1.1]        Revision History        User Key Date/Time User Provider Type Action    > SM1.1 8/2/2018 11:58 AM Arabella Mckeon RN Case Manager Sign            Progress Notes by Mine Pike at 8/2/2018 10:41 AM     Author:  Mine Pike Service:  Spiritual Health Author Type:      Filed:  8/2/2018 10:43 AM Date of Service:  8/2/2018 10:41 AM Creation Time:  8/2/2018 10:41 AM    Status:  Signed :  Mine Pike ()         SPIRITUAL HEALTH SERVICES [place]  [facility] visit with pt. Pt talked about his hope for discharge today to TCU. Pt states he plans to discharge to a TCU near his home that is well known to him. Pt is looking forward to rehabilitating and getting to walk outside in the gardens there. Pt is also hoping to begin looking for a new place to live that will be more handicap-accessible.      provided listening and prayer. Pt stated no additional needs at this time. SH has no plans to follow but is available upon request.      Mine Pike  Chaplain Resident  Pager: [phone]  Office: [phone][EW1.1]     Revision History        User Key Date/Time User Provider Type Action    > EW1.1 8/2/2018 10:43 AM Mine Pike  Sign            Progress Notes by Harmeet Lagos  PAYAL Kinney CNP at 8/2/2018 10:18 AM     Author:  Harmeet Lagos APRN CNP Service:  Hem/Onc Author Type:  Nurse Practitioner    Filed:  8/2/2018 10:18 AM Date of Service:  8/2/2018 10:18 AM Creation Time:  8/2/2018 10:18 AM    Status:  Signed :  Harmeet Lagos APRN CNP (Nurse Practitioner)         Heme/Onc chart check:       1.  Myeloproliferative neoplasm -  - polycythemia vera treated previously with phlebotomy but not required for the last several years.   - Now presenting with anemia and thrombocytopenia  - Possible development of myelofibrosis, but could be underlying CKD, cirrhosis, hemodiluation or bleeding.  - Iron studies likely chronic disease   - SPEP m spike 0.2 g/dL  - B12 and folate normal  - GI has evaluated him, possible EGD as outpatient  - Consider SAMARIA in the future  - Possibly repeat bone marrow biopsy as outpatient  - CBC in 2 weeks (labs faxed to [facility] for Dr. Ames) and follow up with Dr. Ames in about 1 month      Harmeet MOE CNP[BU1.1]     Revision History        User Key Date/Time User Provider Type Action    > BU1.1 8/2/2018 10:18 AM Harmeet Lagos APRN CNP Nurse Practitioner Sign            Progress Notes by Arabella Scott LICSW at 8/1/2018  4:45 PM     Author:  Arabella Scott LICSW Service:  Social Work Author Type:      Filed:  8/1/2018  4:49 PM Date of Service:  8/1/2018  4:45 PM Creation Time:  8/1/2018  4:45 PM    Status:  Signed :  Arabella Scott LICSW ()         SW:  D:  Reviewed chart.  Initial TCU referrals were sent to [facility] and [facility] and patient's discharge was delayed.  Re-sent referrals to check bed availability.  Received a call back from [facility].  They can accept patient tomorrow.  Call placed to update patient's sister Jennifer.  She is in agreement.  Jennifer states she will transport when discharge is known.  Jennifer also had questions about  patient completing a healthcare directive and a power of  document and having them notarized in the hospital prior to patient being discharged.  Explained that patient can complete a health care directive for sure.  Most of the notaries at the hospital will also notarize a power of  form as well.    P:  Will continue to follow.[SJ1.1]     Revision History        User Key Date/Time User Provider Type Action    > SJ1.1 8/1/2018  4:49 PM Arabella Scott [facility]  Sign            Progress Notes by Jr Navarrete MD at 8/1/2018  2:55 PM     Author:  Jr Navarrete MD Service:  Gastroenterology Author Type:  Physician    Filed:  8/1/2018  2:56 PM Date of Service:  8/1/2018  2:55 PM Creation Time:  8/1/2018  2:55 PM    Status:  Signed :  Jr Navarrete MD (Physician)         Discussed with HSx  Will be available as needed    Jr Navarrete MD  [facility]   Office: [phone]   Pager: [phone]  Monday-Thursday 8am to 5pm, Fridays 8am to 4pm[SL1.1]       Revision History        User Key Date/Time User Provider Type Action    > SL1.1 8/1/2018  2:56 PM rJ Navarrete MD Physician Sign            Progress Notes by Siobhan Cline MD at 8/1/2018 12:01 PM     Author:  Siobhan Cline MD Service:  Hospitalist Author Type:  Physician    Filed:  8/1/2018 12:10 PM Date of Service:  8/1/2018 12:01 PM Creation Time:  8/1/2018 12:01 PM    Status:  Signed :  Siobhan Cline MD (Physician)         [facility]    Hospitalist Progress Note    Assessment & Plan     Abebe Christensen is a 72 year old male, admitted on 7/23/2018,  with PMHx of CKD III, MGUS, HTN, hyperlipidemia, and GERD who presented to the ED on 7/23/18 with weakness, recurrent falls, lightheadness, and unintentional weight loss with findings of abnormal UA and SOHEILA. Registered under inpatient status for further evaluation and treatment.     Generalized weakness/memory issues/hallucinations the last 2  nights   Multifactorial in the context of below. Pt lives alone in an apartment. Recently purchased a walker/cane. No elevator in apartment.   --- PT and OT to assess, recommending TCU at discharge which patient is agreeable to  -- pt did fall at home and hit head, he has a laceration on the back of his head  -he is weak noni legs and also right arm  -sister here 7/27 and she says that pt has different speech that prior  -patient has had hallucinations night on 7/25 and 7/26  - head CT and head MRI done last night without acute hemorrhage or infarct  MRI of the brain showed atrophy of the brain for possible underlying dementia contributing to the confusion and memory issues    Probable pneumonia with possible acute COPD exacerbation   Patient is wheezing today  He has a 30 year history of smoking  Started  him on duo nebs 4 times daily  Prednisone 40 mg p.o. daily to help with the wheezing  Breo Ellipta inhaler added to help with the COPD  CXR on 7/28 w/ mild infiltrate and/or atelectasis right base.  Pulm vascularity WNL  -due to his sx of cough, looks more SOB and CXR change am going to start abx  -since he has been in the hospital will start zosyn for possibe HCAP   -BC x3 remain negative  -afebrile, WBC normal  -tobacco abuse  -sputum for gram stain and culture showed greater than 10 squamous cells consistent with oral contamination  Shortness of breath improved today   Switched  IV Zosyn to oral Augmentin on July 31  Needs outpatient pulmonary function test      Mild elevated troponin  Cardiology consulted and follow on the following  Elevated troponin is thought to be due to demand ischemia in the setting of pneumonia and hypertension  -EKG done for the tachycardia showed sinus tachycardia but also possible ischemia with TWI inferiorly and anterolaterally  -due to EKG, ordered troponin which was elevated at 0.148  Serial troponins remained flat  -pt denies chest pain  Lexiscan stress test was done and was  nondiagnostic  Echocardiogram showed normal EF  Cardiology is recommending conservative management as patient is asymptomatic and has multiple comorbid issues  We will check a fasting LPA tomorrow and start him on Lipitor 20 mg p.o. Daily  Unable to start beta-blocker due to low blood pressure issues  No aspirin due to thrombocytopenia and risk of bleeding with anemia  Nonsustained ventricular tachycardia;  He had a run of nonsustained ventricular tachycardia last night.  Electrolytes and magnesium were checked and magnesium was low at 1.6 so replacement was started  Cardiology is aware of the ventricular tachycardia and recommending conservative management          Orthostatic hypotension and persistent hypotension  History of benign essential hypertension: Positive on admission. Likely secondary to SOHEILA below with ongoing PTA medication administration including BP meds, poor PO intake. Echocardiogram with EF 60-65%, no RWMA, mild valvular aortic stenosis   - Has received total of 3500 L IVF bolus plus maintenance fluids  - Continue to hold PTA BP meds (Cartia  mg po every day, Prinzide 20/12.5 mg po every day--last dose 0830 on 7/23/18)  - Encourage adequate PO intake   - saline locked 7/27  Patient was given so far multiple liters of fluids and was short of breath overnight on 729 overnight and needed 20 mg of Lasix IV  He was started on Midodrin 10 mg p.o. 3 times daily to help with the hypotension  Blood pressure when checked in his left thigh is in the systolics in the 110s today           Acute metabolic encephalopathy, transient: Episode of delirium reported 2 nights ago  per nursing staff and had some hallucinations last 2 nights   Infectious work-up negative with BC NGTD, Urine culture with urogenital seda. CXR unremarkable. Lumbar XR unremarkable. CT A/P unremarkable, comment on trace fluid and/or capsular implants seen anterolaterally in the liver. WBC WNL. Afebrile.   -- Monitor   -- Delirium  prevention protocol in place   -workup of CT of head and MRI of brain did not show anything acute  -recommend cognitive eval as outpatient    SOHEILA on CKD III, resolved: Baseline creatinine appears to be 1.1-1.2. Creatinine on admission 1.84>1.46>1.36>1.19>1.17  after IVF resuscitation. Likely related to poor PO intake in the context of nausea for the past 1-2 weeks.   Creatinine is stable         Hyponatremia, resolved: Sodium 130 on admission, 137 on repeat.   Sodium  141  -- Monitor       Abnormal UA: UA with large LE. Asymptomatic; denies dysuria or increased frequency  - IV ceftriaxone empirically on admission, discontinued 7/25 as urine cultures grew urogenital seda.       Myeloproliferative disorder (2002)  History of omental lesions concerning for peritoneal mets, s/p exploratory lap  Unintentional weight loss (20-30 lbs in the last year per review of clinic notes): Previously followed by [facility]. CT and PET form 2017 showed moderate omental thickening and nodularity with low metabolic activity, moderate abdominal and pelvic ascited with intermediate metabolic activity. Pt had a lap in 10/2017 which showed cirrhosis, no malignancy. CT A/P on admission showing marked improvement in ascites form prior imaging. Trace fluid and/or capsular implants seen anterolaterally in the liver, question minimal serosal implants vs fluid adjacent to the liver, otherwise no definite omental lesions demonstrated. B12 392, Folate 23.3. XR Lumbar negative.   -- [facility] consulted, see formal note by Dr. Ames--myeloproliferative neoplasm appears to be progressed moderately with anemia. Consider aranesp if he continues to have kidney dysfunction and anemia to help with improvement in hemoglobin and associated fatigue, consider repeating bone marrow bx depending on clinical progress (outpatient)  -- Regarding malignancy w/u recommended per Oncology: EGD 6/21/17 with normal esophagus, medium hiatal hernia, normal mucosa, possible  "gastritis. Colonoscopy 6/21/17 with diverticulosis of the sigmoid colon, remainder of exam reported at normal. Consider low dose CT scan of chest given tobacco hx, but this can be done in the outpatient setting   -- Care Coordinator to help facilitate Oncology follow-up at discharge  Oncology with [facility] following and they are recommending follow-up with Dr. lopez with CBC in 2 weeks after discharge        Macrocytic anemia, acute   Thrombocytopenia: Platelets 172>123>142. Baseline hemoglobin in the last year has actually been around 13-14. B12 and folate WNL.   -- No active signs of bleeding, monitor   -- Heme/Onc following-appreciate assistance   hgb at 8.2 today  No  Esophageal varices  on prior EGD done in June 2017  No sign of active bleeding so no need for EGD at this point per GI  Continue PPI once daily      Elevated BNP: 1931 on admission. Pt is euvolemic on exam. Denies chest pain, GOMEZ, SOB.   -- Echo with EF 60-65%, no RWMA, mild valvular aortic stenosis   -- Monitor I&Os, daily weights          NIDDM, controlled  Peripheral neuropathy: Maintained on Metformin 500 mg po every day PTA. A1C 4.0 on 7/23/18. Describes bilateral neuropathy. Previously assessed by Neurologist and diagnosed with neuropathy. Not currently taking medication for this. B12 and folate WNL.   -- PTA Metformin on hold and to be discontinued at discharge indefinitely based on A1C and sugars during this hospitalization   -- Medium dose sliding scale insulin ordered   -- BS per protocol   -- Monitor for hypoglycemia   -- Consider starting gabapentin once SOHEILA resolved   -- Neurology referral at discharge                  Cirrhosis: Prior hx of ascites with paracentesis in 8/2017 with negative cytology, 950 ccs of fluid removed. Pathology showing advanced chronic liver disease (stage IV \"cirrhosis\" with steatohepatitis. AST 51, ALT 11)   -- Defer further monitoring to outpatient setting       Hypothyroidism: TSH WNL. Continue PTA " Synthroid       GERD: Continue PTA Prilosec       History of infrarenal AA: Measuring at 3 cm on CT. Stable.       Elevated AST: 51>57>61. Bili 0.3. No abdominal pain.   -- Monitor     Alcohol use disorder: Drinks one mixed drink containing 1 shot of vodka daily. Denies history of withdrawal symptoms including withdrawal seizure.   -- Monitor for signs of withdrawal      Tobacco use: 40 pack year hx.   -- Nicotine replacement available if needed       Severe malnutrition in the context of chronic illness  Hypoalbuminemia   Unintentional weight loss (20-30 lbs in the last year per review of clinic notes): % Weight Loss:  > 7.5% in 3 months (severe malnutrition)  % Intake:  </= 50% for >/= 1 month (severe malnutrition)  -- Nutrition consulted           # Pain Assessment:  Current Pain Score 8/1/2018   Patient currently in pain? denies   Pain score (0-10) -   Pain location -   Pain descriptors -       DVT Prophylaxis: SCD  Code Status: Full Code  Discussed with bedside RN  Disposition: Expected discharge to TCU tomorrow if remains stable   discussed with cardiology and patient's sister and           Siobhan Cline MD    Interval History   Shortness of breath improved.  Cough also improving.  Patient had a run of nonsustained V. tach last night.  None today so far.  Cardiology is aware and are following  -Data reviewed today: I reviewed all new labs and imaging results over the last 24 hours. I personally reviewed the chest x-ray image(s) showing mild right base infiltrate or atelectasis.    Physical Exam   Temp: 97.5  F (36.4  C) Temp src: Oral BP: 102/75   Heart Rate: 95 Resp: 18 SpO2: 98 % O2 Device: None (Room air) Oxygen Delivery: 2 LPM  Vitals:    07/30/18 0700 07/31/18 0029 08/01/18 0200   Weight: 80.8 kg (178 lb 1.6 oz) 80.2 kg (176 lb 11.2 oz) 81 kg (178 lb 9.6 oz)     Vital Signs with Ranges  Temp:  [97.3  F (36.3  C)-97.9  F (36.6  C)] 97.5  F (36.4  C)  Heart Rate:  [] 95  Resp:  [16-18]  18  BP: (102-135)/(70-92) 102/75  SpO2:  [92 %-100 %] 98 %  I/O last 3 completed shifts:  In: 1200 [P.O.:1100; I.V.:100]  Out: -     Constitutional: alert   Respiratory: Better air movement bilaterally today.  Occasional wheezing  Cardiovascular: regular rate and rhythm without murmer or rub   GI:positive bowel sounds, non-tender   Skin/Integumen: no rashes    Other:        Medications       amoxicillin-clavulanate  1 tablet Oral Q12H BESS     cholecalciferol  2,000 Units Oral Daily     dorzolamide-timolol PF  1 drop Both Eyes BID     fluticasone-vilanterol  1 puff Inhalation Daily     insulin aspart  1-7 Units Subcutaneous TID AC     insulin aspart  1-5 Units Subcutaneous At Bedtime     ipratropium - albuterol 0.5 mg/2.5 mg/3 mL  3 mL Nebulization 4x daily     levothyroxine  100 mcg Oral QAM AC     midodrine  10 mg Oral TID w/meals     pantoprazole  40 mg Oral QAM AC     predniSONE  40 mg Oral Daily     senna-docusate  1 tablet Oral BID    Or     senna-docusate  2 tablet Oral BID     sodium chloride (PF)  3 mL Intracatheter Q8H       Data     Recent Labs  Lab 08/01/18  0333 07/31/18  0625 07/29/18  0535 07/29/18  0230 07/28/18  2143 07/28/18  1824 07/28/18  1404  07/27/18  0710 07/26/18  0817   WBC  --   --  7.7  --   --   --  7.8  --  7.4 8.1   HGB  --  8.2* 8.8*  --   --   --  9.3*  --  9.0* 9.8*   MCV  --   --  102*  --   --   --  103*  --  103* 104*   PLT  --   --  142*  --   --   --  149*  --  125* 162     --  138  --   --  134  --   --  141 141   POTASSIUM 3.9  --  3.6  --   --  3.6  --   --  3.6 3.4   CHLORIDE 109  --  108  --   --  106  --   --  111* 113*   CO2 19*  --  22  --   --  17*  --   --  18* 16*   BUN 9  --  9  --   --  9  --   --  9 8   CR 1.23  --  1.16  --   --  1.10  --   --  1.17 1.19   ANIONGAP 10  --  8  --   --  11  --   --  12 12   JESSICA 8.3*  --  7.9*  --   --  7.6*  --   --  7.7* 7.8*   *  --  108*  --   --  121*  --   --  111* 101   ALBUMIN  --   --   --   --   --   --   --    --   --  2.6*   PROTTOTAL  --   --   --   --   --   --   --   --   --  6.2*   BILITOTAL  --   --   --   --   --   --   --   --   --  0.8   ALKPHOS  --   --   --   --   --   --   --   --   --  114   ALT  --   --   --   --   --   --   --   --   --  14   AST  --   --   --   --   --   --   --   --   --  61*   TROPI  --   --   --  0.125* 0.135* 0.136* 0.148*  < >  --   --    < > = values in this interval not displayed.    No results found for this or any previous visit (from the past 24 hour(s)).[ST1.1]     Revision History        User Key Date/Time User Provider Type Action    > ST1.1 8/1/2018 12:10 PM Siobhan Cline MD Physician Sign            Progress Notes by Harmeet Lagos APRN CNP at 8/1/2018 11:32 AM     Author:  Harmeet Lagos APRN CNP Service:  Hem/Onc Author Type:  Nurse Practitioner    Filed:  8/1/2018 11:41 AM Date of Service:  8/1/2018 11:32 AM Creation Time:  8/1/2018 11:32 AM    Status:  Signed :  Harmeet Lagos APRN CNP (Nurse Practitioner)         Heme/Onc chart check:      1.  Myeloproliferative neoplasm -  - polycythemia vera treated previously with phlebotomy but not required for the last several years.   - Now presenting with anemia and thrombocytopenia  - Possible development of myelofibrosis, but could be underlying CKD, cirrhosis, hemodiluation or bleeding.  - Iron studies likely chronic disease   - SPEP m spike 0.2 g/dL  - B12 and folate normal  - GI has evaluated him, possible EGD as outpatient  - Consider SAMARIA in the future  - Possibly repeat bone marrow biopsy as outpatient  - CBC in 2 weeks (labs faxed to [facility] for Dr. mAes) and follow up with Dr. Ames in about 1 month     Harmeet MOE CNP[BU1.1]     Revision History        User Key Date/Time User Provider Type Action    > BU1.1 8/1/2018 11:41 AM Harmeet Lagos APRN CNP Nurse Practitioner Sign            Progress Notes by Nirmal Rubio MD at 8/1/2018 11:07 AM     Author:  Nirmal Rubio  MD Alok Service:  Cardiology Author Type:  Physician    Filed:  8/1/2018 11:19 AM Date of Service:  8/1/2018 11:07 AM Creation Time:  8/1/2018 11:07 AM    Status:  Signed :  Nirmal Rubio MD (Physician)         Cardiology note.    Asked to review situation again after pt had brief episode of NSVT. I reviewed tele strip. I agree that he had 25 beat run of NSVT which was reportedly asymptomatic.     Nuclear study reported yesterday. Essentially nondiagnostic.  LV function normal by nuclear study and echo.     I am not inclined to alter my recommendations for a conservative approach to his cardiology evaluation and treatment based on this arryththmia. He presented with dyspnea and low BP's and has pneumonia. He has not had angina. We are evaluating his cardiac issues only because his troponin was slighly elevated and his ECG is abnormal. I strongly suspect that he has CAD, but am not inclined to be aggressive with invasive evaluation because his risk of complications is high and his benefit would be low. Medical management with ASA, statin, BB if tolerated.     Nirmal Rubio MD[EE1.1]     Revision History        User Key Date/Time User Provider Type Action    > EE1.1 8/1/2018 11:19 AM Nirmal Rubio MD Physician Sign            Progress Notes by Jyoti Soto PA-C at 8/1/2018  8:24 AM     Author:  Jyoti Soto PA-C Service:  Gastroenterology Author Type:  Physician Assistant - C    Filed:  8/1/2018  8:27 AM Date of Service:  8/1/2018  8:24 AM Creation Time:  8/1/2018  8:24 AM    Status:  Addendum :  Jyoti Soto PA-C (Physician Assistant - C)         GI Chart Check    Patient requiring 2L 02 with ongoing cough - currently being treated for pneumonia. Run of Vtach overnight.[JE1.1] HGB overall stable. No reports of overt GI bleeding.[JE1.2] Possible EGD when patient improved from pulmonary/cardiology standpoint.    HIEU Hernandez  "Gastroenterology  Office: [phone][JE1.1]     Revision History        User Key Date/Time User Provider Type Action    > [N/A] 8/1/2018  8:27 AM Jyoti Soto PA-C Physician Assistant - [place] Addend     JE1.2 8/1/2018  8:26 AM Jyoti Soto PA-C Physician Assistant - [place] Sign     JE1.1 8/1/2018  8:24 AM Jyoti Soto PA-C Physician Assistant - C             Progress Notes by Harvinder Figueroa NP at 8/1/2018  3:13 AM     Author:  Harvinder Figueroa NP Service:  Internal Medicine Author Type:  Nurse Practitioner    Filed:  8/1/2018  3:15 AM Date of Service:  8/1/2018  3:13 AM Creation Time:  8/1/2018  3:13 AM    Status:  Signed :  Harvinder Figueroa NP (Nurse Practitioner)         Hospitalist cross cover:    Run of VT approx 6 seconds. I have ordered a stat BMP and mag, along with electrolyte replacements. 12 lead ECG grossly normal, patient asymptomatic.     Please page me or the covering hospitalist with additional concerns.     PAYAL Walker, CNP  Hospitalist - [facility]  Text Page  [phone])[NV1.1]      Revision History        User Key Date/Time User Provider Type Action    > NV1.1 8/1/2018  3:15 AM Harvinder Figueroa NP Nurse Practitioner Sign            Progress Notes by Nirmal Rubio MD at 7/31/2018  5:40 PM     Author:  Nirmal Rubio MD Service:  Cardiology Author Type:  Physician    Filed:  7/31/2018  5:44 PM Date of Service:  7/31/2018  5:40 PM Creation Time:  7/31/2018  5:40 PM    Status:  Signed :  Nirmal Rubio MD (Physician)                Assessment and Plan:     1. Pneumonia  2. Generalized weakness, weight loss, \"failure to thrive\"  3. Hypotension  4. Acute metabolic encephalopathy  5. Myeloplastic disease  6. Chronic macrocytic anemia, possibly due to myelofibrosis  7. Mild troponin elevation, 0.12-0.14 with flat trend. Not consistent with ACS. No chest pain symptoms recognized. Recent echo normal except for mild aortic " "stenosis  8. Abnormal ECG with anterolateral T wave inversion, possibly due to ischemia.    Possible demand ischemia in setting of pneumonia and hypotension causing slight troponin rise. ECG changes are more concerning.    Lexiscan study was technically difficult and essentially nondiagnostic.   No clear evidence for ischemia.  I talked about the medical issues and options for evaluation with the patient and his sister in detail. I would suggest that cardiac cath would be a higher risk procedure than usual due to bleeding risk. I would recommend medical therapy and treatment of pneumonia. Pt has no chest pain or other active cardiac symptoms. I don't think an angiogram would make him feel any better. He agrees. We can reconsider angiogram in the future if he has angina.      CHARISMA WADE MD        Interval History:   doing well; no cp, sob, n/v/d, or abd pain.          Medications:       amoxicillin-clavulanate  1 tablet Oral Q12H BESS     cholecalciferol  2,000 Units Oral Daily     dorzolamide-timolol PF  1 drop Both Eyes BID     fluticasone-vilanterol  1 puff Inhalation Daily     insulin aspart  1-7 Units Subcutaneous TID AC     insulin aspart  1-5 Units Subcutaneous At Bedtime     ipratropium - albuterol 0.5 mg/2.5 mg/3 mL  3 mL Nebulization 4x daily     levothyroxine  100 mcg Oral QAM AC     midodrine  10 mg Oral TID w/meals     [START ON 8/1/2018] pantoprazole  40 mg Oral QAM AC     predniSONE  40 mg Oral Daily     senna-docusate  1 tablet Oral BID    Or     senna-docusate  2 tablet Oral BID     sodium chloride (PF)  3 mL Intracatheter Q8H            Physical Exam:   Blood pressure 115/78, pulse 106, temperature 97.3  F (36.3  C), temperature source Oral, resp. rate 18, height 1.854 m (6' 1\"), weight 80.2 kg (176 lb 11.2 oz), SpO2 100 %.    Vitals:    07/23/18 1225 07/24/18 0754 07/25/18 0615 07/26/18 0626   Weight: 72.6 kg (160 lb) 73 kg (160 lb 15 oz) 72.3 kg (159 lb 6.4 oz) 74.2 kg (163 lb 9.3 oz)    " 07/27/18 0641 07/28/18 0500 07/28/18 1857 07/30/18 0700   Weight: 77.9 kg (171 lb 11.8 oz) 77.9 kg (171 lb 11.8 oz) 80.9 kg (178 lb 6.4 oz) 80.8 kg (178 lb 1.6 oz)    07/31/18 0029   Weight: 80.2 kg (176 lb 11.2 oz)         Intake/Output Summary (Last 24 hours) at 07/30/18 1158  Last data filed at 07/30/18 0755   Gross per 24 hour   Intake                0 ml   Output              275 ml   Net             -275 ml       07/26 1500 - 07/31 1459  In: 3320 [P.O.:1120; I.V.:2200]  Out: 2777 [Urine:2777]  Net: 543    Exam:  GENERAL APPEARANCE ADULT: Alert, in no acute distress  RESP: expiratory wheezes  CV: regular rate and rhythm, no murmur, rub, or gallop  ABDOMEN: normal bowel sounds, soft, nontender, no hepatosplenomegaly or other masses  EXTREMITIES: No edema         Data:   LABS (Last four results)  CMP    Recent Labs  Lab 07/29/18  0535 07/28/18  1824 07/27/18  0710 07/26/18  0817 07/25/18  0808    134 141 141 141   POTASSIUM 3.6 3.6 3.6 3.4 3.4   CHLORIDE 108 106 111* 113* 113*   CO2 22 17* 18* 16* 17*   ANIONGAP 8 11 12 12 11   * 121* 111* 101 98   BUN 9 9 9 8 9   CR 1.16 1.10 1.17 1.19 1.36*   GFRESTIMATED 62 66 61 60* 51*   GFRESTBLACK 75 79 74 73 62   JESSICA 7.9* 7.6* 7.7* 7.8* 7.8*   PROTTOTAL  --   --   --  6.2* 6.2*   ALBUMIN  --   --   --  2.6* 2.5*   BILITOTAL  --   --   --  0.8 0.5   ALKPHOS  --   --   --  114 110   AST  --   --   --  61* 57*   ALT  --   --   --  14 13     CBC    Recent Labs  Lab 07/31/18  0625 07/29/18  0535 07/28/18  1404 07/27/18  0710 07/26/18  0817   WBC  --  7.7 7.8 7.4 8.1   RBC  --  2.48* 2.61* 2.52* 2.81*   HGB 8.2* 8.8* 9.3* 9.0* 9.8*   HCT  --  25.3* 27.0* 26.0* 29.1*   MCV  --  102* 103* 103* 104*   MCH  --  35.5* 35.6* 35.7* 34.9*   MCHC  --  34.8 34.4 34.6 33.7   RDW  --  14.1 14.0 13.8 13.7   PLT  --  142* 149* 125* 162     INRNo lab results found in last 7 days.  TROPONINS   Lab Results   Component Value Date    TROPI 0.125 () 07/29/2018    TROPI 0.135 ()  07/28/2018    TROPI 0.136 (HH) 07/28/2018    TROPI 0.148 (HH) 07/28/2018    TROPI <0.015 07/23/2018                                                                                                               CHRAISMA RUBIO MD[EE1.1]     Revision History        User Key Date/Time User Provider Type Action    > EE1.1 7/31/2018  5:44 PM Charisma Rubio MD Physician Sign            Progress Notes by Siobhan Cline MD at 7/31/2018 11:50 AM     Author:  Siobhan Cline MD Service:  Hospitalist Author Type:  Physician    Filed:  7/31/2018 11:54 AM Date of Service:  7/31/2018 11:50 AM Creation Time:  7/31/2018 11:50 AM    Status:  Signed :  Siobhan Cline MD (Physician)         [facility]    Hospitalist Progress Note    Assessment & Plan     Abebe Christensen is a 72 year old male, admitted on 7/23/2018,  with PMHx of CKD III, MGUS, HTN, hyperlipidemia, and GERD who presented to the ED on 7/23/18 with weakness, recurrent falls, lightheadness, and unintentional weight loss with findings of abnormal UA and SOHEILA. Registered under inpatient status for further evaluation and treatment.     Generalized weakness/memory issues/hallucinations the last 2 nights   Multifactorial in the context of below. Pt lives alone in an apartment. Recently purchased a walker/cane. No elevator in apartment.   --- PT and OT to assess, recommending TCU at discharge which patient is agreeable to  -- pt did fall at home and hit head, he has a laceration on the back of his head  -he is weak noni legs and also right arm  -sister here 7/27 and she says that pt has different speech that prior  -patient has had hallucinations night on 7/25 and 7/26  - head CT and head MRI done last night without acute hemorrhage or infarct  -pt has seen neurology in the past for neuropathy    Probable pneumonia with possible acute COPD exacerbation   Patient is wheezing today  He has a 30 year history of smoking  Start him on duo nebs 4 times  daily  Prednisone 40 mg p.o. daily to help with the wheezing  Breo Ellipta inhaler added to help with the COPD  CXR on 7/28 w/ mild infiltrate and/or atelectasis right base.  Pulm vascularity WNL  -due to his sx of cough, looks more SOB and CXR change am going to start abx  -since he has been in the hospital will start zosyn for possibe HCAP   -BC x3  -afebrile, WBC normal  -tobacco abuse  -sputum for gram stain and culture showed greater than 10 squamous cells consistent with oral contamination  Shortness of breath improved today   we will switch IV Zosyn to oral Augmentin today      Mild elevated troponin  Cardiology consulted and follow on the following  Elevated troponin is thought to be due to demand ischemia in the setting of pneumonia and hypertension  -EKG done for the tachycardia showed sinus tachycardia but also possible ischemia with TWI inferiorly and anterolaterally  -due to EKG, ordered troponin which was elevated at 0.148  Serial troponins remained flat  -pt denies chest pain  Lexiscan stress test is being done today results are pending          Orthostatic hypotension and persistent hypotension  History of benign essential hypertension: Positive on admission. Likely secondary to SOHEILA below with ongoing PTA medication administration including BP meds, poor PO intake. Echocardiogram with EF 60-65%, no RWMA, mild valvular aortic stenosis   - Has received total of 3500 L IVF bolus plus maintenance fluids  - Continue to hold PTA BP meds (Cartia  mg po every day, Prinzide 20/12.5 mg po every day--last dose 0830 on 7/23/18)  - Encourage adequate PO intake   - saline locked 7/27  Patient was given so far multiple liters of fluids and was short of breath overnight on 729 overnight and needed 20 mg of Lasix IV  He was started on Midodrin 10 mg p.o. 3 times daily to help with the hypotension  Blood pressure when checked in his left thigh is in the systolics in the 110s today           Acute metabolic  encephalopathy, transient: Episode of delirium reported 2 nights ago  per nursing staff and had some hallucinations last 2 nights   Infectious work-up negative with BC NGTD, Urine culture with urogenital seda. CXR unremarkable. Lumbar XR unremarkable. CT A/P unremarkable, comment on trace fluid and/or capsular implants seen anterolaterally in the liver. WBC WNL. Afebrile.   -- Monitor   -- Delirium prevention protocol in place   -workup of CT of head and MRI of brain did not show anything acute  -recommend cognitive eval as outpatient    SOHEILA on CKD III, resolved: Baseline creatinine appears to be 1.1-1.2. Creatinine on admission 1.84>1.46>1.36>1.19>1.17  after IVF resuscitation. Likely related to poor PO intake in the context of nausea for the past 1-2 weeks.   Creatinine is stable         Hyponatremia, resolved: Sodium 130 on admission, 137 on repeat.   Sodium  141  -- Monitor       Abnormal UA: UA with large LE. Asymptomatic; denies dysuria or increased frequency  - IV ceftriaxone empirically on admission, discontinued 7/25 as urine cultures grew urogenital seda.       Myeloproliferative disorder (2002)  History of omental lesions concerning for peritoneal mets, s/p exploratory lap  Unintentional weight loss (20-30 lbs in the last year per review of clinic notes): Previously followed by [facility]. CT and PET form 2017 showed moderate omental thickening and nodularity with low metabolic activity, moderate abdominal and pelvic ascited with intermediate metabolic activity. Pt had a lap in 10/2017 which showed cirrhosis, no malignancy. CT A/P on admission showing marked improvement in ascites form prior imaging. Trace fluid and/or capsular implants seen anterolaterally in the liver, question minimal serosal implants vs fluid adjacent to the liver, otherwise no definite omental lesions demonstrated. B12 392, Folate 23.3. XR Lumbar negative.   -- [facility] consulted, see formal note by Dr. Ames--myeloproliferative  neoplasm appears to be progressed moderately with anemia. Consider aranesp if he continues to have kidney dysfunction and anemia to help with improvement in hemoglobin and associated fatigue, consider repeating bone marrow bx depending on clinical progress (outpatient)  -- Regarding malignancy w/u recommended per Oncology: EGD 6/21/17 with normal esophagus, medium hiatal hernia, normal mucosa, possible gastritis. Colonoscopy 6/21/17 with diverticulosis of the sigmoid colon, remainder of exam reported at normal. Consider low dose CT scan of chest given tobacco hx, but this can be done in the outpatient setting   -- Care Coordinator to help facilitate Oncology follow-up at discharge  Oncology with [facility] following and they are recommending follow-up with Dr. lopez with CBC in 2 weeks after discharge        Macrocytic anemia, acute   Thrombocytopenia: Platelets 172>123>142. Baseline hemoglobin in the last year has actually been around 13-14. B12 and folate WNL.   -- No active signs of bleeding, monitor   -- Heme/Onc following-appreciate assistance   hgb at 8.2 today  No  Esophageal varices  on prior EGD done in June 2017  No sign of active bleeding so no need for EGD at this point per GI  Continue PPI once daily      Elevated BNP: 1931 on admission. Pt is euvolemic on exam. Denies chest pain, GOMEZ, SOB.   -- Echo with EF 60-65%, no RWMA, mild valvular aortic stenosis   -- Monitor I&Os, daily weights          NIDDM, controlled  Peripheral neuropathy: Maintained on Metformin 500 mg po every day PTA. A1C 4.0 on 7/23/18. Describes bilateral neuropathy. Previously assessed by Neurologist and diagnosed with neuropathy. Not currently taking medication for this. B12 and folate WNL.   -- PTA Metformin on hold and to be discontinued at discharge indefinitely based on A1C and sugars during this hospitalization   -- Medium dose sliding scale insulin ordered   -- BS per protocol   -- Monitor for hypoglycemia   -- Consider  "starting gabapentin once SOHEILA resolved   -- Neurology referral at discharge                  Cirrhosis: Prior hx of ascites with paracentesis in 8/2017 with negative cytology, 950 ccs of fluid removed. Pathology showing advanced chronic liver disease (stage IV \"cirrhosis\" with steatohepatitis. AST 51, ALT 11)   -- Defer further monitoring to outpatient setting       Hypothyroidism: TSH WNL. Continue PTA Synthroid       GERD: Continue PTA Prilosec       History of infrarenal AA: Measuring at 3 cm on CT. Stable.       Elevated AST: 51>57>61. Bili 0.3. No abdominal pain.   -- Monitor     Alcohol use disorder: Drinks one mixed drink containing 1 shot of vodka daily. Denies history of withdrawal symptoms including withdrawal seizure.   -- Monitor for signs of withdrawal      Tobacco use: 40 pack year hx.   -- Nicotine replacement available if needed       Severe malnutrition in the context of chronic illness  Hypoalbuminemia   Unintentional weight loss (20-30 lbs in the last year per review of clinic notes): % Weight Loss:  > 7.5% in 3 months (severe malnutrition)  % Intake:  </= 50% for >/= 1 month (severe malnutrition)  -- Nutrition consulted           # Pain Assessment:  Current Pain Score 7/31/2018   Patient currently in pain? denies   Pain score (0-10) -   Pain location -   Pain descriptors -       DVT Prophylaxis: SCD  Code Status: Full Code  Discussed with bedside RN  Disposition: Expected discharge to TCU tomorrow if blood pressure stays stable and respiratory status remains stable      Siobhan Cline MD    Interval History   Denies chest pain or dizziness.  Shortness of breath improving feels much better since yesterday.  Coughing up clear colored sputum  -Data reviewed today: I reviewed all new labs and imaging results over the last 24 hours. I personally reviewed the chest x-ray image(s) showing mild right base infiltrate or atelectasis.    Physical Exam   Temp: 98  F (36.7  C) Temp src: Oral BP: 100/70   Heart " Rate: 96 Resp: 18 SpO2: 100 % O2 Device: Nasal cannula Oxygen Delivery: 2 LPM  Vitals:    07/28/18 1857 07/30/18 0700 07/31/18 0029   Weight: 80.9 kg (178 lb 6.4 oz) 80.8 kg (178 lb 1.6 oz) 80.2 kg (176 lb 11.2 oz)     Vital Signs with Ranges  Temp:  [97.6  F (36.4  C)-98.6  F (37  C)] 98  F (36.7  C)  Heart Rate:  [64-96] 96  Resp:  [18-22] 18  BP: ()/(56-96) 100/70  SpO2:  [97 %-100 %] 100 %  I/O last 3 completed shifts:  In: -   Out: 200 [Urine:200]    Constitutional: alert   Respiratory: Better air movement bilaterally today.  No active wheezing heard  Cardiovascular: regular rate and rhythm without murmer or rub   GI:positive bowel sounds, non-tender   Skin/Integumen: no rashes    Other:        Medications       cholecalciferol  2,000 Units Oral Daily     dorzolamide-timolol PF  1 drop Both Eyes BID     fluticasone-vilanterol  1 puff Inhalation Daily     insulin aspart  1-7 Units Subcutaneous TID AC     insulin aspart  1-5 Units Subcutaneous At Bedtime     ipratropium - albuterol 0.5 mg/2.5 mg/3 mL  3 mL Nebulization 4x daily     levothyroxine  100 mcg Oral QAM AC     midodrine  10 mg Oral TID w/meals     pantoprazole  40 mg Oral BID AC     piperacillin-tazobactam  3.375 g Intravenous Q6H     predniSONE  40 mg Oral Daily     senna-docusate  1 tablet Oral BID    Or     senna-docusate  2 tablet Oral BID     sodium chloride (PF)  3 mL Intracatheter Q8H       Data     Recent Labs  Lab 07/31/18  0625 07/29/18  0535 07/29/18  0230 07/28/18  2143 07/28/18  1824 07/28/18  1404  07/27/18  0710 07/26/18  0817 07/25/18  0808   WBC  --  7.7  --   --   --  7.8  --  7.4 8.1 6.9   HGB 8.2* 8.8*  --   --   --  9.3*  --  9.0* 9.8* 9.7*   MCV  --  102*  --   --   --  103*  --  103* 104* 102*   PLT  --  142*  --   --   --  149*  --  125* 162 142*   NA  --  138  --   --  134  --   --  141 141 141   POTASSIUM  --  3.6  --   --  3.6  --   --  3.6 3.4 3.4   CHLORIDE  --  108  --   --  106  --   --  111* 113* 113*   CO2  --  22   --   --  17*  --   --  18* 16* 17*   BUN  --  9  --   --  9  --   --  9 8 9   CR  --  1.16  --   --  1.10  --   --  1.17 1.19 1.36*   ANIONGAP  --  8  --   --  11  --   --  12 12 11   JESSICA  --  7.9*  --   --  7.6*  --   --  7.7* 7.8* 7.8*   GLC  --  108*  --   --  121*  --   --  111* 101 98   ALBUMIN  --   --   --   --   --   --   --   --  2.6* 2.5*   PROTTOTAL  --   --   --   --   --   --   --   --  6.2* 6.2*   BILITOTAL  --   --   --   --   --   --   --   --  0.8 0.5   ALKPHOS  --   --   --   --   --   --   --   --  114 110   ALT  --   --   --   --   --   --   --   --  14 13   AST  --   --   --   --   --   --   --   --  61* 57*   TROPI  --   --  0.125* 0.135* 0.136* 0.148*  < >  --   --   --    < > = values in this interval not displayed.    No results found for this or any previous visit (from the past 24 hour(s)).[ST1.1]     Revision History        User Key Date/Time User Provider Type Action    > ST1.1 7/31/2018 11:54 AM Siobhan Cline MD Physician Sign            Progress Notes by Lauren Romero, RN at 7/31/2018 10:23 AM     Author:  Lauren Romero, RN Service:  Radiology Author Type:  Registered Nurse    Filed:  7/31/2018 10:24 AM Date of Service:  7/31/2018 10:23 AM Creation Time:  7/31/2018 10:23 AM    Status:  Signed :  Lauren Romero, RN (Registered Nurse)         Patient tolerated this portion of the stress test well.  Offered no complaints.  Patient returned to his room in the heart center.[LL1.1]     Revision History        User Key Date/Time User Provider Type Action    > LL1.1 7/31/2018 10:24 AM Lauren Romero RN Registered Nurse Sign            Progress Notes by Georgette Waterman APRN CNP at 7/31/2018  9:38 AM     Author:  Georgette Waterman APRN CNP Service:  Oncology Author Type:  Nurse Practitioner    Filed:  7/31/2018  9:54 AM Date of Service:  7/31/2018  9:38 AM Creation Time:  7/31/2018  9:38 AM    Status:  Signed :  Georgette Waterman APRN CNP (Nurse  Practitioner)         Hematology chart check:       IMPRESSION:   1.  Myeloproliferative neoplasm -- polycythemia vera treated previously with phlebotomy but not required for the last several years.   2.  Anemia, microcytic, likely associated with MPN with possible progression to myelofibrosis state.  Other etiologies: chronic kidney disease associated anemia.  The drop in hemoglobin is likely dilution after admission.   3.  Thrombocytopenia associated with MPN, and possible cirrhosis related.   4.  History of ascites and peritoneal nodularity, status post laparoscopic evaluation in 10/2017.  Negative for malignancy and showed cirrhosis.   5.  Weakness, multifactorial.  Could be associated with liver disease, hypoalbuminemia, anemia, and chronic kidney disease.       PLAN:  His myeloproliferative neoplasm appears to be progressed moderately with anemia, but his white count and platelets are adequate. There is not an urgent need to evaluate his anemia at this point.  His weight loss, hypoalbuminemia are concerning. EGD on 6/17/17 showed normal esophagus, medium hiatal hernia, normal mucosa, possible gastritis. Colonoscopy 6/21/17 with diverticulosis of the sigmoid colon, remainder of exam reported as normal. CT abdomen upon admission showed no obvious malignancy.  CXR was normal.  Given hx smoking, could consider low-dose CT of chest.      Darbepoetin can be considered if he continues to have kidney dysfunction and anemia to help with improvement in hemoglobin and associated fatigue. We will consider repeating bone marrow biopsy depending on his clinical progress, but this will be done as an outpatient.     When discharged,he should be scheduled to see Dr. Ames in one month with CBC in two weeks.     Georgette Waterman  Nurse Practitioner  [facility][MK1.1]     Revision History        User Key Date/Time User Provider Type Action    > MK1.1 7/31/2018  9:54 AM Georgette Waterman APRN CNP Nurse Practitioner  Sign            Progress Notes by Eva Caraballo PA-C at 7/31/2018  9:47 AM     Author:  Eva Caraballo PA-C Service:  Gastroenterology Author Type:  Physician Assistant Isabel ARAGON    Filed:  7/31/2018  9:49 AM Date of Service:  7/31/2018  9:47 AM Creation Time:  7/31/2018  9:47 AM    Status:  Signed :  Eva Caraballo PA-C (Physician Assistant - C)         Chart Check    Patient with continued productive cough - currently being treated for pneumonia.  Possible EGD when patient improved from pulmonary standpoint.    Eva Caraballo PA-C  [facility][KW1.1]     Revision History        User Key Date/Time User Provider Type Action    > KW1.1 7/31/2018  9:49 AM Eva Caraballo PA-C Physician Assistant - [place] Sign            Progress Notes by MORENA SUAZO at 7/30/2018  9:00 PM     Author:  MORENA SUAZO Service:  Respiratory Therapy Author Type:  Respiratory Therapist    Filed:  7/30/2018  9:01 PM Date of Service:  7/30/2018  9:00 PM Creation Time:  7/30/2018  9:00 PM    Status:  Signed :  MORENA SUAZO RT (Respiratory Therapist)         Patient is on a 2L NC with SpO2 in the upper 90's. BS coarse diminished with an increase in aeration post neb treatments. All nebs were given as ordered.  Will cont to follow.[SB1.1]  7/30/2018[SB1.2]  Morena Suazo RRT[SB1.1]     Revision History        User Key Date/Time User Provider Type Action    > SB1.2 7/30/2018  9:01 PM MORENA SUAZO Respiratory Therapist Sign     SB1.1 7/30/2018  9:00 PM MORENA SUAZO Respiratory Therapist             Progress Notes by Aubrie Scott SLP at 7/30/2018 12:28 PM     Author:  Aubrie Scott SLP Service:  (none) Author Type:  Speech Therapist    Filed:  7/30/2018 12:29 PM Date of Service:  7/30/2018 12:28 PM Creation Time:  7/30/2018 12:28 PM    Status:  Signed :  Aubrie Scott SLP (Speech Therapist)         CLINICAL SWALLOW EVALUATION       07/30/18 1100   General Information   Onset Date  07/23/18   Start of Care Date 07/30/18   Referring Physician Dr. Cline   Patient Profile Review/OT: Additional Occupational Profile Info See Profile for full history and prior level of function   Patient/Family Goals Statement Patient would like to continue eating   Swallowing Evaluation Bedside swallow evaluation   Behaviorial Observations Alert;WFL (within functional limits)   Mode of current nutrition Oral diet   Type of oral diet Thin liquid  (Full liquids)   Respiratory Status O2 Supply   Comments Patient admitted 7/23 with weakness, anemia, and RLL. Patient's PMH is significant for myeloproliferative disorder, cirrhosis, and HTN.  Patient reports long term hx of occasional coughing during liquid intake.  He reports that for 30+ years, if he is chasing food down with liquids, he will occasionally feel a small amount of liquid going down the wrong pipe.  Patient's upper GI endoscopy in June 2017 indicated normal esophagus at that time.     Clinical Swallow Evaluation   Oral Musculature generally intact   Structural Abnormalities none present   Dentition present and adequate   Mucosal Quality good   Mandibular Strength and Mobility intact   Oral Labial Strength and Mobility WFL   Lingual Strength and Mobility WFL   Velar Elevation intact   Buccal Strength and Mobility intact   Laryngeal Function Cough;Throat clear;Swallow;Voicing initiated;Dry swallow palpated   Oral Musculature Comments Strong and clear voicing, coughing after nebulizer treatment.     Additional Documentation Yes   Additional evaluation(s) completed today No   Clinical Swallow Eval: Thin Liquid Texture Trial   Mode of Presentation, Thin Liquids cup;self-fed   Volume of Liquid or Food Presented 4 oz   Oral Phase of Swallow WFL   Pharyngeal Phase of Swallow intact   Diagnostic Statement No overt Sx of aspiration with single or consecutive sips   Clinical Swallow Eval: Puree Solid Texture Trial   Mode of Presentation, Puree spoon;self-fed   Volume  of Puree Presented 3 oz   Oral Phase, Puree WFL   Pharyngeal Phase, Puree impaired;wet vocal quality after swallow   Successful Strategies Trialed During Procedure, Puree hard swallow   Diagnostic Statement Intermittent wet vocal quality, independently cleared.   Clinical Swallow Eval: Solid Food Texture Trial   Mode of Presentation, Solid self-fed   Volume of Solid Food Presented 2 crackers   Oral Phase, Solid WFL   Pharyngeal Phase, Solid impaired;wet vocal quality after swallow   Successful Strategies Trialed During Procedure, Solid hard swallow   Diagnostic Statement Intermittent wet vocal quality, independently cleared.   Swallow Eval: Clinical Impressions   Skilled Criteria for Therapy Intervention Skilled criteria met.  Treatment indicated.   Functional Assessment Scale (FAS) 4   Dysphagia Outcome Severity Scale (AKILAH) Level 4 - AKILAH   Treatment Diagnosis Mild to moderate oropharyngeal dysphagia   Diet texture recommendations Regular diet;Thin liquids   Recommended Feeding/Eating Techniques small sips/bites;other (see comments);maintain upright posture during/after eating for 30 mins  (Double swallow)   Demonstrates Need for Referral to Another Service dietitian;social work   Therapy Frequency 5 times/wk   Predicted Duration of Therapy Intervention (days/wks) 1 week   Risks and Benefits of Treatment have been explained. Yes   Patient, family and/or staff in agreement with Plan of Care Yes   Clinical Impression Comments Patient presents with mild to moderate oropharyngeal dysphagia characterized by wet vocal quality intermittently with puree and solid trials, suspected pharyngeal residue due to generalized weakness.  No overt Sx of aspiration noted with single and consecutive sips of thin liquids today, however, cannot rule out silent aspiration at bedside.  Trained double swallow strategy.    Total Evaluation Time   Total Evaluation Time (Minutes) 45[RL1.1]        Revision History        User Key Date/Time  User Provider Type Action    > RL1.1 7/30/2018 12:29 PM Aubrie Scott, SLP Speech Therapist Sign            Progress Notes by Siobhan Cline MD at 7/30/2018 12:08 PM     Author:  Siobhan Cline MD Service:  Hospitalist Author Type:  Physician    Filed:  7/30/2018 12:17 PM Date of Service:  7/30/2018 12:08 PM Creation Time:  7/30/2018 12:08 PM    Status:  Signed :  Siobhan Cline MD (Physician)         [facility]    Hospitalist Progress Note    Assessment & Plan     Abebe Christensen is a 72 year old male, admitted on 7/23/2018,  with PMHx of CKD III, MGUS, HTN, hyperlipidemia, and GERD who presented to the ED on 7/23/18 with weakness, recurrent falls, lightheadness, and unintentional weight loss with findings of abnormal UA and SOHEILA. Registered under inpatient status for further evaluation and treatment.     Generalized weakness/memory issues/hallucinations the last 2 nights   Multifactorial in the context of below. Pt lives alone in an apartment. Recently purchased a walker/cane. No elevator in apartment.   --- PT and OT to assess, recommending TCU at discharge which patient is agreeable to  -- pt did fall at home and hit head, he has a laceration on the back of his head  -he is weak noni legs and also right arm  -sister here 7/27 and she says that pt has different speech that prior  -patient has had hallucinations night on 7/25 and 7/26  - head CT and head MRI done last night without acute hemorrhage or infarct  - OT to do cognitive eval  -pt has seen neurology in the past for neuropathy    Probable pneumonia with possible acute COPD exacerbation   Patient is wheezing today  He has a 30 year history of smoking  Start him on duo nebs 4 times daily  Prednisone 40 mg p.o. daily to help with the wheezing  Breo Ellipta inhaler added to help with the COPD    -CXR on 7/28 w/ mild infiltrate and/or atelectasis right base.  Pulm vascularity WNL  -due to his sx of cough, looks more SOB and CXR change am  going to start abx  -since he has been in the hospital will start zosyn for possibe HCAP   -BC x3  -afebrile, WBC normal  -tobacco abuse  -sputum for gram stain and culture showed greater than 10 squamous cells  oral contamination    Mild elevated troponin  Cardiology consulted and follow on the following  Elevated troponin is thought to be due to demand ischemia in the setting of pneumonia and hypertension  Cardiology is planning on doing Lexiscan study tomorrow  -EKG done for the tachycardia showed sinus tachycardia but also possible ischemia with TWI inferiorly and anterolaterally  -due to EKG, ordered troponin which was elevated at 0.148  Serial troponins remained flat  -pt denies chest pain          Orthostatic hypotension and persistent hypotension  History of benign essential hypertension: Positive on admission. Likely secondary to SOHEILA below with ongoing PTA medication administration including BP meds, poor PO intake. Echocardiogram with EF 60-65%, no RWMA, mild valvular aortic stenosis   - Has received total of 3500 L IVF bolus plus maintenance fluids  - Continue to hold PTA BP meds (Cartia  mg po every day, Prinzide 20/12.5 mg po every day--last dose 0830 on 7/23/18)  - Encourage adequate PO intake   - saline locked 7/27  Patient was given so far multiple liters of fluids and was short of breath overnight on 729 overnight and needed 20 mg of Lasix IV  He was started on Midodrin 10 mg p.o. 3 times daily to help with the hypotension  Blood pressure when checked in his left thigh is in the systolics in the 110s today           Acute metabolic encephalopathy, transient: Episode of delirium reported 2 nights ago  per nursing staff and had some hallucinations last 2 nights   Infectious work-up negative with BC NGTD, Urine culture with urogenital seda. CXR unremarkable. Lumbar XR unremarkable. CT A/P unremarkable, comment on trace fluid and/or capsular implants seen anterolaterally in the liver. WBC WNL.  Afebrile.   -- Monitor   -- Delirium prevention protocol in place   -workup of CT of head and MRI of brain did not show anything acute  -recommend cognitive eval as outpatient    SOHEILA on CKD III, resolved: Baseline creatinine appears to be 1.1-1.2. Creatinine on admission 1.84>1.46>1.36>1.19>1.17  after IVF resuscitation. Likely related to poor PO intake in the context of nausea for the past 1-2 weeks.   Creatinine is stable         Hyponatremia, resolved: Sodium 130 on admission, 137 on repeat.   Sodium  141  -- Monitor       Abnormal UA: UA with large LE. Asymptomatic; denies dysuria or increased frequency  - IV ceftriaxone empirically on admission, discontinued 7/25 as urine cultures grew urogenital seda.       Myeloproliferative disorder (2002)  History of omental lesions concerning for peritoneal mets, s/p exploratory lap  Unintentional weight loss (20-30 lbs in the last year per review of clinic notes): Previously followed by [facility]. CT and PET form 2017 showed moderate omental thickening and nodularity with low metabolic activity, moderate abdominal and pelvic ascited with intermediate metabolic activity. Pt had a lap in 10/2017 which showed cirrhosis, no malignancy. CT A/P on admission showing marked improvement in ascites form prior imaging. Trace fluid and/or capsular implants seen anterolaterally in the liver, question minimal serosal implants vs fluid adjacent to the liver, otherwise no definite omental lesions demonstrated. B12 392, Folate 23.3. XR Lumbar negative.   -- [facility] consulted, see formal note by Dr. Ames--myeloproliferative neoplasm appears to be progressed moderately with anemia. Consider aranesp if he continues to have kidney dysfunction and anemia to help with improvement in hemoglobin and associated fatigue, consider repeating bone marrow bx depending on clinical progress (outpatient)  -- Regarding malignancy w/u recommended per Oncology: EGD 6/21/17 with normal esophagus, medium  "hiatal hernia, normal mucosa, possible gastritis. Colonoscopy 6/21/17 with diverticulosis of the sigmoid colon, remainder of exam reported at normal. Consider low dose CT scan of chest given tobacco hx, but this can be done in the outpatient setting   -- Care Coordinator to help facilitate Oncology follow-up at discharge      Macrocytic anemia, acute   Thrombocytopenia: Platelets 172>123>142. Baseline hemoglobin in the last year has actually been around 13-14. B12 and folate WNL.   -- No active signs of bleeding, monitor   -- Heme/Onc following-appreciate assistance   hgb at 8.8  GI consulted with history of cirrhosis and recommending EGD in the future once cardiorespiratory status is stable      Elevated BNP: 1931 on admission. Pt is euvolemic on exam. Denies chest pain, GOMEZ, SOB.   -- Echo with EF 60-65%, no RWMA, mild valvular aortic stenosis   -- Monitor I&Os, daily weights          NIDDM, controlled  Peripheral neuropathy: Maintained on Metformin 500 mg po every day PTA. A1C 4.0 on 7/23/18. Describes bilateral neuropathy. Previously assessed by Neurologist and diagnosed with neuropathy. Not currently taking medication for this. B12 and folate WNL.   -- PTA Metformin on hold and to be discontinued at discharge indefinitely based on A1C and sugars during this hospitalization   -- Medium dose sliding scale insulin ordered   -- BS per protocol   -- Monitor for hypoglycemia   -- Consider starting gabapentin once SOHEILA resolved   -- Neurology referral at discharge                  Cirrhosis: Prior hx of ascites with paracentesis in 8/2017 with negative cytology, 950 ccs of fluid removed. Pathology showing advanced chronic liver disease (stage IV \"cirrhosis\" with steatohepatitis. AST 51, ALT 11)   -- Defer further monitoring to outpatient setting       Hypothyroidism: TSH WNL. Continue PTA Synthroid       GERD: Continue PTA Prilosec       History of infrarenal AA: Measuring at 3 cm on CT. Stable.       Elevated " AST: 51>57>61. Bili 0.3. No abdominal pain.   -- Monitor     Alcohol use disorder: Drinks one mixed drink containing 1 shot of vodka daily. Denies history of withdrawal symptoms including withdrawal seizure.   -- Monitor for signs of withdrawal      Tobacco use: 40 pack year hx.   -- Nicotine replacement available if needed       Severe malnutrition in the context of chronic illness  Hypoalbuminemia   Unintentional weight loss (20-30 lbs in the last year per review of clinic notes): % Weight Loss:  > 7.5% in 3 months (severe malnutrition)  % Intake:  </= 50% for >/= 1 month (severe malnutrition)  -- Nutrition consulted           # Pain Assessment:  Current Pain Score 7/30/2018   Patient currently in pain? denies   Pain score (0-10) -   Pain location -   Pain descriptors -       DVT Prophylaxis: SCD  Code Status: Full Code  Discussed with bedside RN  Disposition: Expected discharge to TCU in 1-2 days if blood pressure stays stable and respiratory status remains stable      Siobhan Cline MD    Interval History   Denies chest pain or dizziness.  Complains of shortness of breath with activity.  Coughing up clear colored sputum  -Data reviewed today: I reviewed all new labs and imaging results over the last 24 hours. I personally reviewed the chest x-ray image(s) showing mild right base infiltrate or atelectasis.    Physical Exam   Temp: 98.3  F (36.8  C) Temp src: Oral BP: 118/66 Pulse: 106 Heart Rate: 102 Resp: 18 SpO2: 100 % O2 Device: Nasal cannula Oxygen Delivery: 2 LPM  Vitals:    07/28/18 0500 07/28/18 1857 07/30/18 0700   Weight: 77.9 kg (171 lb 11.8 oz) 80.9 kg (178 lb 6.4 oz) 80.8 kg (178 lb 1.6 oz)     Vital Signs with Ranges  Temp:  [97.6  F (36.4  C)-98.3  F (36.8  C)] 98.3  F (36.8  C)  Pulse:  [100-106] 106  Heart Rate:  [101-105] 102  Resp:  [18-20] 18  BP: ()/(61-77) 118/66  SpO2:  [98 %-100 %] 100 %  I/O last 3 completed shifts:  In: 300 [P.O.:300]  Out: 575 [Urine:575]    Constitutional: alert    Respiratory: coarse breath sounds bilaterally with occasional wheezing heard on auscultation  Cardiovascular: regular rate and rhythm without murmer or rub   GI:positive bowel sounds, non-tender   Skin/Integumen: no rashes    Other:        Medications       cholecalciferol  2,000 Units Oral Daily     dorzolamide-timolol PF  1 drop Both Eyes BID     fluticasone-vilanterol  1 puff Inhalation Daily     insulin aspart  1-7 Units Subcutaneous TID AC     insulin aspart  1-5 Units Subcutaneous At Bedtime     ipratropium - albuterol 0.5 mg/2.5 mg/3 mL  3 mL Nebulization 4x daily     levothyroxine  100 mcg Oral QAM AC     midodrine  10 mg Oral TID w/meals     pantoprazole  40 mg Oral BID AC     piperacillin-tazobactam  3.375 g Intravenous Q6H     predniSONE  40 mg Oral Daily     senna-docusate  1 tablet Oral BID    Or     senna-docusate  2 tablet Oral BID     sodium chloride (PF)  3 mL Intracatheter Q8H       Data     Recent Labs  Lab 07/29/18  0535 07/29/18  0230 07/28/18  2143 07/28/18  1824 07/28/18  1404 07/27/18  0710 07/26/18  0817 07/25/18  0808   WBC 7.7  --   --   --  7.8 7.4 8.1 6.9   HGB 8.8*  --   --   --  9.3* 9.0* 9.8* 9.7*   *  --   --   --  103* 103* 104* 102*   *  --   --   --  149* 125* 162 142*     --   --  134  --  141 141 141   POTASSIUM 3.6  --   --  3.6  --  3.6 3.4 3.4   CHLORIDE 108  --   --  106  --  111* 113* 113*   CO2 22  --   --  17*  --  18* 16* 17*   BUN 9  --   --  9  --  9 8 9   CR 1.16  --   --  1.10  --  1.17 1.19 1.36*   ANIONGAP 8  --   --  11  --  12 12 11   JESSICA 7.9*  --   --  7.6*  --  7.7* 7.8* 7.8*   *  --   --  121*  --  111* 101 98   ALBUMIN  --   --   --   --   --   --  2.6* 2.5*   PROTTOTAL  --   --   --   --   --   --  6.2* 6.2*   BILITOTAL  --   --   --   --   --   --  0.8 0.5   ALKPHOS  --   --   --   --   --   --  114 110   ALT  --   --   --   --   --   --  14 13   AST  --   --   --   --   --   --  61* 57*   TROPI  --  0.125* 0.135* 0.136* 0.148*   "--   --   --        No results found for this or any previous visit (from the past 24 hour(s)).[ST1.1]     Revision History        User Key Date/Time User Provider Type Action    > ST1.1 7/30/2018 12:17 PM Siobhan Cline MD Physician Sign            Progress Notes by Charisma Rubio MD at 7/30/2018 11:58 AM     Author:  Charisma Rubio MD Service:  Cardiology Author Type:  Physician    Filed:  7/30/2018 12:07 PM Date of Service:  7/30/2018 11:58 AM Creation Time:  7/30/2018 11:58 AM    Status:  Addendum :  Charisma Rubio MD (Physician)                Assessment and Plan:     1. Pneumonia  2. Generalized weakness, weight loss, \"failure to thrive\"  3. Hypotension  4. Acute metabolic encephalopathy  5. Myeloplastic disease  6. Chronic macrocytic anemia, possibly due to myelofibrosis  7. Mild troponin elevation, 0.12-0.14 with flat trend. Not consistent with ACS. No chest pain symptoms recognized.[EE1.1] Recent echo normal except for mild aortic stenosis[EE1.2]  8. Abnormal ECG with anterolateral T wave inversion, possibly due to ischemia.    Possible demand ischemia in setting of pneumonia and hypotension causing slight troponin rise. ECG changes are more concerning. We need some risk stratification in this setting with a Lexiscan nuclear perfusion study. I will plan on that for tomorrow.     CHARISMA RUBIO MD        Interval History:   doing well; no cp, sob, n/v/d, or abd pain.          Medications:[EE1.1]       cholecalciferol  2,000 Units Oral Daily     dorzolamide-timolol PF  1 drop Both Eyes BID     fluticasone-vilanterol  1 puff Inhalation Daily     insulin aspart  1-7 Units Subcutaneous TID AC     insulin aspart  1-5 Units Subcutaneous At Bedtime     ipratropium - albuterol 0.5 mg/2.5 mg/3 mL  3 mL Nebulization 4x daily     levothyroxine  100 mcg Oral QAM AC     midodrine  10 mg Oral TID w/meals     pantoprazole  40 mg Oral BID AC     piperacillin-tazobactam  3.375 g Intravenous Q6H     " "predniSONE  40 mg Oral Daily     senna-docusate  1 tablet Oral BID    Or     senna-docusate  2 tablet Oral BID     sodium chloride (PF)  3 mL Intracatheter Q8H[EE1.3]            Physical Exam:[EE1.1]   Blood pressure 118/66, pulse 106, temperature 98.3  F (36.8  C), temperature source Oral, resp. rate 18, height 1.854 m (6' 1\"), weight 80.8 kg (178 lb 1.6 oz), SpO2 100 %.[EE1.3]    Vitals:    07/23/18 1225 07/24/18 0754 07/25/18 0615 07/26/18 0626   Weight: 72.6 kg (160 lb) 73 kg (160 lb 15 oz) 72.3 kg (159 lb 6.4 oz) 74.2 kg (163 lb 9.3 oz)    07/27/18 0641 07/28/18 0500 07/28/18 1857 07/30/18 0700   Weight: 77.9 kg (171 lb 11.8 oz) 77.9 kg (171 lb 11.8 oz) 80.9 kg (178 lb 6.4 oz) 80.8 kg (178 lb 1.6 oz)         Intake/Output Summary (Last 24 hours) at 07/30/18 1158  Last data filed at 07/30/18 0755   Gross per 24 hour   Intake                0 ml   Output              275 ml   Net             -275 ml       07/25 0700 - 07/30 0659  In: 6926 [P.O.:2280; I.V.:4646]  Out: 3467 [Urine:3467]  Net: 3456    Exam:  GENERAL APPEARANCE ADULT: Alert, in no acute distress  RESP: expiratory wheezes  CV: regular rate and rhythm, no murmur, rub, or gallop  ABDOMEN: normal bowel sounds, soft, nontender, no hepatosplenomegaly or other masses  EXTREMITIES: No edema         Data:   LABS (Last four results)  CMP  Recent Labs  Lab 07/29/18  0535 07/28/18  1824 07/27/18  0710 07/26/18  0817 07/25/18  0808 07/24/18  0815    134 141 141 141 137   POTASSIUM 3.6 3.6 3.6 3.4 3.4 3.3*   CHLORIDE 108 106 111* 113* 113* 106   CO2 22 17* 18* 16* 17* 21   ANIONGAP 8 11 12 12 11 10   * 121* 111* 101 98 100*   BUN 9 9 9 8 9 11   CR 1.16 1.10 1.17 1.19 1.36* 1.46*   GFRESTIMATED 62 66 61 60* 51* 47*   GFRESTBLACK 75 79 74 73 62 57*   JESSICA 7.9* 7.6* 7.7* 7.8* 7.8* 7.9*   PROTTOTAL  --   --   --  6.2* 6.2* 5.4*   ALBUMIN  --   --   --  2.6* 2.5* 2.4*   BILITOTAL  --   --   --  0.8 0.5 1.0   ALKPHOS  --   --   --  114 110 99   AST  --   --  "  --  61* 57* 51*   ALT  --   --   --  14 13 11     CBC  Recent Labs  Lab 07/29/18  0535 07/28/18  1404 07/27/18  0710 07/26/18  0817   WBC 7.7 7.8 7.4 8.1   RBC 2.48* 2.61* 2.52* 2.81*   HGB 8.8* 9.3* 9.0* 9.8*   HCT 25.3* 27.0* 26.0* 29.1*   * 103* 103* 104*   MCH 35.5* 35.6* 35.7* 34.9*   MCHC 34.8 34.4 34.6 33.7   RDW 14.1 14.0 13.8 13.7   * 149* 125* 162     INRNo lab results found in last 7 days.  TROPONINS Lab Results   Component Value Date    TROPI 0.125 () 07/29/2018    TROPI 0.135 () 07/28/2018    TROPI 0.136 () 07/28/2018    TROPI 0.148 () 07/28/2018    TROPI <0.015 07/23/2018                                                                                                               NIRMAL RUBIO MD[EE1.1]     Revision History        User Key Date/Time User Provider Type Action    > EE1.2 7/30/2018 12:07 PM Nirmal Rubio MD Physician Addend     EE1.3 7/30/2018 12:06 PM Nirmal Rubio MD Physician Sign     EE1.1 7/30/2018 11:58 AM Nirmal Rubio MD Physician             Progress Notes by Georgette Waterman APRN CNP at 7/30/2018 11:26 AM     Author:  Georgette Waterman APRN CNP Service:  Oncology Author Type:  Nurse Practitioner    Filed:  7/30/2018 11:29 AM Date of Service:  7/30/2018 11:26 AM Creation Time:  7/30/2018 11:26 AM    Status:  Signed :  Georgette Waterman APRN CNP (Nurse Practitioner)         HEMATOLOGY CC:      No new CBC for review      .  Myeloproliferative neoplasm -- polycythemia vera treated previously with phlebotomy but not required for the last several years.       2.  Anemia, macrocytic, likely associated with MPN with possible progression to myelofibrosis state.  Other etiologies: chronic kidney disease associated anemia.  The drop in hemoglobin is likely dilution after admission.   - Follow CBCs  - To see Dr. Ames in clinic in about 1 month.      3.  Thrombocytopenia associated with MPN  - Stable      4.   History of ascites and peritoneal nodularity, status post laparoscopic evaluation in 10/2017.  Negative for malignancy and showed cirrhosis.       5.  Weakness, multifactorial.  Could be associated with liver disease, hypoalbuminemia, anemia, and chronic kidney disease.   - Likely DC to TCU     Georgette Waterman  Nurse Practitioner  [facility][MK1.1]     Revision History        User Key Date/Time User Provider Type Action    > MK1.1 7/30/2018 11:29 AM Georgette Waterman APRN CNP Nurse Practitioner Sign            Progress Notes by Mine Pike at 7/30/2018 11:13 AM     Author:  Mine Pike Service:  Spiritual Health Author Type:      Filed:  7/30/2018 11:18 AM Date of Service:  7/30/2018 11:13 AM Creation Time:  7/30/2018 11:13 AM    Status:  Signed :  Mine Pike ()         SPIRITUAL HEALTH SERVICES Progress Note  [facility] [place]    Visited pt per length of stay. Pt bemoaned the restricted freedom of the hospital and the frequent disruptions of the alarms. Pt has one sister and several nieces and nephews who are supportive. Pt has also been visited by friends. Pt spoke about his career as an  and his global travels with his work. 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 Pt particularly enjoyed his business trips to [place]. Pt states that his life as a single person enabled him to work a job with so much traveling. Pt appreciates prayer.     provided supportive listening and prayer. Pt requested follow-up.  will follow up as available.      Mine Pike  Chaplain Resident  Pager: [phone]  Office: [phone][EW1.1]     Revision History        User Key Date/Time User Provider Type Action    > EW1.1 7/30/2018 11:18 AM Mine Pikein Sign            Progress Notes by Kayla Levy RD, LD at 7/30/2018  9:07 AM     Author:  Kayla Levy RD, LD Service:  Nutrition Author Type:  Registered Dietitian    Filed:   "7/30/2018  9:14 AM Date of Service:  7/30/2018  9:07 AM Creation Time:  7/30/2018  9:07 AM    Status:  Addendum :  Kayla Levy RD, LD (Registered Dietitian)         CLINICAL NUTRITION SERVICES - REASSESSMENT NOTE      Malnutrition: (7/24)  % Weight Loss:  > 7.5% in 3 months (severe malnutrition)  % Intake:  </= 50% for >/= 1 month (severe malnutrition)  Subcutaneous Fat Loss:  None observed  Muscle Loss:  None observed  Fluid Retention:  None noted     Malnutrition Diagnosis: Severe malnutrition  In Context of:  Chronic illness or disease       EVALUATION OF PROGRESS TOWARD GOALS   Diet:  Full Liquid + Boost BID between meals   Was on a regular diet yesterday morning and then NPO for procedures     Intake:  Visited with patient this morning - he has just received his breakfast tray of broth, apple juice, and coffee.  He states that his appetite is a little bit improved.  However, he is not fond of the food which is affecting his intake.  Patient was NPO yesterday afternoon but was able to consume \"most\" of his breakfast:  Pancakes x 2, eggs, and coffee.  He also noted that he is taking 100% of the Boost that he is receiving twice daily.       NEW FINDINGS:   Plan for EGD per GI once cardiac/pulmonary status improved     Previous Goals (7/24):   Pt will consume at least 75% of 3 meals/day and supplements  Evaluation: Not met    Previous Nutrition Diagnosis (7/24):   Inadequate oral intake related to poor appetite as evidenced by 11% wt loss x 4 months  Evaluation: Improving      CURRENT NUTRITION DIAGNOSIS  Inadequate oral intake related to recent tests (and subsequent NPO status) + dislike of the food as evidenced by consumed one meal yesterday    INTERVENTIONS  Recommendations / Nutrition Prescription  Advance diet per MD discretion   Continue Boost BID between meals     Implementation  None     Goals  Patient will consume >/= 75% meals TID and supplements BID       MONITORING AND EVALUATION:  Progress " towards goals will be monitored and evaluated per protocol and Practice Guidelines    Kayla Levy RD, LD, [facility]   Clinical Dietitian - [facility][KK1.1]              Revision History        User Key Date/Time User Provider Type Action    > [N/A] 7/30/2018  9:14 AM Kayla Levy RD, LD Registered Dietitian Addend     KK1.1 7/30/2018  9:14 AM Kayla Levy RD, SHADI Registered Dietitian Sign                  Procedure Notes     No notes of this type exist for this encounter.         Progress Notes - Therapies (Notes from 07/30/18 through 08/02/18)      Progress Notes by MORENA SUAZO at 7/30/2018  9:00 PM     Author:  MORENA SUAZO Service:  Respiratory Therapy Author Type:  Respiratory Therapist    Filed:  7/30/2018  9:01 PM Date of Service:  7/30/2018  9:00 PM Creation Time:  7/30/2018  9:00 PM    Status:  Signed :  MORENA SUAZO RT (Respiratory Therapist)         Patient is on a 2L NC with SpO2 in the upper 90's. BS coarse diminished with an increase in aeration post neb treatments. All nebs were given as ordered.  Will cont to follow.[SB1.1]  7/30/2018[SB1.2]  Morena Suazo RRT[SB1.1]     Revision History        User Key Date/Time User Provider Type Action    > SB1.2 7/30/2018  9:01 PM MORENA SUAZO Respiratory Therapist Sign     SB1.1 7/30/2018  9:00 PM MORENA SUAZO Respiratory Therapist             Progress Notes by Aubrie Scott SLP at 7/30/2018 12:28 PM     Author:  Aubrie Scott SLP Service:  (none) Author Type:  Speech Therapist    Filed:  7/30/2018 12:29 PM Date of Service:  7/30/2018 12:28 PM Creation Time:  7/30/2018 12:28 PM    Status:  Signed :  Aubrie Scott SLP (Speech Therapist)         CLINICAL SWALLOW EVALUATION       07/30/18 1100   General Information   Onset Date 07/23/18   Start of Care Date 07/30/18   Referring Physician Dr. Cline   Patient Profile Review/OT: Additional Occupational Profile Info See Profile for full history and  prior level of function   Patient/Family Goals Statement Patient would like to continue eating   Swallowing Evaluation Bedside swallow evaluation   Behaviorial Observations Alert;WFL (within functional limits)   Mode of current nutrition Oral diet   Type of oral diet Thin liquid  (Full liquids)   Respiratory Status O2 Supply   Comments Patient admitted 7/23 with weakness, anemia, and RLL. Patient's PMH is significant for myeloproliferative disorder, cirrhosis, and HTN.  Patient reports long term hx of occasional coughing during liquid intake.  He reports that for 30+ years, if he is chasing food down with liquids, he will occasionally feel a small amount of liquid going down the wrong pipe.  Patient's upper GI endoscopy in June 2017 indicated normal esophagus at that time.     Clinical Swallow Evaluation   Oral Musculature generally intact   Structural Abnormalities none present   Dentition present and adequate   Mucosal Quality good   Mandibular Strength and Mobility intact   Oral Labial Strength and Mobility WFL   Lingual Strength and Mobility WFL   Velar Elevation intact   Buccal Strength and Mobility intact   Laryngeal Function Cough;Throat clear;Swallow;Voicing initiated;Dry swallow palpated   Oral Musculature Comments Strong and clear voicing, coughing after nebulizer treatment.     Additional Documentation Yes   Additional evaluation(s) completed today No   Clinical Swallow Eval: Thin Liquid Texture Trial   Mode of Presentation, Thin Liquids cup;self-fed   Volume of Liquid or Food Presented 4 oz   Oral Phase of Swallow WFL   Pharyngeal Phase of Swallow intact   Diagnostic Statement No overt Sx of aspiration with single or consecutive sips   Clinical Swallow Eval: Puree Solid Texture Trial   Mode of Presentation, Puree spoon;self-fed   Volume of Puree Presented 3 oz   Oral Phase, Puree WFL   Pharyngeal Phase, Puree impaired;wet vocal quality after swallow   Successful Strategies Trialed During Procedure, Puree  hard swallow   Diagnostic Statement Intermittent wet vocal quality, independently cleared.   Clinical Swallow Eval: Solid Food Texture Trial   Mode of Presentation, Solid self-fed   Volume of Solid Food Presented 2 crackers   Oral Phase, Solid WFL   Pharyngeal Phase, Solid impaired;wet vocal quality after swallow   Successful Strategies Trialed During Procedure, Solid hard swallow   Diagnostic Statement Intermittent wet vocal quality, independently cleared.   Swallow Eval: Clinical Impressions   Skilled Criteria for Therapy Intervention Skilled criteria met.  Treatment indicated.   Functional Assessment Scale (FAS) 4   Dysphagia Outcome Severity Scale (AKILAH) Level 4 - AKILAH   Treatment Diagnosis Mild to moderate oropharyngeal dysphagia   Diet texture recommendations Regular diet;Thin liquids   Recommended Feeding/Eating Techniques small sips/bites;other (see comments);maintain upright posture during/after eating for 30 mins  (Double swallow)   Demonstrates Need for Referral to Another Service dietitian;social work   Therapy Frequency 5 times/wk   Predicted Duration of Therapy Intervention (days/wks) 1 week   Risks and Benefits of Treatment have been explained. Yes   Patient, family and/or staff in agreement with Plan of Care Yes   Clinical Impression Comments Patient presents with mild to moderate oropharyngeal dysphagia characterized by wet vocal quality intermittently with puree and solid trials, suspected pharyngeal residue due to generalized weakness.  No overt Sx of aspiration noted with single and consecutive sips of thin liquids today, however, cannot rule out silent aspiration at bedside.  Trained double swallow strategy.    Total Evaluation Time   Total Evaluation Time (Minutes) 45[RL1.1]        Revision History        User Key Date/Time User Provider Type Action    > RL1.1 7/30/2018 12:29 PM Aubrie Scott, SLP Speech Therapist Sign

## 2018-07-23 NOTE — IP AVS SNAPSHOT
` `     [facility] CARDIAC SPECIALTY CARE: [phone]            Medication Administration Report for Abebe Christensen as of 08/02/18 1359   Legend:    Given Hold Not Given Due Canceled Entry Other Actions    Time Time (Time) Time  Time-Action       Inactive    Active    Linked        Medications 07/27/18 07/28/18 07/29/18 07/30/18 07/31/18 08/01/18 08/02/18    acetaminophen (TYLENOL) tablet 650 mg  Dose: 650 mg  Freq: EVERY 4 HOURS PRN Route: PO  PRN Reason: mild pain  Start: 07/23/18 1902   Admin Instructions: Alternate ibuprofen (if ordered) with acetaminophen.  Maximum acetaminophen dose from all sources = 75 mg/kg/day not to exceed 4 grams/day.    Admin. Amount: 2 tablet (2 × 325 mg tablet)  Dispense Loc: [facility] A               amoxicillin-clavulanate (AUGMENTIN) 875-125 MG per tablet 1 tablet  Dose: 1 tablet  Freq: EVERY 12 HOURS SCHEDULED Route: PO  Indications of Use: COMMUNITY ACQUIRED PNEUMONIA  Start: 07/31/18 1200   Admin. Amount: 1 tablet  Last Admin: 08/02/18 0923  Dispense Loc: [facility] A         1235 (1 tablet)-Given       2009 (1 tablet)-Given        1005 (1 tablet)-Given       2029 (1 tablet)-Given        0923 (1 tablet)-Given       [ ] 2000           aspirin EC tablet 81 mg  Dose: 81 mg  Freq: DAILY Route: PO  Start: 08/01/18 1245   Admin Instructions: DO NOT CRUSH.    Admin. Amount: 1 tablet (1 × 81 mg tablet)  Last Admin: 08/02/18 0924  Dispense Loc: [facility] A          1330 (81 mg)-Given        0924 (81 mg)-Given           atorvastatin (LIPITOR) tablet 20 mg  Dose: 20 mg  Freq: EVERY EVENING Route: PO  Start: 08/01/18 2000   Admin. Amount: 1 tablet (1 × 20 mg tablet)  Last Admin: 08/01/18 2029  Dispense Loc: [facility]          2029 (20 mg)-Given        [ ] 2000           benztropine (COGENTIN) tablet 1-2 mg  Dose: 1-2 mg  Freq: 3 TIMES DAILY PRN Route: PO  PRN Reason: other  PRN Comment: for extrapyramidal effects  Start: 07/26/18 1332   Admin. Amount: 1-2 tablet (1-2 × 1 mg  tablet)  Dispense Loc:  [facility]               cholecalciferol (vitamin D3) tablet 2,000 Units  Dose: 2,000 Units  Freq: DAILY Route: PO  Start: 07/24/18 0900   Admin. Amount: 2 tablet (2 × 1,000 Units tablet)  Last Admin: 08/02/18 0924  Dispense Loc:  [facility]     1045 (2,000 Units)-Given        0958 (1,000 Units)-Given        0937 (2,000 Units)-Given        0927 (2,000 Units)-Given        1057 (1,000 Units)-Given [C]        1005 (1,000 Units)-Given        0924 (2,000 Units)-Given           dorzolamide-timolol PF (COSOPT) opthalmic solution 1 drop  Dose: 1 drop  Freq: 2 TIMES DAILY Route: Both Eyes  Start: 07/23/18 2100   Admin. Amount: 1 drop  Last Admin: 08/02/18 0928  Dispense Loc:  Main Pharmacy  Volume: 0.2 mL     1049 (1 drop)-Given       2341 (1 drop)-Given        0959 (1 drop)-Given       (2223)-Not Given        (0941)-Not Given       2100 (1 drop)-Given        1028 (1 drop)-Given       (2323)-Not Given        1057 (1 drop)-Given       2009 (1 drop)-Given        1007 (1 drop)-Given       2029 (1 drop)-Given        0928 (1 drop)-Given       [ ] 2100           fluticasone-vilanterol (BREO ELLIPTA) 100-25 MCG/INH oral inhaler 1 puff  Dose: 1 puff  Freq: DAILY Route: IN  Start: 07/30/18 0915   Admin Instructions: *Do not use more frequently than once daily.*  Rinse mouth after use.    Admin. Amount: 1 puff  Last Admin: 08/02/18 0923  Dispense Loc:  Main Pharmacy        1022 (1 puff)-Given        1057 (1 puff)-Given        1007 (1 puff)-Given        0923 (1 puff)-Given           glucose gel 15-30 g  Dose: 15-30 g  Freq: EVERY 15 MIN PRN Route: PO  PRN Reason: low blood sugar  Start: 07/23/18 1902   Admin Instructions: Give 15 g for BG 51 to 69 mg/dL IF patient is conscious and able to swallow. Give 30 g for BG less than or equal to 50 mg/dL IF patient is conscious and able to swallow. Do NOT give glucose gel via enteral tube.  IF patient has enteral tube: give apple juice 120 mL (4 oz or 15 g of CHO)  via enteral tube for BG 51 to 69 mg/dL.  Give apple juice 240 mL (8 oz or 30 g of CHO) via enteral tube for BG less than or equal to 50 mg/dL.    ~Oral gel is preferable for conscious and able to swallow patient.   ~IF gel unavailable or patient refuses may provide apple juice 120 mL (4 oz or 15 g of CHO). Document juice on I and O flowsheet.    Admin. Amount: 15-30 g  Dispense Loc:  [facility]  Volume: 93.75 mL              Or  dextrose 50 % injection 25-50 mL  Dose: 25-50 mL  Freq: EVERY 15 MIN PRN Route: IV  PRN Reason: low blood sugar  Start: 07/23/18 1902   Admin Instructions: Use if have IV access, BG less than 70 mg/dL and meet dose criteria below:  Dose if conscious and alert (or disorientated) and NPO = 25 mL  Dose if unconscious / not alert = 50 mL  Vesicant. For ordered doses up to 25 g, give IV Push undiluted. Give each 5g over 1 minute.    Admin. Amount: 25-50 mL  Dispense Loc: [facility]  Infused Over: 1-5 Minutes  Volume: 50 mL              Or  glucagon injection 1 mg  Dose: 1 mg  Freq: EVERY 15 MIN PRN Route: SC  PRN Reason: low blood sugar  PRN Comment: May repeat x 1 only  Start: 07/23/18 1902   Admin Instructions: May give SQ or IM. ONLY use glucagon IF patient has NO IV access AND is UNABLE to swallow AND blood glucose is LESS than or EQUAL to 50 mg/dL.  If ordered IV, give IV Push over 1 minute. Reconstitute with 1mL sterile water.    Admin. Amount: 1 mg  Dispense Loc: [facility]               insulin aspart (NovoLOG) inj (RAPID ACTING)  Dose: 1-5 Units  Freq: AT BEDTIME Route: SC  Start: 07/23/18 2200   Admin Instructions: MEDIUM INSULIN RESISTANCE DOSING    Do Not give Bedtime Correction Insulin if BG less than  200.   For  - 249 give 1 units.   For  - 299 give 2 units.   For  - 349 give 3 units.   For  -399 give 4 units.   For BG greater than or equal to 400 give 5 units.  Notify provider if glucose greater than or equal to 350 mg/dL after administration of  correction dose.  If given at mealtime, must be administered 5 min before meal or immediately after.    Admin. Amount: 1-5 Units  Dispense Loc: Contact Rx for dose  Volume: 3 mL     (2134)-Not Given        (2223)-Not Given        (2245)-Not Given        (2116)-Not Given        (2107)-Not Given        (2128)-Not Given        [ ] 2200           insulin aspart (NovoLOG) inj (RAPID ACTING)  Dose: 1-7 Units  Freq: 3 TIMES DAILY BEFORE MEALS Route: SC  Start: 07/24/18 0730   Admin Instructions: Correction Scale - MEDIUM INSULIN RESISTANCE DOSING     Do Not give Correction Insulin if Pre-Meal BG less than 140.   For Pre-Meal  - 189 give 1 unit.   For Pre-Meal  - 239 give 2 units.   For Pre-Meal  - 289 give 3 units.   For Pre-Meal  - 339 give 4 units.   For Pre-Meal - 399 give 5 units.   For Pre-Meal -449 give 6 units  For Pre-Meal BG greater than or equal to 450 give 7 units.   To be given with prandial insulin, and based on pre-meal blood glucose.    Notify provider if glucose greater than or equal to 350 mg/dL after administration of correction dose.  If given at mealtime, must be administered 5 min before meal or immediately after.    Admin. Amount: 1-7 Units  Last Admin: 08/01/18 1716  Dispense Loc: Contact Rx for dose  Volume: 3 mL     (1019)-Not Given       (1354)-Not Given       (1805)-Not Given        (0849)-Not Given       (1250)-Not Given       (1715)-Not Given        (0922)-Not Given       (1200)-Not Given       (1735)-Not Given        (0900)-Not Given       1408 (1 Units)-Given [C]       1805 (1 Units)-Given        (0911)-Not Given [C]       (1241)-Not Given [C]       1837 (1 Units)-Given [C]        (1010)-Not Given [C]       (1253)-Not Given [C]       1716 (1 Units)-Given [C]        (0924)-Not Given       (1256)-Not Given       [ ] 1700           ipratropium - albuterol 0.5 mg/2.5 mg/3 mL (DUONEB) neb solution 3 mL  Dose: 3 mL  Freq: 4 TIMES DAILY Route: NEBULIZATION  Start:  07/30/18 1100   Admin. Amount: 3 mL  Last Admin: 08/02/18 0746  Dispense Loc: [facility]  Volume: 3 mL        1135 (3 mL)-Given       1530 (3 mL)-Given       1956 (3 mL)-Given        0753 (3 mL)-Given       1212 (3 mL)-Given       1614 (3 mL)-Given       1932 (3 mL)-Given        0721 (3 mL)-Given       1159 (3 mL)-Given       1551 (3 mL)-Given       2019 (3 mL)-Given        0746 (3 mL)-Given       (1209)-Not Given       [ ] 1500       [ ] 1900           levalbuterol (XOPENEX CONC) neb solution 1.25 mg  Dose: 1.25 mg  Freq: EVERY 4 HOURS PRN Route: NEBULIZATION  PRN Reasons: wheezing,shortness of breath / dyspnea  Start: 07/29/18 0356   Order specific questions:  Levalbuterol orders will be substituted to albuterol unless intolerance is documented here Tachycardia     Admin. Amount: 1.25 mg = 0.5 mL Conc: 1.25 mg/0.5 mL  Last Admin: 08/01/18 0443  Dispense Loc: [facility] A  Volume: 0.5 mL        0509 (1.25 mg)-Given         0443 (1.25 mg)-Given            levothyroxine (SYNTHROID/LEVOTHROID) tablet 100 mcg  Dose: 100 mcg  Freq: EVERY MORNING BEFORE BREAKFAST Route: PO  Start: 07/24/18 0730   Admin Instructions: Separate oral administration of iron- or calcium-containing products and levothyroxine by at least 4 hours.    Admin. Amount: 1 tablet (1 × 100 mcg tablet)  Last Admin: 08/02/18 0644  Dispense Loc: [facility] A     1046 (100 mcg)-Given        0658 (100 mcg)-Given        0813 (100 mcg)-Given        0927 (100 mcg)-Given        0646 (100 mcg)-Given        1005 (100 mcg)-Given        0644 (100 mcg)-Given           melatonin tablet 1 mg  Dose: 1 mg  Freq: AT BEDTIME PRN Route: PO  PRN Reason: sleep  Start: 07/23/18 1902   Admin Instructions: Do not give unless at least 6 hours of uninterrupted sleep is expected.    Admin. Amount: 1 tablet (1 × 1 mg tablet)  Dispense Loc: [facility]               midodrine (PROAMATINE) tablet 10 mg  Dose: 10 mg  Freq: 3 TIMES DAILY WITH MEALS Route: PO  Start: 07/29/18 0915    Admin. Amount: 2 tablet (2 × 5 mg tablet)  Last Admin: 08/02/18 1328  Dispense Loc: [facility]       0940 (10 mg)-Given       1423 (10 mg)-Given       2059 (10 mg)-Given        0927 (10 mg)-Given       1235 (10 mg)-Given       1804 (10 mg)-Given        0901 (10 mg)-Given       1236 (10 mg)-Given       1719 (10 mg)-Given        1005 (10 mg)-Given       1251 (10 mg)-Given       1809 (10 mg)-Given        0923 (10 mg)-Given       1328 (10 mg)-Given       [ ] 1800           naloxone (NARCAN) injection 0.1-0.4 mg  Dose: 0.1-0.4 mg  Freq: EVERY 2 MIN PRN Route: IV  PRN Reason: opioid reversal  Start: 07/23/18 1902   Admin Instructions: For respiratory rate LESS than or EQUAL to 8.  Partial reversal dose:  0.1 mg titrated q 2 minutes for Analgesia Side Effects Monitoring Sedation Level of 3 (frequently drowsy, arousable, drifts to sleep during conversation).Full reversal dose:  0.4 mg bolus for Analgesia Side Effects Monitoring Sedation Level of 4 (somnolent, minimal or no response to stimulation).  For ordered doses up to 2mg give IVP. Give each 0.4mg over 15 seconds in emergency situations. For non-emergent situations further dilute in 9mL of NS to facilitate titration of response.    Admin. Amount: 0.1-0.4 mg = 0.25-1 mL Conc: 0.4 mg/mL  Dispense Loc: [facility]  Volume: 1 mL               ondansetron (ZOFRAN-ODT) ODT tab 4 mg  Dose: 4 mg  Freq: EVERY 6 HOURS PRN Route: PO  PRN Reasons: nausea,vomiting  Start: 07/23/18 1902   Admin Instructions: This is Step 1 of nausea and vomiting management.  If nausea not resolved in 15 minutes, go to Step 2 prochlorperazine (COMPAZINE). Do not push through foil backing. Peel back foil and gently remove. Place on tongue immediately. Administration with liquid unnecessary  With dry hands, peel back foil backing and gently remove tablet; do not push oral disintegrating tablet through foil backing; administer immediately on tongue and oral disintegrating tablet dissolves in  seconds; then swallow with saliva; liquid not required.    Admin. Amount: 1 tablet (1 × 4 mg tablet)  Last Admin: 08/01/18 1100  Dispense Loc:  [facility]          1100 (4 mg)-Given           Or  ondansetron (ZOFRAN) injection 4 mg  Dose: 4 mg  Freq: EVERY 6 HOURS PRN Route: IV  PRN Reasons: nausea,vomiting  Start: 07/23/18 1902   Admin Instructions: This is Step 1 of nausea and vomiting management.  If nausea not resolved in 15 minutes, go to Step 2 prochlorperazine (COMPAZINE).  Irritant. For ordered doses up to 4 mg, give IV Push undiluted over 2-5 minutes.    Admin. Amount: 4 mg = 2 mL Conc: 4 mg/2 mL  Dispense Loc:  [facility]  Infused Over: 2-5 Minutes  Volume: 2 mL                      pantoprazole (PROTONIX) EC tablet 40 mg  Dose: 40 mg  Freq: EVERY MORNING BEFORE BREAKFAST Route: PO  Start: 08/01/18 0730   Admin Instructions: DO NOT CRUSH.    Admin. Amount: 1 tablet (1 × 40 mg tablet)  Last Admin: 08/02/18 0644  Dispense Loc: [facility]          1004 (40 mg)-Given        0644 (40 mg)-Given           polyethylene glycol (MIRALAX/GLYCOLAX) Packet 17 g  Dose: 17 g  Freq: DAILY PRN Route: PO  PRN Reason: constipation  Start: 07/24/18 1145   Admin Instructions: Give in 8oz of  water, juice, or soda. Hold for loose stools.  This is the second step of a three step constipation treatment.  1 Packet = 17 grams. Mixed prescribed dose in 8 ounces of water. Follow with 8 oz. of water.    Admin. Amount: 17 g  Dispense Loc: [facility]               potassium chloride (KLOR-CON) Packet 20-40 mEq  Dose: 20-40 mEq  Freq: EVERY 2 HOURS PRN Route: ORAL OR FEED  PRN Reason: potassium supplementation  Start: 08/01/18 0255   Admin Instructions: Use if unable to tolerate tablets.    If Serum K+ 3.4-4.0, dose = 20 mEq x1. Recheck K+ level the next AM.  If Serum K+ 3.0-3.3, dose = 60 mEq po total dose (40 mEq x 1 followed in 2 hours by 20 mEq X1). Recheck K+ level 4 hours after dose and the next AM.  If Serum K+ 2.5-2.9, dose  = 80 mEq po total dose (40 mEq Q2H x2). Recheck K+ level 4 hours after dose and the next AM.  If Serum K+ less than 2.5, See IV order.  Dissolve packet contents in 4-8 ounces of cold water or juice.    Admin. Amount: 20-40 mEq  Dispense Loc: [facility]               potassium chloride 10 mEq in 100 mL intermittent infusion with 10 mg lidocaine  Dose: 10 mEq  Freq: EVERY 1 HOUR PRN Route: IV  PRN Reason: potassium supplementation  Start: 08/01/18 0255   Admin Instructions: Infuse via PERIPHERAL LINE. Use potassium with lidocaine for pain with peripheral administration.  If Serum K+ 3.4-4.0, dose = 10 mEq/hr x2 doses. Recheck K+ level the next AM.  If Serum K+ 3.0-3.3, dose = 10 mEq/hr x4 doses (40 mEq IV total dose). Recheck K+ level 2 hours after dose and the next AM.  If Serum K+ less than 3.0, dose = 10 mEq/hr x6 doses (60 mEq IV total dose). Recheck K+ level 2 hours after dose and the next AM.    Admin. Amount: 10 mEq = 100 mL Conc: 10 mEq/100 mL  Dispense Loc: Contact Rx for dose  Infused Over: 1 Hours  Volume: 100 mL               potassium chloride 10 mEq in 100 mL sterile water intermittent infusion (premix)  Dose: 10 mEq  Freq: EVERY 1 HOUR PRN Route: IV  PRN Reason: potassium supplementation  Start: 08/01/18 0255   Admin Instructions: Infuse via PERIPHERAL LINE or CENTRAL LINE. Use for central line replacement if patient weight less than 65 kg, if patient is on TPN with high potassium content or if unit does not stock 20 mEq bags.  If Serum K+ 3.4-4.0, dose = 10 mEq/hr x2 doses. Recheck K+ level the next AM.  If Serum K+ 3.0-3.3, dose = 10 mEq/hr x4 doses (40 mEq IV total dose). Recheck K+ level 2 hours after dose and the next AM.  If Serum K+ less than 3.0, dose = 10 mEq/hr x6 doses (60 mEq IV total dose). Recheck K+ level 2 hours after dose and the next AM.    Admin. Amount: 10 mEq = 100 mL Conc: 10 mEq/100 mL  Dispense Loc: Contact Rx for dose  Infused Over: 60 Minutes  Volume: 100 mL                potassium chloride 20 mEq in 50 mL intermittent infusion  Dose: 20 mEq  Freq: EVERY 1 HOUR PRN Route: IV  PRN Reason: potassium supplementation  Start: 08/01/18 0255   Admin Instructions: Infuse via CENTRAL LINE Only.  May need EKG if less than 65 kg or on TPN - Max rate is 0.3 mEq/kg/hr for patients not on EKG monitoring.    If Serum K+ 3.4-4.0, dose = 20 mEq/hr x1 doses. Recheck K+ level the next AM.  If Serum K+ 3.0-3.3, dose = 20 mEq/hr x2 doses (40 mEq IV total dose).  Recheck K+ level 2 hours after dose and the next AM.  If Serum K+ less than 3.0, dose = 20 mEq/hr x3 doses (60 mEq IV total dose). Recheck K+ level 2 hours after dose and the next AM.    Admin. Amount: 20 mEq = 50 mL Conc: 20 mEq/50 mL  Dispense Loc: Contact Rx for dose  Volume: 50 mL               potassium chloride SA (K-DUR/KLOR-CON M) CR tablet 20-40 mEq  Dose: 20-40 mEq  Freq: EVERY 2 HOURS PRN Route: PO  PRN Reason: potassium supplementation  Start: 08/01/18 0255   Admin Instructions: Use if able to take PO.   If Serum K+ 3.4-4.0, dose = 20 mEq x1. Recheck K+ level the next AM.  If Serum K+ 3.0-3.3, dose = 60 mEq po total dose (40 mEq x1 followed in 2 hours by 20 mEq x1). Recheck K+ level 4 hours after dose and the next AM.  If Serum K+ 2.5-2.9, dose = 80 mEq po total dose (40 mEq Q2H x2). Recheck K+ level 4 hours after dose and the next AM.  If Serum K+ less than 2.5, See IV order.  DO NOT CRUSH    Admin. Amount: 1-2 tablet (1-2 × 20 mEq tablet)  Last Admin: 08/01/18 0441  Dispense Loc: [facility]          0441 (20 mEq)-Given            predniSONE (DELTASONE) tablet 40 mg  Dose: 40 mg  Freq: DAILY Route: PO  Start: 07/30/18 0915   Admin. Amount: 2 tablet (2 × 20 mg tablet)  Last Admin: 08/02/18 0923  Dispense Loc:  [facility]        1021 (40 mg)-Given        1057 (40 mg)-Given        1005 (40 mg)-Given        0923 (40 mg)-Given           senna-docusate (SENOKOT-S;PERICOLACE) 8.6-50 MG per tablet 1 tablet  Dose: 1 tablet  Freq: 2 TIMES  DAILY PRN Route: PO  PRN Reason: constipation  Start: 07/24/18 1145   Admin Instructions: If no bowel movement in 24 hours, increase to 2 tablets PO.  Hold for loose stools.  This is the first step of a three step constipation treatment.    Admin. Amount: 1 tablet  Dispense Loc:  [facility]              Or  senna-docusate (SENOKOT-S;PERICOLACE) 8.6-50 MG per tablet 2 tablet  Dose: 2 tablet  Freq: 2 TIMES DAILY PRN Route: PO  PRN Reason: constipation  Start: 07/24/18 1145   Admin Instructions: Hold for loose stools.  This is the first step of a three step constipation treatment.    Admin. Amount: 2 tablet  Dispense Loc: [facility]               senna-docusate (SENOKOT-S;PERICOLACE) 8.6-50 MG per tablet 1 tablet  Dose: 1 tablet  Freq: 2 TIMES DAILY Route: PO  Start: 07/24/18 1200   Admin Instructions: If no bowel movement in 24 hours, increase to 2 tablets PO.  Hold for loose stools.    Admin. Amount: 1 tablet  Last Admin: 07/29/18 0938  Dispense Loc:  [facility]     (1019)-Not Given       (2134)-Not Given        (0951)-Not Given       (2223)-Not Given        0938 (1 tablet)-Given       (2001)-Not Given        (1006)-Not Given       (2155)-Not Given        (0911)-Not Given [C]       2100-Hold        (1012)-Not Given       (2030)-Not Given               [ ] 2100          Or  senna-docusate (SENOKOT-S;PERICOLACE) 8.6-50 MG per tablet 2 tablet  Dose: 2 tablet  Freq: 2 TIMES DAILY Route: PO  Start: 07/24/18 1200   Admin Instructions: Hold for loose stools.    Admin. Amount: 2 tablet  Last Admin: 08/02/18 0923  Dispense Loc:  [facility]                                                                                               0923 (2 tablet)-Given       [ ] 2100           sodium chloride (PF) 0.9% PF flush 3 mL  Dose: 3 mL  Freq: EVERY 8 HOURS Route: IK  Start: 07/30/18 1400   Admin Instructions: And Q1H PRN, to lock peripheral IV dormant line.    Admin. Amount: 3 mL  Last Admin: 08/02/18 0926  Dispense Loc: [facility]  Floor Stock  Volume: 3 mL   Current Line: Peripheral IV 18 Right       1611 (3 mL)-Given        (0105)-Not Given       1058 (3 mL)-Given       1720 (3 mL)-Given        0447 (3 mL)-Given       1012 (3 mL)-Given       1809 (3 mL)-Given        (0232)-Not Given       0926 (3 mL)-Given       [ ] 1600           sodium chloride (PF) 0.9% PF flush 3 mL  Dose: 3 mL  Freq: EVERY 1 HOUR PRN Route: IK  PRN Reason: line flush  Start: 18 1111   Admin Instructions: for peripheral IV flush post IV meds    Admin. Amount: 3 mL  Dispense Loc: [facility] Floor Stock  Volume: 3 mL              Completed Medications  Medications 18         Dose: 0.4 mg  Freq: ONCE Route: IV  Start: 18 0830   End: 18 1018   Admin Instructions: Push  medication in through saline lock over 15 seconds. Follow medication with saline flush. To be given in procedure area.    Admin. Amount: 0.4 mg = 5 mL Conc: 0.4 mg/5 mL  Last Admin: 18 1018  Dispense Loc:  Main Pharmacy  Administrations Remainin  Volume: 5 mL  POC: Cardiac Intra-procedure   Current Line: Peripheral IV 18 Right Lower forearm        1018 (0.4 mg)-Given [C]               Dose: 10 mL  Freq: ONCE Route: IV  Start: 18 1545   End: 18 1611   Admin Instructions: To lock peripheral IV dormant line    Admin. Amount: 10 mL  Last Admin: 18 1611  Dispense Loc: [facility] Floor Stock  Administrations Remainin  Volume: 10 mL  POC: Cardiac Pre-procedure        1611 (10 mL)-Given                Dose: 3-42 mCi  Freq: EVERY 2 HOURS Route: IV  Start: 18 0815   End: 18 1021   Admin Instructions: Radioisotope, supplied by and administered by Nuclear Medicine.  *HW*    Admin. Amount: 3-42 millicurie (3-42 × 1 mCi millicurie)  Last Admin: 18 1021  Dispense Loc:  [facility] FLOOR STOCK  Administrations Remainin   Current Line: Peripheral IV 18 Right        0745 (10.1 mCi)-Given       1021  (30.6 mCi)-Given            Discontinued Medications  Medications 18         Dose: 2 puff  Freq: EVERY 5 MIN PRN Route: IN  PRN Reason: other  PRN Comment: may repeat every 5 minutes x 3 doses if needed  Start: 18   End: 18   Admin Instructions: Max total three doses  Indications:  - COPD  - severe bronchospasm (notify the procedural provider that Albuterol was administered)    Admin. Amount: 2 puff  Dispense Loc:  Main Pharmacy  Administrations Remaining: 3  POC: Cardiac Intra-procedure          1033-Med Discontinued          Dose:  mg  Freq: ONCE PRN Route: IV  PRN Comment: In the event of the following side effects if not dissipating by 5 minutes past completion of procedure  Start: 18   End: 18   Admin Instructions: Indications.  - Angina Pectoris    - Abdominal discomfort  - Chest pain/severe chest discomfort  - Dizziness/syncope  - Dysgeusia (abnormal taste)  - Dyspnea /wheezing  - Dysrhythmia  - Feeling Hot   - Flushing  - Headache  - Hypotension  - Nausea  - Severe bronchospasms    - Second and third degree heart block  - ST Segment depression    Slow IV push over 30-60 seconds.  Do not exceed rate of 50 mg over 30 seconds.  Maximum dose is 250 mg in 24 hours.     Notify the procedural provider that aminophylline was administered and reason for administration  Vesicant.    Admin. Amount:  mg = 2-4 mL Conc: 25 mg/mL  Dispense Loc:  Main Pharmacy  Administrations Remainin  Volume: 4 mL  POC: Cardiac Intra-procedure          1033-Med Discontinued          Freq: HOLD Route: XX  Start: 18   End: 18   Order specific questions:  Medication(s) to hold: Caffeine containing medications: Anacin, Excedrin, NoDoz, Vivarin, Midol, Valorin  Parameter for hold (doses,days,conditions) : Hold  before Procedure or Test  Hours or days to hold med before/after procedure/surgery 12  hours prior to the procedure     Dispense Loc:  Main Pharmacy  POC: Cardiac Pre-procedure          1033-Med Discontinued          Freq: HOLD Route: XX  Start: 07/30/18 1534   End: 08/01/18 1033   Order specific questions:  Medication(s) to hold: Dipyridamole (Persantine) or aspirin/dipyridamole (Aggrenox)  Parameter for hold (doses,days,conditions) : Hold  before Procedure or Test  Hours or days to hold med before/after procedure/surgery 48 hours prior to the procedure     Dispense Loc:  Main Pharmacy  POC: Cardiac Pre-procedure          1033-Med Discontinued          Freq: HOLD Route: XX  Start: 07/30/18 1534   End: 08/01/18 1033   Order specific questions:  Medication(s) to hold: Phosphodiesterase-5 (PDE-5) inhibitor medications - vardenafil (LEVITRA/STAXYN), sildenafil (VIAGRA/REVATIO), tadalafil (CIALIS/ADCIRCA), avanafil (STENDRA)  Parameter for hold (doses,days,conditions) : Hold  before Procedure or Test  Hours or days to hold med before/after procedure/surgery 48 hours prior to the procedure     Dispense Loc:  Main Pharmacy  POC: Cardiac Pre-procedure          1033-Med Discontinued          Freq: HOLD Route: XX  Start: 07/30/18 1534   End: 08/01/18 1033   Order specific questions:  Medication(s) to hold: theophylline or aminophylline  Parameter for hold (doses,days,conditions) : Hold  before Procedure or Test  Hours or days to hold med before/after procedure/surgery 12 hours prior to the procedure     Dispense Loc:  Main Pharmacy  POC: Cardiac Pre-procedure          1033-Med Discontinued          Freq: HOLD Route: XX  Start: 07/30/18 1534   End: 08/01/18 1033   Admin Instructions: ALL ORAL HYPOGLYCEMICS and include glipizide, glyburide, glimepiride, gliclazide, metformin (GLUCOPHAGE, GLUMETZA, FORTAMET, RIOMET) and metformin containing medications: alogliptin/metformin (KAZANO), glipizide/metformin (METAGLIP), glyburide/metformin (GLUCOVANCE), rosiglitazone/metformin (AVANDAMET),  dapagliflozin/metformin (XIGDUO XR), sitagliptin/metformin (JANUMET, JANUMET XR), linagliptin/metformin (JENTADUETO), repaglinide/metformin (PRANDIMET), saxagliptin/metformin (KOMBIGLYZE XR), canagliflozin/metformin (INVOKAMET), and pioglitazone/metformin (ACTOPLUS MET, ACTOPLUS MET XR).    Order specific questions:  Medication(s) to hold: ALL ORAL HYPOGLYCEMICS (Full list in Admin Instructions)  Parameter for hold (doses,days,conditions) : Hold  before Procedure or Test  Hours or days to hold med before/after procedure/surgery the day of the procedure     Dispense Loc:  Main Pharmacy  POC: Cardiac Pre-procedure          1033-Med Discontinued          Dose: 2 g  Freq: DAILY PRN Route: IV  PRN Reason: magnesium supplementation  Start: 08/01/18 0255   End: 08/02/18 1245   Admin Instructions: For Serum Mg++ 1.6 - 2 mg/dL  Give 2 g and recheck magnesium level next AM.    Admin. Amount: 2 g = 50 mL Conc: 0.04 g/mL  Last Admin: 08/01/18 0441  Dispense Loc: Contact Rx for dose  Infused Over: 60 Minutes  Volume: 50 mL   Current Line: Peripheral IV 07/28/18 Right Lower forearm         0441 (2 g)-New Bag        1245-Med Discontinued         Dose: 4 g  Freq: EVERY 4 HOURS PRN Route: IV  PRN Reason: magnesium supplementation  Start: 08/01/18 0255   End: 08/02/18 1245   Admin Instructions: For serum Mg++ less than 1.6 mg/dL  Give 4 g and recheck magnesium level 2 hours after dose, and next AM.    Admin. Amount: 4 g = 100 mL Conc: 4 g/100 mL  Dispense Loc: Contact Rx for dose  Infused Over: 120 Minutes  Volume: 100 mL           1245-Med Discontinued         Dose: 40 mg  Freq: 2 TIMES DAILY BEFORE MEALS Route: PO  Start: 07/30/18 0915   End: 07/31/18 1155   Admin Instructions: DO NOT CRUSH.    Admin. Amount: 1 tablet (1 × 40 mg tablet)  Last Admin: 07/31/18 0647  Dispense Loc:  [facility]        0927 (40 mg)-Given       1611 (40 mg)-Given        0647 (40 mg)-Given       1155-Med Discontinued           Dose: 3.375 g  Freq: EVERY  6 HOURS Route: IV  Indications of Use: COMMUNITY ACQUIRED PNEUMONIA  Last Dose: 3.375 g (18 0424)  Start: 18 0900   End: 18 1155   Admin. Amount: 3.375 g  Last Admin: 18 105  Dispense Loc:  [facility]  Infused Over: 30 Minutes  Volume: 15 mL   Current Line: Peripheral IV 18 Right       1021 (3.375 g)-New Bag       1611 (3.375 g)-New Bag       2154 (3.375 g)-New Bag        0424 (3.375 g)-New Bag       1057 (3.375 g)-New Bag       1155-Med Discontinued           Dose: 1-10 mL  Freq: EVERY 10 MIN PRN Route: IV  PRN Reason: other  PRN Comment: for peripheral IV flush post IV meds  Start: 18   End: 18 1033   Admin. Amount: 1-10 mL  Dispense Loc: [facility] Floor Stock  Volume: 10 mL  POC: Cardiac Pre-procedure          1033-Med Discontinued          Dose: 10 mL  Freq: EVERY 10 MIN PRN Route: IV  PRN Reason: other  PRN Comment: for peripheral IV flush post IV meds  Start: 18   End: 18 1033   Admin. Amount: 10 mL  Last Admin: 18 1019  Dispense Loc: [facility] Floor Stock  Volume: 10 mL  POC: Cardiac Intra-procedure   Current Line: Peripheral IV 18 Right Lower forearm        1013 (10 mL)-Given       1019 (10 mL)-Given        1033-Med Discontinued          Dose: 5-10 mL  Freq: EVERY 1 MIN PRN Route: IV  PRN Reason: line flush  Start: 18   End: 18 1033   Admin Instructions: 2 flushes through saline lock.   First flush given immediately   Usual flush dose is 10 mL over 15 seconds after bolus injection of Lexiscan.  Second flush given immediately after isotope injection.  Usual flush dose is 5 mL.   FOR TOTAL OF 2 doses.    Admin. Amount: 5-10 mL  Dispense Loc: [facility] Floor Stock  Administrations Remainin  Volume: 10 mL  POC: Cardiac Intra-procedure          1033-Med Discontinued          Dose: 0-1 mL  Freq: ONCE PRN Route: ID  PRN Reason: other  PRN Comment: for preparation of the IV insertion  Start: 18 0819   End: 18 1033   Admin  Instructions: Give immediately prior to the procedure  This dose is to be given ONLY by provider or Nuclear Medicine procedure staff ONLY as verbally requested by the provider    Admin. Amount: 0-1 mL  Dispense Loc: [facility] Floor Stock  Administrations Remainin  Volume: 30 mL  POC: Cardiac Intra-procedure          1033-Med Discontinued     Medications 18

## 2018-07-23 NOTE — PHARMACY-ADMISSION MEDICATION HISTORY
Admission medication history interview status for the 7/23/2018  admission is complete. See EPIC admission navigator for prior to admission medications     Medication history source reliability:Good    Actions taken by pharmacist (provider contacted, etc):Interviewed pt - pt also provided a medication list     Additional medication history information not noted on PTA med list :None    Medication reconciliation/reorder completed by provider prior to medication history? No    Time spent in this activity: 15 minutes    Prior to Admission medications    Medication Sig Last Dose Taking? Auth Provider   ASPIRIN PO Take 81 mg by mouth daily 7/23/2018 at Unknown time Yes Reported, Patient   CARTIA  MG 24 hr capsule TAKE 1 CAPSULE BY MOUTH DAILY 7/23/2018 at Unknown time Yes Carson Fried MD   Cholecalciferol (VITAMIN D) 2000 UNITS CAPS Take 1 capsule by mouth daily 7/23/2018 at Unknown time Yes Reported, Patient   dorzolamide-timolol (COSOPT) 2-0.5 % ophthalmic solution Place 1 drop into both eyes 2 times daily  7/22/2018 at Unknown time Yes Reported, Patient   folic acid (FOLVITE) 1 MG tablet TAKE 4 TABLETS BY MOUTH DAILY 7/23/2018 at Unknown time Yes Rudy Joya MD   levothyroxine (SYNTHROID/LEVOTHROID) 100 MCG tablet TAKE 1 TABLET(100 MCG) BY MOUTH DAILY 7/23/2018 at Unknown time Yes Carson Fried MD   lisinopril-hydrochlorothiazide (PRINZIDE/ZESTORETIC) 20-12.5 MG per tablet TAKE 1 TABLET BY MOUTH DAILY 7/23/2018 at Unknown time Yes Steven Tapia MD   metFORMIN (GLUCOPHAGE) 500 MG tablet TAKE 1 TABLET(500 MG) BY MOUTH DAILY 20 MINUTES BEFORE DINNER 7/22/2018 at Unknown time Yes Carson Fried MD   omeprazole (PRILOSEC) 20 MG CR capsule TAKE ONE CAPSULE BY MOUTH EVERY DAY 7/23/2018 at Unknown time Yes Steven Tapia MD   travoprost Z, benzalkonium, (TRAVATAN) 0.004 % ophthalmic solution Place 1 drop into both eyes every evening  7/22/2018 at Unknown time Yes  Reported, Patient

## 2018-07-23 NOTE — ED NOTES
"[facility]  ED Nurse Handoff Report    ED Chief complaint: Hypotension (has been dealing with dizziness \"for a few months, worse when i sit up and pass out when i stand up\". was at clinic today and BP 80/40. hx of this with dehydration)      ED Diagnosis:   Final diagnoses:   Hypotension, unspecified hypotension type   Dehydration   Hyponatremia   Urinary tract infection without hematuria, site unspecified   Acute renal failure, unspecified acute renal failure type (H)       Code Status: Full Code    Allergies: No Known Allergies    Activity level - Baseline/Home:  Independent- using walker and wheelchair x1 week due to recent weakness and increased falls    Activity Level - Current:   Stand with Assist     Needed?: No    Isolation: No  Infection: Not Applicable  Bariatric?: No    Vital Signs:   Vitals:    07/23/18 1345 07/23/18 1400 07/23/18 1445 07/23/18 1500   BP: (!) 89/60 95/75 94/66 91/72   Pulse:       Resp: 21 21     Temp:       TempSrc:       SpO2:  100%     Weight:       Height:           Cardiac Rhythm: ,   Cardiac  Cardiac Rhythm: Normal sinus rhythm    Pain level:      Is this patient confused?: No   [place] - Suicide Severity Rating Scale Completed?  Yes  If yes, what color did the patient score?  White    Patient Report: Initial Complaint: hypotension  Focused Assessment:   Resp: WDL  Cardiac: pt hypotensive. Pressures in the upper 80s/60s and low 90s. Per patient's triaging RN, pt has had soft BPs at his primary MDs office on many of his visits.   Pt has been falling at home frequently x1 week.   : States he has had increased urgency and frequency for several days   Tests Performed: UA/UC. Labs. Xray  Abnormal Results: UTI. BNP 1931. Low pressures  Treatments provided: antibiotics    Family Comments: sisters present at bedside.    OBS brochure/video discussed/provided to patient: N/A    ED Medications:   Medications   0.9% sodium chloride BOLUS (0 mLs Intravenous " Stopped 7/23/18 1454)     Followed by   sodium chloride 0.9% infusion (1,000 mLs Intravenous New Bag 7/23/18 1454)   cefTRIAXone (ROCEPHIN) 1 g vial to attach to  mL bag for ADULTS or NS 50 mL bag for PEDS (1 g Intravenous New Bag 7/23/18 7524)       Drips infusing?:  No    For the majority of the shift this patient was Green.   Interventions performed were n/a.    Severe Sepsis OR Septic Shock Diagnosis Present: No      ED NURSE PHONE NUMBER: [phone]

## 2018-07-23 NOTE — IP AVS SNAPSHOT
MRN:[id number]                      After Visit Summary   7/23/2018    Abebe Christensen    MRN: [id number]           Thank you!     Thank you for choosing [facility] for your care. Our goal is always to provide you with excellent care. Hearing back from our patients is one way we can continue to improve our services. Please take a few minutes to complete the written survey that you may receive in the mail after you visit with us. Thank you!        Patient Information     Date Of Birth          1945        Designated Caregiver       Most Recent Value    Caregiver    Will someone help with your care after discharge? no      About your hospital stay     You were admitted on:  July 23, 2018 You last received care in the:  [facility]    You were discharged on:  August 2, 2018       Who to Call     For medical emergencies, please call 911.  For non-urgent questions about your medical care, please call your primary care provider or clinic, [phone]          Attending Provider     Provider Specialty    Franki Gonzales MD Emergency Medicine    [facility]  [facility]       Primary Care Provider Office Phone # Fax #    Carson Fried [phone]      After Care Instructions     Activity - Up ad ellie           Advance Diet as Tolerated       Follow this diet upon discharge: regular diet            General info for SNF       Length of Stay Estimate: Short Term Care: Estimated # of Days 31-90  Condition at Discharge: Stable  Level of care:skilled   Rehabilitation Potential: Good  Admission H&P remains valid and up-to-date: Yes  Recent Chemotherapy: N/A  Use Nursing Home Standing Orders: Yes            Mantoux instructions       Give two-step Mantoux (PPD) Per Facility Policy Yes            Oxygen - Nasal cannula       2 Lpm by nasal cannula to keep O2 sats 92% or greater.                  Follow-up Appointments     Follow Up and  recommended labs and tests       Follow up with Nursing home physician in 1week.  The following labs/tests are recommended: CBC,  BMP .  Needs f/u with  in [facility] in 1month with CBC results  Check blood pressure in left thigh only due to peripheral arterial disease  Recheck CMP in 1month as pt started on lipitor                  Additional Services     Occupational Therapy Adult Consult       Evaluate and treat as clinically indicated.    Reason:  weakness            Physical Therapy Adult Consult       Evaluate and treat as clinically indicated.    Reason:  weakness                  Further instructions from your care team       -A follow-up appointment has been made for you with Dr. Ames on Tuesday, September 4th at 11:00 AM at [facility]. Please arrive at 10:40 AM to have your labs drawn.  Please call the clinic at [phone] should you need to change or cancel your appointment.  Please arrive 15 minutes early to your scheduled appointment and bring your photo ID, insurance card and co-payment.     [address], [address]   [place], [place] [address]   Phone: [phone]           Pt has socks and a pair of glasses with him. No other known belongings.        Patient will discharge to [facility] today via family around 13:30.  [facility] phone number is [phone].    Pending Results     Date and Time Order Name Status Description    8/1/2018 0832 EKG 12-lead, tracing only Preliminary     7/28/2018 1355 Blood culture Preliminary     7/28/2018 1355 Blood culture Preliminary             Statement of Approval     Ordered          08/02/18 0936  I have reviewed and agree with all the recommendations and orders detailed in this document.  EFFECTIVE NOW     Approved and electronically signed by:  Siobhan Cline MD             Admission Information     Date & Time Provider Department Dept. Phone    7/23/2018 Nirmal Laird  "DO [facility] [phone]      Your Vitals Were     Blood Pressure Pulse Temperature Respirations Height Weight    93/81 (BP Location: Left leg) 106 97.3  F (36.3  C) (Oral) 18 1.854 m (6' 1\") 81 kg (178 lb 9.6 oz)    Pulse Oximetry BMI (Body Mass Index)                99% 23.56 kg/m2          Care EveryWhere ID     This is your Care EveryWhere ID. This could be used by other organizations to access your [facility] medical records  [id number]        Equal Access to Services     [facility] : Robina Younger, juana narayanan, serge luna, tamara euceda . So [facility] [phone].    ATENCIÓN: Si habla español, tiene a francis disposición servicios gratuitos de asistencia lingüística. [facility] [phone].    We comply with applicable federal civil rights laws and [place] laws. We do not discriminate on the basis of race, color, national origin, age, disability, sex, sexual orientation, or gender identity.               Review of your medicines      START taking        Dose / Directions    atorvastatin 20 MG tablet   Commonly known as:  LIPITOR   Used for:  Hyperlipidemia LDL goal <100        Dose:  20 mg   Take 1 tablet (20 mg) by mouth every evening   Quantity:  30 tablet   Refills:  0       fluticasone-vilanterol 200-25 MCG/INH oral inhaler   Commonly known as:  BREO ELLIPTA   Used for:  COPD exacerbation (H)        Dose:  1 puff   Inhale 1 puff into the lungs daily   Quantity:  1 Inhaler   Refills:  0       ipratropium - albuterol 0.5 mg/2.5 mg/3 mL 0.5-2.5 (3) MG/3ML neb solution   Commonly known as:  DUONEB   Used for:  COPD exacerbation (H)        Dose:  3 mL   Take 1 vial (3 mLs) by nebulization 4 times daily   Quantity:  360 mL   Refills:  1       levalbuterol 1.25 MG/3ML neb solution   Commonly known as:  XOPENEX   Used for:  COPD exacerbation (H)        Dose:  1 ampule   Take 3 mLs (1.25 mg) by nebulization every 4 " hours as needed for shortness of breath / dyspnea or wheezing   Quantity:  270 mL   Refills:  0       midodrine HCl 10 MG Tabs   Used for:  Hypotension, unspecified hypotension type        Dose:  10 mg   Take 10 mg by mouth 3 times daily (with meals)   Quantity:  90 tablet   Refills:  0       predniSONE 10 MG tablet   Commonly known as:  DELTASONE   Used for:  COPD exacerbation (H)        4 tabs daily for 3 days, then 3 tabs daily for 3 days, then 2 tabs daily for 3 days, then 1 tab daily for 3 days, then stop   Quantity:  60 tablet   Refills:  0         CONTINUE these medicines which have NOT CHANGED        Dose / Directions    dorzolamide-timolol 2-0.5 % ophthalmic solution   Commonly known as:  COSOPT   Used for:  Hypertension        Dose:  1 drop   Place 1 drop into both eyes 2 times daily   Refills:  0       folic acid 1 MG tablet   Commonly known as:  FOLVITE   Used for:  Folate deficiency        TAKE 4 TABLETS BY MOUTH DAILY   Quantity:  120 tablet   Refills:  9       levothyroxine 100 MCG tablet   Commonly known as:  SYNTHROID/LEVOTHROID   Used for:  Hypothyroidism, unspecified type        TAKE 1 TABLET(100 MCG) BY MOUTH DAILY   Quantity:  90 tablet   Refills:  2       metFORMIN 500 MG tablet   Commonly known as:  GLUCOPHAGE   Used for:  Type 2 diabetes mellitus without complication, without long-term current use of insulin (H)        TAKE 1 TABLET(500 MG) BY MOUTH DAILY 20 MINUTES BEFORE DINNER   Quantity:  90 tablet   Refills:  0       omeprazole 20 MG CR capsule   Commonly known as:  priLOSEC   Used for:  Essential hypertension with goal blood pressure less than 130/85        TAKE ONE CAPSULE BY MOUTH EVERY DAY   Quantity:  90 capsule   Refills:  3       TRAVATAN 0.004 % ophthalmic solution   Used for:  Hypertension   Generic drug:  travoprost Z (benzalkonium)        Dose:  1 drop   Place 1 drop into both eyes every evening   Refills:  0       vitamin D 2000 units Caps        Dose:  1 capsule   Take 1  capsule by mouth daily   Refills:  0         STOP taking     ASPIRIN PO           CARTIA  MG 24 hr capsule   Generic drug:  diltiazem           lisinopril-hydrochlorothiazide 20-12.5 MG per tablet   Commonly known as:  PRINZIDE/ZESTORETIC                Where to get your medicines      Some of these will need a paper prescription and others can be bought over the counter. Ask your nurse if you have questions.     You don't need a prescription for these medications     atorvastatin 20 MG tablet    fluticasone-vilanterol 200-25 MCG/INH oral inhaler    ipratropium - albuterol 0.5 mg/2.5 mg/3 mL 0.5-2.5 (3) MG/3ML neb solution    levalbuterol 1.25 MG/3ML neb solution    midodrine HCl 10 MG Tabs    predniSONE 10 MG tablet                Protect others around you: Learn how to safely use, store and throw away your medicines at www.disposemymeds.org.             Medication List: This is a list of all your medications and when to take them. Check marks below indicate your daily home schedule. Keep this list as a reference.      Medications           Morning Afternoon Evening Bedtime As Needed    atorvastatin 20 MG tablet   Commonly known as:  LIPITOR   Take 1 tablet (20 mg) by mouth every evening   Last time this was given:  20 mg on 8/1/2018  8:29 PM                                dorzolamide-timolol 2-0.5 % ophthalmic solution   Commonly known as:  COSOPT   Place 1 drop into both eyes 2 times daily                                fluticasone-vilanterol 200-25 MCG/INH oral inhaler   Commonly known as:  BREO ELLIPTA   Inhale 1 puff into the lungs daily                                folic acid 1 MG tablet   Commonly known as:  FOLVITE   TAKE 4 TABLETS BY MOUTH DAILY                                ipratropium - albuterol 0.5 mg/2.5 mg/3 mL 0.5-2.5 (3) MG/3ML neb solution   Commonly known as:  DUONEB   Take 1 vial (3 mLs) by nebulization 4 times daily   Last time this was given:  3 mLs on 8/2/2018  7:46 AM                                 levalbuterol 1.25 MG/3ML neb solution   Commonly known as:  XOPENEX   Take 3 mLs (1.25 mg) by nebulization every 4 hours as needed for shortness of breath / dyspnea or wheezing                                levothyroxine 100 MCG tablet   Commonly known as:  SYNTHROID/LEVOTHROID   TAKE 1 TABLET(100 MCG) BY MOUTH DAILY   Last time this was given:  100 mcg on 8/2/2018  6:44 AM                                metFORMIN 500 MG tablet   Commonly known as:  GLUCOPHAGE   TAKE 1 TABLET(500 MG) BY MOUTH DAILY 20 MINUTES BEFORE DINNER                                midodrine HCl 10 MG Tabs   Take 10 mg by mouth 3 times daily (with meals)   Last time this was given:  10 mg on 8/2/2018  1:28 PM                                omeprazole 20 MG CR capsule   Commonly known as:  priLOSEC   TAKE ONE CAPSULE BY MOUTH EVERY DAY   Last time this was given:  20 mg on 7/29/2018  9:38 AM                                predniSONE 10 MG tablet   Commonly known as:  DELTASONE   4 tabs daily for 3 days, then 3 tabs daily for 3 days, then 2 tabs daily for 3 days, then 1 tab daily for 3 days, then stop   Last time this was given:  40 mg on 8/2/2018  9:23 AM                                TRAVATAN 0.004 % ophthalmic solution   Place 1 drop into both eyes every evening   Generic drug:  travoprost Z (benzalkonium)                                vitamin D 2000 units Caps   Take 1 capsule by mouth daily                                          More Information        Prednisone tablets  Brand Names: Deltasone, Predone, Sterapred, Sterapred DS  What is this medicine?  PREDNISONE (PRED ni sone) is a corticosteroid. It is commonly used to treat inflammation of the skin, joints, lungs, and other organs. Common conditions treated include asthma, allergies, and arthritis. It is also used for other conditions, such as blood disorders and diseases of the adrenal glands.  How should I use this medicine?  Take this medicine by mouth with a  glass of water. Follow the directions on the prescription label. Take this medicine with food. If you are taking this medicine once a day, take it in the morning. Do not take more medicine than you are told to take. Do not suddenly stop taking your medicine because you may develop a severe reaction. Your doctor will tell you how much medicine to take. If your doctor wants you to stop the medicine, the dose may be slowly lowered over time to avoid any side effects.  Talk to your pediatrician regarding the use of this medicine in children. Special care may be needed.  What side effects may I notice from receiving this medicine?  Side effects that you should report to your doctor or health care professional as soon as possible:    allergic reactions like skin rash, itching or hives, swelling of the face, lips, or tongue    changes in emotions or moods    changes in vision    depressed mood    eye pain    fever or chills, cough, sore throat, pain or difficulty passing urine    increased thirst    swelling of ankles, feet  Side effects that usually do not require medical attention (report to your doctor or health care professional if they continue or are bothersome):    confusion, excitement, restlessness    headache    nausea, vomiting    skin problems, acne, thin and shiny skin    trouble sleeping    weight gain  What may interact with this medicine?  Do not take this medicine with any of the following medications:    metyrapone    mifepristone  This medicine may also interact with the following medications:    aminoglutethimide    amphotericin B    aspirin and aspirin-like medicines    barbiturates    certain medicines for diabetes, like glipizide or glyburide    cholestyramine    cholinesterase inhibitors    cyclosporine    digoxin    diuretics    ephedrine    female hormones, like estrogens and birth control pills    isoniazid    ketoconazole    NSAIDS, medicines for pain and inflammation, like ibuprofen or  naproxen    phenytoin    rifampin    toxoids    vaccines    warfarin  What if I miss a dose?  If you miss a dose, take it as soon as you can. If it is almost time for your next dose, talk to your doctor or health care professional. You may need to miss a dose or take an extra dose. Do not take double or extra doses without advice.  Where should I keep my medicine?  Keep out of the reach of children.  Store at room temperature between 15 and 30 degrees C (59 and 86 degrees F). Protect from light. Keep container tightly closed. Throw away any unused medicine after the expiration date.  What should I tell my health care provider before I take this medicine?  They need to know if you have any of these conditions:    Cushing's syndrome    diabetes    glaucoma    heart disease    high blood pressure    infection (especially a virus infection such as chickenpox, cold sores, or herpes)    kidney disease    liver disease    mental illness    myasthenia gravis    osteoporosis    seizures    stomach or intestine problems    thyroid disease    an unusual or allergic reaction to lactose, prednisone, other medicines, foods, dyes, or preservatives    pregnant or trying to get pregnant    breast-feeding  What should I watch for while using this medicine?  Visit your doctor or health care professional for regular checks on your progress. If you are taking this medicine over a prolonged period, carry an identification card with your name and address, the type and dose of your medicine, and your doctor's name and address.  This medicine may increase your risk of getting an infection. Tell your doctor or health care professional if you are around anyone with measles or chickenpox, or if you develop sores or blisters that do not heal properly.  If you are going to have surgery, tell your doctor or health care professional that you have taken this medicine within the last twelve months.  Ask your doctor or health care professional about  your diet. You may need to lower the amount of salt you eat.  This medicine may affect blood sugar levels. If you have diabetes, check with your doctor or health care professional before you change your diet or the dose of your diabetic medicine.  NOTE:This sheet is a summary. It may not cover all possible information. If you have questions about this medicine, talk to your doctor, pharmacist, or health care provider. Copyright  2018 [employer]                Discharge Instructions: COPD  You have been diagnosed with chronic obstructive pulmonary disease (COPD). This is a name given to a group of diseases that limit the flow of air in and out of your lungs. This makes it harder to breathe. With COPD, you are also more likely to get lung infections. COPD includes chronic bronchitis and emphysema. COPD is most often caused by heavy, long-term cigarette smoking.  Home care  Quit smoking    If you smoke, quit. It is the best thing you can do for your COPD and your overall health.    Join a stop-smoking program. There are even telephone, text message, and Internet programs to help you quit.    Ask your healthcare provider about medicines or other methods to help you quit.    Ask family members to quit smoking as well.    Don't allow people to smoke in your home, in your car, or when they are around you.  Protect yourself from infection    Wash your hands often. Do your best to keep your hands away from your face. Most germs are spread from your hands to your mouth.    Get a flu shot every year. Also ask your provider about pneumonia vaccines.    Avoid crowds. It's especially important to do this in the winter when more people have colds and flu.    To stay healthy, get enough sleep, exercise regularly, and eat a balanced diet. You should:  ? Get about 8 hours of sleep every night.  ? Try to exercise for at least 30 minutes on most days.  ? Have healthy foods including fruits and vegetables, 100% whole grains, lean meats  and fish, and low-fat dairy products. Try to stay away from foods high in fats and sugar.  Take your medicines  Take your medicines exactly as directed. Don't skip doses.  Manage your stress  Stress can make COPD worse. Use this stress management technique:    Find a quiet place and sit or lie in a comfortable position.    Close your eyes and perform breathing exercises for several minutes. Ask your provider about the best way to breathe.  Pulmonary rehabilitation    Pulmonary rehab can help you feel better. These programs include exercise, breathing techniques, information about COPD, counseling, and help for smokers.    Ask your provider or your local hospital about programs in your area.  When to call your healthcare provider  Call your provider immediately if you have any of the following:    Shortness of breath, wheezing, or coughing    Increased mucus    Yellow, green, bloody, or smelly mucus    Fever or chills    Tightness in your chest that does not go away with rest or medicine    An irregular heartbeat or a feeling that your heart is beating very fast    Swollen ankles   Date Last Reviewed: 5/1/2016 2000-2017 [employer]. [address] [place] [address]. All rights reserved. This information is not intended as a substitute for professional medical care. Always follow your healthcare professional's instructions.                What is Pneumonia?    Pneumonia is a serious lung infection. Many cases of pneumonia are caused by bacteria or viruses. Fungi may also cause pneumonia, but this is less common.  You may also get pneumonia after another illness, such as a cold, flu, or bronchitis. Those most at risk include older adults, smokers, and people with long-term (chronic) health problems or weak immune systems.  Healthy lungs    Air travels in and out of the lungs through tubes called airways.    The tubes branch into smaller passages called bronchioles. These end in tiny sacs  called alveoli.    Blood vessels surrounding the alveoli take oxygen into the bloodstream. At the same time, the alveoli remove carbon dioxide (a waste gas) from the blood. The carbon dioxide is then exhaled.  When you have pneumonia    Pneumonia causes the bronchioles and the alveoli to fill with excess mucus and become inflamed.    Your body s response may be to cough. This can help clear out the fluid.    The fluid (or mucus) you cough up may appear green or dark yellow.    The excess mucus may make you feel short of breath.    The inflammation and infection may give you a fever.  What are the symptoms?  Symptoms of pneumonia can come without warning. At first, you may think you have a cold or flu. But symptoms may get worse quickly, turning into pneumonia. Symptoms can be different for bacterial and viral pneumonia. Common symptoms may include the following:    Severe cough with green or yellow mucus that doesn't improve or that gets worse    Fever and chills    Nausea, vomiting or diarrhea    Shortness of breath with normal daily activities    Increased heart rate    Chest pain or discomfort when breathing in or coughing    Headache    Excessive sweating and clammy skin  Date Last Reviewed: 12/1/2016 2000-2017 [employer]. [address] [place] [id number]. All rights reserved. This information is not intended as a substitute for professional medical care. Always follow your healthcare professional's instructions.

## 2018-07-23 NOTE — IP AVS SNAPSHOT
"` `     [facility] CARDIAC SPECIALTY CARE: [phone]                                              INTERAGENCY TRANSFER FORM - NURSING   2018                    Hospital Admission Date: 2018  BAO MUNIZ   : 1945  Sex: Male        Attending Provider: Nirmal Laird DO     Allergies:  No Known Allergies    Infection:  None   Service:  HOSPITALIST    Ht:  1.854 m (6' 1\")   Wt:  81 kg (178 lb 9.6 oz)   Admission Wt:  72.6 kg (160 lb)    BMI:  23.56 kg/m 2   BSA:  2.04 m 2            Patient PCP Information     Provider PCP Type    Carson Fried MD General      Current Code Status     Date Active Code Status Order ID Comments User Context       Prior      Code Status History     Date Active Date Inactive Code Status Order ID Comments User Context    2018  9:35 AM  Full Code [id number]  Siobhan Cline MD Outpatient    2018 11:26 PM 2018  9:35 AM Full Code [id number]  Nirmal Laird DO Inpatient      Advance Directives        Scanned docmt in ACP Activity?           No scanned doc        Hospital Problems as of 2018              Priority Class Noted POA    Orthostatic hypotension Medium  2018 Yes      Non-Hospital Problems as of 2018              Priority Class Noted    H/O polycythemia vera Low  2002    CKD (chronic kidney disease) stage 3, GFR 30-59 ml/min Medium  2013    GERD (gastroesophageal reflux disease) Medium  Unknown    Essential hypertension, benign Medium  Unknown    Glucose intolerance (impaired glucose tolerance) Medium  3/4/2014    Family history of diabetes mellitus Medium  2014    COPD (chronic obstructive pulmonary disease): spirometry 5-14-15  Medium  2014    Coryza for 20 yrs  Medium  2015    Screening for prostate cancer Medium  2015    Tobacco abuse: [age]y/o @ 1ppd=19pk yr hx;only cigars since  Medium  2015    Risk for falls Medium  2015    Acquired hypothyroidism Medium  " 6/20/2017    Numbness in feet Medium  10/2/2017    Monoclonal paraproteinemia Medium  10/2/2017    Peripheral axonal neuropathy Medium  10/2/2017      Immunizations     Name Date      Pneumo Conj 13-V (2010&after) 05/14/15     TDAP Vaccine (Adacel) 03/04/14          END      ASSESSMENT     Discharge Profile Flowsheet     EXPECTED DISCHARGE     COMMUNICATION ASSESSMENT      Expected Discharge Date  08/02/18 (TCU) 08/01/18 1534   Patient's communication style  spoken language (English or Bilingual) 07/23/18 1214    DISCHARGE NEEDS ASSESSMENT     Patient's primary language  English 07/31/18 1008    Equipment Currently Used at Home  walker, rolling;cane, straight;shower chair 07/25/18 1423   FINAL RESOURCES      Transportation Available  car 07/25/18 1423   Resources List  Skilled Nursing Facility 08/02/18 1231    # of Referrals Placed by CTS  Senior Linkage Line;Post Acute Facilities 08/02/18 1231   Skilled Nursing Facility  [facility] [phone], Fax: [phone] 08/02/18 1231    FUNCTIONAL LEVEL CURRENT     PAS Number  [id number] 08/02/18 1231    Ambulation  3 - assistive equipment and person 08/01/18 2214   SKIN      Transferring  3 - assistive equipment and person 08/01/18 2214   Inspection of bony prominences  Full 08/02/18 0017    Toileting  2 - assistive person 08/01/18 2214   Inspection under devices  Full 08/02/18 0017    Bathing  2 - assistive person 08/01/18 2214   Skin WDL  ex 08/02/18 0017    Dressing  2 - assistive person 08/01/18 2214   Skin Temperature  warm 08/02/18 0017    Eating  0 - independent 08/01/18 2214   Skin Moisture  dry 08/02/18 0017    Communication  0 - understands/communicates without difficulty 08/01/18 2214   Skin Elasticity  slow return to original state 08/01/18 1826    Swallowing  0 - swallows foods/liquids without difficulty 08/01/18 2214   Skin Integrity  abrasion(s);bruise(s);skin tear(s) 08/02/18 0017    GASTROINTESTINAL (ADULT,PEDIATRIC,OB)      "Additional Documentation  Wound (LDA) 07/23/18 2121    GI WDL  ex 08/02/18 0017   Skin Color/Characteristics  bruised (ecchymotic) 08/02/18 0017    Abdominal Appearance  rounded 08/02/18 0024   Full except areas not inspected   Coccyx;Sacrum;Buttock, right;Buttock, left 08/01/18 1826    Last Bowel Movement  08/01/18 08/01/18 2214   SAFETY      GI Signs/Symptoms  diarrhea 08/02/18 0024   Safety WDL  WDL 08/02/18 0017    Passing flatus  yes 08/01/18 2214   All Alarms  alarm(s) activated and audible 08/02/18 0024                 Assessment WDL (Within Defined Limits) Definitions           Safety WDL     Effective: 09/28/15    Row Information: <b>WDL Definition:</b> Bed in low position, wheels locked; call light in reach; upper side rails up x 2; ID band on<br> <font color=\"gray\"><i>Item=AS safety wdl>>List=AS safety wdl>>Version=F14</i></font>      Skin WDL     Effective: 09/28/15    Row Information: <b>WDL Definition:</b> Warm; dry; intact; elastic; without discoloration; pressure points without redness<br> <font color=\"gray\"><i>Item=AS skin wdl>>List=AS skin wdl>>Version=F14</i></font>      Vitals     Vital Signs Flowsheet     COMMENTS     TYLER COMA SCALE      Comments  While standing  08/02/18 0930   Best Eye Response  4-->(E4) spontaneous 08/02/18 0018    VITAL SIGNS     Best Motor Response  6-->(M6) obeys commands 08/02/18 0018    Temp  97.3  F (36.3  C) 08/02/18 1128   Best Verbal Response  4-->(V4) confused 08/02/18 0018    Temp src  Oral 08/02/18 1128   Tyler Coma Scale Score  14 08/02/18 0018    Resp  18 08/02/18 1128   HEIGHT AND WEIGHT      Pulse  106 07/29/18 1922   Height  1.854 m (6' 1\") 07/23/18 1225    Heart Rate  86 08/02/18 1128   Height Method  Actual 07/23/18 1225    Pulse/Heart Rate Source  Monitor 08/02/18 1128   Weight  81 kg (178 lb 9.6 oz) 08/01/18 0259    BP  93/81 08/02/18 1128   Weight Method  Standing scale 08/01/18 0259    BP Location  Left leg 08/02/18 1128   BSA (Calculated - sq m)  " 1.93 07/23/18 1225    LYING ORTHOSTATIC BP     BMI (Calculated)  21.15 07/23/18 1225    Lying Orthostatic BP  128/73 (prior to activity) 07/31/18 1739   EKG MONITORING      Lying Orthostatic Pulse  66 bpm 07/24/18 1039   Cardiac Regularity  Regular 07/23/18 1313    SITTING ORTHOSTATIC BP     Cardiac Rhythm  NSR 07/23/18 1313    Sitting Orthostatic BP  139/108 (prior to activity) 07/31/18 1739   POSITIONING      Sitting Orthostatic Pulse  97 bpm 07/26/18 0946   Body Position  independently positioning 08/02/18 1327    STANDING ORTHOSTATIC BP     Head of Bed (HOB)  HOB at 60 degrees 08/02/18 1327    Standing Orthostatic BP  110/80 (following activity) 07/31/18 1739   Positioning/Transfer Devices  pillows 08/02/18 1327    Standing Orthostatic Pulse  102 bpm 07/26/18 0947   Chair  Upright in chair 08/02/18 1327    OXYGEN THERAPY     DAILY CARE      SpO2  99 % 08/02/18 1128   Activity Management  activity adjusted per tolerance;ambulated in room;ambulated to bathroom 08/02/18 1327    O2 Device  None (Room air) 08/02/18 1128   Activity Assistance Provided  assistance, stand-by 08/02/18 1327    Oxygen Delivery  2 LPM 08/01/18 0444   Additional Documentation  Activity Device Assistance (Row) 07/23/18 2121    PAIN/COMFORT     Assistive Device Utilized  walker 08/02/18 0338    Patient Currently in Pain  denies 08/02/18 0012   ECG      Preferred Pain Scale  number (Numeric Rating Pain Scale) 07/28/18 2109   ECG Rhythm  Sinus tachycardia 08/01/18 1031    Patient's Stated Pain Goal  No pain 07/28/18 2109   Equipment  electrodes changed;telemetry batteries changed 08/01/18 1122    0-10 Pain Scale  0 08/02/18 0012   Ectopy  None 07/31/18 2030    Response to Interventions  Absence of nonverbal indicators of pain 07/30/18 0036                 Patient Lines/Drains/Airways Status    Active LINES/DRAINS/AIRWAYS     Name: Placement date: Placement time: Site: Days: Last dressing change:    Peripheral IV 07/28/18 Right Lower forearm  07/28/18   1630   Lower forearm   4     Peripheral IV 07/30/18 Right 07/30/18   0840      3     Wound 07/23/18 Right;Left Arm Abrasion(s);Skin tear 07/23/18   2110   Arm   9     Incision/Surgical Site 10/03/17 Abdomen 10/03/17   1310    303             Patient Lines/Drains/Airways Status    Active PICC/CVC     None            Intake/Output Detail Report     Date Intake       Output Net    Shift P.O. I.V. IV Piggyback Colloid Total Urine Total       Noc 07/31/18 2300 - 08/01/18 0659 -- -- -- -- -- -- -- 0    Day 08/01/18 0700 - 08/01/18 1459 420 -- -- -- 420 -- -- 420    Shasha 08/01/18 1500 - 08/01/18 2259 480 -- -- -- 480 -- -- 480    Noc 08/01/18 2300 - 08/02/18 0659 200 -- -- -- 200 -- -- 200    Day 08/02/18 0700 - 08/02/18 1459 -- -- -- -- -- -- -- 0      Last Void/BM       Most Recent Value    Urine Occurrence 1 at 08/01/2018 2300    Stool Occurrence 1 at 08/02/2018 0645      Case Management/Discharge Planning     Case Management/Discharge Planning Flowsheet     REFERRAL INFORMATION     DISCHARGE PLANNING      Did the Initial Social Work Assessment result in a Social Work Case?  Yes 07/27/18 1633   Transportation Available  car 07/25/18 1423    Admission Type  inpatient 07/27/18 1633   FINAL RESOURCES      Referral Source  physician 07/27/18 1633   Equipment Currently Used at Home  walker, rolling;cane, straight;shower chair 07/25/18 1423    # of Referrals Placed by CTS  Senior Linkage Line;Post Acute Facilities 08/02/18 1231   Resources List  Skilled Nursing Facility 08/02/18 1231    Post Acute Facilities  TCU 08/01/18 1644   Skilled Nursing Facility  [facility] [phone], Fax: [phone] 08/02/18 1231    Reason For Consult  care coordination/care conference 08/02/18 1200   PAS Number  [id number] 08/02/18 1231    Record Reviewed  medical record 08/02/18 1200   ABUSE RISK SCREEN      CTS Assigned to Case  Arabella Mckeon RN 08/02/18 1200   QUESTION TO PATIENT:  Has a member of your  family or a partner(now or in the past) intimidated, hurt, manipulated, or controlled you in any way?  no 07/23/18 1221    LIVING ENVIRONMENT     QUESTION TO PATIENT: Do you feel safe going back to the place where you are living?  yes 07/23/18 1221    Lives With  alone 07/25/18 1423   OBSERVATION: Is there reason to believe there has been maltreatment of a vulnerable adult (ie. Physical/Sexual/Emotional abuse, self neglect, lack of adequate food, shelter, medical care, or financial exploitation)?  no 07/23/18 1221    Living Arrangements  apartment 07/25/18 1423   OTHER      COPING/STRESS     Are you depressed or being treated for depression?  No 07/23/18 2102    Major Change/Loss/Stressor  none 07/23/18 2057   HOMICIDE RISK      EXPECTED DISCHARGE     Feels Like Hurting Others  no 07/23/18 1221    Expected Discharge Date  08/02/18 ([facility]) 08/01/18 1536

## 2018-07-23 NOTE — PROGRESS NOTES
RECEIVING UNIT ED HANDOFF REVIEW    ED Nurse Handoff Report was reviewed by: Vanessa Mcknight on July 23, 2018 at 5:49 PM

## 2018-07-23 NOTE — IP AVS SNAPSHOT
"          [facility] CARDIAC SPECIALTY CARE: [phone]                                              INTERAGENCY TRANSFER FORM - LAB / IMAGING / EKG / EMG RESULTS   2018                    Hospital Admission Date: 2018  BAO MUNIZ   : 1945  Sex: Male        Attending Provider: Nirmal Laird DO     Allergies:  No Known Allergies    Infection:  None   Service:  HOSPITALIST    Ht:  1.854 m (6' 1\")   Wt:  81 kg (178 lb 9.6 oz)   Admission Wt:  72.6 kg (160 lb)    BMI:  23.56 kg/m 2   BSA:  2.04 m 2            Patient PCP Information     Provider PCP Type    Carson Fried MD General         Lab Results - 3 Days      Glucose by meter [[id number]] (Abnormal)  Resulted: 18 0850, Result status: Final result    Ordering provider: Nirmal Laird DO  18 0832 Resulting lab: POINT OF CARE TEST, GLUCOSE    Specimen Information    Type Source Collected On     18 0832          Components       Value Reference Range Flag Lab   Glucose 105 70 - 99 mg/dL H 170            Potassium [[id number]]  Resulted: 18, Result status: Final result    Ordering provider: Siobhan Cline MD  18 0000 Resulting lab: [facility]    Specimen Information    Type Source Collected On   Blood  18 0603          Components       Value Reference Range Flag Lab   Potassium 4.1 3.4 - 5.3 mmol/L  FrStHsLb            Magnesium (AM Draw) [[id number]]  Resulted: 18, Result status: Final result    Ordering provider: Siobhan Cline MD  18 0000 Resulting lab: [facility]    Specimen Information    Type Source Collected On   Blood  18 0603          Components       Value Reference Range Flag Lab   Magnesium 2.0 1.6 - 2.3 mg/dL  FrStHsLb            Lipid panel reflex to direct LDL [[id number]] (Abnormal)  Resulted: 18, Result status: Final result    Ordering provider: Siobhan Cline MD  18 0000 " Resulting lab: [facility]    Specimen Information    Type Source Collected On   Blood  08/02/18 0603          Components       Value Reference Range Flag Lab   Cholesterol 107 <200 mg/dL  FrStHsLb   Triglycerides 92 <150 mg/dL  FrStHsLb   HDL Cholesterol 34 >39 mg/dL L FrStHsLb   LDL Cholesterol Calculated 55 <100 mg/dL  FrStHsLb   Comment:  Desirable:       <100 mg/dl   Non HDL Cholesterol 73 <130 mg/dL  FrStHsLb            CBC with platelets [[id number]] (Abnormal)  Resulted: 08/02/18 0615, Result status: Final result    Ordering provider: Siobhan Cline MD  08/02/18 0000 Resulting lab: [facility]    Specimen Information    Type Source Collected On   Blood  08/02/18 0603          Components       Value Reference Range Flag Lab   WBC 5.9 4.0 - 11.0 10e9/L  FrStHsLb   RBC Count 2.42 4.4 - 5.9 10e12/L L FrStHsLb   Hemoglobin 8.5 13.3 - 17.7 g/dL L FrStHsLb   Hematocrit 25.4 40.0 - 53.0 % L FrStHsLb    78 - 100 fl H FrStHsLb   MCH 35.1 26.5 - 33.0 pg H FrStHsLb   MCHC 33.5 31.5 - 36.5 g/dL  FrStHsLb   RDW 15.8 10.0 - 15.0 % H FrStHsLb   Platelet Count 139 150 - 450 10e9/L L FrStHsLb            Blood culture [[id number]]  Resulted: 08/02/18 0515, Result status: Preliminary result    Ordering provider: Jana Starr MD  07/28/18 1355 Resulting lab: [facility]    Specimen Information    Type Source Collected On   Blood  07/28/18 1504   Comment:  Right Arm          Components       Value Reference Range Flag Lab   Specimen Description Blood Right Arm      Special Requests Aerobic and anaerobic bottles received   FrStHsLb   Culture Micro No growth after 5 days   225            Blood culture [[id number]]  Resulted: 08/02/18 0515, Result status: Preliminary result    Ordering provider: Jana Starr MD  07/28/18 7352 Resulting lab: [facility]    Specimen Information    Type Source Collected On   Blood  07/28/18  1458   Comment:  Left Hand          Components       Value Reference Range Flag Lab   Specimen Description Blood Left Hand      Special Requests Received in aerobic bottle only   75   Culture Micro No growth after 5 days   225            Glucose by meter [[id number]] (Abnormal)  Resulted: 08/02/18 0219, Result status: Final result    Ordering provider: Nirmal Laird, DO  08/02/18 0206 Resulting lab: POINT OF CARE TEST, GLUCOSE    Specimen Information    Type Source Collected On     08/02/18 0206          Components       Value Reference Range Flag Lab   Glucose 115 70 - 99 mg/dL H 170            Glucose by meter [[id number]] (Abnormal)  Resulted: 08/01/18 2115, Result status: Final result    Ordering provider: Nirmal Laird, DO  08/01/18 2103 Resulting lab: POINT OF CARE TEST, GLUCOSE    Specimen Information    Type Source Collected On     08/01/18 2103          Components       Value Reference Range Flag Lab   Glucose 174 70 - 99 mg/dL H 170            Glucose by meter [[id number]] (Abnormal)  Resulted: 08/01/18 1621, Result status: Final result    Ordering provider: Nirmal Laird, DO  08/01/18 1608 Resulting lab: POINT OF CARE TEST, GLUCOSE    Specimen Information    Type Source Collected On     08/01/18 1608          Components       Value Reference Range Flag Lab   Glucose 142 70 - 99 mg/dL H 170            Glucose by meter [[id number]] (Abnormal)  Resulted: 08/01/18 1212, Result status: Final result    Ordering provider: Nirmal Laird, DO  08/01/18 1157 Resulting lab: POINT OF CARE TEST, GLUCOSE    Specimen Information    Type Source Collected On     08/01/18 1157          Components       Value Reference Range Flag Lab   Glucose 113 70 - 99 mg/dL H 170            Glucose by meter [[id number]] (Abnormal)  Resulted: 08/01/18 0916, Result status: Final result    Ordering provider: Nirmal Laird, DO  08/01/18 0904 Resulting lab: POINT OF CARE TEST, GLUCOSE     Specimen Information    Type Source Collected On     08/01/18 0904          Components       Value Reference Range Flag Lab   Glucose 103 70 - 99 mg/dL H 170            Magnesium [[id number]]  Resulted: 08/01/18 0356, Result status: Final result    Ordering provider: Siobhan Cline MD  08/01/18 0301 Resulting lab: [facility]    Specimen Information    Type Source Collected On   Blood  08/01/18 0333          Components       Value Reference Range Flag Lab   Magnesium 1.6 1.6 - 2.3 mg/dL  FrStHsLb            Basic metabolic panel [[id number]] (Abnormal)  Resulted: 08/01/18 0356, Result status: Final result    Ordering provider: Siobhan Cline MD  08/01/18 0301 Resulting lab: [facility]    Specimen Information    Type Source Collected On   Blood  08/01/18 0333          Components       Value Reference Range Flag Lab   Sodium 138 133 - 144 mmol/L  FrStHsLb   Potassium 3.9 3.4 - 5.3 mmol/L  FrStHsLb   Chloride 109 94 - 109 mmol/L  FrStHsLb   Carbon Dioxide 19 20 - 32 mmol/L L FrStHsLb   Anion Gap 10 3 - 14 mmol/L  FrStHsLb   Glucose 119 70 - 99 mg/dL H FrStHsLb   Urea Nitrogen 9 7 - 30 mg/dL  FrStHsLb   Creatinine 1.23 0.66 - 1.25 mg/dL  FrStHsLb   GFR Estimate 58 >60 mL/min/1.7m2 L FrStHsLb   Comment:  Non  GFR Calc   GFR Estimate If Black 70 >60 mL/min/1.7m2  FrStHsLb   Comment:  African American GFR Calc   Calcium 8.3 8.5 - 10.1 mg/dL L FrStHsLb            Glucose by meter [[id number]] (Abnormal)  Resulted: 08/01/18 0204, Result status: Final result    Ordering provider: Nirmal Laird DO  08/01/18 0152 Resulting lab: POINT OF CARE TEST, GLUCOSE    Specimen Information    Type Source Collected On     08/01/18 0152          Components       Value Reference Range Flag Lab   Glucose 130 70 - 99 mg/dL H 170            Glucose by meter [[id number]] (Abnormal)  Resulted: 07/31/18 2117, Result status: Final result    Ordering provider: Nirmal Laird DO   07/31/18 2105 Resulting lab: POINT OF CARE TEST, GLUCOSE    Specimen Information    Type Source Collected On     07/31/18 2105          Components       Value Reference Range Flag Lab   Glucose 161 70 - 99 mg/dL H 170            Glucose by meter [[id number]] (Abnormal)  Resulted: 07/31/18 1803, Result status: Final result    Ordering provider: Nirmal Laird, DO  07/31/18 1751 Resulting lab: POINT OF CARE TEST, GLUCOSE    Specimen Information    Type Source Collected On     07/31/18 1751          Components       Value Reference Range Flag Lab   Glucose 160 70 - 99 mg/dL H 170            Glucose by meter [[id number]] (Abnormal)  Resulted: 07/31/18 1215, Result status: Final result    Ordering provider: Nirmal Laird,   07/31/18 1159 Resulting lab: POINT OF CARE TEST, GLUCOSE    Specimen Information    Type Source Collected On     07/31/18 1159          Components       Value Reference Range Flag Lab   Glucose 124 70 - 99 mg/dL H 170            Glucose by meter [[id number]] (Abnormal)  Resulted: 07/31/18 0836, Result status: Final result    Ordering provider: Nirmal Laird,   07/31/18 0823 Resulting lab: POINT OF CARE TEST, GLUCOSE    Specimen Information    Type Source Collected On     07/31/18 0823          Components       Value Reference Range Flag Lab   Glucose 126 70 - 99 mg/dL H 170            Hemoglobin [[id number]] (Abnormal)  Resulted: 07/31/18 0643, Result status: Final result    Ordering provider: Siobhan Cline MD  07/31/18 0000 Resulting lab: [facility]    Specimen Information    Type Source Collected On   Blood  07/31/18 0625          Components       Value Reference Range Flag Lab   Hemoglobin 8.2 13.3 - 17.7 g/dL L FrStHsLb            Glucose by meter [[id number]] (Abnormal)  Resulted: 07/30/18 2107, Result status: Final result    Ordering provider: Nirmal Laird,   07/30/18 2054 Resulting lab: POINT OF CARE TEST, GLUCOSE    Specimen  Information    Type Source Collected On     07/30/18 2054          Components       Value Reference Range Flag Lab   Glucose 183 70 - 99 mg/dL H 170            Glucose by meter [[id number]] (Abnormal)  Resulted: 07/30/18 2028, Result status: Final result    Ordering provider: Nirmal Laird,   07/27/18 1804 Resulting lab: POINT OF CARE TEST, GLUCOSE    Specimen Information    Type Source Collected On     07/27/18 1804          Components       Value Reference Range Flag Lab   Glucose 108 70 - 99 mg/dL H 170            Glucose by meter [[id number]] (Abnormal)  Resulted: 07/30/18 1738, Result status: Final result    Ordering provider: Nirmal Laird,   07/30/18 1726 Resulting lab: POINT OF CARE TEST, GLUCOSE    Specimen Information    Type Source Collected On     07/30/18 1726          Components       Value Reference Range Flag Lab   Glucose 188 70 - 99 mg/dL H 170            Glucose by meter [[id number]] (Abnormal)  Resulted: 07/30/18 1342, Result status: Final result    Ordering provider: Nirmal Laird,   07/30/18 1301 Resulting lab: POINT OF CARE TEST, GLUCOSE    Specimen Information    Type Source Collected On     07/30/18 1301          Components       Value Reference Range Flag Lab   Glucose 152 70 - 99 mg/dL H 170            Glucose by meter [[id number]] (Abnormal)  Resulted: 07/30/18 0915, Result status: Final result    Ordering provider: Nirmal Laird,   07/30/18 0855 Resulting lab: POINT OF CARE TEST, GLUCOSE    Specimen Information    Type Source Collected On     07/30/18 0855          Components       Value Reference Range Flag Lab   Glucose 100 70 - 99 mg/dL H 170            Sputum Culture Aerobic Bacterial [[id number]] (Abnormal)  Resulted: 07/30/18 0403, Result status: Final result    Ordering provider: Siobhan Cline MD  07/29/18 0439 Resulting lab: [facility]CRO RAPID TESTING [facility]    Specimen Information    Type Source Collected On   Sputum  07/30/18 0048           Components       Value Reference Range Flag Lab   Specimen Description Sputum      Culture Micro --  A 226   Result:         Canceled, Test credited  >10 Squamous epithelial cells/low power field indicates oral contamination. Please   recollect.     Culture Micro --   226   Result:         Notification of test cancellation was given to  Lyubov Zhang RN at [facility] at 0400 on 07.28.18.jpg              Gram stain [[id number]] (Abnormal)  Resulted: 07/30/18 0358, Result status: Final result    Ordering provider: Siobhan Cline MD  07/29/18 0439 Resulting lab: [facility]CRO RAPID TESTING [facility]    Specimen Information    Type Source Collected On   Sputum  07/30/18 0048          Components       Value Reference Range Flag Lab   Specimen Description Sputum      Special Requests Screen   75   Gram Stain --  A 226   Result:         >10 Squamous epithelial cells/low power field indicates oral contamination. Please   recollect.     Gram Stain <25 PMNs/low power field   226   Result:     Gram Stain --   226   Result:         Moderate  Mixed gram positive and gram negative bacteria present.              Blood culture [[id number]]  Resulted: 07/30/18 0206, Result status: Final result    Ordering provider: Nirmal Laird DO  07/23/18 2216 Resulting lab: [facility]    Specimen Information    Type Source Collected On   Blood  07/23/18 2350   Comment:  Left Hand          Components       Value Reference Range Flag Lab   Specimen Description Blood Left Hand      Special Requests Aerobic and anaerobic bottles received   FrStHsLb   Culture Micro No growth   225            Testing Performed By     Lab - Abbreviation Name Director Address Valid Date Range    14 - FrStHsLb [facility] Unknown [address] [place] [address] 05/08/15 1057 - Present    75 - Unknown [facility] Unknown [address] [facility] [address] 01/15/15 1019 - Present    170 - Unknown  "POINT OF CARE TEST, GLUCOSE Unknown Unknown 10/31/11 1114 - Present    225 - Unknown [facility] Unknown [address] [facility] [address] 12/19/14 0954 - Present    226 - Unknown MICRO RAPID TESTING LAB Unknown [address] [facility] [address] 12/19/14 0955 - Present            Unresulted Labs (24h ago through future)    Start       Ordered    Unscheduled  Potassium  (Potassium Replacement - \"High\" - Replacement for all levels less than 4.1 mmol/L - UU,UR,UA,RH,SH,PH,WY )  CONDITIONAL (SPECIFY),   Routine     Comments:  Obtain Potassium Level for these conditions:  *IF no potassium result within 24 hrs before initiation of order set, draw potassium level with next lab collect.    *2 HOURS AFTER last IV potassium replacement dose and 4 hours after an oral replacement dose when potassium replacement given for level less than 3.4.  *Next morning after potassium dose.     Repeat Potassium Replacement if necessary.    08/01/18 0257    Unscheduled  Magnesium  (Magnesium Replacement - Adult - \"High\" - Replacement for all levels less than or equal to 2 mg/dL)  CONDITIONAL (SPECIFY),   Routine     Comments:  Obtain Magnesium Level for these conditions:  *IF no magnesium result within 24 hrs before initiation of order set, draw magnesium level with next lab collect.    *2 HOURS AFTER last magnesium replacement dose when magnesium replacement given for level less than 1.6  *Next morning after magnesium dose.     Repeat Magnesium Replacement if necessary.    08/01/18 0257         Imaging Results - 3 Days      NM Lexiscan stress test (nuc card) [[id number]]  Resulted: 07/31/18 1517, Result status: Final result    Ordering provider: Nirmal Rubio MD  07/30/18 1209 Resulted by: Edil Avila MD    Performed: 07/31/18 0803 - 07/31/18 1152 Resulting lab: RADIOLOGY RESULTS    Narrative:       GATED MYOCARDIAL PERFUSION SCINTIGRAPHY WITH INTRAVENOUS " PHARMACOLOGIC  VASODILATATION LEXISCAN -ONE DAY STUDY     7/31/2018 11:52 AM  BAO MUNIZ  72 years  Male  1945.    Indication/Clinical History: Abnormal echocardiogram with elevated  troponins    Impression-technically difficult study due to significant soft tissue  attenuation, primarily diaphragm attenuation with bowel uptake  artifact.  1.  Myocardial perfusion imaging using single isotope technique  demonstrated moderately large relatively fixed anterior apical defect  extending into the distal inferior wall, rest slightly greater than  stress, consistent with possible nontransmural infarct without  significant ischemia; significant decrease in specificity due to soft  tissue attenuation artifact involving diaphragm attenuation/bowel  uptake. No other significant area of ischemia or infarct noted. Also  cannot totally exclude a nonischemic cardiomyopathy with fixed defect  present..   2. Gated images demonstrated normal size left ventricle with adequate  to decreased overall contractility with mild global hypokinesis with 6  more severe hypokinesis of the anterior apex extending to the inferior  wall.  The left ventricular systolic function is low normal with  ejection fraction 52%.  3. Compared to the prior study from not applicable .    Procedure  Pharmacologic stress testing was performed with Lexiscan at a rate of  0.08 mg/ml rapid bolus injection, for 15 seconds, 0.4 mg/5ml  intravenously. Low-level exercise was not performed along with the  vasodilator infusion.  The heart rate was 99 at baseline and lakhwinder to  106 beats per minute during the Lexiscan infusion. The rest blood  pressure was 103/60 mmHg and was 97/77 mm Hg during Lexiscan infusion.  The patient experienced no symptoms  during the test.    Myocardial perfusion imaging was performed at rest, approximately 45  minutes after the injection intravenously of 10.1 mCi of Tc-99m  Myoview. At peak pharmacologic effect, 10-20 seconds after  Lexiscan,   the patient was injected intravenously with 30.6 mCi of  Tc-99m  Myoview. The post-stress tomographic imaging was performed  approximately 60 minutes after stress.    EKG Findings  The resting EKG demonstrated normal sinus rhythm with diffuse inferior  and anterolateral T-wave inversion. The stress EKG demonstrated sinus  tachycardia with persistent inferior and anterolateral T-wave  inversion without significant ST depression.    Tomographic Findings  Overall, the study quality is suboptimal due to significant soft  tissue attenuation . On the stress images, there is a moderately large  mild anterior apical defect extending into the distal inferior wall.  On the rest images, there is a moderately large mild anterior apical  defect extending the distal inferior wall . Gated images demonstrated  normal size left ventricle with adequate to slightly decreased overall  contractility with mild global hypokinesis with more severe  hypokinesis affecting the anterior apex into the inferior wall. The  left ventricular ejection fraction was calculated to be 52% with  stress 53% rest. TID was absent.    SARY BARRETO MD      Testing Performed By     Lab - Abbreviation Name Director Address Valid Date Range    104 - Rad Rslts RADIOLOGY RESULTS Unknown Unknown 05 1553 - Present               ECG/EMG Results      ECHO COMPLETE WITH OPTISON [[id number]]  Resulted: 18 0911, Result status: Edited Result - FINAL    Ordering provider: Charisma Laird DO  18 1902 Resulted by: Grant Ryan MD    Performed: 18 1008 - 18 1009 Resulting lab: RADIOLOGY RESULTS    Narrative:       [id number]  [id number]  [id number]  [id number]^RAOUL^CHARISMA^TORO           [facility]  Echocardiography Laboratory  [address] [place] [address]        Name: BAO MUNIZ  MRN: [id number]  : 1945  Study Date: 2018 09:11 AM  Age: 72 yrs  Gender: Male  Patient  Location: [facility]  Reason For Study: Dyspnea  Ordering Physician: CHARISMA SILVEIRA  Referring Physician: Carson Fried  Performed By: Cecelia Oro     BSA: 2.0 m2  Height: 73 in  Weight: 160 lb  HR: 64  BP: 106/53 mmHg  _____________________________________________________________________________  __        Procedure  Complete Portable Echo Adult. Contrast Optison.  _____________________________________________________________________________  __        Interpretation Summary     The left ventricle is normal in size.  The visual ejection fraction is estimated at 60-65%.  Diastolic Doppler findings (E/E' ratio and/or other parameters) suggest left  ventricular filling pressures are normal.  No regional wall motion abnormalities noted.  Fused right and left aortic valve leaflets, unknown if functionally versus  congenitally bicuspid.  Mild valvular aortic stenosis.  _____________________________________________________________________________  __        Left Ventricle  The left ventricle is normal in size. There is normal left ventricular wall  thickness. The visual ejection fraction is estimated at 60-65%. Diastolic  Doppler findings (E/E' ratio and/or other parameters) suggest left ventricular  filling pressures are normal. No regional wall motion abnormalities noted.     Right Ventricle  The right ventricle is normal in size and function.     Atria  Normal left atrial size. Right atrial size is normal. There is no color  Doppler evidence of an atrial shunt.     Mitral Valve  The mitral valve leaflets are mildly thickened. There is trace mitral  regurgitation.        Tricuspid Valve  There is mild (1+) tricuspid regurgitation. Normal IVC (1.5-2.5cm) with >50%  respiratory collapse; right atrial pressure is estimated at 5-10mmHg. The  right ventricular systolic pressure is approximated at 16.3 mmHg plus the  right atrial pressure.     Aortic Valve  Fused right and left aortic valve leaflets, unknown if  functionally versus  congenitally bicuspid. No aortic regurgitation is present. The mean AoV  pressure gradient is 12.5 mmHg. Mild valvular aortic stenosis.     Pulmonic Valve  There is trace pulmonic valvular regurgitation.     Vessels  Mild aortic root dilatation. The ascending aorta is Mildly dilated.     Pericardium  There is no pericardial effusion.        Rhythm  The patient's rhythm cannot be determined due to a technically difficult ECG  tracing.  _____________________________________________________________________________  __  MMode/2D Measurements & Calculations  IVSd: 0.90 cm     LVIDd: 4.4 cm  LVIDs: 2.7 cm  LVPWd: 0.87 cm  FS: 37.1 %  LV mass(C)d: 123.0 grams  LV mass(C)dI: 62.8 grams/m2  Ao root diam: 4.0 cm  LA dimension: 3.0 cm  asc Aorta Diam: 4.2 cm  LA/Ao: 0.74  LVOT diam: 2.5 cm  LVOT area: 5.0 cm2  RWT: 0.40        Doppler Measurements & Calculations  MV E max torsten: 56.9 cm/sec  MV A max torsten: 68.6 cm/sec  MV E/A: 0.83  MV dec slope: 258.0 cm/sec2  Ao V2 max: 250.2 cm/sec  Ao max P.0 mmHg  Ao V2 mean: 164.9 cm/sec  Ao mean P.5 mmHg  Ao V2 VTI: 51.5 cm  SHAN(I,D): 1.5 cm2  SHAN(V,D): 1.6 cm2  LV V1 max P.5 mmHg  LV V1 max: 77.6 cm/sec  LV V1 VTI: 15.4 cm  SV(LVOT): 77.6 ml  SI(LVOT): 39.7 ml/m2  PA V2 max: 53.8 cm/sec  PA max P.2 mmHg  PA acc time: 0.13 sec  TR max torsten: 201.6 cm/sec  TR max P.3 mmHg     AV Torsten Ratio (DI): 0.31  SHAN Index (cm2/m2): 0.77  E/E' av.2  Lateral E/e': 5.0  Medial E/e': 7.5           _____________________________________________________________________________  __           Report approved by: Rick Ennis 2018 01:24 PM       1    Type Source Collected On     18 0911          View Image (below)        Echocardiogram Complete [[id number]]  Resulted: 18, Result status: In process    Ordering provider: Nirmal Laird DO  18 1902 Performed: 188 - 18    Resulting lab: [facility]                    Encounter-Level Documents:     There are no encounter-level documents.      Order-Level Documents:     There are no order-level documents.

## 2018-07-23 NOTE — ED NOTES
Bed: ED27  Expected date:   Expected time:   Means of arrival:   Comments:  Eric - 519  - 72 M hypotensive eta 1209

## 2018-07-23 NOTE — IP AVS SNAPSHOT
"    [facility]: [phone]                                              INTERAGENCY TRANSFER FORM - PHYSICIAN ORDERS   2018                    Hospital Admission Date: 2018  BAO MUNIZ   : 1945  Sex: Male        Attending Provider: Nirmal Laird DO     Allergies:  No Known Allergies    Infection:  None   Service:  HOSPITALIST    Ht:  1.854 m (6' 1\")   Wt:  81 kg (178 lb 9.6 oz)   Admission Wt:  72.6 kg (160 lb)    BMI:  23.56 kg/m 2   BSA:  2.04 m 2            Patient PCP Information     Provider PCP Type    Carson Fried MD General      ED Clinical Impression     Diagnosis Description Comment Added By Time Added    Hypotension, unspecified hypotension type [I95.9] Hypotension, unspecified hypotension type [I95.9]  Franki Gonzales MD 2018  4:19 PM    Dehydration [E86.0] Dehydration [E86.0]  Franki Gonzales MD 2018  4:20 PM    Hyponatremia [E87.1] Hyponatremia [E87.1]  Franki Gonzales MD 2018  4:20 PM    Acute renal failure, unspecified acute renal failure type (H) [N17.9] Acute renal failure, unspecified acute renal failure type (H) [N17.9]  Franki Gonzales MD 2018  4:34 PM      Hospital Problems as of 2018              Priority Class Noted POA    Orthostatic hypotension Medium  2018 Yes      Non-Hospital Problems as of 2018              Priority Class Noted    H/O polycythemia vera Low  2002    CKD (chronic kidney disease) stage 3, GFR 30-59 ml/min Medium  2013    GERD (gastroesophageal reflux disease) Medium  Unknown    Essential hypertension, benign Medium  Unknown    Glucose intolerance (impaired glucose tolerance) Medium  3/4/2014    Family history of diabetes mellitus Medium  2014    COPD (chronic obstructive pulmonary disease): spirometry 5-14-15  Medium  2014    Coryza for 20 yrs  Medium  2015    Screening for prostate cancer Medium  2015    Tobacco abuse: " [age]y/o @ 1ppd=19pk yr hx;only cigars since  Medium  5/14/2015    Risk for falls Medium  5/14/2015    Acquired hypothyroidism Medium  6/20/2017    Numbness in feet Medium  10/2/2017    Monoclonal paraproteinemia Medium  10/2/2017    Peripheral axonal neuropathy Medium  10/2/2017      Code Status History     Date Active Date Inactive Code Status Order ID Comments User Context    8/2/2018  9:35 AM  Full Code [id number]  Siobhan Cline MD Outpatient    7/23/2018 11:26 PM 8/2/2018  9:35 AM Full Code [id number]  Nirmal Laird DO Inpatient         Medication Review      START taking        Dose / Directions Comments    atorvastatin 20 MG tablet   Commonly known as:  LIPITOR   Used for:  Hyperlipidemia LDL goal <100        Dose:  20 mg   Take 1 tablet (20 mg) by mouth every evening   Quantity:  30 tablet   Refills:  0        fluticasone-vilanterol 200-25 MCG/INH oral inhaler   Commonly known as:  BREO ELLIPTA   Used for:  COPD exacerbation (H)        Dose:  1 puff   Inhale 1 puff into the lungs daily   Quantity:  1 Inhaler   Refills:  0    Future refills by PCP Dr. Carson Fried with phone number [phone].       ipratropium - albuterol 0.5 mg/2.5 mg/3 mL 0.5-2.5 (3) MG/3ML neb solution   Commonly known as:  DUONEB   Used for:  COPD exacerbation (H)        Dose:  3 mL   Take 1 vial (3 mLs) by nebulization 4 times daily   Quantity:  360 mL   Refills:  1    Future refills by PCP Dr. Carson Fried with phone number [phone].       levalbuterol 1.25 MG/3ML neb solution   Commonly known as:  XOPENEX   Used for:  COPD exacerbation (H)        Dose:  1 ampule   Take 3 mLs (1.25 mg) by nebulization every 4 hours as needed for shortness of breath / dyspnea or wheezing   Quantity:  270 mL   Refills:  0        midodrine HCl 10 MG Tabs   Used for:  Hypotension, unspecified hypotension type        Dose:  10 mg   Take 10 mg by mouth 3 times daily (with meals)   Quantity:  90 tablet   Refills:  0     Future refills by PCP Dr. Carson Fried with phone number [phone].       predniSONE 10 MG tablet   Commonly known as:  DELTASONE   Used for:  COPD exacerbation (H)        4 tabs daily for 3 days, then 3 tabs daily for 3 days, then 2 tabs daily for 3 days, then 1 tab daily for 3 days, then stop   Quantity:  60 tablet   Refills:  0          CONTINUE these medications which have NOT CHANGED        Dose / Directions Comments    dorzolamide-timolol 2-0.5 % ophthalmic solution   Commonly known as:  COSOPT   Used for:  Hypertension        Dose:  1 drop   Place 1 drop into both eyes 2 times daily   Refills:  0        folic acid 1 MG tablet   Commonly known as:  FOLVITE   Used for:  Folate deficiency        TAKE 4 TABLETS BY MOUTH DAILY   Quantity:  120 tablet   Refills:  9        levothyroxine 100 MCG tablet   Commonly known as:  SYNTHROID/LEVOTHROID   Used for:  Hypothyroidism, unspecified type        TAKE 1 TABLET(100 MCG) BY MOUTH DAILY   Quantity:  90 tablet   Refills:  2        metFORMIN 500 MG tablet   Commonly known as:  GLUCOPHAGE   Used for:  Type 2 diabetes mellitus without complication, without long-term current use of insulin (H)        TAKE 1 TABLET(500 MG) BY MOUTH DAILY 20 MINUTES BEFORE DINNER   Quantity:  90 tablet   Refills:  0        omeprazole 20 MG CR capsule   Commonly known as:  priLOSEC   Used for:  Essential hypertension with goal blood pressure less than 130/85        TAKE ONE CAPSULE BY MOUTH EVERY DAY   Quantity:  90 capsule   Refills:  3        TRAVATAN 0.004 % ophthalmic solution   Used for:  Hypertension   Generic drug:  travoprost Z (benzalkonium)        Dose:  1 drop   Place 1 drop into both eyes every evening   Refills:  0        vitamin D 2000 units Caps        Dose:  1 capsule   Take 1 capsule by mouth daily   Refills:  0          STOP taking     ASPIRIN PO           CARTIA  MG 24 hr capsule   Generic drug:  diltiazem           lisinopril-hydrochlorothiazide 20-12.5 MG  per tablet   Commonly known as:  PRINZIDE/ZESTORETIC                     Further instructions from your care team       -A follow-up appointment has been made for you with Dr. Ames on Tuesday, September 4th at 11:00 AM at [facility]. Please arrive at 10:40 AM to have your labs drawn.  Please call the clinic at [phone] should you need to change or cancel your appointment.  Please arrive 15 minutes early to your scheduled appointment and bring your photo ID, insurance card and co-payment.     [address], [address]   [place], [place] [address]   Phone: [phone]           Pt has socks and a pair of glasses with him. No other known belongings.        Patient will discharge to [facility] today via family around 13:30.  [facility] phone number is [phone].    After Care     Activity - Up ad ellie           Advance Diet as Tolerated       Follow this diet upon discharge: regular diet       General info for SNF       Length of Stay Estimate: Short Term Care: Estimated # of Days 31-90  Condition at Discharge: Stable  Level of care:skilled   Rehabilitation Potential: Good  Admission H&P remains valid and up-to-date: Yes  Recent Chemotherapy: N/A  Use Nursing Home Standing Orders: Yes       Mantoux instructions       Give two-step Mantoux (PPD) Per Facility Policy Yes             Procedures     Oxygen - Nasal cannula       2 Lpm by nasal cannula to keep O2 sats 92% or greater.             Referrals     Occupational Therapy Adult Consult       Evaluate and treat as clinically indicated.    Reason:  weakness       Physical Therapy Adult Consult       Evaluate and treat as clinically indicated.    Reason:  weakness             Follow-Up Appointment Instructions     Future Labs/Procedures    Follow Up and recommended labs and tests     Comments:    Follow up with Nursing home physician in 1week.  The following labs/tests are recommended: CBC,   BMP .  Needs f/u with  in [email or web address] in 1month with CBC results  Check blood pressure in left thigh only due to peripheral arterial disease  Recheck CMP in 1month as pt started on lipitor      Follow-Up Appointment Instructions     Follow Up and recommended labs and tests       Follow up with Nursing home physician in 1week.  The following labs/tests are recommended: CBC,  BMP .  Needs f/u with  in [facility] [[email or web address] in 1month with CBC results  Check blood pressure in left thigh only due to peripheral arterial disease  Recheck CMP in 1month as pt started on lipitor             Statement of Approval     Ordered          08/02/18 0936  I have reviewed and agree with all the recommendations and orders detailed in this document.  EFFECTIVE NOW     Approved and electronically signed by:  Siobhan Cline MD

## 2018-07-23 NOTE — IP AVS SNAPSHOT
[facility]    [address] [place], [place] [address]e., [address]    [place] [address]    Phone:  [phone]                                       After Visit Summary   7/23/2018    Abebe Christensen    MRN: [id number]           After Visit Summary Signature Page     I have received my discharge instructions, and my questions have been answered. I have discussed any challenges I see with this plan with the nurse or doctor.    ..........................................................................................................................................  Patient/Patient Representative Signature      ..........................................................................................................................................  Patient Representative Print Name and Relationship to Patient    ..................................................               ................................................  Date                                            Time    ..........................................................................................................................................  Reviewed by Signature/Title    ...................................................              ..............................................  Date                                                            Time

## 2018-07-23 NOTE — PROGRESS NOTES
SUBJECTIVE:   Abebe Christensen is a 72 year old male who presents to clinic today for the following health issues:        Vertigo, weight loss, loose stools, Low O-2, multiple falls, weakness, foot pain        Duration: Symptoms have increased over the last 2 weeks    Description (location/character/radiation): See above    Intensity:  severe    Accompanying signs and symptoms: See above    History (similar episodes/previous evaluation): None    Precipitating or alleviating factors: None    Therapies tried and outcome: None     Weight loss (unintentional) over the past few months.    Feels very weak, tired...  Pt's sister present as well and states that they were actually thinking of going to the hospital due to his current condition and complaints.  Continues to smoke tobacco.  Lives alone and has difficulty taking care of himself, mostly due to his weakness which has worsened over the past 2 weeks.    Problem list and histories reviewed & adjusted, as indicated.  Additional history: as documented    Labs reviewed in EPIC    Reviewed and updated as needed this visit by clinical staff  Tobacco  Allergies  Meds  Problems  Med Hx  Surg Hx  Fam Hx  Soc Hx        Reviewed and updated as needed this visit by Provider  Allergies  Meds  Problems         ROS:  CONSTITUTIONAL: NEGATIVE for fever, chills.  +Weight Loss.   INTEGUMENTARY/SKIN: NEGATIVE for worrisome rashes or bruises  EYES: NEGATIVE for vision changes or irritation  ENT/MOUTH: NEGATIVE for ear, mouth and throat problems  RESP: NEGATIVE for significant cough or SOB  CV: NEGATIVE for chest pain, palpitations or peripheral edema  GI: NEGATIVE for nausea, abdominal pain, heartburn, or change in bowel habits  : NEGATIVE for frequency, dysuria, or hematuria  MUSCULOSKELETAL: POSITIVE for physical deconditioning and muscle atophy  NEURO: POSITIVE for generalized weakness  HEME: NEGATIVE for bleeding problems  PSYCHIATRIC: POSITIVE for fatigue    OBJECTIVE:  "    Pulse 79  Temp 97.9  F (36.6  C) (Tympanic)  Resp 13  Ht 6' 1\" (1.854 m)  Wt 150 lb (68 kg)  SpO2 91%  BMI 19.79 kg/m2  Body mass index is 19.79 kg/(m^2).   BP 80/40's    GENERAL: cachectic, fatigued, septic appearance  EYES: Eyes grossly normal to inspection, PERRL and conjunctivae and sclerae normal  HENT: Nose and mouth without ulcers or lesions  NECK: no adenopathy, no asymmetry, masses, or scars and thyroid normal to palpation  RESP: lungs clear to auscultation - no rales, rhonchi or wheezes  CV: regular rate and rhythm, normal S1 S2, no S3 or S4, no murmur  ABDOMEN: soft, nontender, and bowel sounds normal  MS: resting/sitting in wheelchair  SKIN: no suspicious rashes or bruises  NEURO: generalized weakness; speech slow and hesitant  PSYCH: appears fatigued, poor concentration    Diagnostic Test Results:  none     ASSESSMENT/PLAN:     Problem List Items Addressed This Visit     Tobacco abuse: [age]y/o @ 1ppd=19pk yr hx;only cigars since     COPD (chronic obstructive pulmonary disease): spirometry 5-14-15       Other Visit Diagnoses     Other specified hypotension    -  Primary    Weight loss, unintentional        Hypoxemia             Due to severe hypotension of 80/40s and very septic appearance, I recommended Abebe get further work-up and stabilization in the ED.  Also agreeable to get ambulance transport to the ED so that we can make sure he gets transported there safely.  Will RTC to f/u with PCP once stabilized and discharged from the hospital.  We will need to work up his unintentional weight loss as well which I am concerned is likely cancer-related due to his continued tobacco abuse.     Jana Mirza, DO  [facility]  "

## 2018-07-23 NOTE — ED PROVIDER NOTES
History     Chief Complaint:  Hypotension with Referral from Clinic    HPI   Abebe Christensen is a 72 year old male with a history of hypertension who presents to the emergency department via EMS with hypotension. As of the past year and a half, the patient reported severe degradation of his well-being, including increased falls, numbness in his feet with difficulty walking, loss of consciousness, light headedness, dizziness, and  pronounced loss of muscle mass. He was seen on 10/3/17 and underwent an exploratory omentectomy due to weight loss and unexpected weakness, for which he never received results. He and his sister both corroborated that the past 3-4 weeks have been the peak of degradation. Last night, the patient fell and has since been experiencing left side rib pain. The patient was seen in clinic today, where they noted a blood pressure of 80/40 and subsequently referred him to the ED for evaluation and treatment. He denies vomiting, fever, current light headedness, or history diabetes.     Allergies:  No Known Allergies      Medications:    Aspirin   Cartia XT  Vitamin D   Cosopt  Folvite  Synthroid  Prinzide  Glucophage  Prilosec  Travatan     Past Medical History:    Polycythemia  GERD   Hyperlipidemia  Hypertension  Renal Insufficiency  Polycythemia  Reynaud's    Past Surgical History:    Cataract and glaucoma  Laparoscopic biopsy liver  Laparoscopic herniorrhaphy umbilical  Omentectomy  Laparotomy exploratory    Family History:    Mother: eye disorder  Father: colorectal cancer    Social History:  The patient was accompanied to the ED by sister.  Smoking Status: Current Every Day Smoker   Packs/day: 0.5   Types: Cigarettes  Smokeless Tobacco: Never Used  Alcohol Use: Positive  Marital Status:  Single      Review of Systems   Constitutional: Positive for unexpected weight change. Negative for fever.        Increasing falls   Cardiovascular:        Hypotension   Gastrointestinal: Negative for vomiting.  "  Musculoskeletal:        Rib pain   Neurological: Positive for dizziness, syncope, light-headedness (none currently) and numbness.   All other systems reviewed and are negative.    Physical Exam     Patient Vitals for the past 24 hrs:   BP Temp Temp src Pulse Heart Rate Resp SpO2 Height Weight   07/23/18 1725 91/61 - - - - - 98 % - -   07/23/18 1720 (!) 81/57 - - - - - 100 % - -   07/23/18 1704 97/59 - - - - - 100 % - -   07/23/18 1703 - - - - - - 100 % - -   07/23/18 1702 - - - - - - 100 % - -   07/23/18 1701 - - - - - - 100 % - -   07/23/18 1700 (!) 80/61 - - - - - 99 % - -   07/23/18 1600 94/65 - - - - - - - -   07/23/18 1530 97/75 - - - - - - - -   07/23/18 1515 105/71 - - - - - - - -   07/23/18 1500 91/72 - - - - - - - -   07/23/18 1445 94/66 - - - - - - - -   07/23/18 1400 95/75 - - - 66 21 100 % - -   07/23/18 1345 (!) 89/60 - - - 69 21 - - -   07/23/18 1300 - - - - 78 19 100 % - -   07/23/18 1245 95/59 - - - 71 13 - - -   07/23/18 1230 (!) 87/58 - - - 78 18 100 % - -   07/23/18 1225 96/62 97.5  F (36.4  C) Oral 71 - 16 100 % 1.854 m (6' 1\") 72.6 kg (160 lb)     Physical Exam   Nursing note and vitals reviewed.  General: Oriented to person, place, and time. Cachectic thin male. Pale.  Head: No signs of trauma.   Mouth/Throat: Oropharynx is clear and moist.   Eyes: Conjunctivae are normal. Pupils are equal, round, and reactive to light.   Neck: Normal range of motion. No nuchal rigidity.   Cardiovascular: Normal rate and regular rhythm.    Respiratory: Effort normal and breath sounds normal. No respiratory distress.   Abdominal: Soft. There is no tenderness. There is no guarding.   Musculoskeletal: Normal range of motion. no edema.   Neurological: The patient is alert and oriented to person, place, and time.  PERRLA, EOMI, visual fields intact, strength in upper/lower extremities normal and symmetrical.   Sensation normal. Speech normal  GCS eye subscore is 4. GCS verbal subscore is 5. GCS motor subscore is " 6.   Skin: Skin is warm and dry. No rash noted.   Psychiatric: normal mood and affect. behavior is normal.     Emergency Department Course     Imaging:  Radiology findings were communicated with the patient who voiced understanding of the findings.    XR Chest 2 Views  Negative.  JULITO OREILLY MD  Reading per radiology    Laboratory:  Laboratory findings were communicated with the patient who voiced understanding of the findings.    Lactic Acid (Resulted: 1718): 1.5  UA with Microscopic Reflex to Culture: Leukocyte Esterase large (A), WBC 45 (H), o/w WNL   Urine Culture: pending  CBC: WBC 7.8, HGB 11.0 (L),   BMP:  (L), Glucose 129 (H), Creatinine 1.84 (H), GFR Estimate 36 (L) o/w WNL   Troponin (Collected: 1225): <0.015   NT ProBNP Inpatient: 1931 (H)    Interventions:  1321 NS 1000 ml IV  1454 NS 1000 ml IV  1634 Rocephin 1 g IV    Emergency Department Course:    1225 IV was inserted and blood was drawn for laboratory testing, results above.    1236 Nursing notes and vitals reviewed.    1248 I performed an exam of the patient as documented above.     1407 The patient was sent for a chest xray while in the emergency department, results above.     1505 The patient provided a urine sample here in the emergency department. This was sent for laboratory testing, findings above.    1629 I spoke with Dr. Laird of the hospitalist service from [facility] [place] regarding patient's presentation, findings, and plan of care.    1642 IV was inserted and blood was drawn for laboratory testing, results above.    1800 I personally reviewed the laboratory and imaging results with the patient and answered all related questions prior to admission.    Impression & Plan      Medical Decision Making:  Abebe Christensen is a 72 year old male who presents to the emergency department today from the clinic for evaluation of hypotension. The patient does not appear to be in sepsis but does display signs of an urinary tract  infection. His white count is normal, and his sodium is slightly low. His other electrolytes are normal except for his creatinine, which is elevated. This is an acute change.  The patient appears dehydrated. This elevated creatinine is likely prerenal. He will require overnight admission for fluids to correct his sodium and creatinine levels, as well as to treat the infection.     Diagnosis:    ICD-10-CM    1. Hypotension, unspecified hypotension type I95.9 Lactic acid   2. Dehydration E86.0    3. Hyponatremia E87.1    4. Urinary tract infection without hematuria, site unspecified N39.0    5. Acute renal failure, unspecified acute renal failure type (H) N17.9      Disposition:   The patient is admitted into the care of Dr. Laird.    Scribe Disclosure:  JONATHAN, Glenis Duarte, am serving as a scribe at 2:38 PM on 7/23/2018 to document services personally performed by Franki Gonzales MD based on my observations and the provider's statements to me.     EMERGENCY DEPARTMENT       Franki Gonzales MD  07/23/18 1391

## 2018-07-24 ENCOUNTER — APPOINTMENT (OUTPATIENT)
Dept: CARDIOLOGY | Facility: CLINIC | Age: 73
DRG: 682 | End: 2018-07-24
Attending: HOSPITALIST
Payer: MEDICARE

## 2018-07-24 LAB
ALBUMIN SERPL-MCNC: 2.4 G/DL (ref 3.4–5)
ALP SERPL-CCNC: 99 U/L (ref 40–150)
ALT SERPL W P-5'-P-CCNC: 11 U/L (ref 0–70)
ANION GAP SERPL CALCULATED.3IONS-SCNC: 10 MMOL/L (ref 3–14)
AST SERPL W P-5'-P-CCNC: 51 U/L (ref 0–45)
BACTERIA SPEC CULT: NORMAL
BACTERIA SPEC CULT: NORMAL
BASOPHILS # BLD AUTO: 0 10E9/L (ref 0–0.2)
BASOPHILS NFR BLD AUTO: 0.3 %
BILIRUB SERPL-MCNC: 1 MG/DL (ref 0.2–1.3)
BUN SERPL-MCNC: 11 MG/DL (ref 7–30)
CALCIUM SERPL-MCNC: 7.9 MG/DL (ref 8.5–10.1)
CHLORIDE SERPL-SCNC: 106 MMOL/L (ref 94–109)
CO2 SERPL-SCNC: 21 MMOL/L (ref 20–32)
CREAT SERPL-MCNC: 1.46 MG/DL (ref 0.66–1.25)
DIFFERENTIAL METHOD BLD: ABNORMAL
EOSINOPHIL # BLD AUTO: 0.2 10E9/L (ref 0–0.7)
EOSINOPHIL NFR BLD AUTO: 2.4 %
ERYTHROCYTE [DISTWIDTH] IN BLOOD BY AUTOMATED COUNT: 13.2 % (ref 10–15)
FOLATE SERPL-MCNC: 23.3 NG/ML
GFR SERPL CREATININE-BSD FRML MDRD: 47 ML/MIN/1.7M2
GLUCOSE BLDC GLUCOMTR-MCNC: 102 MG/DL (ref 70–99)
GLUCOSE BLDC GLUCOMTR-MCNC: 104 MG/DL (ref 70–99)
GLUCOSE BLDC GLUCOMTR-MCNC: 121 MG/DL (ref 70–99)
GLUCOSE BLDC GLUCOMTR-MCNC: 123 MG/DL (ref 70–99)
GLUCOSE BLDC GLUCOMTR-MCNC: 132 MG/DL (ref 70–99)
GLUCOSE SERPL-MCNC: 100 MG/DL (ref 70–99)
HCT VFR BLD AUTO: 26.8 % (ref 40–53)
HGB BLD-MCNC: 9.4 G/DL (ref 13.3–17.7)
IMM GRANULOCYTES # BLD: 0 10E9/L (ref 0–0.4)
IMM GRANULOCYTES NFR BLD: 0.3 %
LYMPHOCYTES # BLD AUTO: 1 10E9/L (ref 0.8–5.3)
LYMPHOCYTES NFR BLD AUTO: 15.1 %
Lab: NORMAL
MCH RBC QN AUTO: 35.3 PG (ref 26.5–33)
MCHC RBC AUTO-ENTMCNC: 35.1 G/DL (ref 31.5–36.5)
MCV RBC AUTO: 101 FL (ref 78–100)
MONOCYTES # BLD AUTO: 0.5 10E9/L (ref 0–1.3)
MONOCYTES NFR BLD AUTO: 8 %
NEUTROPHILS # BLD AUTO: 5 10E9/L (ref 1.6–8.3)
NEUTROPHILS NFR BLD AUTO: 73.9 %
NRBC # BLD AUTO: 0 10*3/UL
NRBC BLD AUTO-RTO: 0 /100
PLATELET # BLD AUTO: 123 10E9/L (ref 150–450)
POTASSIUM SERPL-SCNC: 3.3 MMOL/L (ref 3.4–5.3)
PROT PATTERN UR ELPH-IMP: NORMAL
PROT SERPL-MCNC: 5.4 G/DL (ref 6.8–8.8)
RBC # BLD AUTO: 2.66 10E12/L (ref 4.4–5.9)
SODIUM SERPL-SCNC: 137 MMOL/L (ref 133–144)
SPECIMEN SOURCE: NORMAL
VIT B12 SERPL-MCNC: 392 PG/ML (ref 193–986)
WBC # BLD AUTO: 6.7 10E9/L (ref 4–11)

## 2018-07-24 PROCEDURE — 12000000 ZZH R&B MED SURG/OB

## 2018-07-24 PROCEDURE — 82784 ASSAY IGA/IGD/IGG/IGM EACH: CPT | Performed by: HOSPITALIST

## 2018-07-24 PROCEDURE — 82607 VITAMIN B-12: CPT | Performed by: HOSPITALIST

## 2018-07-24 PROCEDURE — 25000132 ZZH RX MED GY IP 250 OP 250 PS 637: Mod: GY | Performed by: PHYSICIAN ASSISTANT

## 2018-07-24 PROCEDURE — 40000264 ECHO COMPLETE WITH OPTISON

## 2018-07-24 PROCEDURE — A9270 NON-COVERED ITEM OR SERVICE: HCPCS | Mod: GY | Performed by: HOSPITALIST

## 2018-07-24 PROCEDURE — 80053 COMPREHEN METABOLIC PANEL: CPT | Performed by: HOSPITALIST

## 2018-07-24 PROCEDURE — 25000128 H RX IP 250 OP 636: Performed by: INTERNAL MEDICINE

## 2018-07-24 PROCEDURE — 25500064 ZZH RX 255 OP 636: Performed by: HOSPITALIST

## 2018-07-24 PROCEDURE — 86334 IMMUNOFIX E-PHORESIS SERUM: CPT | Performed by: HOSPITALIST

## 2018-07-24 PROCEDURE — A9270 NON-COVERED ITEM OR SERVICE: HCPCS | Mod: GY | Performed by: PHYSICIAN ASSISTANT

## 2018-07-24 PROCEDURE — 93306 TTE W/DOPPLER COMPLETE: CPT | Mod: 26 | Performed by: INTERNAL MEDICINE

## 2018-07-24 PROCEDURE — 25000128 H RX IP 250 OP 636: Performed by: HOSPITALIST

## 2018-07-24 PROCEDURE — 36415 COLL VENOUS BLD VENIPUNCTURE: CPT | Performed by: HOSPITALIST

## 2018-07-24 PROCEDURE — 84165 PROTEIN E-PHORESIS SERUM: CPT | Performed by: HOSPITALIST

## 2018-07-24 PROCEDURE — 25000132 ZZH RX MED GY IP 250 OP 250 PS 637: Mod: GY | Performed by: HOSPITALIST

## 2018-07-24 PROCEDURE — 00000402 ZZHCL STATISTIC TOTAL PROTEIN: Performed by: HOSPITALIST

## 2018-07-24 PROCEDURE — 99233 SBSQ HOSP IP/OBS HIGH 50: CPT | Performed by: PHYSICIAN ASSISTANT

## 2018-07-24 PROCEDURE — 82746 ASSAY OF FOLIC ACID SERUM: CPT | Performed by: HOSPITALIST

## 2018-07-24 PROCEDURE — 25000128 H RX IP 250 OP 636: Performed by: PHYSICIAN ASSISTANT

## 2018-07-24 PROCEDURE — 85025 COMPLETE CBC W/AUTO DIFF WBC: CPT | Performed by: HOSPITALIST

## 2018-07-24 PROCEDURE — 00000146 ZZHCL STATISTIC GLUCOSE BY METER IP

## 2018-07-24 RX ORDER — POTASSIUM CHLORIDE 1500 MG/1
20 TABLET, EXTENDED RELEASE ORAL ONCE
Status: COMPLETED | OUTPATIENT
Start: 2018-07-24 | End: 2018-07-24

## 2018-07-24 RX ORDER — AMOXICILLIN 250 MG
1 CAPSULE ORAL 2 TIMES DAILY
Status: DISCONTINUED | OUTPATIENT
Start: 2018-07-24 | End: 2018-08-02 | Stop reason: HOSPADM

## 2018-07-24 RX ORDER — AMOXICILLIN 250 MG
1 CAPSULE ORAL 2 TIMES DAILY PRN
Status: DISCONTINUED | OUTPATIENT
Start: 2018-07-24 | End: 2018-08-02 | Stop reason: HOSPADM

## 2018-07-24 RX ORDER — AMOXICILLIN 250 MG
2 CAPSULE ORAL 2 TIMES DAILY PRN
Status: DISCONTINUED | OUTPATIENT
Start: 2018-07-24 | End: 2018-08-02 | Stop reason: HOSPADM

## 2018-07-24 RX ORDER — POLYETHYLENE GLYCOL 3350 17 G/17G
17 POWDER, FOR SOLUTION ORAL DAILY PRN
Status: DISCONTINUED | OUTPATIENT
Start: 2018-07-24 | End: 2018-08-02 | Stop reason: HOSPADM

## 2018-07-24 RX ORDER — AMOXICILLIN 250 MG
2 CAPSULE ORAL 2 TIMES DAILY
Status: DISCONTINUED | OUTPATIENT
Start: 2018-07-24 | End: 2018-08-02 | Stop reason: HOSPADM

## 2018-07-24 RX ADMIN — POTASSIUM CHLORIDE 20 MEQ: 1500 TABLET, EXTENDED RELEASE ORAL at 12:08

## 2018-07-24 RX ADMIN — LEVOTHYROXINE SODIUM 100 MCG: 100 TABLET ORAL at 06:25

## 2018-07-24 RX ADMIN — DORZOLAMIDE HYDROCHLORIDE AND TIMOLOL MALEATE 1 DROP: 20; 5 SOLUTION/ DROPS OPHTHALMIC at 08:26

## 2018-07-24 RX ADMIN — SENNOSIDES AND DOCUSATE SODIUM 1 TABLET: 8.6; 5 TABLET ORAL at 13:42

## 2018-07-24 RX ADMIN — DORZOLAMIDE HYDROCHLORIDE AND TIMOLOL MALEATE 1 DROP: 20; 5 SOLUTION/ DROPS OPHTHALMIC at 21:32

## 2018-07-24 RX ADMIN — VITAMIN D, TAB 1000IU (100/BT) 2000 UNITS: 25 TAB at 08:26

## 2018-07-24 RX ADMIN — SODIUM CHLORIDE: 9 INJECTION, SOLUTION INTRAVENOUS at 10:36

## 2018-07-24 RX ADMIN — HUMAN ALBUMIN MICROSPHERES AND PERFLUTREN 9 ML: 10; .22 INJECTION, SOLUTION INTRAVENOUS at 10:10

## 2018-07-24 RX ADMIN — SODIUM CHLORIDE 1000 ML: 9 INJECTION, SOLUTION INTRAVENOUS at 08:26

## 2018-07-24 RX ADMIN — SODIUM CHLORIDE: 9 INJECTION, SOLUTION INTRAVENOUS at 21:17

## 2018-07-24 RX ADMIN — OMEPRAZOLE 20 MG: 20 CAPSULE, DELAYED RELEASE ORAL at 08:26

## 2018-07-24 RX ADMIN — SODIUM CHLORIDE 1000 ML: 9 INJECTION, SOLUTION INTRAVENOUS at 00:30

## 2018-07-24 RX ADMIN — ASPIRIN 81 MG 81 MG: 81 TABLET ORAL at 08:26

## 2018-07-24 RX ADMIN — CEFTRIAXONE 1 G: 1 INJECTION, POWDER, FOR SOLUTION INTRAMUSCULAR; INTRAVENOUS at 15:57

## 2018-07-24 RX ADMIN — SODIUM CHLORIDE: 9 INJECTION, SOLUTION INTRAVENOUS at 08:09

## 2018-07-24 ASSESSMENT — ACTIVITIES OF DAILY LIVING (ADL)
ADLS_ACUITY_SCORE: 11
ADLS_ACUITY_SCORE: 11
ADLS_ACUITY_SCORE: 13
ADLS_ACUITY_SCORE: 11

## 2018-07-24 NOTE — CONSULTS
"CLINICAL NUTRITION SERVICES  -  ASSESSMENT NOTE      Recommendations Ordered by Registered Dietitian (RD):   Will send Ensure between meals     Malnutrition:   % Weight Loss:  > 7.5% in 3 months (severe malnutrition)  % Intake:  </= 50% for >/= 1 month (severe malnutrition)  Subcutaneous Fat Loss:  None observed  Muscle Loss:  None observed  Fluid Retention:  None noted    Malnutrition Diagnosis: Severe malnutrition  In Context of:  Chronic illness or disease        REASON FOR ASSESSMENT  Abebe Christensen is a 72 year old male seen by Registered Dietitian for Admission Nutrition Risk Screen - Unintentional weight loss of 10# or more in past 2 months and Provider Order - weight loss      NUTRITION HISTORY  - Information obtained from the pt  - Patient is on a regular diet at home, he states his appetite has been poor for 6 months.  - Typical food/fluid intake is: Breakfast - bowl of cereal or frozen waffle; lunch and dinner are often \"combined\" - soup, pudding. He may have ice cream for a snack.  Note no good protein sources in pt's diet, he states meat hasn't appealed to him.  - Supplements - he hadn't been taking any but his sister had been talking to him about them.        CURRENT NUTRITION ORDERS  Diet Order:     Regular     Current Intake/Tolerance:  Pt had scrambled eggs and coffee for breakfast. He is willing to try supplements between meals.      PHYSICAL FINDINGS  Observed  No nutrition-related physical findings observed  Obtained from Chart/Interdisciplinary Team  [place]    ANTHROPOMETRICS  Height: 6' 1\"  Weight: 160 lbs 14.97 oz (73 kg) - 7/24  Body mass index is 21.23 kg/(m^2).  Weight Status:  Normal BMI  IBW: 83.6 kg +/- 10%  % IBW: 87%  Weight History:   Per chart pt reported # wt loss in the past year. However, the records below do not support this.  It appears he's lost 20# (11%) in the past 4 months although pt does not feel the current wt of 160# is accurate. He states he's more like 125#. "     Wt Readings from Last 10 Encounters:   07/24/18 73 kg (160 lb 15 oz)   07/23/18 68 kg (150 lb)   03/27/18 81.9 kg (180 lb 8 oz)   10/03/17 79.2 kg (174 lb 11.2 oz)   10/02/17 79.4 kg (175 lb)   10/02/17 79.4 kg (175 lb)   09/25/17 77.1 kg (170 lb)   08/23/17 77.1 kg (170 lb)   08/22/17 79.4 kg (175 lb)   06/07/17 80.7 kg (178 lb)       LABS  Labs reviewed    MEDICATIONS  Medications reviewed      ASSESSED NUTRITION NEEDS PER APPROVED PRACTICE GUIDELINES:    Dosing Weight 73 kg - 7/24  Estimated Energy Needs: 0409-9215 kcals (30-35 Kcal/Kg)  Justification: underweight  Estimated Protein Needs: 110-130 grams protein (1.5-1.8 g pro/Kg)  Justification: Repletion      MALNUTRITION:  % Weight Loss:  > 7.5% in 3 months (severe malnutrition)  % Intake:  </= 50% for >/= 1 month (severe malnutrition)  Subcutaneous Fat Loss:  None observed  Muscle Loss:  None observed  Fluid Retention:  None noted    Malnutrition Diagnosis: Severe malnutrition  In Context of:  Chronic illness or disease    NUTRITION DIAGNOSIS:  Inadequate oral intake related to poor appetite as evidenced by 11% wt loss x 4 months      NUTRITION INTERVENTIONS  Recommendations / Nutrition Prescription  Continue regular diet.      Implementation  Nutrition education: Provided education on nutritional supplements  Medical Food Supplement - Ensure between meals      Nutrition Goals  Pt will consume at least 75% of 3 meals/day and supplements.      MONITORING AND EVALUATION:  Progress towards goals will be monitored and evaluated per protocol and Practice Guidelines    Domonique Valdez RD  Pager [phone] (M-F)            [phone] ([facility])

## 2018-07-24 NOTE — PROGRESS NOTES
Note from last visit at my office in 2017:    (Buck is a 71-year-old  man with history of tobacco smoking and myeloproliferative disorder last treated in our office in 2011 with phlebotomy subsequently had no follow-up, experienced anorexia and weight loss over the last 1 year for 50 pounds.  He feels weak in his legs and has general fatigue.  Developed episode over the Memorial Day weekend with abdominal pain and evaluated in the hospital CT scan showed moderate ascites and some nodularity in the omentum.  He has neuropathy in his soles up to the ankles since late 2016.  Had paracentesis in August, 2017 which was negative, 950 mL of fluid removed, and needle biopsy was unable to be done and he was referred to surgery to have laparoscopy with biopsies.     INTERVAL HISTORY:  He is here today for follow-up, he continues to have fatigue low appetite and most bothersome symptom and his neuropathy in his feet.  He met with Dr. Kenny and is scheduled to have laparoscopy in October 3.  He was seen by neurology and he has not heard about final conclusions.  Continues to have decreased appetite but he forces himself to eat to maintain his weight.  No significant pain but he felt some pressure in the lower abdomen and has occasional diarrhea as.        REVIEW OF SYSTEMS:   As detailed above.    PAST MEDICAL HISTORY:   Hypertension myeloproliferative disorder since 2002 Raynauds diabetes glaucoma hypothyroidism no history of depression had cataract surgery bilaterally.    MEDICATIONS:   Reviewed..    ALLERGIES:   NKDA    SOCIAL HISTORY:  Retired , is  lives alone smokes a pack a day for 40 years drinks about 24 ounce of vodka per week.    FAMILY HISTORY:   Father had colon cancer at age 89.  No hematological disorders.    PHYSICAL EXAM:  In no acute distress. Vitals are stable as charted, afebrile, weight 173 pounds. HEENT: Unremarkable, specifically no tenderness on the palpation of the  right maxillary sinus area.  Neck: Supple, No lymphadenopathy. No JVD. No Bruits or thyromegaly. Lungs: Clear to auscultation. No dullness to percussion. Heart: Regular rhythm no murmer or gallop. Abdomen: Soft, nontender; liver edge is palpable under the right costal margin. Bowel sounds are present. No masses there is prominence of his umbilicus with fluid, reducible. Extremities: No cyanosis, clubbing, or edema. Lymphatics: No lymphadenopathy in cervical, supraclavicular, axillary, epitrochlear, or inguinal areas.          ANCILLARY DATA:   PET scan from August 11 shows moderate omental thickening and nodularity with low metabolic activity, moderate abdominal and pelvic ascites with intermediate metabolic activity.  Suspicious for malignant ascites.  Several small noncalcified pulmonary nodules bilaterally.  Low metabolic activity nonspecific may be benign.  Complete opacification of the right maxillary sinus likely infection.     IMPRESSION:   1.  History of myeloproliferative disorder current counts are stable.  2.  Ascites and nodularity in the omentum is concerning for malignancy.  3.  Weight loss anorexia concerning for malignancy.  4.  Sensory neuropathy in the feet currently unclear etiology but could be associated with paraneoplastic syndrome that can be seen with certain malignancies including small cell cancer.  5.  Chronic tobacco abuse.  6.  Monoclonal gammopathy of unclear significance.     PLAN: He is advised to proceed with laparoscopy and I will follow-up on the results of the procedure and pathology next week.  If tissue diagnosis is available we will arrange for follow-up and discussion regarding diagnosis prognosis and treatment. ) End of note    IMPRESSION:  MPN stable count but currently anemic, could be partly due to CKD  Abdominal peritoneal nodularity, lap in October 2017 shows cirrhosis, no malignancy  Weight loss (50 lb last year before dx of cirrhosis, 10+ lb since last year, current CT  ok)    Follow B12, consider aranesp, complete malignancy w/u , consider endoscopy.  consult to be dictated.     Trish Ames MD  [facility].

## 2018-07-24 NOTE — PLAN OF CARE
Problem: Patient Care Overview  Goal: Plan of Care/Patient Progress Review  Admission    Patient arrives to room 615-2 via cart from ED.  Care plan note: Pt alert and oriented x 4. +orthostatic hypotension (laying: BP 95/56 HR 75; standing: BP 68/37 ). MD updated; ordered NS bolus which is currently infusing; otherwise IVF NS 100ml/hr. Other VSS on RA. Denies pain, nausea, sob. Up with assist of 1, GB, and walker. Urine sample taken and sent down; results pending. CT and Xray of abdomen performed tonight; results pending. Tolerating reg diet. . DC in 2-3, once results are back. Nursing will continue to monitor.     Inpatient nursing criteria listed below were met:    PCD's Documented: NA  Skin issues/needs documented :Yes  Isolation education started/completed NA  Patient allergies verified with patient: Yes  Verified completion of [place] Risk Assessment Tool:  Yes  Verified completion of Guardianship screening tool: Yes  Fall Prevention: Care plan updated, Education given and documented Yes  Care Plan initiated: Yes  Home medications documented in belongings flowsheet: NA  Patient belongings documented in belongings flowsheet: Yes  Reminder note (belongings/ medications) placed in discharge instructions:Yes  Admission profile/ required documentation complete: Yes

## 2018-07-24 NOTE — PROVIDER NOTIFICATION
MD Notification    Notified Person: MD    Notified Person Name:  Malik    Notification Date/Time: 7/24/18 at 0030    Notification Interaction: Phone conversation    Purpose of Notification: Pt's lying to standing BP 64/36. Very dizzy. Bolus?    Orders Received: Run 1L bolus over 2 hours.    Comments: Thanks!

## 2018-07-24 NOTE — PLAN OF CARE
Problem: Patient Care Overview  Goal: Plan of Care/Patient Progress Review  Outcome: No Change  Patient A&Ox4, forgetful.  Up SBA at bedside using urinal/commode this shift.  Pt continues to have orthostatic BP changes.  Asymptomatic, denies dizziness/lightheaded when standing.  See vitals.  1000 ml bolus given this am, IVF's currently infusing 100/hr.  Denies pain.  UC pending, on IV Rocephin.  Echo completed today.  Hematology following.  K 3.3, one time dose 20 meq potassium given, recheck labs in am.  CR 1.46.   and 132, appetite improving today.  Continue to monitor.

## 2018-07-24 NOTE — PROVIDER NOTIFICATION
MD Notification    Notified Person: MD    Notified Person Name: Dr. Gan    Notification Date/Time: 7/24/18 at 0650    Notification Interaction: Phone message    Purpose of Notification: Pt's BP this am 72/46 FYI    Orders Received:    Comments: Pt endorses taking his BP medications around 0830 on 7/23/18. One which is long acting: CARTIA XT 24 hr.

## 2018-07-24 NOTE — PLAN OF CARE
Problem: Patient Care Overview  Goal: Plan of Care/Patient Progress Review  Outcome: No Change  A/O x4. VSS ex hypotensive. Orthostatic +. MD notified and BP did not change after 1L bolus given. Sit to stand was 72/46 this morning. Pt reportedly took long acting BP medication yesterday (see prior note). Needs A1 to stand at bedside and use urinal. Very unsteady with urgency/frequency. Fall precautions maintained. Regular diet but reports no appetite whatsoever and endorses major weight loss in last year.  at 0200. IVF at 100 mL/hr now. Placed new 20 gauge IV this shift. PT/OT consults pending. Nursing will continue to monitor.

## 2018-07-24 NOTE — PLAN OF CARE
Problem: Patient Care Overview  Goal: Individualization & Mutuality  PT and OT: Spoke with RN, Pt not medically appropriate at this time. BP 86/43 supine in bed,  will hold and continue to follow.

## 2018-07-24 NOTE — PROVIDER NOTIFICATION
MD Notification    Notified Person: MD    Notified Person Name: Dr. Gan    Notification Date/Time: 7/24/18 at 0335    Notification Interaction: Phone message    Purpose of Notification: FYI, pt's lying BP unchanged after 1L bolus 88/44.    Orders Received: None    Comments:

## 2018-07-24 NOTE — CONSULTS
Consult Date:  07/24/2018      REQUESTING PHYSICIAN:  Nirmal Laird MD      REASON FOR CONSULTATION:  History of myeloproliferative neoplasm and abdominal peritoneal nodularity.      HISTORY.  Medical record reviewed, history obtained, and patient examined.      Buck is a 72-year-old  man with history of myeloproliferative neoplasm for many years, treated with phlebotomy previously, and subsequently did not require any treatment for several years.  He had a weight loss in 2017, over 50 pounds, and abdominal scan showed a nodularity in the omentum.  He also has had fluid retention and eventually was referred to have a laparoscopic evaluation.  This was done by Dr. Kenny on 10/03/2017.  There was no evidence of malignancy.  The omentum and peritoneum biopsy showed fat necrosis, fibrosis, and calcification.  Liver wedge biopsy showed chronic liver disease, stage IV cirrhosis, with steatohepatitis.  Omentectomy revealed mild patchy chronic inflammation without evidence of malignancy.  The liver was described to be a cobblestone appearance.  There were multiple adhesions in the omentum.        Currently, Mr. Christensen is hospitalized with increasing weakness, lightheadedness, falls, and additional weight loss.  He denies any abdominal pain, no GI or  symptoms.  Appetite is low.  CT scan was done during this hospitalization which showed resolution of the ascites that he had previously, with trace fluid at this point and questionable some minimal serosal implants versus fluid adjacent to the liver without other findings.  He is currently feeling weak, but no other symptoms.      PAST MEDICAL HISTORY:  Hypertension, but his blood pressure recently was down and he is off blood pressure medications.  History of dyslipidemia, GERD, myeloproliferative neoplasm required only phlebotomy previously, mild chronic renal insufficiency, liver cirrhosis status post laparoscopy in 10/2017, omentectomy.      MEDICATIONS:  IV  saline, aspirin, Rocephin, vitamin D, Cosopt, insulin, Synthroid, omeprazole, potassium, Senna S.      ALLERGIES:  NO KNOWN DRUG ALLERGIES.      SOCIAL HISTORY:  He used to drink 3 alcoholic beverages per day, quit last year.  Smokes half pack a day.      FAMILY HISTORY:  Father had a diagnosis of colon cancer at age 89.      REVIEW OF SYSTEMS:  Significant for fatigue, failure to thrive, a fall, weight loss, decreased appetite.  No melena, hematochezia, hematuria.  He has some prostate symptoms.  The remainder of his 10-point system review is unremarkable.      PHYSICAL EXAMINATION:   GENERAL:  He is in no acute distress.   VITAL SIGNS:  Stable as charted, afebrile.  Blood pressure systolic on the low side, was as low as 72, currently 92.  His weight is 160, and from oncology office on 09/28 was 174.   HEENT:  Normocephalic, atraumatic.  Sclerae are anicteric.   NECK:  Supple, no JVD, no lymphadenopathy.   LUNGS:  Clear to auscultation.   HEART:  Regular rhythm.   ABDOMEN:  Soft, nontender, no organomegaly.   EXTREMITIES:  No cyanosis or edema.   LYMPHATIC:  No lymphadenopathy in cervical, supraclavicular, axillary, epitrochlear, or inguinal areas.   NEUROLOGIC:  Grossly intact.      ANCILLARY DATA:  Creatinine 1.46, GFR 47 estimated.  Potassium 3.3, albumin 2.4, protein 5.4, total bilirubin 1.  AST mildly elevated at 51, ALT 11.  B12 is 392.  TSH 0.77.  White count 6.7, hemoglobin 9.4, was 11 on admission.  Platelet count 123, .  CT scan of the abdomen as detailed.  Chest x-ray unremarkable.  Protein electrophoresis ordered and pending.      IMPRESSION:   1.  Myeloproliferative neoplasm -- polycythemia vera treated previously with phlebotomy but not required for the last several years.   2.  Anemia, macrocytic, likely associated with MPN with possible progression to myelofibrosis state.  Other etiologies: chronic kidney disease associated anemia.  The drop in hemoglobin is likely dilution after admission.    3.  Thrombocytopenia associated with MPN, and possible cirrhosis related.   4.  History of ascites and peritoneal nodularity, status post laparoscopic evaluation in 10/2017.  Negative for malignancy and showed cirrhosis.   5.  Weakness, multifactorial.  Could be associated with liver disease, hypoalbuminemia, anemia, and chronic kidney disease.      DISCUSSION AND RECOMMENDATIONS:  His myeloproliferative neoplasm appears to be progressed moderately with anemia, but his white count and platelets are adequate.  I do not see urgent need to evaluate his anemia at this point, but rather address the other significant symptoms of weight loss, hypoalbuminemia, and to consider rule out malignancy workup.  Chest x-ray was normal, but with a history of smoking recommend to consider low dose CT scan of the chest.  Endoscopy also to be considered.  He does not recall having a recent colonoscopy over many years.  Darbepoetin can be considered if he continues to have kidney dysfunction and anemia to help with improvement in hemoglobin and associated fatigue.  I will consider repeating bone marrow biopsy depending on his clinical progress, but this will be done as an outpatient.      I appreciate the opportunity to participate in the care of Abebe Christensen.  Will follow while hospitalized and will need a followup in my office after discharge.         JUAN DAVID PLATT MD             D: 2018   T: 2018   MT: LAYO      Name:     ABEBE CHRISTENSEN   MRN:      [id number]        Account:       [id number]   :      1945           Consult Date:  2018      Document: [id number]       cc: Nirmal Laird MD

## 2018-07-24 NOTE — PROGRESS NOTES
[facility]    Hospitalist Progress Note    Date of Service (when I saw the patient): 07/24/2018    Assessment & Plan   Abebe Christensen is a 72 year old male with PMHx of CKD III, MGUS, HTN, hyperlipidemia, and GERD who presented to the ED on 7/23/18 with weakness, recurrent falls, lightheadness, and unintentional weight loss with findings of abnormal UA and SOHEILA. Registered under inpatient status for further evaluation and treatment.     Generalized weakness: Multifactorial in the context of below. Pt lives alone in an apartment. Recently purchased a walker/cane. No elevator in apartment.   -- Treat as below  -- PT and OT to assess  -- SW for discharge planning     Abnormal UA: UA with large LE. Asymptomatic; denies dysuria or increased frequency  - IV ceftriaxone empirically until urine culture resulted      Orthostatic hypotension, dizziness  History of benign essential hypertension: Positive on admission. Likely secondary to SOHEILA below with ongoing PTA medication administration including BP meds, poor PO intake.   - Has received total of 1500 ml bolus since admission as well as maintenance fluids at 100 ml/hr. Administer an additional 1000 ml bolus this AM due to ongoing hypotension  - Strict I&Os, daily weights   - discontinue the bp meds (Cartia  mg po every day, Prinzide 20/12.5 mg po every day--last dose 0830 on 7/23/18)  - Maintenance fluids at 100 ccs/hr   - Hold on further orthostatic hypotension checks unless pt were to have symptoms, repeat in the AM     SOHEILA on CKD III: Baseline creatinine appears to be 1.1-1.2. Creatinine on admission 1.84>1.46 after IVF resuscitation. Likely related to poor PO intake in the context of nausea for the past 1-2 weeks.   -- Continue maintenance fluids   -- BMP in the AM     Hyponatremia, resolved: Sodium 130 on admission, 137 on repeat.   -- Monitor     Myeloproliferative disorder (2002)  History of omental lesions concerning for peritoneal mets, s/p  exploratory lap  Unintentional weight loss (20-30 lbs in the last year per review of clinic notes): Previously followed by [facility]. CT and PET form 2017 showed moderate omental thickening and nodularity with low metabolic activity, moderate abdominal and pelvic ascited with intermediate metabolic activity. Pt had a lap in 10/2017 which showed cirrhosis, no malignancy. CT A/P on admission showing marked improvement in ascites form prior imaging. Trace fluid and/or capsular implants seen anterolaterally in the liver, question minimal serosal implants vs fluid adjacent to the liver, otherwise no definite omental lesions demonstrated.   [facility] see formal note by Dr. Ames. Follow B12, consider aranesp, complete malignancy w/u, consider endoscopy  -- Protein and urine electrophoresis with immunofixation    Macrocytic anemia   Thrombocytopenia: Platelets 172>123  -- No active signs of bleeding, monitor   -- Anemia work-up ordered   -- Heme/Onc following-appreciate assistance     Recent Labs  Lab 07/24/18  0815 07/23/18  1225   HGB 9.4* 11.0*     Elevated BNP: 1931 on admission. Pt is euvolemic on exam. Denies chest pain, GOMEZ, SOB.   -- Echo ordered and pending   -- Monitor I&Os, daily weights     Intake/Output Summary (Last 24 hours) at 07/24/18 1101  Last data filed at 07/24/18 1037   Gross per 24 hour   Intake             3331 ml   Output              850 ml   Net             2481 ml     NIDDM, controlled  Peripheral neuropathy: Maintained on Metformin 500 mg po every day PTA. A1C 4.0 on 7/23/18. Describes bilateral neuropathy. Previously assessed by Neurologist and diagnosed with neuropathy. Not currently taking medication for this.   -- PTA Metformin on hold  -- Medium dose sliding scale insulin ordered   -- BS per protocol   -- Monitor for hypoglycemia   -- Consider starting gabapentin once SOHEILA resolved   -- Neurology referral at discharge     Recent Labs  Lab 07/24/18  0804 07/24/18  0222  "07/23/18 2054 07/23/18  1225   GLC  --   --   --  129*   * 102* 104*  --      Cirrhosis: Prior hx of ascites with paracentesis in 8/2017 with negative cytology, 950 ccs of fluid removed. Pathology showing advanced chronic liver disease (stage IV \"cirrhosis\" with steatohepatitis. AST 51, ALT 11.      Hypothyroidism: TSH WNL. Continue PTA Synthroid     GERD: Continue PTA Prilosec     History of infrarenal AA: Measuring at 3 cm on CT. Stable.     Alcohol use disorder: Drinks one mixed drink containing 1 shot of vodka daily. Denies history of withdrawal symptoms including withdrawal seizure.   -- Monitor for signs of withdrawal    Tobacco use: 40 pack year hx.   -- Nicotine replacement available if needed     Severe malnutrition in the context of chronic illness  Unintentional weight loss (20-30 lbs in the last year per review of clinic notes): % Weight Loss:  > 7.5% in 3 months (severe malnutrition)  % Intake:  </= 50% for >/= 1 month (severe malnutrition)  -- Nutrition consulted     # Pain Assessment:  Current Pain Score 7/23/2018   Patient currently in pain? denies   Pain score (0-10) -   Pain location -   Pain descriptors -   Abebe guzman pain level was assessed and he currently denies pain.      DVT Prophylaxis: Pneumatic Compression Devices and Ambulate every shift  Code Status: Full Code    Disposition: Expected discharge pending clinical course     Lizzette Sarkar PA-C    This patient was discussed with Dr. Ansari of the Hospitalist Service who agrees with current plans as outlined above.     Interval History   Resting comfortably in bed. Positive orthostatics this AM. Denies dizziness, lightheadedness, chest pain, SOB. No acute events reported overnight. IVFs infusing. Heme/Onc following.     -Data reviewed today: See below.     Physical Exam   Temp: 97.4  F (36.3  C) Temp src: Oral BP: (!) 86/43 Pulse: 81 Heart Rate: 90 Resp: 16 SpO2: 99 % O2 Device: None (Room air)    Vitals:    07/23/18 " 1225   Weight: 72.6 kg (160 lb)     Vital Signs with Ranges  Temp:  [97.4  F (36.3  C)-98  F (36.7  C)] 97.4  F (36.3  C)  Pulse:  [71-81] 81  Heart Rate:  [60-90] 90  Resp:  [13-21] 16  BP: ()/(43-75) 86/43  SpO2:  [87 %-100 %] 99 %  I/O last 3 completed shifts:  In: 1918 [I.V.:1918]  Out: 525 [Urine:525]    CONSTITUTIONAL: Pt laying in bed, dressed in hospital garb. Appears comfortable. Cooperative with interview.   HEENT: Normocephalic, atraumatic. Negative for conjunctival redness or scleral icterus.    CARDIOVASCULAR: RRR, no murmurs, rubs, or extra heart sounds appreciated. Pulses +2/4 and regular in upper and lower extremities, bilaterally.   RESPIRATORY: No increased work of breathing.  CTA, bilat; no wheezes, rales, or rhonchi appreciated.  GASTROINTESTINAL:  Abdomen soft, non-distended. BS auscultated in all four quadrants. Negative for tenderness to palpation.  No masses or organomegaly noted.  MUSCULOSKELETAL: No gross deformities noted. Normal muscle tone.   HEMATOLOGIC/LYMPHATIC/IMMUNOLOGIC: Negative for lower extremity edema, bilaterally.  NEUROLOGIC: Alert and oriented to person, place, and time.  strength intact.   SKIN: Bruising noted on arms, bilat.     Medications     sodium chloride 100 mL/hr at 07/24/18 0809       sodium chloride 0.9%  1,000 mL Intravenous Once     aspirin chewable tablet 81 mg  81 mg Oral Daily     cefTRIAXone  1 g Intravenous Q24H     cholecalciferol  2,000 Units Oral Daily     dorzolamide-timolol PF  1 drop Both Eyes BID     insulin aspart  1-7 Units Subcutaneous TID AC     insulin aspart  1-5 Units Subcutaneous At Bedtime     levothyroxine  100 mcg Oral QAM AC     omeprazole  20 mg Oral Daily       Data     Recent Labs  Lab 07/24/18  0815 07/23/18  1225   WBC 6.7 7.8   HGB 9.4* 11.0*   * 100   * 172   NA  --  130*   POTASSIUM  --  3.6   CHLORIDE  --  98   CO2  --  23   BUN  --  14   CR  --  1.84*   ANIONGAP  --  9   JESSICA  --  8.9   GLC  --  129*    TROPI  --  <0.015       Recent Results (from the past 24 hour(s))   XR Chest 2 Views    Narrative    XR CHEST 2 VW 7/23/2018 2:32 PM    HISTORY: weakness hypotension;       Impression    IMPRESSION: Negative.    JULITO OREILLY MD   CT Abdomen Pelvis w/o Contrast    Narrative    CT ABDOMEN AND PELVIS WITHOUT CONTRAST 7/23/2018 9:42 PM     HISTORY:  History of omental masses, weight loss, renal failure.     COMPARISON: May 26, 2017    TECHNIQUE: Volumetric helical acquisition of CT images from the lung  bases through the symphysis pubis without intravenous contrast.  Radiation dose for this scan was reduced using automated exposure  control, adjustment of the mA and/or kV according to patient size, or  iterative reconstruction technique.    FINDINGS: Previously seen ascites is markedly improved from previous.  Trace fluid and/or capsular implants seen anterolaterally in the  liver. Minimal soft tissue stranding seen in the right paracolic  gutter near the liver. No definite focal intrahepatic lesion. Adrenal  glands, kidneys, pancreas, and spleen demonstrate no worrisome focal  lesion. There are no dilated loops of small intestine or large bowel  to suggest ileus or obstruction. There is mild diverticulosis without  evidence for diverticulitis. No free air. 3 cm infrarenal abdominal  aortic aneurysm. This is unchanged from previous. Survey of the  visualized bony structures demonstrates no destructive bony lesions.  Lung bases clear of acute infiltrates.      Impression    IMPRESSION: Marked improvement in ascites since the comparison study.  Question some minimal serosal implants vs. fluid adjacent to the  liver. Otherwise no definite omental lesions demonstrated.

## 2018-07-24 NOTE — H&P
[facility]    History and Physical  Hospitalist       Date of Admission:  7/23/2018    Assessment & Plan   Abebe Christensen is a 72 year old male who presents with weakness, falling, lightheadness, and unintentional weight loss    Urinary tract infection  - IV ceftriaxone  - follow cultures  - CT abdomen without contrast    Orthostatic hypotension  - fluids and recheck  - discontinue the bp meds    SOHEILA  - likely prenreal , continue fluids, bolus as needed    100 lb unintentional weight loss  History of MGUS and PV  History of omental lesions concerning for peritoneal mets, s/p exploratory lap  Appears like the pathology is negative, sounds like he was lost for followup and now family is here to help him navigate the health care system  followed with [facility]  History is concerning for underlying malignancy  Given his history would reimage the abdomen and have [facility] consult to see if they have other concerns  Send myeloma labs  - cons oncology  - protein and urine electrophoresis with immunofixation  - CT abdomen    Failure to thrive  - PT and OT consults, likely needs placment    HTN  - holding meds    DVT Prophylaxis: Pneumatic Compression Devices  Code Status: Full Code    Disposition: Expected discharge in 2-3 days once data returns.    Nirmal Laird,     Primary Care Physician   Carson Fried    Chief Complaint   weakness     History is obtained from the patient and family members    History of Present Illness   Abebe Christensen is a 72 year old male who presents with weakness, falling, accompanied by sister and niece. Basically his overall health has been deteriorating over the past year with about 100 lbs of unintentional weight loss. He has now became very unsteady, and fell 2 days ago. He lives in an apartment on the second floor and can barely navigate the steps. Family feel his blood pressure is overmedicated. He also states that he had a biopsy done last year that he never received  results from. He notes that he feels some distention in his abdomen. He also has some back pain, chronically but this is worse.     Past Medical History    I have reviewed this patient's medical history and updated it with pertinent information if needed.   Past Medical History:   Diagnosis Date     GERD (gastroesophageal reflux disease)      Hyperlipidemia      Hypertension      Hypertension      MGUS (monoclonal gammopathy of unknown significance)      Renal insufficiency, mild        Past Surgical History   I have reviewed this patient's surgical history and updated it with pertinent information if needed.  Past Surgical History:   Procedure Laterality Date     EYE SURGERY  2008    cataract and glaucoma     LAPAROSCOPIC BIOPSY LIVER N/A 10/3/2017    Procedure: LAPAROSCOPIC BIOPSY LIVER;;  Surgeon: Kendell Kenny MD;  Location:  OR     LAPAROSCOPIC HERNIORRHAPHY UMBILICAL N/A 10/3/2017    Procedure: LAPAROSCOPIC HERNIORRHAPHY UMBILICAL;;  Surgeon: Kendell Kenny MD;  Location:  OR     LAPAROTOMY EXPLORATORY N/A 10/3/2017    Procedure: LAPAROTOMY EXPLORATORY;;  Surgeon: Kendell Kenny MD;  Location:  OR     OMENTECTOMY N/A 10/3/2017    Procedure: OMENTECTOMY;  LAPAROSCOPIC ABDOMINAL EXPLORATION; OMENTECTOMY; PERITONEAL UMBILICAL HERNIA REPAIR, WEDGE LIVER BIOPSY;  Surgeon: Kendell Kenny MD;  Location:  OR       Prior to Admission Medications   Prior to Admission Medications   Prescriptions Last Dose Informant Patient Reported? Taking?   ASPIRIN PO 7/23/2018 at Unknown time Self Yes Yes   Sig: Take 81 mg by mouth daily   CARTIA  MG 24 hr capsule 7/23/2018 at Unknown time Self No Yes   Sig: TAKE 1 CAPSULE BY MOUTH DAILY   Cholecalciferol (VITAMIN D) 2000 UNITS CAPS 7/23/2018 at Unknown time Self Yes Yes   Sig: Take 1 capsule by mouth daily   dorzolamide-timolol (COSOPT) 2-0.5 % ophthalmic solution 7/22/2018 at Unknown time Self Yes Yes   Sig: Place 1 drop into both eyes 2 times daily    folic acid  (FOLVITE) 1 MG tablet 7/23/2018 at Unknown time Self No Yes   Sig: TAKE 4 TABLETS BY MOUTH DAILY   levothyroxine (SYNTHROID/LEVOTHROID) 100 MCG tablet 7/23/2018 at Unknown time Self No Yes   Sig: TAKE 1 TABLET(100 MCG) BY MOUTH DAILY   lisinopril-hydrochlorothiazide (PRINZIDE/ZESTORETIC) 20-12.5 MG per tablet 7/23/2018 at Unknown time Self No Yes   Sig: TAKE 1 TABLET BY MOUTH DAILY   metFORMIN (GLUCOPHAGE) 500 MG tablet 7/22/2018 at Unknown time Self No Yes   Sig: TAKE 1 TABLET(500 MG) BY MOUTH DAILY 20 MINUTES BEFORE DINNER   omeprazole (PRILOSEC) 20 MG CR capsule 7/23/2018 at Unknown time Self No Yes   Sig: TAKE ONE CAPSULE BY MOUTH EVERY DAY   travoprost Z, benzalkonium, (TRAVATAN) 0.004 % ophthalmic solution 7/22/2018 at Unknown time Self Yes Yes   Sig: Place 1 drop into both eyes every evening       Facility-Administered Medications: None     Allergies   No Known Allergies    Social History   I have reviewed this patient's social history and updated it with pertinent information if needed. Abebe Christensen  reports that he has been smoking Cigarettes.  He has been smoking about 0.50 packs per day. He has never used smokeless tobacco. He reports that he drinks alcohol. He reports that he does not use illicit drugs.    Family History   I have reviewed this patient's family history and updated it with pertinent information if needed.   Family History   Problem Relation Age of Onset     Eye Disorder Mother      Cancer - colorectal Father        Review of Systems   The 10 point Review of Systems is negative other than noted in the HPI or here.1    Physical Exam   Temp: 98  F (36.7  C) Temp src: Oral BP: 95/56 Pulse: 71 Heart Rate: 75 Resp: 18 SpO2: 99 % O2 Device: None (Room air)    Vital Signs with Ranges  Temp:  [97.5  F (36.4  C)-98  F (36.7  C)] 98  F (36.7  C)  Pulse:  [71-79] 71  Heart Rate:  [66-78] 75  Resp:  [13-21] 18  BP: ()/(53-75) 95/56  SpO2:  [87 %-100 %] 99 %  160 lbs 0 oz    Constitutional:  Awake, alert, cooperative, no apparent distress. Appears cachectic  Eyes: Conjunctiva and pupils examined and normal.  HEENT: Moist mucous membranes, normal dentition.  Respiratory: Clear to auscultation bilaterally, no crackles or wheezing.  Cardiovascular: Regular rate and rhythm, normal S1 and S2, and no murmur noted.  GI: Soft, non-distended, non-tender, normal bowel sounds.  Lymph/Hematologic: No anterior cervical or supraclavicular adenopathy.  Skin: No rashes, no cyanosis, no edema.  Musculoskeletal: No joint swelling, erythema or tenderness.  Neurologic: Cranial nerves 2-12 intact, normal strength and sensation.  Psychiatric: Alert, oriented to person, place and time, no obvious anxiety or depression.    Data   Data reviewed today:  I personally reviewed the chest x-ray image(s) showing clear lungs.    Recent Labs  Lab 07/23/18  1225   WBC 7.8   HGB 11.0*         *   POTASSIUM 3.6   CHLORIDE 98   CO2 23   BUN 14   CR 1.84*   ANIONGAP 9   JESSICA 8.9   *   TROPI <0.015       Imaging:  Recent Results (from the past 24 hour(s))   XR Chest 2 Views    Narrative    XR CHEST 2 VW 7/23/2018 2:32 PM    HISTORY: weakness hypotension;       Impression    IMPRESSION: Negative.    JULITO OREILLY MD

## 2018-07-24 NOTE — PROGRESS NOTES
Continue to be hypotensive, -another liter bolus ordered over 2 hours.no active bleeding, hemoglobin  Stable

## 2018-07-25 ENCOUNTER — APPOINTMENT (OUTPATIENT)
Dept: OCCUPATIONAL THERAPY | Facility: CLINIC | Age: 73
DRG: 682 | End: 2018-07-25
Attending: HOSPITALIST
Payer: MEDICARE

## 2018-07-25 ENCOUNTER — APPOINTMENT (OUTPATIENT)
Dept: PHYSICAL THERAPY | Facility: CLINIC | Age: 73
DRG: 682 | End: 2018-07-25
Attending: HOSPITALIST
Payer: MEDICARE

## 2018-07-25 LAB
ALBUMIN SERPL ELPH-MCNC: 2.7 G/DL (ref 3.7–5.1)
ALBUMIN SERPL-MCNC: 2.5 G/DL (ref 3.4–5)
ALP SERPL-CCNC: 110 U/L (ref 40–150)
ALPHA1 GLOB SERPL ELPH-MCNC: 0.4 G/DL (ref 0.2–0.4)
ALPHA2 GLOB SERPL ELPH-MCNC: 0.5 G/DL (ref 0.5–0.9)
ALT SERPL W P-5'-P-CCNC: 13 U/L (ref 0–70)
ANION GAP SERPL CALCULATED.3IONS-SCNC: 11 MMOL/L (ref 3–14)
AST SERPL W P-5'-P-CCNC: 57 U/L (ref 0–45)
B-GLOBULIN SERPL ELPH-MCNC: 0.6 G/DL (ref 0.6–1)
BASOPHILS # BLD AUTO: 0 10E9/L (ref 0–0.2)
BASOPHILS NFR BLD AUTO: 0.4 %
BILIRUB DIRECT SERPL-MCNC: 0.3 MG/DL (ref 0–0.2)
BILIRUB SERPL-MCNC: 0.5 MG/DL (ref 0.2–1.3)
BUN SERPL-MCNC: 9 MG/DL (ref 7–30)
CALCIUM SERPL-MCNC: 7.8 MG/DL (ref 8.5–10.1)
CHLORIDE SERPL-SCNC: 113 MMOL/L (ref 94–109)
CO2 SERPL-SCNC: 17 MMOL/L (ref 20–32)
CREAT SERPL-MCNC: 1.36 MG/DL (ref 0.66–1.25)
DIFFERENTIAL METHOD BLD: ABNORMAL
EOSINOPHIL # BLD AUTO: 0.1 10E9/L (ref 0–0.7)
EOSINOPHIL NFR BLD AUTO: 1.7 %
ERYTHROCYTE [DISTWIDTH] IN BLOOD BY AUTOMATED COUNT: 13.5 % (ref 10–15)
GAMMA GLOB SERPL ELPH-MCNC: 1.1 G/DL (ref 0.7–1.6)
GFR SERPL CREATININE-BSD FRML MDRD: 51 ML/MIN/1.7M2
GLUCOSE BLDC GLUCOMTR-MCNC: 105 MG/DL (ref 70–99)
GLUCOSE BLDC GLUCOMTR-MCNC: 110 MG/DL (ref 70–99)
GLUCOSE BLDC GLUCOMTR-MCNC: 123 MG/DL (ref 70–99)
GLUCOSE BLDC GLUCOMTR-MCNC: 135 MG/DL (ref 70–99)
GLUCOSE BLDC GLUCOMTR-MCNC: 136 MG/DL (ref 70–99)
GLUCOSE BLDC GLUCOMTR-MCNC: 99 MG/DL (ref 70–99)
GLUCOSE SERPL-MCNC: 98 MG/DL (ref 70–99)
HCT VFR BLD AUTO: 27.7 % (ref 40–53)
HGB BLD-MCNC: 9.7 G/DL (ref 13.3–17.7)
IGA SERPL-MCNC: 202 MG/DL (ref 70–380)
IGG SERPL-MCNC: 1080 MG/DL (ref 695–1620)
IGM SERPL-MCNC: 289 MG/DL (ref 60–265)
IMM GRANULOCYTES # BLD: 0 10E9/L (ref 0–0.4)
IMM GRANULOCYTES NFR BLD: 0.3 %
LYMPHOCYTES # BLD AUTO: 1 10E9/L (ref 0.8–5.3)
LYMPHOCYTES NFR BLD AUTO: 15 %
M PROTEIN SERPL ELPH-MCNC: 0.2 G/DL
MCH RBC QN AUTO: 35.8 PG (ref 26.5–33)
MCHC RBC AUTO-ENTMCNC: 35 G/DL (ref 31.5–36.5)
MCV RBC AUTO: 102 FL (ref 78–100)
MONOCYTES # BLD AUTO: 0.5 10E9/L (ref 0–1.3)
MONOCYTES NFR BLD AUTO: 7.1 %
NEUTROPHILS # BLD AUTO: 5.2 10E9/L (ref 1.6–8.3)
NEUTROPHILS NFR BLD AUTO: 75.5 %
NRBC # BLD AUTO: 0 10*3/UL
NRBC BLD AUTO-RTO: 0 /100
PLATELET # BLD AUTO: 142 10E9/L (ref 150–450)
POTASSIUM SERPL-SCNC: 3.4 MMOL/L (ref 3.4–5.3)
PROT PATTERN SERPL ELPH-IMP: ABNORMAL
PROT PATTERN SERPL IFE-IMP: ABNORMAL
PROT SERPL-MCNC: 6.2 G/DL (ref 6.8–8.8)
RBC # BLD AUTO: 2.71 10E12/L (ref 4.4–5.9)
SODIUM SERPL-SCNC: 141 MMOL/L (ref 133–144)
WBC # BLD AUTO: 6.9 10E9/L (ref 4–11)

## 2018-07-25 PROCEDURE — 40000193 ZZH STATISTIC PT WARD VISIT: Performed by: PHYSICAL THERAPIST

## 2018-07-25 PROCEDURE — 80076 HEPATIC FUNCTION PANEL: CPT | Performed by: PHYSICIAN ASSISTANT

## 2018-07-25 PROCEDURE — 12000000 ZZH R&B MED SURG/OB

## 2018-07-25 PROCEDURE — 97535 SELF CARE MNGMENT TRAINING: CPT | Mod: GO

## 2018-07-25 PROCEDURE — 99233 SBSQ HOSP IP/OBS HIGH 50: CPT | Performed by: PHYSICIAN ASSISTANT

## 2018-07-25 PROCEDURE — 97116 GAIT TRAINING THERAPY: CPT | Mod: GP | Performed by: PHYSICAL THERAPIST

## 2018-07-25 PROCEDURE — 97110 THERAPEUTIC EXERCISES: CPT | Mod: GP | Performed by: PHYSICAL THERAPIST

## 2018-07-25 PROCEDURE — 00000146 ZZHCL STATISTIC GLUCOSE BY METER IP

## 2018-07-25 PROCEDURE — 40000133 ZZH STATISTIC OT WARD VISIT

## 2018-07-25 PROCEDURE — 36415 COLL VENOUS BLD VENIPUNCTURE: CPT | Performed by: PHYSICIAN ASSISTANT

## 2018-07-25 PROCEDURE — A9270 NON-COVERED ITEM OR SERVICE: HCPCS | Mod: GY | Performed by: HOSPITALIST

## 2018-07-25 PROCEDURE — 80048 BASIC METABOLIC PNL TOTAL CA: CPT | Performed by: PHYSICIAN ASSISTANT

## 2018-07-25 PROCEDURE — 25000132 ZZH RX MED GY IP 250 OP 250 PS 637: Mod: GY | Performed by: HOSPITALIST

## 2018-07-25 PROCEDURE — 25000128 H RX IP 250 OP 636: Performed by: HOSPITALIST

## 2018-07-25 PROCEDURE — 97161 PT EVAL LOW COMPLEX 20 MIN: CPT | Mod: GP | Performed by: PHYSICAL THERAPIST

## 2018-07-25 PROCEDURE — 85025 COMPLETE CBC W/AUTO DIFF WBC: CPT | Performed by: PHYSICIAN ASSISTANT

## 2018-07-25 PROCEDURE — 97165 OT EVAL LOW COMPLEX 30 MIN: CPT | Mod: GO

## 2018-07-25 RX ADMIN — SODIUM CHLORIDE: 9 INJECTION, SOLUTION INTRAVENOUS at 17:07

## 2018-07-25 RX ADMIN — DORZOLAMIDE HYDROCHLORIDE AND TIMOLOL MALEATE 1 DROP: 20; 5 SOLUTION/ DROPS OPHTHALMIC at 20:46

## 2018-07-25 RX ADMIN — DORZOLAMIDE HYDROCHLORIDE AND TIMOLOL MALEATE 1 DROP: 20; 5 SOLUTION/ DROPS OPHTHALMIC at 08:32

## 2018-07-25 RX ADMIN — ASPIRIN 81 MG 81 MG: 81 TABLET ORAL at 08:29

## 2018-07-25 RX ADMIN — LEVOTHYROXINE SODIUM 100 MCG: 100 TABLET ORAL at 07:09

## 2018-07-25 RX ADMIN — VITAMIN D, TAB 1000IU (100/BT) 2000 UNITS: 25 TAB at 08:28

## 2018-07-25 RX ADMIN — OMEPRAZOLE 20 MG: 20 CAPSULE, DELAYED RELEASE ORAL at 08:29

## 2018-07-25 RX ADMIN — SODIUM CHLORIDE: 9 INJECTION, SOLUTION INTRAVENOUS at 06:56

## 2018-07-25 ASSESSMENT — ACTIVITIES OF DAILY LIVING (ADL)
ADLS_ACUITY_SCORE: 12
ADLS_ACUITY_SCORE: 11
ADLS_ACUITY_SCORE: 11
PREVIOUS_RESPONSIBILITIES: MEAL PREP;HOUSEKEEPING;LAUNDRY;SHOPPING;MEDICATION MANAGEMENT;FINANCES;DRIVING
ADLS_ACUITY_SCORE: 12
ADLS_ACUITY_SCORE: 11
ADLS_ACUITY_SCORE: 11

## 2018-07-25 NOTE — PLAN OF CARE
Problem: Patient Care Overview  Goal: Plan of Care/Patient Progress Review  Outcome: No Change  Pt AO, VSS on RA. Denies pain. BPs low but stable. No orthostatic BPs done this yahaira per provider request. Asymptomatic. IV atbx. IVF infusing. Using urinal or BSC. . Up SBA. Regular diet. Baseline n/t in BLE. Continue to monitor.

## 2018-07-25 NOTE — PROGRESS NOTES
[facility]    Hospitalist Progress Note    Date of Service (when I saw the patient): 07/25/2018    Assessment & Plan   Abebe Christensen is a 72 year old male with PMHx of CKD III, MGUS, HTN, hyperlipidemia, and GERD who presented to the ED on 7/23/18 with weakness, recurrent falls, lightheadness, and unintentional weight loss with findings of abnormal UA and SOHEILA. Registered under inpatient status for further evaluation and treatment.     Generalized weakness, improving: Multifactorial in the context of below. Pt lives alone in an apartment. Recently purchased a walker/cane. No elevator in apartment.   -- Treat as below  -- PT and OT to assess  -- SW for discharge planning     Orthostatic hypotension, dizziness, improving   History of benign essential hypertension: Positive on admission. Likely secondary to SOHEILA below with ongoing PTA medication administration including BP meds, poor PO intake.   - Has received total of 1500 ml bolus since admission as well as maintenance fluids at 100 ml/hr. Administer an additional 1000 ml bolus this AM due to ongoing hypotension  - Strict I&Os, daily weights   - Continue to hold PTA BP meds (Cartia  mg po every day, Prinzide 20/12.5 mg po every day--last dose 0830 on 7/23/18)  - Maintenance fluids at 100 ccs/hr   - Repeat orthostatic BPs this AM     SOHEILA on CKD III, improving: Baseline creatinine appears to be 1.1-1.2. Creatinine on admission 1.84>1.46>1.36 after IVF resuscitation. Likely related to poor PO intake in the context of nausea for the past 1-2 weeks.   -- Continue maintenance fluids   -- BMP in the AM     Hyponatremia, resolved: Sodium 130 on admission, 137 on repeat.   -- Monitor     Abnormal UA: UA with large LE. Asymptomatic; denies dysuria or increased frequency  - IV ceftriaxone empirically on admission, discontinued 7/25 as urine cultures grew urogenital seda.     Myeloproliferative disorder (2002)  History of omental lesions concerning for  peritoneal mets, s/p exploratory lap  Unintentional weight loss (20-30 lbs in the last year per review of clinic notes): Previously followed by [facility]. CT and PET form 2017 showed moderate omental thickening and nodularity with low metabolic activity, moderate abdominal and pelvic ascited with intermediate metabolic activity. Pt had a lap in 10/2017 which showed cirrhosis, no malignancy. CT A/P on admission showing marked improvement in ascites form prior imaging. Trace fluid and/or capsular implants seen anterolaterally in the liver, question minimal serosal implants vs fluid adjacent to the liver, otherwise no definite omental lesions demonstrated. B12 392, Folate 23.3.   -- MOHPA consulted, see formal note by Dr. Ames--myeloproliferative neoplasm appears to be progressed moderately with anemia. Consider aranesp if he continues to have kidney dysfunction and anemia to help with improvement in hemoglobin and associated fatigue, consider repeating bone marrow bx depending on clinical progress (outpatient)  -- Regarding malignancy w/u recommended per Oncology: EGD 6/21/17 with normal esophagus, medium hiatal hernia, normal mucosa, possible gastritis. Colonoscopy 6/21/17 with diverticulosis of the sigmoid colon, remainder of exam reported at normal. Consider low dose CT scan of chest given tobacco hx, but this can be done in the outpatient setting   -- Protein and urine electrophoresis with immunofixation pending     Macrocytic anemia, acute   Thrombocytopenia: Platelets 172>123>142. Baseline hemoglobin in the last year has actually been around 13-14. B12 and folate WNL.   -- No active signs of bleeding, monitor   -- Heme/Onc following-appreciate assistance     Recent Labs  Lab 07/25/18  0808 07/24/18  0815 07/23/18  1225   HGB 9.7* 9.4* 11.0*     Elevated BNP: 1931 on admission. Pt is euvolemic on exam. Denies chest pain, GOMEZ, SOB.   -- Echo with EF 60-65%, no RWMA, mild valvular aortic stenosis   -- Monitor  "I&Os, daily weights     Intake/Output Summary (Last 24 hours) at 07/25/18 1029  Last data filed at 07/25/18 1006   Gross per 24 hour   Intake             1693 ml   Output             1525 ml   Net              168 ml     NIDDM, controlled  Peripheral neuropathy: Maintained on Metformin 500 mg po every day PTA. A1C 4.0 on 7/23/18. Describes bilateral neuropathy. Previously assessed by Neurologist and diagnosed with neuropathy. Not currently taking medication for this. B12 and folate WNL.   -- PTA Metformin on hold  -- Medium dose sliding scale insulin ordered   -- BS per protocol   -- Monitor for hypoglycemia   -- Consider starting gabapentin once SOHEILA resolved   -- Neurology referral at discharge     Recent Labs  Lab 07/25/18  0825 07/25/18  0808 07/25/18  0217 07/24/18  2135 07/24/18  1712 07/24/18  1201 07/24/18  0815 07/24/18  0804  07/23/18  1225   GLC  --  98  --   --   --   --  100*  --   --  129*   *  --  99 123* 121* 132*  --  104*  < >  --    < > = values in this interval not displayed.    Cirrhosis: Prior hx of ascites with paracentesis in 8/2017 with negative cytology, 950 ccs of fluid removed. Pathology showing advanced chronic liver disease (stage IV \"cirrhosis\" with steatohepatitis. AST 51, ALT 11)   -- Defer further monitoring to outpatient setting     Hypothyroidism: TSH WNL. Continue PTA Synthroid     GERD: Continue PTA Prilosec     History of infrarenal AA: Measuring at 3 cm on CT. Stable.     Alcohol use disorder: Drinks one mixed drink containing 1 shot of vodka daily. Denies history of withdrawal symptoms including withdrawal seizure.   -- Monitor for signs of withdrawal    Tobacco use: 40 pack year hx.   -- Nicotine replacement available if needed     Severe malnutrition in the context of chronic illness  Hypoalbuminemia   Unintentional weight loss (20-30 lbs in the last year per review of clinic notes): % Weight Loss:  > 7.5% in 3 months (severe malnutrition)  % Intake:  </= 50% for >/= " 1 month (severe malnutrition)  -- Nutrition consulted     # Pain Assessment:  Current Pain Score 7/25/2018   Patient currently in pain? denies   Pain score (0-10) -   Pain location -   Pain descriptors -   Abebe guzman pain level was assessed and he currently denies pain.      DVT Prophylaxis: Pneumatic Compression Devices and Ambulate every shift  Code Status: Full Code    Disposition: Expected discharge 7/26 pending PT recommendations and repeat lab work in the AM     Lizzette Sarkar PA-C    This patient was discussed with Dr. Starr of the Hospitalist Service who agrees with current plans as outlined above.     Interval History   Resting comfortably in bed. Feels significantly improved from admission. Denies nausea, vomiting, diarrhea, SOB, chest pain, dizziness, lightheadedness.     -Data reviewed today: See below.     Physical Exam   Temp: 97.7  F (36.5  C) Temp src: Oral BP: 135/80 Pulse: 91 Heart Rate: 95 Resp: 18 SpO2: 100 % O2 Device: None (Room air)    Vitals:    07/23/18 1225 07/24/18 0754 07/25/18 0615   Weight: 72.6 kg (160 lb) 73 kg (160 lb 15 oz) 72.3 kg (159 lb 6.4 oz)     Vital Signs with Ranges  Temp:  [97.7  F (36.5  C)-98.1  F (36.7  C)] 97.7  F (36.5  C)  Pulse:  [81-91] 91  Heart Rate:  [66-95] 95  Resp:  [16-18] 18  BP: ()/(56-83) 135/80  SpO2:  [97 %-100 %] 100 %  I/O last 3 completed shifts:  In: 1653 [P.O.:440; I.V.:1213]  Out: 1650 [Urine:1650]    CONSTITUTIONAL: Pt laying in bed, dressed in hospital garb. Appears comfortable. Cooperative with interview.   HEENT: Normocephalic, atraumatic. Negative for conjunctival redness or scleral icterus.    CARDIOVASCULAR: RRR, no murmurs, rubs, or extra heart sounds appreciated. Pulses +2/4 and regular in upper and lower extremities, bilaterally.   RESPIRATORY: No increased work of breathing.  CTA, bilat; no wheezes, rales, or rhonchi appreciated.  GASTROINTESTINAL:  Abdomen soft, non-distended. BS auscultated in all four  quadrants. Negative for tenderness to palpation.  No masses or organomegaly noted.  MUSCULOSKELETAL: No gross deformities noted. Normal muscle tone.   HEMATOLOGIC/LYMPHATIC/IMMUNOLOGIC: Negative for lower extremity edema, bilaterally.  NEUROLOGIC: Alert and oriented to person, place, and time.  strength intact.   SKIN: Bruising noted on arms, bilat.     Medications     sodium chloride 100 mL/hr at 07/25/18 0656       aspirin chewable tablet 81 mg  81 mg Oral Daily     cholecalciferol  2,000 Units Oral Daily     dorzolamide-timolol PF  1 drop Both Eyes BID     insulin aspart  1-7 Units Subcutaneous TID AC     insulin aspart  1-5 Units Subcutaneous At Bedtime     levothyroxine  100 mcg Oral QAM AC     omeprazole  20 mg Oral Daily     senna-docusate  1 tablet Oral BID    Or     senna-docusate  2 tablet Oral BID       Data     Recent Labs  Lab 07/25/18  0808 07/24/18  0815 07/23/18  1225   WBC 6.9 6.7 7.8   HGB 9.7* 9.4* 11.0*   * 101* 100   * 123* 172    137 130*   POTASSIUM 3.4 3.3* 3.6   CHLORIDE 113* 106 98   CO2 17* 21 23   BUN 9 11 14   CR 1.36* 1.46* 1.84*   ANIONGAP 11 10 9   JESSICA 7.8* 7.9* 8.9   GLC 98 100* 129*   ALBUMIN  --  2.4*  --    PROTTOTAL  --  5.4*  --    BILITOTAL  --  1.0  --    ALKPHOS  --  99  --    ALT  --  11  --    AST  --  51*  --    TROPI  --   --  <0.015       No results found for this or any previous visit (from the past 24 hour(s)).

## 2018-07-25 NOTE — PROGRESS NOTES
Chart check, CBC reviewed stable, Cr improving    IMPRESSION:   1.  Myeloproliferative neoplasm -- polycythemia vera treated previously with phlebotomy but not required for the last several years.   2.  Anemia, microcytic, likely associated with MPN with possible progression to myelofibrosis state.  Other etiologies: chronic kidney disease associated anemia.  The drop in hemoglobin is likely dilution after admission.   3.  Thrombocytopenia associated with MPN, and possible cirrhosis related.   4.  History of ascites and peritoneal nodularity, status post laparoscopic evaluation in 10/2017.  Negative for malignancy and showed cirrhosis.   5.  Weakness, multifactorial.  Could be associated with liver disease, hypoalbuminemia, anemia, and chronic kidney disease.       PLAN:  His myeloproliferative neoplasm appears to be progressed moderately with anemia, but his white count and platelets are adequate.  I do not see urgent need to evaluate his anemia at this point, but rather address the other significant symptoms of weight loss, hypoalbuminemia, and to consider rule out malignancy workup.  Chest x-ray was normal, but with a history of smoking may consider low dose CT scan of the chest.  Endoscopy to be considered.  He does not recall having a recent colonoscopy over many years.  Darbepoetin can be considered if he continues to have kidney dysfunction and anemia to help with improvement in hemoglobin and associated fatigue.  I will consider repeating bone marrow biopsy depending on his clinical progress, but this will be done as an outpatient.

## 2018-07-25 NOTE — PLAN OF CARE
Problem: Patient Care Overview  Goal: Plan of Care/Patient Progress Review  Discharge Planner PT   Patient plan for discharge: Home  Current status: PT orders received, evaluation completed, treatment initiated. Pt is a 73yo male admitted for evaluation of weakness, lightheadedness, recurrent falls, and unintentional weight loss with findings of abnormal UA and SOHEILA. Pt resides alone in an apartment with flight of stairs to his level. Reports IND without AD for mobility (inconsistent with his report to OT earlier where he stated he uses walker or cane). Pt reports IND with ADLs/IADLs. Orthostatics taken, initially positive but asymptomatic, negative on re-test - see VSFS, RN updated and in agreement with progressive mobility. Pt appears intermittently confused at times during session, giving contradictory information and slow to respond at times. Pt transfers supine<>sit with SBA. Pt transfers sit<>stand to FWW with CGA. Pt ambulated 100' with FWW and CGA except modA needed once to prevent LOB, denies dizziness/lightheadedness throughout. Pt ambulated without AD with CGA/Ramonita for balance and pt using single UE support of rail throughout. Deferred stair trial d/t low BP/positive orthostatics initially. Pt participated in standing exercises. Noting pt bradycardic with HR 40s after activity, but BP improved.  Barriers to return to prior living situation: Decreased activity tolerance, fall risk with history of recurrent falls, impaired balance, stairs to enter, lives alone  Recommendations for discharge: TCU  Rationale for recommendations: Pt would benefit from continued skilled PT to progress functional strength, balance, activity tolerance, safety and IND with functional mobility prior to return home. If pt refuses, would recommend 24hr assist with Home PT/OT/RN/HHA, use of FWW for mobility, assist on stairs.       Entered by: Lindsey Cook 07/25/2018 3:59 PM

## 2018-07-25 NOTE — PLAN OF CARE
Problem: Patient Care Overview  Goal: Plan of Care/Patient Progress Review  Outcome: Improving  Patient A&Ox4, forgetful.  Up with 1 assist with gait belt/walker.  VSS.  Denies dizziness/lightheadedness.  IV NS infusing 100/hr.  PT/OT following.  Denies pain.  Tolerating regular diet.  IV antibiotic discontinued today.  Hematology following.  Possible dc pending tomorrow.  Continue to monitor.

## 2018-07-25 NOTE — PLAN OF CARE
Problem: Patient Care Overview  Goal: Plan of Care/Patient Progress Review  Outcome: Improving  A/Ox4; forgetful at times. VSS on RA. Denies pain. BPs low but stable. No orthostatic BPs done this yahaira per provider request. Asymptomatic. IV atbx. IVF infusing. Using urinal or BSC. BMs this shift. Up Ax1, GB, and walker. Regular diet. Baseline n/t in BLE. Dressing on RUE; CDI. Continue to monitor.

## 2018-07-25 NOTE — PLAN OF CARE
Problem: Patient Care Overview  Goal: Plan of Care/Patient Progress Review  Outcome: Improving  A/O x4 but became slightly forgetful during night. VSS on ra. SBA to commode due to previous soft bp on 7/24/18. K+ 3.3, redraw this am. Good uo overnight, no bm. BS at 0200 99. Nursing will continue to follow.

## 2018-07-25 NOTE — PLAN OF CARE
Problem: Patient Care Overview  Goal: Plan of Care/Patient Progress Review  OT: Evaluation and treatment initiated. Pt lives independently in an apartment. Prior pt independent in all I/ADLs. Patient's sister can assist intermittently as needed.   Discharge Planner OT   Patient plan for discharge: Home  Current status: Orthostatic blood pressure monitored during session and stable, see vitals flowsheet for details. Pt went from supine to sit EOB with SBA. Pt went from sit EOB<>Stand with SBA. Pt transferred to the bedside commode with CGA and walker. While seated/standing pt completed lower body dressing including don/doff underwear with overall CGA and set up. Pt reported increase in dizziness and requested to return to bed. Functional mobility needs further assessment.   Barriers to return to prior living situation: current level of assist; lives alone; fall history; stairs (Defer to PT); Cognition (needs further assessment)   Recommendations for discharge: Pending progress in therapy and in cognitive assessments, anticipate home with home OT. Pt will benefit from a home health aid for showering and IADLs (meal preparation, laundry)   Rationale for recommendations: Anticipate pt will progress in established goal areas. Pt would benefit from home OT to increase functional independence in I/ADL tasks.         Entered by: Pierre Blount 07/25/2018 8:19 AM

## 2018-07-25 NOTE — PROGRESS NOTES
07/25/18 0730   Quick Adds   Type of Visit Initial Occupational Therapy Evaluation   Living Environment   Lives With alone   Living Arrangements apartment   Home Accessibility bed and bath on same level   Number of Stairs to Enter Home 11   Transportation Available car   Living Environment Comment Sister is able to assist as needed and can assist in transportation.    Self-Care   Usual Activity Tolerance good   Current Activity Tolerance moderate   Regular Exercise no   Equipment Currently Used at Home walker, rolling;cane, straight;shower chair   Functional Level Prior   Ambulation 1-->assistive equipment   Transferring 1-->assistive equipment   Toileting 0-->independent   Bathing 1-->assistive equipment  (uses occassionally )   Dressing 0-->independent   Fall history within last six months yes   Number of times patient has fallen within last six months 12  (dur to dizziness, per pt report )   General Information   Onset of Illness/Injury or Date of Surgery - Date 07/23/18   Referring Physician Nirmal Laird, DO   Patient/Family Goals Statement Home   Additional Occupational Profile Info/Pertinent History of Current Problem Abebe Christensen is a 72 year old male with PMHx of CKD III, MGUS, HTN, hyperlipidemia, and GERD who presented to the ED on 7/23/18 with weakness, recurrent falls, lightheadness, and unintentional weight loss with findings of abnormal UA and SOHEILA. Registered under inpatient status for further evaluation and treatment.    Precautions/Limitations fall precautions   Cognitive Status Examination   Orientation orientation to person, place and time   Level of Consciousness alert   Visual Perception   Visual Perception Wears glasses   Sensory Examination   Sensory Quick Adds (neuropathy in BLEs)   Pain Assessment   Patient Currently in Pain No   Mobility   Bed Mobility Bed mobility skill: Sit to supine;Bed mobility skill: Supine to sit   Bed Mobility Skill: Sit to Supine   Level of  "[place]: Sit/Supine stand-by assist   Bed Mobility Skill: Supine to Sit   Level of [place]: Supine/Sit stand-by assist   Transfer Skills   Transfer Transfer Safety Analysis Bed/Chair;Transfer Skill: Stand to Sit;Transfer Safety Analysis Sit/Stand   Transfer Skill: Sit to Stand   Level of [place]: Sit/Stand contact guard   Toilet Transfer   Toilet Transfer Toilet Transfer Skill;Toilet Transfer Safety Analysis   Transfer Skill: Toilet Transfer   Level of [place]: Toilet contact guard  (to bedside commode)   Lower Body Dressing   Level of [place]: Dress Lower Body contact guard   Instrumental Activities of Daily Living (IADL)   Previous Responsibilities meal prep;housekeeping;laundry;shopping;medication management;finances;driving   General Therapy Interventions   Planned Therapy Interventions ADL retraining;IADL retraining;cognition;strengthening;transfer training   Clinical Impression   Criteria for Skilled Therapeutic Interventions Met yes, treatment indicated   OT Diagnosis Decreased endurance for I/ADLs   Influenced by the following impairments Decreased endurance for I/ADLs   Assessment of Occupational Performance 1-3 Performance Deficits   Identified Performance Deficits Decreased endurance for I/ADLs (dressing, bathing, toileting)   Clinical Decision Making (Complexity) Low complexity   Therapy Frequency daily   Predicted Duration of Therapy Intervention (days/wks) 5 days   Anticipated Discharge Disposition Home with Assist;Home with Home Therapy  (Pt declines need for TCU)   Risks and Benefits of Treatment have been explained. Yes   Patient, Family & other staff in agreement with plan of care Yes   [facility]-P[facility] TM \"6 Clicks\"   2016, Trustees of [facility], under license to [employer].  All rights reserved.   6 Clicks Short Forms Daily Activity Inpatient Short Form   [facility] AM-PAC  \"6 Clicks\" Daily Activity Inpatient Short Form   1. Putting on and taking " off regular lower body clothing? 3 - A Little   2. Bathing (including washing, rinsing, drying)? 3 - A Little   3. Toileting, which includes using toilet, bedpan or urinal? 3 - A Little   4. Putting on and taking off regular upper body clothing? 4 - None   5. Taking care of personal grooming such as brushing teeth? 3 - A Little   6. Eating meals? 4 - None   Daily Activity Raw Score (Score out of 24.Lower scores equate to lower levels of function) 20   Total Evaluation Time   Total Evaluation Time (Minutes) 8

## 2018-07-26 ENCOUNTER — APPOINTMENT (OUTPATIENT)
Dept: PHYSICAL THERAPY | Facility: CLINIC | Age: 73
DRG: 682 | End: 2018-07-26
Payer: MEDICARE

## 2018-07-26 ENCOUNTER — APPOINTMENT (OUTPATIENT)
Dept: OCCUPATIONAL THERAPY | Facility: CLINIC | Age: 73
DRG: 682 | End: 2018-07-26
Payer: MEDICARE

## 2018-07-26 PROBLEM — I95.1 ORTHOSTATIC HYPOTENSION: Status: ACTIVE | Noted: 2018-07-23

## 2018-07-26 LAB
ALBUMIN SERPL-MCNC: 2.6 G/DL (ref 3.4–5)
ALP SERPL-CCNC: 114 U/L (ref 40–150)
ALT SERPL W P-5'-P-CCNC: 14 U/L (ref 0–70)
ANION GAP SERPL CALCULATED.3IONS-SCNC: 12 MMOL/L (ref 3–14)
AST SERPL W P-5'-P-CCNC: 61 U/L (ref 0–45)
BASOPHILS # BLD AUTO: 0 10E9/L (ref 0–0.2)
BASOPHILS NFR BLD AUTO: 0.5 %
BILIRUB DIRECT SERPL-MCNC: 0.3 MG/DL (ref 0–0.2)
BILIRUB SERPL-MCNC: 0.8 MG/DL (ref 0.2–1.3)
BUN SERPL-MCNC: 8 MG/DL (ref 7–30)
CALCIUM SERPL-MCNC: 7.8 MG/DL (ref 8.5–10.1)
CHLORIDE SERPL-SCNC: 113 MMOL/L (ref 94–109)
CK SERPL-CCNC: 100 U/L (ref 30–300)
CO2 SERPL-SCNC: 16 MMOL/L (ref 20–32)
CREAT SERPL-MCNC: 1.19 MG/DL (ref 0.66–1.25)
DIFFERENTIAL METHOD BLD: ABNORMAL
EOSINOPHIL # BLD AUTO: 0.1 10E9/L (ref 0–0.7)
EOSINOPHIL NFR BLD AUTO: 1.7 %
ERYTHROCYTE [DISTWIDTH] IN BLOOD BY AUTOMATED COUNT: 13.7 % (ref 10–15)
GFR SERPL CREATININE-BSD FRML MDRD: 60 ML/MIN/1.7M2
GLUCOSE BLDC GLUCOMTR-MCNC: 102 MG/DL (ref 70–99)
GLUCOSE BLDC GLUCOMTR-MCNC: 110 MG/DL (ref 70–99)
GLUCOSE BLDC GLUCOMTR-MCNC: 115 MG/DL (ref 70–99)
GLUCOSE BLDC GLUCOMTR-MCNC: 135 MG/DL (ref 70–99)
GLUCOSE BLDC GLUCOMTR-MCNC: 138 MG/DL (ref 70–99)
GLUCOSE SERPL-MCNC: 101 MG/DL (ref 70–99)
HCT VFR BLD AUTO: 29.1 % (ref 40–53)
HGB BLD-MCNC: 9.8 G/DL (ref 13.3–17.7)
IMM GRANULOCYTES # BLD: 0 10E9/L (ref 0–0.4)
IMM GRANULOCYTES NFR BLD: 0.2 %
LYMPHOCYTES # BLD AUTO: 1.3 10E9/L (ref 0.8–5.3)
LYMPHOCYTES NFR BLD AUTO: 16.4 %
MCH RBC QN AUTO: 34.9 PG (ref 26.5–33)
MCHC RBC AUTO-ENTMCNC: 33.7 G/DL (ref 31.5–36.5)
MCV RBC AUTO: 104 FL (ref 78–100)
MONOCYTES # BLD AUTO: 0.7 10E9/L (ref 0–1.3)
MONOCYTES NFR BLD AUTO: 9 %
NEUTROPHILS # BLD AUTO: 5.9 10E9/L (ref 1.6–8.3)
NEUTROPHILS NFR BLD AUTO: 72.2 %
NRBC # BLD AUTO: 0 10*3/UL
NRBC BLD AUTO-RTO: 0 /100
PLATELET # BLD AUTO: 162 10E9/L (ref 150–450)
POTASSIUM SERPL-SCNC: 3.4 MMOL/L (ref 3.4–5.3)
PROCALCITONIN SERPL-MCNC: 0.12 NG/ML
PROT SERPL-MCNC: 6.2 G/DL (ref 6.8–8.8)
RBC # BLD AUTO: 2.81 10E12/L (ref 4.4–5.9)
SODIUM SERPL-SCNC: 141 MMOL/L (ref 133–144)
WBC # BLD AUTO: 8.1 10E9/L (ref 4–11)

## 2018-07-26 PROCEDURE — 80048 BASIC METABOLIC PNL TOTAL CA: CPT | Performed by: PHYSICIAN ASSISTANT

## 2018-07-26 PROCEDURE — A9270 NON-COVERED ITEM OR SERVICE: HCPCS | Mod: GY | Performed by: PHYSICIAN ASSISTANT

## 2018-07-26 PROCEDURE — 97116 GAIT TRAINING THERAPY: CPT | Mod: GP | Performed by: PHYSICAL THERAPIST

## 2018-07-26 PROCEDURE — 85025 COMPLETE CBC W/AUTO DIFF WBC: CPT | Performed by: PHYSICIAN ASSISTANT

## 2018-07-26 PROCEDURE — 80076 HEPATIC FUNCTION PANEL: CPT | Performed by: PHYSICIAN ASSISTANT

## 2018-07-26 PROCEDURE — 84145 PROCALCITONIN (PCT): CPT | Performed by: PHYSICIAN ASSISTANT

## 2018-07-26 PROCEDURE — 99233 SBSQ HOSP IP/OBS HIGH 50: CPT | Performed by: PHYSICIAN ASSISTANT

## 2018-07-26 PROCEDURE — 25000132 ZZH RX MED GY IP 250 OP 250 PS 637: Mod: GY | Performed by: HOSPITALIST

## 2018-07-26 PROCEDURE — 25000128 H RX IP 250 OP 636: Performed by: PHYSICIAN ASSISTANT

## 2018-07-26 PROCEDURE — 25000132 ZZH RX MED GY IP 250 OP 250 PS 637: Mod: GY | Performed by: PHYSICIAN ASSISTANT

## 2018-07-26 PROCEDURE — 40000133 ZZH STATISTIC OT WARD VISIT: Performed by: OCCUPATIONAL THERAPY ASSISTANT

## 2018-07-26 PROCEDURE — 82550 ASSAY OF CK (CPK): CPT | Performed by: PHYSICIAN ASSISTANT

## 2018-07-26 PROCEDURE — 97535 SELF CARE MNGMENT TRAINING: CPT | Mod: GO | Performed by: OCCUPATIONAL THERAPY ASSISTANT

## 2018-07-26 PROCEDURE — 25000128 H RX IP 250 OP 636: Performed by: HOSPITALIST

## 2018-07-26 PROCEDURE — 12000000 ZZH R&B MED SURG/OB

## 2018-07-26 PROCEDURE — 36415 COLL VENOUS BLD VENIPUNCTURE: CPT | Performed by: PHYSICIAN ASSISTANT

## 2018-07-26 PROCEDURE — 97750 PHYSICAL PERFORMANCE TEST: CPT | Mod: GP | Performed by: PHYSICAL THERAPIST

## 2018-07-26 PROCEDURE — 40000193 ZZH STATISTIC PT WARD VISIT: Performed by: PHYSICAL THERAPIST

## 2018-07-26 PROCEDURE — A9270 NON-COVERED ITEM OR SERVICE: HCPCS | Mod: GY | Performed by: HOSPITALIST

## 2018-07-26 PROCEDURE — 00000146 ZZHCL STATISTIC GLUCOSE BY METER IP

## 2018-07-26 RX ORDER — SODIUM CHLORIDE 9 MG/ML
INJECTION, SOLUTION INTRAVENOUS CONTINUOUS
Status: DISCONTINUED | OUTPATIENT
Start: 2018-07-26 | End: 2018-07-27

## 2018-07-26 RX ORDER — FLUDROCORTISONE ACETATE 0.1 MG/1
100 TABLET ORAL DAILY
Status: DISCONTINUED | OUTPATIENT
Start: 2018-07-26 | End: 2018-07-26

## 2018-07-26 RX ORDER — BENZTROPINE MESYLATE 1 MG/1
1-2 TABLET ORAL 3 TIMES DAILY PRN
Status: DISCONTINUED | OUTPATIENT
Start: 2018-07-26 | End: 2018-08-02 | Stop reason: HOSPADM

## 2018-07-26 RX ADMIN — LEVOTHYROXINE SODIUM 100 MCG: 100 TABLET ORAL at 08:06

## 2018-07-26 RX ADMIN — ASPIRIN 81 MG 81 MG: 81 TABLET ORAL at 08:06

## 2018-07-26 RX ADMIN — DORZOLAMIDE HYDROCHLORIDE AND TIMOLOL MALEATE 1 DROP: 20; 5 SOLUTION/ DROPS OPHTHALMIC at 21:21

## 2018-07-26 RX ADMIN — VITAMIN D, TAB 1000IU (100/BT) 2000 UNITS: 25 TAB at 08:05

## 2018-07-26 RX ADMIN — DORZOLAMIDE HYDROCHLORIDE AND TIMOLOL MALEATE 1 DROP: 20; 5 SOLUTION/ DROPS OPHTHALMIC at 08:05

## 2018-07-26 RX ADMIN — OMEPRAZOLE 20 MG: 20 CAPSULE, DELAYED RELEASE ORAL at 08:06

## 2018-07-26 RX ADMIN — SODIUM CHLORIDE: 9 INJECTION, SOLUTION INTRAVENOUS at 14:43

## 2018-07-26 RX ADMIN — FLUDROCORTISONE ACETATE 100 MCG: 0.1 TABLET ORAL at 13:40

## 2018-07-26 ASSESSMENT — ACTIVITIES OF DAILY LIVING (ADL)
ADLS_ACUITY_SCORE: 11
ADLS_ACUITY_SCORE: 12

## 2018-07-26 ASSESSMENT — VISUAL ACUITY
OU: GLASSES
OU: GLASSES

## 2018-07-26 NOTE — PROVIDER NOTIFICATION
MD Notification    Notified Person: MD    Notified Person Name: JOHN    Notification Date/Time: 7/26/18 ~0230    Notification Interaction: telephone    Purpose of Notification: positive CAM   Orders Received:watch and recheck in AM     Comments:

## 2018-07-26 NOTE — PROGRESS NOTES
AMAUR[facility]  [facility]  Hematology/oncology Progress Note            History:   Buck is a 72-year-old  man with history of myeloproliferative neoplasm for many years, treated with phlebotomy previously, and subsequently did not require any treatment for several years.  He had a weight loss in 2017, over 50 pounds, and abdominal scan showed a nodularity in the omentum.  He also has had fluid retention and eventually was referred to have a laparoscopic evaluation.  This was done by Dr. Kenny on 10/03/2017.  There was no evidence of malignancy.  The omentum and peritoneum biopsy showed fat necrosis, fibrosis, and calcification.  Liver wedge biopsy showed chronic liver disease, stage IV cirrhosis, with steatohepatitis.  Omentectomy revealed mild patchy chronic inflammation without evidence of malignancy.  The liver was described to be a cobblestone appearance.  There were multiple adhesions in the omentum.         Currently, Mr. Christensen is hospitalized with increasing weakness, lightheadedness, falls, and additional weight loss.  He denies any abdominal pain, no GI or  symptoms.  Appetite is low.  CT scan was done during this hospitalization which showed resolution of the ascites that he had previously, with trace fluid at this point and questionable some minimal serosal implants versus fluid adjacent to the liver without other findings.    He feels better and possible discharge tomorrow.          Medications:       aspirin chewable tablet 81 mg  81 mg Oral Daily     cholecalciferol  2,000 Units Oral Daily     dorzolamide-timolol PF  1 drop Both Eyes BID     fludrocortisone  100 mcg Oral Daily     insulin aspart  1-7 Units Subcutaneous TID AC     insulin aspart  1-5 Units Subcutaneous At Bedtime     levothyroxine  100 mcg Oral QAM AC     omeprazole  20 mg Oral Daily     senna-docusate  1 tablet Oral BID    Or     senna-docusate  2 tablet Oral BID                ROS:   RESP, CV, GI,,  MUSCULOSKELETAL, HEME/ALLERGY/IMMUNE,  Skin: reviewed and negative except the positives mentioned in this note            Physical Exam:       Vital Sign Ranges  Temp:  [97  F (36.1  C)-98.4  F (36.9  C)] 97  F (36.1  C)  Pulse:  [94-97] 94  Heart Rate:  [] 95  Resp:  [16-20] 16  BP: ()/(41-79) 142/64  SpO2:  [93 %-100 %] 100 %      I/O last 3 completed shifts:  In: 3226 [P.O.:680; I.V.:2546]  Out: 340 [Urine:340]    Constitutional: Awake, alert, cooperative, no apparent distress  HEENT: unremarkable  Neck: Supple, no adenopathy, thyroid symmetric, not enlarged and no tenderness  Lungs: No increased work of breathing, good air exchange, clear to auscultation bilaterally, no crackles or wheezing.  Cardiovascular: Regular rate and rhythm, normal S1 and S2,no murmur noted.  Abdomen: No scars, normal bowel sounds, soft, non-distended, non-tender, no masses palpated, no hepatosplenomegally.  Ext: no edema, cyanosis           Data:       ROUTINE LABS (Last four results)  CMP  Recent Labs  Lab 07/26/18  0817 07/25/18  0808 07/24/18  0815 07/23/18  1225    141 137 130*   POTASSIUM 3.4 3.4 3.3* 3.6   CHLORIDE 113* 113* 106 98   CO2 16* 17* 21 23   ANIONGAP 12 11 10 9    98 100* 129*   BUN 8 9 11 14   CR 1.19 1.36* 1.46* 1.84*   GFRESTIMATED 60* 51* 47* 36*   GFRESTBLACK 73 62 57* 44*   JESSICA 7.8* 7.8* 7.9* 8.9   PROTTOTAL 6.2* 6.2* 5.4*  --    ALBUMIN 2.6* 2.5* 2.4*  --    BILITOTAL 0.8 0.5 1.0  --    ALKPHOS 114 110 99  --    AST 61* 57* 51*  --    ALT 14 13 11  --      CBC  Recent Labs  Lab 07/26/18  0817 07/25/18  0808 07/24/18  0815 07/23/18  1225   WBC 8.1 6.9 6.7 7.8   RBC 2.81* 2.71* 2.66* 3.11*   HGB 9.8* 9.7* 9.4* 11.0*   HCT 29.1* 27.7* 26.8* 31.1*   * 102* 101* 100   MCH 34.9* 35.8* 35.3* 35.4*   MCHC 33.7 35.0 35.1 35.4   RDW 13.7 13.5 13.2 13.2    142* 123* 172     INRNo lab results found in last 7 days.         Assessment and Plan:   IMPRESSION:   1.  Myeloproliferative  neoplasm -- polycythemia vera treated previously with phlebotomy but not required for the last several years.   2.  Anemia, macrocytic, likely associated with MPN with possible progression to myelofibrosis state.  Other etiologies: chronic kidney disease associated anemia.  The drop in hemoglobin is likely dilution after admission.   3.  Thrombocytopenia associated with MPN, improved.   4.  History of ascites and peritoneal nodularity, status post laparoscopic evaluation in 10/2017.  Negative for malignancy and showed cirrhosis.   5.  Weakness, multifactorial.  Could be associated with liver disease, hypoalbuminemia, anemia, and chronic kidney disease.       RECOMMENDATIONS:    His myeloproliferative neoplasm appears to be progressed moderately with anemia, but his white count and platelets are adequate.  I do not see urgent need to evaluate his anemia at this point, but rather address the other significant symptoms of weight loss, hypoalbuminemia, and to consider rule out malignancy workup.  Chest x-ray was normal, but with a history of smoking recommend to consider low dose CT scan of the chest.  Endoscopy also to be considered.  He does not recall having a recent colonoscopy over many years.    No objection for discharge from heme standpoint, upon discharge I would like to see in office in 1 month and to have CBC in 2 weeks.            Trish Ames M.D.

## 2018-07-26 NOTE — PROGRESS NOTES
07/26/18 0800   Signing Clinician's Name / Credentials   Signing clinician's name / credentials Lindsey Cook PT, DPT   Jauregui Balance Scale (REYMUNDO ZAVALA, ARAMIS S, GINETTE MITCHELL, MANE SONI: MEASURING BALANCE IN THE ELDERLY: VALIDATION OF AN INSTRUMENT. CAN. J. PUB. HEALTH, JULY/AUGUST SUPPLEMENT 2:S7-11, 1992.)   Sit To Stand 3   Standing Unsupported 3   Sitting Unsupported 4   Stand to Sit 3   Transfers 2   Standing with Eyes Closed 3   Standing Unsupported, Feet Together 3   Reach Forward With Outstretched Arm 3   Retrieve Object From Floor 3   Turning to Look Behind 4   Turn 360 Degrees 1   Placing Alternate Foot on Stool (4-6 inches) 1   Unsupported Tandem Stand (Demonstrate to Subject) 0   One Leg Stand 2   Total Score (A score of 45 or less has been correlated with an increased risk of falls)   Total Score (out of 56) 35     Jauregui Balance Scale (BBS) Cutoff Scores: A score of ? 45/56 indicates an increased risk for falls.   Patient Score: 35/56    The BBS is a measure of static and dynamic standing balance that has been validated in community dwelling elderly individuals and individuals who have Parkinson's Disease, MS, and those who are s/p CVA and TBI. The test is administered without an assistive device. Scores from the Jauregui are used to determine the probability of falling based on the patient's previous history of falls and their test performance.     Minimal Detectable Change = 6.5   & Minimal Detectable Change (Parkinson's Disease) = 5 according to Leander & Stuartey 2008    Assessment (rationale for performing, application to patient s function & care plan): Assessment of balance, demonstrates both static and dynamic standing balance deficits, increased risk for falls  Minutes billed as physical performance test: 12

## 2018-07-26 NOTE — PLAN OF CARE
Problem: Patient Care Overview  Goal: Plan of Care/Patient Progress Review  Discharge Planner PT   Patient plan for discharge: Home  Current status: Orthostatics taken, negative, BP soft, pt asymptomatic. Pt continues to be confused at times, stating he is looking for his screwdriver to fix IV pole and asking where the [employer] is that he could walk to; re-oriented.  Pt transfers sit<>stand to FWW with Ramonita from bedside chair, slow to stand, difficulty transferring hands chair to walker. Pt ambulated 60'x2 with FWW and CGA/Ramonita, unsteady at times. Pt required min-modA of 1 to ascend/descend 3 stairs with pt demo heavy BUE use of rail to pull himself up. Pt denies lightheadedness/dizziness throughout. Jauregui Balance test performed, pt scored 35/56 indicating increased risk for falls. Pt seated in chair with chair alarm on and needs within reach upon PT exit; RN updated.  Barriers to return to prior living situation: Decreased activity tolerance, fall risk with history of recurrent falls, impaired balance, stairs to enter, lives alone, decreased safety awareness, intermittent confusion  Recommendations for discharge: TCU  Rationale for recommendations: Pt would benefit from continued skilled PT to progress functional strength, balance, activity tolerance, safety and IND with functional mobility prior to return home alone. If pt refuses, would recommend 24hr assist with Home PT/OT/RN/HHA, use of FWW for mobility, assist on stairs.       Entered by: Lindsey Cook 07/26/2018 9:41 AM

## 2018-07-26 NOTE — PLAN OF CARE
Problem: Patient Care Overview  Goal: Plan of Care/Patient Progress Review  Outcome: No Change  Patient A&Ox4, with intermittent confusion.  Up with 1 assist with gait belt/walker.  Up in chair most of shift.  Ambulated in hallways this afternoon with nursing.  VSS, except BP continues to be low in the 80's/40s-50's.  Asymptomatic.  IV fluids SL, restarted this afternoon, /hr.  Denies pain.  Appetite fair, fluids encouraged.   and 135.  Dressing changed right arm wound.  Discharge pending tomorrow to TCU if BP improves.  Continue to monitor.

## 2018-07-26 NOTE — PROGRESS NOTES
[facility]    Hospitalist Progress Note    Date of Service (when I saw the patient): 07/26/2018    Assessment & Plan   Abebe Christensen is a 72 year old male with PMHx of CKD III, MGUS, HTN, hyperlipidemia, and GERD who presented to the ED on 7/23/18 with weakness, recurrent falls, lightheadness, and unintentional weight loss with findings of abnormal UA and SOHEILA. Registered under inpatient status for further evaluation and treatment.     Generalized weakness, improving: Multifactorial in the context of below. Pt lives alone in an apartment. Recently purchased a walker/cane. No elevator in apartment.   -- Treat as below  -- PT and OT to assess, recommending TCU at discharge which patient is agreeable to  -- SW for discharge planning     Orthostatic hypotension  History of benign essential hypertension: Positive on admission. Likely secondary to SOHEILA below with ongoing PTA medication administration including BP meds, poor PO intake. Echocardiogram with EF 60-65%, no RWMA, mild valvular aortic stenosis   - Has received total of 3500 L IVF bolus plus maintenance fluids, ongoing orthostatic hypotension noted this AM. Florinef 100 mcg every day initiated on 7/26  - Strict I&Os, daily weights   - Continue to hold PTA BP meds (Cartia  mg po every day, Prinzide 20/12.5 mg po every day--last dose 0830 on 7/23/18)  - Encourage adequate PO intake   - Monitor for symptoms     Intake/Output Summary (Last 24 hours) at 07/26/18 1418  Last data filed at 07/26/18 1340   Gross per 24 hour   Intake             3226 ml   Output              340 ml   Net             2886 ml     Acute metabolic encephalopathy, transient: Episode of delirium reported overnight per nursing staff. No ongoing sx. Oriented X4 this AM. Infectious work-up negative with BC NGTD, Urine culture with urogenital seda. CXR unremarkable. Lumbar XR unremarkable. CT A/P unremarkable, comment on trace fluid and/or capsular implants seen anterolaterally  in the liver. WBC WNL. Afebrile.   -- Monitor   -- Delirium prevention protocol in place      SOHEILA on CKD III, resolved: Baseline creatinine appears to be 1.1-1.2. Creatinine on admission 1.84>1.46>1.36>1.19 after IVF resuscitation. Likely related to poor PO intake in the context of nausea for the past 1-2 weeks.   -- BMP in the AM     Hyponatremia, resolved: Sodium 130 on admission, 137 on repeat.   -- Monitor     Abnormal UA: UA with large LE. Asymptomatic; denies dysuria or increased frequency  - IV ceftriaxone empirically on admission, discontinued 7/25 as urine cultures grew urogenital seda.     Myeloproliferative disorder (2002)  History of omental lesions concerning for peritoneal mets, s/p exploratory lap  Unintentional weight loss (20-30 lbs in the last year per review of clinic notes): Previously followed by [facility]. CT and PET form 2017 showed moderate omental thickening and nodularity with low metabolic activity, moderate abdominal and pelvic ascited with intermediate metabolic activity. Pt had a lap in 10/2017 which showed cirrhosis, no malignancy. CT A/P on admission showing marked improvement in ascites form prior imaging. Trace fluid and/or capsular implants seen anterolaterally in the liver, question minimal serosal implants vs fluid adjacent to the liver, otherwise no definite omental lesions demonstrated. B12 392, Folate 23.3. XR Lumbar negative.   -- MOHPA consulted, see formal note by Dr. Ames--myeloproliferative neoplasm appears to be progressed moderately with anemia. Consider aranesp if he continues to have kidney dysfunction and anemia to help with improvement in hemoglobin and associated fatigue, consider repeating bone marrow bx depending on clinical progress (outpatient)  -- Regarding malignancy w/u recommended per Oncology: EGD 6/21/17 with normal esophagus, medium hiatal hernia, normal mucosa, possible gastritis. Colonoscopy 6/21/17 with diverticulosis of the sigmoid colon, remainder  of exam reported at normal. Consider low dose CT scan of chest given tobacco hx, but this can be done in the outpatient setting   -- Care Coordinator to help facilitate Oncology follow-up at discharge    Macrocytic anemia, acute   Thrombocytopenia: Platelets 172>123>142. Baseline hemoglobin in the last year has actually been around 13-14. B12 and folate WNL.   -- No active signs of bleeding, monitor   -- Heme/Onc following-appreciate assistance     Recent Labs  Lab 07/26/18  0817 07/25/18  0808 07/24/18  0815 07/23/18  1225   HGB 9.8* 9.7* 9.4* 11.0*     Elevated BNP: 1931 on admission. Pt is euvolemic on exam. Denies chest pain, GOMEZ, SOB.   -- Echo with EF 60-65%, no RWMA, mild valvular aortic stenosis   -- Monitor I&Os, daily weights     Intake/Output Summary (Last 24 hours) at 07/25/18 1029  Last data filed at 07/25/18 1006   Gross per 24 hour   Intake             1693 ml   Output             1525 ml   Net              168 ml     NIDDM, controlled  Peripheral neuropathy: Maintained on Metformin 500 mg po every day PTA. A1C 4.0 on 7/23/18. Describes bilateral neuropathy. Previously assessed by Neurologist and diagnosed with neuropathy. Not currently taking medication for this. B12 and folate WNL.   -- PTA Metformin on hold and to be discontinued at discharge indefinitely based on A1C and sugars during this hospitalization   -- Medium dose sliding scale insulin ordered   -- BS per protocol   -- Monitor for hypoglycemia   -- Consider starting gabapentin once SOHEILA resolved   -- Neurology referral at discharge     Recent Labs  Lab 07/26/18  0817 07/26/18  0754 07/26/18  0141 07/25/18  2125 07/25/18  1806 07/25/18  1258 07/25/18  0825 07/25/18  0808  07/24/18  0815  07/23/18  1225     --   --   --   --   --   --  98  --  100*  --  129*   BGM  --  110* 102* 110* 136* 135* 105*  --   < >  --   < >  --    < > = values in this interval not displayed.    Cirrhosis: Prior hx of ascites with paracentesis in 8/2017 with  "negative cytology, 950 ccs of fluid removed. Pathology showing advanced chronic liver disease (stage IV \"cirrhosis\" with steatohepatitis. AST 51, ALT 11)   -- Defer further monitoring to outpatient setting     Hypothyroidism: TSH WNL. Continue PTA Synthroid     GERD: Continue PTA Prilosec     History of infrarenal AA: Measuring at 3 cm on CT. Stable.     Elevated AST: 51>57>61. Bili 0.3. No abdominal pain.   -- Monitor     Alcohol use disorder: Drinks one mixed drink containing 1 shot of vodka daily. Denies history of withdrawal symptoms including withdrawal seizure.   -- Monitor for signs of withdrawal    Tobacco use: 40 pack year hx.   -- Nicotine replacement available if needed     Severe malnutrition in the context of chronic illness  Hypoalbuminemia   Unintentional weight loss (20-30 lbs in the last year per review of clinic notes): % Weight Loss:  > 7.5% in 3 months (severe malnutrition)  % Intake:  </= 50% for >/= 1 month (severe malnutrition)  -- Nutrition consulted     # Pain Assessment:  Current Pain Score 7/26/2018   Patient currently in pain? denies   Pain score (0-10) -   Pain location -   Pain descriptors -   Abebe guzman pain level was assessed and he currently denies pain.      DVT Prophylaxis: Pneumatic Compression Devices and Ambulate every shift  Code Status: Full Code    Disposition: Expected discharge 7/27 pending improvement in BP and placement.     Lizzette Sarkar PA-C    This patient was discussed with Dr. Starr of the Hospitalist Service who agrees with current plans as outlined above.     Interval History   Resting comfortably in bed. Feels significantly improved from admission. Denies nausea, vomiting, diarrhea, SOB, chest pain, dizziness, lightheadedness. Ongoing orthostatic hypotension, asymptomatic. BP rechecked several times by me in the room using two different machines (unable to find manual cuff)--91/60 was highest BP I obtained. Sister in room     -Data reviewed " today: See below.     Physical Exam   Temp: 97.4  F (36.3  C) Temp src: Axillary BP: (!) 88/56 Pulse: 97 Heart Rate: 112 Resp: 18 SpO2: 97 % O2 Device: None (Room air)    Vitals:    07/24/18 0754 07/25/18 0615 07/26/18 0626   Weight: 73 kg (160 lb 15 oz) 72.3 kg (159 lb 6.4 oz) 74.2 kg (163 lb 9.3 oz)     Vital Signs with Ranges  Temp:  [97.4  F (36.3  C)-98.4  F (36.9  C)] 97.4  F (36.3  C)  Pulse:  [82-97] 97  Heart Rate:  [] 112  Resp:  [16-20] 18  BP: ()/(56-79) 88/56  SpO2:  [93 %-100 %] 97 %  I/O last 3 completed shifts:  In: 2733 [P.O.:480; I.V.:2253]  Out: 890 [Urine:890]    CONSTITUTIONAL: Pt sitting upright in chair, dressed in hospital garb. Appears comfortable. Cooperative with interview. Sister accompanies in room   HEENT: Normocephalic, atraumatic. Negative for conjunctival redness or scleral icterus.    CARDIOVASCULAR: RRR, no murmurs, rubs, or extra heart sounds appreciated. Pulses +2/4 and regular in upper and lower extremities, bilaterally.   RESPIRATORY: No increased work of breathing.  CTA, bilat; no wheezes, rales, or rhonchi appreciated.  GASTROINTESTINAL:  Abdomen soft, non-distended. BS auscultated in all four quadrants. Negative for tenderness to palpation.  No masses or organomegaly noted.  MUSCULOSKELETAL: No gross deformities noted. Normal muscle tone.   HEMATOLOGIC/LYMPHATIC/IMMUNOLOGIC: Negative for lower extremity edema, bilaterally.  NEUROLOGIC: Alert and oriented to person, place, and time.  strength intact.   SKIN: Bruising noted on arms, bilat.     Medications       aspirin chewable tablet 81 mg  81 mg Oral Daily     cholecalciferol  2,000 Units Oral Daily     dorzolamide-timolol PF  1 drop Both Eyes BID     fludrocortisone  100 mcg Oral Daily     insulin aspart  1-7 Units Subcutaneous TID AC     insulin aspart  1-5 Units Subcutaneous At Bedtime     levothyroxine  100 mcg Oral QAM AC     omeprazole  20 mg Oral Daily     senna-docusate  1 tablet Oral BID    Or      senna-docusate  2 tablet Oral BID       Data     Recent Labs  Lab 07/26/18  0817 07/25/18  0808 07/24/18  0815 07/23/18  1225   WBC 8.1 6.9 6.7 7.8   HGB 9.8* 9.7* 9.4* 11.0*   * 102* 101* 100    142* 123* 172    141 137 130*   POTASSIUM 3.4 3.4 3.3* 3.6   CHLORIDE 113* 113* 106 98   CO2 16* 17* 21 23   BUN 8 9 11 14   CR 1.19 1.36* 1.46* 1.84*   ANIONGAP 12 11 10 9   JESSICA 7.8* 7.8* 7.9* 8.9    98 100* 129*   ALBUMIN  --  2.5* 2.4*  --    PROTTOTAL  --  6.2* 5.4*  --    BILITOTAL  --  0.5 1.0  --    ALKPHOS  --  110 99  --    ALT  --  13 11  --    AST  --  57* 51*  --    TROPI  --   --   --  <0.015       No results found for this or any previous visit (from the past 24 hour(s)).

## 2018-07-26 NOTE — PLAN OF CARE
"Problem: Patient Care Overview  Goal: Plan of Care/Patient Progress Review  Outcome: No Change  Pt here with UTI. Denies any urinary symptoms. CAM positive (pt stated he was in [place] with some ink and making pesos and wanted to exchange $3000; after I reoriented him to the hospital and why he was here, he went back to believing he was in [place] dealing with pesos and that we are all bankers.\" Called hospitalist; see note. A/O x2. VSS. LS wheezes. Pt denied SOB or chest pain. Mepilex on both forearms; CDI. IV infusing NS. Assist x1; walker, GB. Regular diet. . DC 7/26 pending PT recommendation (TCU) and repeat AM lab work; per MD note. Nursing will continue to monitor.       "

## 2018-07-26 NOTE — PROVIDER NOTIFICATION
MD Notification    Notified Person: MD    Notified Person Name: Alex    Notification Date/Time: 1730 7/26/18    Notification Interaction: Talked with phsycian    Purpose of Notification: BLAIRE Pt hallucinating people outside on roof.     Orders Received: Do a neuro assessment and call back.    Comments:     Addendum: Neuros intact. Talked to PA-- orders: neuros q4 x3 -- if all WDL will be D/C'd, florinef to be discontinued.

## 2018-07-26 NOTE — PLAN OF CARE
Problem: Patient Care Overview  Goal: Plan of Care/Patient Progress Review  Discharge Planner OT   Patient plan for discharge: Home  Current status: pt exhibits confusion, cues needed for safety with hand placement and CGA with sit to stands, cues to keep walker close and in front of self during mobility and transfers. Pt fatigues easily with standing at sink for ADLS. Pt completed cognitive assessment SLUMS with score of 26/30 indicates normal range however pt exhibits confusion during conversation and activity and decrease safety with walker mobility.   Barriers to return to prior living situation: current level of assist; lives alone; fall history; stairs (Defer to PT); Cognition    Recommendations for discharge: After collaboration with OTR recommend TCU to further assess cognition, safety and activity tolerance with ADLS/IADLS, if pt would discharge to home pt would need 24/7 assist until further cognition and home safety could be completed.    Rationale for recommendations: Pt would benefit from OT to increase functional independence in I/ADL tasks.           Entered by: Lupe Campbell 07/26/2018 7:55 AM

## 2018-07-26 NOTE — PLAN OF CARE
Problem: Patient Care Overview  Goal: Plan of Care/Patient Progress Review  Outcome: Improving  Pt A/O x4, forgetful at times. VSS on RA. Up with 1 assist GB/walker to bathroom/uses urinal, voiding adequately. Pt denies pain/SOB. Denies dizziness/lightheadedness. Regular diet, refused dinner. /110. IVF infusing 100ml/hr. PT/OT/SW following. Anticipated D/C per MD 7/26. Nursing continue to monitor.

## 2018-07-27 ENCOUNTER — APPOINTMENT (OUTPATIENT)
Dept: CT IMAGING | Facility: CLINIC | Age: 73
DRG: 682 | End: 2018-07-27
Attending: INTERNAL MEDICINE
Payer: MEDICARE

## 2018-07-27 ENCOUNTER — APPOINTMENT (OUTPATIENT)
Dept: OCCUPATIONAL THERAPY | Facility: CLINIC | Age: 73
DRG: 682 | End: 2018-07-27
Payer: MEDICARE

## 2018-07-27 ENCOUNTER — APPOINTMENT (OUTPATIENT)
Dept: MRI IMAGING | Facility: CLINIC | Age: 73
DRG: 682 | End: 2018-07-27
Attending: INTERNAL MEDICINE
Payer: MEDICARE

## 2018-07-27 LAB
ANION GAP SERPL CALCULATED.3IONS-SCNC: 12 MMOL/L (ref 3–14)
BASOPHILS # BLD AUTO: 0 10E9/L (ref 0–0.2)
BASOPHILS NFR BLD AUTO: 0.4 %
BUN SERPL-MCNC: 9 MG/DL (ref 7–30)
CALCIUM SERPL-MCNC: 7.7 MG/DL (ref 8.5–10.1)
CHLORIDE SERPL-SCNC: 111 MMOL/L (ref 94–109)
CO2 SERPL-SCNC: 18 MMOL/L (ref 20–32)
CREAT SERPL-MCNC: 1.17 MG/DL (ref 0.66–1.25)
DIFFERENTIAL METHOD BLD: ABNORMAL
EOSINOPHIL # BLD AUTO: 0.1 10E9/L (ref 0–0.7)
EOSINOPHIL NFR BLD AUTO: 1.8 %
ERYTHROCYTE [DISTWIDTH] IN BLOOD BY AUTOMATED COUNT: 13.8 % (ref 10–15)
GFR SERPL CREATININE-BSD FRML MDRD: 61 ML/MIN/1.7M2
GLUCOSE BLDC GLUCOMTR-MCNC: 108 MG/DL (ref 70–99)
GLUCOSE BLDC GLUCOMTR-MCNC: 112 MG/DL (ref 70–99)
GLUCOSE BLDC GLUCOMTR-MCNC: 141 MG/DL (ref 70–99)
GLUCOSE SERPL-MCNC: 111 MG/DL (ref 70–99)
HCT VFR BLD AUTO: 26 % (ref 40–53)
HGB BLD-MCNC: 9 G/DL (ref 13.3–17.7)
IMM GRANULOCYTES # BLD: 0 10E9/L (ref 0–0.4)
IMM GRANULOCYTES NFR BLD: 0.1 %
LACTATE BLD-SCNC: 1.4 MMOL/L (ref 0.7–2)
LYMPHOCYTES # BLD AUTO: 1.4 10E9/L (ref 0.8–5.3)
LYMPHOCYTES NFR BLD AUTO: 19.3 %
MCH RBC QN AUTO: 35.7 PG (ref 26.5–33)
MCHC RBC AUTO-ENTMCNC: 34.6 G/DL (ref 31.5–36.5)
MCV RBC AUTO: 103 FL (ref 78–100)
MONOCYTES # BLD AUTO: 0.7 10E9/L (ref 0–1.3)
MONOCYTES NFR BLD AUTO: 9.7 %
NEUTROPHILS # BLD AUTO: 5.1 10E9/L (ref 1.6–8.3)
NEUTROPHILS NFR BLD AUTO: 68.7 %
NRBC # BLD AUTO: 0 10*3/UL
NRBC BLD AUTO-RTO: 0 /100
PLATELET # BLD AUTO: 125 10E9/L (ref 150–450)
POTASSIUM SERPL-SCNC: 3.6 MMOL/L (ref 3.4–5.3)
RBC # BLD AUTO: 2.52 10E12/L (ref 4.4–5.9)
SODIUM SERPL-SCNC: 141 MMOL/L (ref 133–144)
WBC # BLD AUTO: 7.4 10E9/L (ref 4–11)

## 2018-07-27 PROCEDURE — A9270 NON-COVERED ITEM OR SERVICE: HCPCS | Mod: GY | Performed by: HOSPITALIST

## 2018-07-27 PROCEDURE — 25000128 H RX IP 250 OP 636: Performed by: PHYSICIAN ASSISTANT

## 2018-07-27 PROCEDURE — 83605 ASSAY OF LACTIC ACID: CPT | Performed by: INTERNAL MEDICINE

## 2018-07-27 PROCEDURE — 25000132 ZZH RX MED GY IP 250 OP 250 PS 637: Mod: GY | Performed by: HOSPITALIST

## 2018-07-27 PROCEDURE — 85025 COMPLETE CBC W/AUTO DIFF WBC: CPT | Performed by: PHYSICIAN ASSISTANT

## 2018-07-27 PROCEDURE — 70450 CT HEAD/BRAIN W/O DYE: CPT

## 2018-07-27 PROCEDURE — 36415 COLL VENOUS BLD VENIPUNCTURE: CPT | Performed by: INTERNAL MEDICINE

## 2018-07-27 PROCEDURE — 99233 SBSQ HOSP IP/OBS HIGH 50: CPT | Performed by: INTERNAL MEDICINE

## 2018-07-27 PROCEDURE — 97535 SELF CARE MNGMENT TRAINING: CPT | Mod: GO | Performed by: OCCUPATIONAL THERAPY ASSISTANT

## 2018-07-27 PROCEDURE — 25000128 H RX IP 250 OP 636: Performed by: HOSPITALIST

## 2018-07-27 PROCEDURE — 70553 MRI BRAIN STEM W/O & W/DYE: CPT

## 2018-07-27 PROCEDURE — 00000146 ZZHCL STATISTIC GLUCOSE BY METER IP

## 2018-07-27 PROCEDURE — A9585 GADOBUTROL INJECTION: HCPCS | Performed by: HOSPITALIST

## 2018-07-27 PROCEDURE — 80048 BASIC METABOLIC PNL TOTAL CA: CPT | Performed by: PHYSICIAN ASSISTANT

## 2018-07-27 PROCEDURE — 40000133 ZZH STATISTIC OT WARD VISIT: Performed by: OCCUPATIONAL THERAPY ASSISTANT

## 2018-07-27 PROCEDURE — 12000000 ZZH R&B MED SURG/OB

## 2018-07-27 RX ORDER — GADOBUTROL 604.72 MG/ML
7 INJECTION INTRAVENOUS ONCE
Status: COMPLETED | OUTPATIENT
Start: 2018-07-27 | End: 2018-07-27

## 2018-07-27 RX ADMIN — LEVOTHYROXINE SODIUM 100 MCG: 100 TABLET ORAL at 10:46

## 2018-07-27 RX ADMIN — DORZOLAMIDE HYDROCHLORIDE AND TIMOLOL MALEATE 1 DROP: 20; 5 SOLUTION/ DROPS OPHTHALMIC at 23:41

## 2018-07-27 RX ADMIN — VITAMIN D, TAB 1000IU (100/BT) 2000 UNITS: 25 TAB at 10:45

## 2018-07-27 RX ADMIN — GADOBUTROL 7 ML: 604.72 INJECTION INTRAVENOUS at 22:53

## 2018-07-27 RX ADMIN — DORZOLAMIDE HYDROCHLORIDE AND TIMOLOL MALEATE 1 DROP: 20; 5 SOLUTION/ DROPS OPHTHALMIC at 10:49

## 2018-07-27 RX ADMIN — OMEPRAZOLE 20 MG: 20 CAPSULE, DELAYED RELEASE ORAL at 10:45

## 2018-07-27 RX ADMIN — SODIUM CHLORIDE: 9 INJECTION, SOLUTION INTRAVENOUS at 01:29

## 2018-07-27 RX ADMIN — ASPIRIN 81 MG 81 MG: 81 TABLET ORAL at 10:45

## 2018-07-27 ASSESSMENT — ACTIVITIES OF DAILY LIVING (ADL)
ADLS_ACUITY_SCORE: 11
ADLS_ACUITY_SCORE: 11
ADLS_ACUITY_SCORE: 12
ADLS_ACUITY_SCORE: 11
ADLS_ACUITY_SCORE: 12
ADLS_ACUITY_SCORE: 12

## 2018-07-27 NOTE — PROGRESS NOTES
Paged by RN at 1730 with report that pt is hallucinating, seeing people standing outside his window on the roof. Vitals stable. Neuros intact per RN report. Likely adverse reaction to Florinef which patient received (100 mcg) at 1340 today (first dose) for persistent orthostatic hypotension despite fluid resuscitation. Vitals stable--improvement in BP with SBP now 140s. Pt did have one episode of delirium reported overnight per nursing staff. Today, on my evaluation, no ongoing sx. Oriented X4 this AM, no focal neuro deficits appreciated on my exam. Infectious work-up negative with BC NGTD, Urine culture with urogenital seda. CXR unremarkable. Lumbar XR unremarkable. CT A/P unremarkable, comment on trace fluid and/or capsular implants seen anterolaterally in the liver. WBC WNL. Afebrile. Procal 0.12. Afebrile.   -- Monitor   -- Delirium prevention protocol in place    -- Continue with IVF at 100 ccs/hr   -- Discontinue further doses of florinef   -- Neuro checks q4 hrs X3 occurrences, if any neuro deficits, RRT to be called to evaluate pt, consider head CT

## 2018-07-27 NOTE — PLAN OF CARE
Problem: Patient Care Overview  Goal: Plan of Care/Patient Progress Review  Discharge Planner OT   Patient plan for discharge: Home  Current status: pt completed LE dressing with SBA for standing balance only pt fatigues easily with dressing task and required several rest periods during task.    Barriers to return to prior living situation: current level of assist; lives alone; fall history; stairs (Defer to PT); Cognition    Recommendations for discharge:  TCU to further assess cognition, safety and activity tolerance with ADLS/IADLS, if pt would discharge to home pt would need 24/7 assist until further cognition and home safety could be completed.    Rationale for recommendations: Pt would benefit from OT to increase functional independence in I/ADL tasks.            Entered by: Lupe Campbell 07/27/2018 2:38 PM

## 2018-07-27 NOTE — PLAN OF CARE
Problem: Patient Care Overview  Goal: Plan of Care/Patient Progress Review  Outcome: No Change   A/Ox3-4(was off about date) forgetful. Hypotensive low 90's/50's and slightly tachycardic(low 100's), other VSS. NS continues @100. Encouraging PO, Tolerating regular diet. Blotchy extremities w/ Scattered bruised noted , skin tears BUE covered w/ mepilex, CDI.  DC to TCU when placement obtained.      Addendum [id number]  Bp still hypotensive 98/66.  Pt asymptomatic. Plan for CT and MR tonight.

## 2018-07-27 NOTE — PROGRESS NOTES
[facility]    Hospitalist Progress Note    Assessment & Plan       Abebe Christensen is a 72 year old male, admitted on 7/23/2018,  with PMHx of CKD III, MGUS, HTN, hyperlipidemia, and GERD who presented to the ED on 7/23/18 with weakness, recurrent falls, lightheadness, and unintentional weight loss with findings of abnormal UA and SOHEILA. Registered under inpatient status for further evaluation and treatment.     Generalized weakness/memory issues/hallucinations the last 2 nights   Multifactorial in the context of below. Pt lives alone in an apartment. Recently purchased a walker/cane. No elevator in apartment.   --- PT and OT to assess, recommending TCU at discharge which patient is agreeable to  -- pt did fall at home and hit head, he has a laceration on the back of his head  -he is weak noni legs and also right arm  -sister here today and she says that pt has different speech that prior  -patient has had hallucinations the last 2 nights  -he cannot not name where he is, does know his name, day of week, month, year  -will get head CT and head MRI   -would also like OT to do cognitive eval  -if CT and MRI are normal, consider neurology eval for memory issues as outpt  -pt has seen neurology in the past for neuropathy  -discussed at length with pt, sister and nephew      Orthostatic hypotension  History of benign essential hypertension: Positive on admission. Likely secondary to SOHEILA below with ongoing PTA medication administration including BP meds, poor PO intake. Echocardiogram with EF 60-65%, no RWMA, mild valvular aortic stenosis   - Has received total of 3500 L IVF bolus plus maintenance fluids  - Continue to hold PTA BP meds (Cartia  mg po every day, Prinzide 20/12.5 mg po every day--last dose 0830 on 7/23/18)  - Encourage adequate PO intake   - today BP in the 90's  -will saline lock and monitor BP        Acute metabolic encephalopathy, transient: Episode of delirium reported 2 nights ago   per nursing staff and had some hallucinations last 2 nights   Infectious work-up negative with BC NGTD, Urine culture with urogenital seda. CXR unremarkable. Lumbar XR unremarkable. CT A/P unremarkable, comment on trace fluid and/or capsular implants seen anterolaterally in the liver. WBC WNL. Afebrile.   -- Monitor   -- Delirium prevention protocol in place   -workup as above      SOHEILA on CKD III, resolved: Baseline creatinine appears to be 1.1-1.2. Creatinine on admission 1.84>1.46>1.36>1.19>1.17  after IVF resuscitation. Likely related to poor PO intake in the context of nausea for the past 1-2 weeks.        Hyponatremia, resolved: Sodium 130 on admission, 137 on repeat.   Sodium  141  -- Monitor      Abnormal UA: UA with large LE. Asymptomatic; denies dysuria or increased frequency  - IV ceftriaxone empirically on admission, discontinued 7/25 as urine cultures grew urogenital seda.      Myeloproliferative disorder (2002)  History of omental lesions concerning for peritoneal mets, s/p exploratory lap  Unintentional weight loss (20-30 lbs in the last year per review of clinic notes): Previously followed by [facility]. CT and PET form 2017 showed moderate omental thickening and nodularity with low metabolic activity, moderate abdominal and pelvic ascited with intermediate metabolic activity. Pt had a lap in 10/2017 which showed cirrhosis, no malignancy. CT A/P on admission showing marked improvement in ascites form prior imaging. Trace fluid and/or capsular implants seen anterolaterally in the liver, question minimal serosal implants vs fluid adjacent to the liver, otherwise no definite omental lesions demonstrated. B12 392, Folate 23.3. XR Lumbar negative.   -- [facility] consulted, see formal note by Dr. Ames--myeloproliferative neoplasm appears to be progressed moderately with anemia. Consider aranesp if he continues to have kidney dysfunction and anemia to help with improvement in hemoglobin and associated fatigue,  "consider repeating bone marrow bx depending on clinical progress (outpatient)  -- Regarding malignancy w/u recommended per Oncology: EGD 6/21/17 with normal esophagus, medium hiatal hernia, normal mucosa, possible gastritis. Colonoscopy 6/21/17 with diverticulosis of the sigmoid colon, remainder of exam reported at normal. Consider low dose CT scan of chest given tobacco hx, but this can be done in the outpatient setting   -- Care Coordinator to help facilitate Oncology follow-up at discharge     Macrocytic anemia, acute   Thrombocytopenia: Platelets 172>123>142. Baseline hemoglobin in the last year has actually been around 13-14. B12 and folate WNL.   -- No active signs of bleeding, monitor   -- Heme/Onc following-appreciate assistance   hgb 9.0<9.8<9.7<9.4<11.0     Elevated BNP: 1931 on admission. Pt is euvolemic on exam. Denies chest pain, GOMEZ, SOB.   -- Echo with EF 60-65%, no RWMA, mild valvular aortic stenosis   -- Monitor I&Os, daily weights        NIDDM, controlled  Peripheral neuropathy: Maintained on Metformin 500 mg po every day PTA. A1C 4.0 on 7/23/18. Describes bilateral neuropathy. Previously assessed by Neurologist and diagnosed with neuropathy. Not currently taking medication for this. B12 and folate WNL.   -- PTA Metformin on hold and to be discontinued at discharge indefinitely based on A1C and sugars during this hospitalization   -- Medium dose sliding scale insulin ordered   -- BS per protocol   -- Monitor for hypoglycemia   -- Consider starting gabapentin once SOHEILA resolved   -- Neurology referral at discharge   -glucose this am 111           Cirrhosis: Prior hx of ascites with paracentesis in 8/2017 with negative cytology, 950 ccs of fluid removed. Pathology showing advanced chronic liver disease (stage IV \"cirrhosis\" with steatohepatitis. AST 51, ALT 11)   -- Defer further monitoring to outpatient setting      Hypothyroidism: TSH WNL. Continue PTA Synthroid      GERD: Continue PTA Prilosec "      History of infrarenal AA: Measuring at 3 cm on CT. Stable.      Elevated AST: 51>57>61. Bili 0.3. No abdominal pain.   -- Monitor     Alcohol use disorder: Drinks one mixed drink containing 1 shot of vodka daily. Denies history of withdrawal symptoms including withdrawal seizure.   -- Monitor for signs of withdrawal     Tobacco use: 40 pack year hx.   -- Nicotine replacement available if needed      Severe malnutrition in the context of chronic illness  Hypoalbuminemia   Unintentional weight loss (20-30 lbs in the last year per review of clinic notes): % Weight Loss:  > 7.5% in 3 months (severe malnutrition)  % Intake:  </= 50% for >/= 1 month (severe malnutrition)  -- Nutrition consulted           # Pain Assessment:  Current Pain Score 7/27/2018   Patient currently in pain? denies   Pain score (0-10) -   Pain location -   Pain descriptors -       DVT Prophylaxis: ambulatory  Code Status: Full Code    Disposition: Expected discharge tomorrow    Text Page (7am - 6pm)  Jana Starr MD    Interval History    Looks better today    -Data reviewed today: I reviewed all new labs and imaging results over the last 24 hours. I personally reviewed no images or EKG's today.    Physical Exam   Temp: 97.4  F (36.3  C) Temp src: Oral BP: 91/63 Pulse: 94 Heart Rate: 104 Resp: 24 SpO2: 95 % O2 Device: None (Room air)    Vitals:    07/25/18 0615 07/26/18 0626 07/27/18 0641   Weight: 72.3 kg (159 lb 6.4 oz) 74.2 kg (163 lb 9.3 oz) 77.9 kg (171 lb 11.8 oz)     Vital Signs with Ranges  Temp:  [97  F (36.1  C)-98  F (36.7  C)] 97.4  F (36.3  C)  Pulse:  [94] 94  Heart Rate:  [] 104  Resp:  [16-24] 24  BP: ()/(41-68) 91/63  SpO2:  [95 %-100 %] 95 %  I/O last 3 completed shifts:  In: 3073 [P.O.:680; I.V.:2393]  Out: -     Constitutional: alert   Respiratory: clear to auscultation, no wheezing or rhonchi   Cardiovascular: regular rate and rhythm without murmer or rub   GI:positive bowel sounds, non-tender    Skin/Integumen: no rashes    Other:  CN 2-12 intact, leg flexion weak noni, also right arm weaker than left      Medications     sodium chloride 100 mL/hr at 07/27/18 0129       aspirin chewable tablet 81 mg  81 mg Oral Daily     cholecalciferol  2,000 Units Oral Daily     dorzolamide-timolol PF  1 drop Both Eyes BID     insulin aspart  1-7 Units Subcutaneous TID AC     insulin aspart  1-5 Units Subcutaneous At Bedtime     levothyroxine  100 mcg Oral QAM AC     omeprazole  20 mg Oral Daily     senna-docusate  1 tablet Oral BID    Or     senna-docusate  2 tablet Oral BID       Data     Recent Labs  Lab 07/27/18  0710 07/26/18  0817 07/25/18  0808  07/23/18  1225   WBC 7.4 8.1 6.9  < > 7.8   HGB 9.0* 9.8* 9.7*  < > 11.0*   * 104* 102*  < > 100   * 162 142*  < > 172    141 141  < > 130*   POTASSIUM 3.6 3.4 3.4  < > 3.6   CHLORIDE 111* 113* 113*  < > 98   CO2 18* 16* 17*  < > 23   BUN 9 8 9  < > 14   CR 1.17 1.19 1.36*  < > 1.84*   ANIONGAP 12 12 11  < > 9   JESSICA 7.7* 7.8* 7.8*  < > 8.9   * 101 98  < > 129*   ALBUMIN  --  2.6* 2.5*  < >  --    PROTTOTAL  --  6.2* 6.2*  < >  --    BILITOTAL  --  0.8 0.5  < >  --    ALKPHOS  --  114 110  < >  --    ALT  --  14 13  < >  --    AST  --  61* 57*  < >  --    TROPI  --   --   --   --  <0.015   < > = values in this interval not displayed.    No results found for this or any previous visit (from the past 24 hour(s)).

## 2018-07-27 NOTE — PLAN OF CARE
Problem: Patient Care Overview  Goal: Plan of Care/Patient Progress Review  Outcome: No Change  Pt A/O x4, intermittent confusion. Pt was hallucinating people outside on the roof, MD notified, neuros q4 x3 ordered and florinef d/c'd. Neuro's intact. VSS on RA. Up with 1 assist gb and walker to bathroom/chair for dinner. Pt denies pain/nausea/SOB/dizziness/lightheadedness. Regular diet, fair appetite. IVF infusing. Nursing continue to monitor.

## 2018-07-27 NOTE — PROGRESS NOTES
SW:  D:  [facility] has declined patient for today due to his periods of confusion.  They would want to re-assess on Monday.  It is noted he has had episodes of hallucinations charted by nursing.   [facility] is still assessing and will call back before 1530.  [facility] does not feel the room they have would be appropriate for patient given patient's confusion/hallucinations.  [facility] called back and said if patient's hallucinations are not disruptive and if MD is comfortable with patient discharging they will accept him.  If MD does not plan to discharge today, [facility] is available tomorrow or Sunday.  Awaiting for MD to round.    D:  Patient will not discharge today, possibly tomorrow pending imaging results.  Sister Jennifer will transport patient and will await a call from hospitalist with update and call from . Sister asks that she be called on her home phone number and a message left if she doesn't get to the phone in time  so that she can return the phone call.   Patient has signed the No Smoking Form for [facility] and form signed back to  admissions.  Patient expresses frustration with being here due to the bed alarm.

## 2018-07-27 NOTE — PROGRESS NOTES
[facility] Progress Note     Primary Oncologist/Hematologist: Dr. Ames           Assessment and Plan:     1.  Myeloproliferative neoplasm -- polycythemia vera treated previously with phlebotomy but not required for the last several years.     2.  Anemia, macrocytic, likely associated with MPN with possible progression to myelofibrosis state.  Other etiologies: chronic kidney disease associated anemia.  The drop in hemoglobin is likely dilution after admission.   - Follow CBCs  - See Dr. Ames in clinic in about 1 month. I will have the schedulers contact him.    3.  Thrombocytopenia associated with MPN  - Stable    4.  History of ascites and peritoneal nodularity, status post laparoscopic evaluation in 10/2017.  Negative for malignancy and showed cirrhosis.     5.  Weakness, multifactorial.  Could be associated with liver disease, hypoalbuminemia, anemia, and chronic kidney disease.   - Likely DC to TCU     Harmeet MOE, CNP  [facility]  [phone]        Interval History:   Buck is feeling better than when admitted.              Review of Systems:   The 5 point Review of Systems is negative other than noted in the HPI             Medications:   Scheduled Medications    aspirin chewable tablet 81 mg  81 mg Oral Daily     cholecalciferol  2,000 Units Oral Daily     dorzolamide-timolol PF  1 drop Both Eyes BID     insulin aspart  1-7 Units Subcutaneous TID AC     insulin aspart  1-5 Units Subcutaneous At Bedtime     levothyroxine  100 mcg Oral QAM AC     omeprazole  20 mg Oral Daily     senna-docusate  1 tablet Oral BID    Or     senna-docusate  2 tablet Oral BID     PRN Medications  acetaminophen, benztropine, glucose **OR** dextrose **OR** glucagon, melatonin, naloxone, ondansetron **OR** ondansetron, polyethylene glycol, senna-docusate **OR** senna-docusate               Physical Exam:   Vitals were reviewed  Blood pressure 98/66, pulse 105, temperature 97.3  F (36.3  " C), temperature source Oral, resp. rate 15, height 1.854 m (6' 1\"), weight 77.9 kg (171 lb 11.8 oz), SpO2 100 %.  Wt Readings from Last 4 Encounters:   07/27/18 77.9 kg (171 lb 11.8 oz)   07/23/18 68 kg (150 lb)   03/27/18 81.9 kg (180 lb 8 oz)   10/03/17 79.2 kg (174 lb 11.2 oz)       I/O last 3 completed shifts:  In: 2220 [P.O.:120; I.V.:2100]  Out: -       Constitutional: Awake, alert, cooperative, no apparent distress   Lungs: Mildly labored. No wheezing.   Cardiovascular: Regular rate and rhythm, normal S1 and S2, and no murmur noted   Abdomen: Normal bowel sounds, soft, non-distended, non-tender   Skin: Thin skin with scattered bruising. Skin tears covered with dressings.   Other:               Data:   All laboratory data and imaging studies reviewed.    CMP  Recent Labs  Lab 07/27/18  0710 07/26/18  0817 07/25/18  0808 07/24/18  0815    141 141 137   POTASSIUM 3.6 3.4 3.4 3.3*   CHLORIDE 111* 113* 113* 106   CO2 18* 16* 17* 21   ANIONGAP 12 12 11 10   * 101 98 100*   BUN 9 8 9 11   CR 1.17 1.19 1.36* 1.46*   GFRESTIMATED 61 60* 51* 47*   GFRESTBLACK 74 73 62 57*   JESSICA 7.7* 7.8* 7.8* 7.9*   PROTTOTAL  --  6.2* 6.2* 5.4*   ALBUMIN  --  2.6* 2.5* 2.4*   BILITOTAL  --  0.8 0.5 1.0   ALKPHOS  --  114 110 99   AST  --  61* 57* 51*   ALT  --  14 13 11     CBC  Recent Labs  Lab 07/27/18  0710 07/26/18  0817 07/25/18  0808 07/24/18  0815   WBC 7.4 8.1 6.9 6.7   RBC 2.52* 2.81* 2.71* 2.66*   HGB 9.0* 9.8* 9.7* 9.4*   HCT 26.0* 29.1* 27.7* 26.8*   * 104* 102* 101*   MCH 35.7* 34.9* 35.8* 35.3*   MCHC 34.6 33.7 35.0 35.1   RDW 13.8 13.7 13.5 13.2   * 162 142* 123*                   "

## 2018-07-27 NOTE — PLAN OF CARE
Problem: Patient Care Overview  Goal: Plan of Care/Patient Progress Review  Outcome: Improving  Pt here with weakness, lightheadedness, and weight loss. A/Ox4. VSS; BP on the lower end. RA. Replaced leaking IV. NS @100. Encourage fluids. Regular diet. Assist of 1. Bruised skin; skin tears covered in mepilex on arms; CDI; new dressing on left arm. .Pt denies chest pain and SOB.  Expected discharge 7/27 pending improvement in BP and placement, per MD note.

## 2018-07-28 ENCOUNTER — APPOINTMENT (OUTPATIENT)
Dept: GENERAL RADIOLOGY | Facility: CLINIC | Age: 73
DRG: 682 | End: 2018-07-28
Attending: INTERNAL MEDICINE
Payer: MEDICARE

## 2018-07-28 LAB
ANION GAP SERPL CALCULATED.3IONS-SCNC: 11 MMOL/L (ref 3–14)
BUN SERPL-MCNC: 9 MG/DL (ref 7–30)
CALCIUM SERPL-MCNC: 7.6 MG/DL (ref 8.5–10.1)
CHLORIDE SERPL-SCNC: 106 MMOL/L (ref 94–109)
CO2 SERPL-SCNC: 17 MMOL/L (ref 20–32)
CREAT SERPL-MCNC: 1.1 MG/DL (ref 0.66–1.25)
ERYTHROCYTE [DISTWIDTH] IN BLOOD BY AUTOMATED COUNT: 14 % (ref 10–15)
GFR SERPL CREATININE-BSD FRML MDRD: 66 ML/MIN/1.7M2
GLUCOSE BLDC GLUCOMTR-MCNC: 101 MG/DL (ref 70–99)
GLUCOSE BLDC GLUCOMTR-MCNC: 110 MG/DL (ref 70–99)
GLUCOSE BLDC GLUCOMTR-MCNC: 116 MG/DL (ref 70–99)
GLUCOSE BLDC GLUCOMTR-MCNC: 93 MG/DL (ref 70–99)
GLUCOSE SERPL-MCNC: 121 MG/DL (ref 70–99)
HCT VFR BLD AUTO: 27 % (ref 40–53)
HGB BLD-MCNC: 9.3 G/DL (ref 13.3–17.7)
LACTATE BLD-SCNC: 2.1 MMOL/L (ref 0.7–2)
MCH RBC QN AUTO: 35.6 PG (ref 26.5–33)
MCHC RBC AUTO-ENTMCNC: 34.4 G/DL (ref 31.5–36.5)
MCV RBC AUTO: 103 FL (ref 78–100)
PLATELET # BLD AUTO: 149 10E9/L (ref 150–450)
POTASSIUM SERPL-SCNC: 3.6 MMOL/L (ref 3.4–5.3)
PROCALCITONIN SERPL-MCNC: 0.1 NG/ML
RBC # BLD AUTO: 2.61 10E12/L (ref 4.4–5.9)
SODIUM SERPL-SCNC: 134 MMOL/L (ref 133–144)
TROPONIN I SERPL-MCNC: 0.14 UG/L (ref 0–0.04)
TROPONIN I SERPL-MCNC: 0.14 UG/L (ref 0–0.04)
TROPONIN I SERPL-MCNC: 0.15 UG/L (ref 0–0.04)
WBC # BLD AUTO: 7.8 10E9/L (ref 4–11)

## 2018-07-28 PROCEDURE — 87040 BLOOD CULTURE FOR BACTERIA: CPT | Performed by: INTERNAL MEDICINE

## 2018-07-28 PROCEDURE — 25000128 H RX IP 250 OP 636: Performed by: INTERNAL MEDICINE

## 2018-07-28 PROCEDURE — 84145 PROCALCITONIN (PCT): CPT | Performed by: INTERNAL MEDICINE

## 2018-07-28 PROCEDURE — 99233 SBSQ HOSP IP/OBS HIGH 50: CPT | Performed by: INTERNAL MEDICINE

## 2018-07-28 PROCEDURE — 83605 ASSAY OF LACTIC ACID: CPT | Performed by: INTERNAL MEDICINE

## 2018-07-28 PROCEDURE — 84484 ASSAY OF TROPONIN QUANT: CPT | Performed by: INTERNAL MEDICINE

## 2018-07-28 PROCEDURE — 21000001 ZZH R&B HEART CARE

## 2018-07-28 PROCEDURE — 80048 BASIC METABOLIC PNL TOTAL CA: CPT | Performed by: INTERNAL MEDICINE

## 2018-07-28 PROCEDURE — 85027 COMPLETE CBC AUTOMATED: CPT | Performed by: INTERNAL MEDICINE

## 2018-07-28 PROCEDURE — 93010 ELECTROCARDIOGRAM REPORT: CPT | Performed by: INTERNAL MEDICINE

## 2018-07-28 PROCEDURE — 87205 SMEAR GRAM STAIN: CPT | Performed by: INTERNAL MEDICINE

## 2018-07-28 PROCEDURE — 87070 CULTURE OTHR SPECIMN AEROBIC: CPT | Performed by: INTERNAL MEDICINE

## 2018-07-28 PROCEDURE — A9270 NON-COVERED ITEM OR SERVICE: HCPCS | Mod: GY | Performed by: HOSPITALIST

## 2018-07-28 PROCEDURE — 71045 X-RAY EXAM CHEST 1 VIEW: CPT

## 2018-07-28 PROCEDURE — 00000146 ZZHCL STATISTIC GLUCOSE BY METER IP

## 2018-07-28 PROCEDURE — 93005 ELECTROCARDIOGRAM TRACING: CPT

## 2018-07-28 PROCEDURE — 25000132 ZZH RX MED GY IP 250 OP 250 PS 637: Mod: GY | Performed by: HOSPITALIST

## 2018-07-28 PROCEDURE — 36415 COLL VENOUS BLD VENIPUNCTURE: CPT | Performed by: INTERNAL MEDICINE

## 2018-07-28 RX ORDER — FUROSEMIDE 10 MG/ML
20 INJECTION INTRAMUSCULAR; INTRAVENOUS ONCE
Status: COMPLETED | OUTPATIENT
Start: 2018-07-28 | End: 2018-07-28

## 2018-07-28 RX ORDER — ALBUTEROL SULFATE 0.83 MG/ML
2.5 SOLUTION RESPIRATORY (INHALATION)
Status: DISCONTINUED | OUTPATIENT
Start: 2018-07-28 | End: 2018-07-29

## 2018-07-28 RX ORDER — SODIUM CHLORIDE 9 MG/ML
INJECTION, SOLUTION INTRAVENOUS CONTINUOUS
Status: DISCONTINUED | OUTPATIENT
Start: 2018-07-28 | End: 2018-07-29

## 2018-07-28 RX ORDER — PIPERACILLIN SODIUM, TAZOBACTAM SODIUM 4; .5 G/20ML; G/20ML
4.5 INJECTION, POWDER, LYOPHILIZED, FOR SOLUTION INTRAVENOUS EVERY 6 HOURS
Status: DISCONTINUED | OUTPATIENT
Start: 2018-07-28 | End: 2018-07-29

## 2018-07-28 RX ADMIN — LEVOTHYROXINE SODIUM 100 MCG: 100 TABLET ORAL at 06:58

## 2018-07-28 RX ADMIN — DORZOLAMIDE HYDROCHLORIDE AND TIMOLOL MALEATE 1 DROP: 20; 5 SOLUTION/ DROPS OPHTHALMIC at 09:59

## 2018-07-28 RX ADMIN — OMEPRAZOLE 20 MG: 20 CAPSULE, DELAYED RELEASE ORAL at 09:58

## 2018-07-28 RX ADMIN — PIPERACILLIN SODIUM,TAZOBACTAM SODIUM 4.5 G: 4; .5 INJECTION, POWDER, FOR SOLUTION INTRAVENOUS at 22:06

## 2018-07-28 RX ADMIN — FUROSEMIDE 20 MG: 10 INJECTION, SOLUTION INTRAVENOUS at 22:06

## 2018-07-28 RX ADMIN — VITAMIN D, TAB 1000IU (100/BT) 1000 UNITS: 25 TAB at 09:58

## 2018-07-28 RX ADMIN — ASPIRIN 81 MG 81 MG: 81 TABLET ORAL at 09:58

## 2018-07-28 ASSESSMENT — ACTIVITIES OF DAILY LIVING (ADL)
ADLS_ACUITY_SCORE: 12

## 2018-07-28 ASSESSMENT — VISUAL ACUITY: OU: GLASSES

## 2018-07-28 NOTE — PROGRESS NOTES
[facility]    Hospitalist Progress Note    Assessment & Plan     Abebe Christensen is a 72 year old male, admitted on 7/23/2018,  with PMHx of CKD III, MGUS, HTN, hyperlipidemia, and GERD who presented to the ED on 7/23/18 with weakness, recurrent falls, lightheadness, and unintentional weight loss with findings of abnormal UA and SOHEILA. Registered under inpatient status for further evaluation and treatment.     Generalized weakness/memory issues/hallucinations the last 2 nights   Multifactorial in the context of below. Pt lives alone in an apartment. Recently purchased a walker/cane. No elevator in apartment.   --- PT and OT to assess, recommending TCU at discharge which patient is agreeable to  -- pt did fall at home and hit head, he has a laceration on the back of his head  -he is weak noni legs and also right arm  -sister here 7/27 and she says that pt has different speech that prior  -patient has had hallucinations night on 7/25 and 7/26  - head CT and head MRI done last night without acute hemorrhage or infarct  - OT to do cognitive eval  -pt has seen neurology in the past for neuropathy    Probable pneumonia  -more SOB today and has cough  -CXR today w/ mild infiltrate and/or atelectasis right base.  Pulm vascularity WNL  -due to his sx of cough, looks more SOB and CXR change am going to start abx  -since he has been in the hospital will start zosyn for possibe HCAP  -BC x3  -afebrile, WBC normal  -will also check procalcitonin  -tobacco abuse  -sputum for gram stain and culture    Possible cardiac ischemia  -mildly tachycardic  -EKG done for the tachycardia showed sinus tachycardia but also possible ischemia with TWI inferiorly and anterolaterally  -due to EKG, ordered troponin which was elevated at 0.148  -pt denies chest pain  -will transfer to [facility] [place]  -tele  -serial troponins  -already on asa  -lipids in am  -cardiac echo  -cards to see          Orthostatic hypotension  History of benign essential  hypertension: Positive on admission. Likely secondary to SOHEILA below with ongoing PTA medication administration including BP meds, poor PO intake. Echocardiogram with EF 60-65%, no RWMA, mild valvular aortic stenosis   - Has received total of 3500 L IVF bolus plus maintenance fluids  - Continue to hold PTA BP meds (Cartia  mg po every day, Prinzide 20/12.5 mg po every day--last dose 0830 on 7/23/18)  - Encourage adequate PO intake   - saline locked 7/27  BP today 103/81, 104/70             Acute metabolic encephalopathy, transient: Episode of delirium reported 2 nights ago  per nursing staff and had some hallucinations last 2 nights   Infectious work-up negative with BC NGTD, Urine culture with urogenital seda. CXR unremarkable. Lumbar XR unremarkable. CT A/P unremarkable, comment on trace fluid and/or capsular implants seen anterolaterally in the liver. WBC WNL. Afebrile.   -- Monitor   -- Delirium prevention protocol in place   -workup of CT of head and MRI of brain did not show anything acute  -recommend cognitive eval as outpatient    SOHEILA on CKD III, resolved: Baseline creatinine appears to be 1.1-1.2. Creatinine on admission 1.84>1.46>1.36>1.19>1.17  after IVF resuscitation. Likely related to poor PO intake in the context of nausea for the past 1-2 weeks.   -will recheck BMP today and tomorrow         Hyponatremia, resolved: Sodium 130 on admission, 137 on repeat.   Sodium  141  -- Monitor       Abnormal UA: UA with large LE. Asymptomatic; denies dysuria or increased frequency  - IV ceftriaxone empirically on admission, discontinued 7/25 as urine cultures grew urogenital seda.       Myeloproliferative disorder (2002)  History of omental lesions concerning for peritoneal mets, s/p exploratory lap  Unintentional weight loss (20-30 lbs in the last year per review of clinic notes): Previously followed by [facility]. CT and PET form 2017 showed moderate omental thickening and nodularity with low metabolic activity,  moderate abdominal and pelvic ascited with intermediate metabolic activity. Pt had a lap in 10/2017 which showed cirrhosis, no malignancy. CT A/P on admission showing marked improvement in ascites form prior imaging. Trace fluid and/or capsular implants seen anterolaterally in the liver, question minimal serosal implants vs fluid adjacent to the liver, otherwise no definite omental lesions demonstrated. B12 392, Folate 23.3. XR Lumbar negative.   -- [facility] consulted, see formal note by Dr. Ames--myeloproliferative neoplasm appears to be progressed moderately with anemia. Consider aranesp if he continues to have kidney dysfunction and anemia to help with improvement in hemoglobin and associated fatigue, consider repeating bone marrow bx depending on clinical progress (outpatient)  -- Regarding malignancy w/u recommended per Oncology: EGD 6/21/17 with normal esophagus, medium hiatal hernia, normal mucosa, possible gastritis. Colonoscopy 6/21/17 with diverticulosis of the sigmoid colon, remainder of exam reported at normal. Consider low dose CT scan of chest given tobacco hx, but this can be done in the outpatient setting   -- Care Coordinator to help facilitate Oncology follow-up at discharge      Macrocytic anemia, acute   Thrombocytopenia: Platelets 172>123>142. Baseline hemoglobin in the last year has actually been around 13-14. B12 and folate WNL.   -- No active signs of bleeding, monitor   -- Heme/Onc following-appreciate assistance   hgb 9.3<9.0<9.8<9.7<9.4<11.0      Elevated BNP: 1931 on admission. Pt is euvolemic on exam. Denies chest pain, GOMEZ, SOB.   -- Echo with EF 60-65%, no RWMA, mild valvular aortic stenosis   -- Monitor I&Os, daily weights          NIDDM, controlled  Peripheral neuropathy: Maintained on Metformin 500 mg po every day PTA. A1C 4.0 on 7/23/18. Describes bilateral neuropathy. Previously assessed by Neurologist and diagnosed with neuropathy. Not currently taking medication for this. B12  "and folate WNL.   -- PTA Metformin on hold and to be discontinued at discharge indefinitely based on A1C and sugars during this hospitalization   -- Medium dose sliding scale insulin ordered   -- BS per protocol   -- Monitor for hypoglycemia   -- Consider starting gabapentin once SOHEILA resolved   -- Neurology referral at discharge   -glucose this am 101               Cirrhosis: Prior hx of ascites with paracentesis in 8/2017 with negative cytology, 950 ccs of fluid removed. Pathology showing advanced chronic liver disease (stage IV \"cirrhosis\" with steatohepatitis. AST 51, ALT 11)   -- Defer further monitoring to outpatient setting       Hypothyroidism: TSH WNL. Continue PTA Synthroid       GERD: Continue PTA Prilosec       History of infrarenal AA: Measuring at 3 cm on CT. Stable.       Elevated AST: 51>57>61. Bili 0.3. No abdominal pain.   -- Monitor     Alcohol use disorder: Drinks one mixed drink containing 1 shot of vodka daily. Denies history of withdrawal symptoms including withdrawal seizure.   -- Monitor for signs of withdrawal      Tobacco use: 40 pack year hx.   -- Nicotine replacement available if needed       Severe malnutrition in the context of chronic illness  Hypoalbuminemia   Unintentional weight loss (20-30 lbs in the last year per review of clinic notes): % Weight Loss:  > 7.5% in 3 months (severe malnutrition)  % Intake:  </= 50% for >/= 1 month (severe malnutrition)  -- Nutrition consulted           # Pain Assessment:  Current Pain Score 7/27/2018   Patient currently in pain? denies   Pain score (0-10) -   Pain location -   Pain descriptors -       DVT Prophylaxis: SCD  Code Status: Full Code    Disposition: Expected discharge in 1-3 days    Text Page (7am - 6pm)  Jana Starr MD    Interval History   Denies chest pain, more coughing today.  Denies abd pain    -Data reviewed today: I reviewed all new labs and imaging results over the last 24 hours. I personally reviewed the chest x-ray " image(s) showing mild right base infiltrate or atelectasis.    Physical Exam   Temp: 97.6  F (36.4  C) Temp src: Oral BP: 104/70 Pulse: 105 Heart Rate: 110 Resp: 20 SpO2: 100 % O2 Device: None (Room air)    Vitals:    07/26/18 0626 07/27/18 0641 07/28/18 0500   Weight: 74.2 kg (163 lb 9.3 oz) 77.9 kg (171 lb 11.8 oz) 77.9 kg (171 lb 11.8 oz)     Vital Signs with Ranges  Temp:  [97.3  F (36.3  C)-97.8  F (36.6  C)] 97.6  F (36.4  C)  Pulse:  [105] 105  Heart Rate:  [103-112] 110  Resp:  [15-20] 20  BP: ()/(63-81) 104/70  SpO2:  [99 %-100 %] 100 %  I/O last 3 completed shifts:  In: 320 [P.O.:320]  Out: -     Constitutional: alert   Respiratory: coarse breath sounds at bases   Cardiovascular: regular rate and rhythm without murmer or rub   GI:positive bowel sounds, non-tender   Skin/Integumen: no rashes    Other:        Medications       aspirin chewable tablet 81 mg  81 mg Oral Daily     cholecalciferol  2,000 Units Oral Daily     dorzolamide-timolol PF  1 drop Both Eyes BID     insulin aspart  1-7 Units Subcutaneous TID AC     insulin aspart  1-5 Units Subcutaneous At Bedtime     levothyroxine  100 mcg Oral QAM AC     omeprazole  20 mg Oral Daily     senna-docusate  1 tablet Oral BID    Or     senna-docusate  2 tablet Oral BID       Data     Recent Labs  Lab 07/27/18  0710 07/26/18  0817 07/25/18  0808  07/23/18  1225   WBC 7.4 8.1 6.9  < > 7.8   HGB 9.0* 9.8* 9.7*  < > 11.0*   * 104* 102*  < > 100   * 162 142*  < > 172    141 141  < > 130*   POTASSIUM 3.6 3.4 3.4  < > 3.6   CHLORIDE 111* 113* 113*  < > 98   CO2 18* 16* 17*  < > 23   BUN 9 8 9  < > 14   CR 1.17 1.19 1.36*  < > 1.84*   ANIONGAP 12 12 11  < > 9   JESSICA 7.7* 7.8* 7.8*  < > 8.9   * 101 98  < > 129*   ALBUMIN  --  2.6* 2.5*  < >  --    PROTTOTAL  --  6.2* 6.2*  < >  --    BILITOTAL  --  0.8 0.5  < >  --    ALKPHOS  --  114 110  < >  --    ALT  --  14 13  < >  --    AST  --  61* 57*  < >  --    TROPI  --   --   --   --   <0.015   < > = values in this interval not displayed.    Recent Results (from the past 24 hour(s))   CT Head w/o Contrast    Narrative    CT SCAN OF THE HEAD WITHOUT CONTRAST   7/27/2018 7:29 PM     HISTORY: recent fall;     TECHNIQUE:  Axial images of the head and coronal reformations without  IV contrast material. Radiation dose for this scan was reduced using  automated exposure control, adjustment of the mA and/or kV according  to patient size, or iterative reconstruction technique.    COMPARISON: None.    FINDINGS:  There is generalized atrophy of the brain.  White matter  changes are present in the cerebral hemispheres that are consistent  with small vessel ischemic disease in this age patient. There is no  evidence of intracranial hemorrhage, mass, acute infarct or anomaly.  Coastal thickening is present in the right maxillary sinus There is no  evidence of trauma.      Impression    IMPRESSION: No acute pathology. No bleed, mass, or infarcts are seen.    No fracture is identified.      STANLEY JOSEPH MD   MR Brain w/o & w Contrast    Narrative    MRI BRAIN WITHOUT AND WITH CONTRAST  7/27/2018 11:14 PM    HISTORY:  Memory issues, also weakness of legs, right arm.      TECHNIQUE:  Multiplanar, multisequence MRI of the brain without and  with 7 mL Gadavist.    COMPARISON: CT on same date.    FINDINGS: Diffusion-weighted images are normal. There is no evidence  for intracranial hemorrhage or any acute infarct. There is a 0.8 cm  area of increased magnetic susceptibility artifact in the right  thalamus which is felt to be due to an old bleed as there is no  definite acute bleed on CT on same date. Mild-to-moderate cerebral  atrophy is present. Several scattered white matter signal  hyperintensities are also noted on T2 and FLAIR images without  enhancement or mass effect. Postcontrast images do not show any  abnormal areas of enhancement or any focal mass lesions. There is an  air-fluid level in the right maxillary  sinus. Vascular structures are  patent at the skull base.      Impression    IMPRESSION:  1. 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  2. Air-fluid level in right maxillary sinus.  3. Cerebral atrophy with scattered nonspecific white matter changes  most likely due to chronic small vessel ischemic disease.  4. No evidence for acute hemorrhage, acute infarct, any focal mass  lesions.

## 2018-07-28 NOTE — PLAN OF CARE
Problem: Patient Care Overview  Goal: Plan of Care/Patient Progress Review  OT: Attempted OT session. Despite encouragement  pt declined session and requested therapist return tomorrow

## 2018-07-28 NOTE — PLAN OF CARE
Problem: Patient Care Overview  Goal: Plan of Care/Patient Progress Review  Outcome: No Change  Alert & oriented x 3, disoriented to time, SBA with walker, denies pain, frequent congested cough, soft /69, tachy,  and 110, head CT done,no mass or bleeding, MRI done, result pending, bilateral upper extremities skin tear, mepilex dressing intact, possible discharge today.

## 2018-07-28 NOTE — PROGRESS NOTES
HEMATOLOGY CC:    Noted imaging done overnight.    .  Myeloproliferative neoplasm -- polycythemia vera treated previously with phlebotomy but not required for the last several years.      2.  Anemia, macrocytic, likely associated with MPN with possible progression to myelofibrosis state.  Other etiologies: chronic kidney disease associated anemia.  The drop in hemoglobin is likely dilution after admission.   - Follow CBCs  - See Dr. Ames in clinic in about 1 month. I will have the schedulers contact him.     3.  Thrombocytopenia associated with MPN  - Stable     4.  History of ascites and peritoneal nodularity, status post laparoscopic evaluation in 10/2017.  Negative for malignancy and showed cirrhosis.      5.  Weakness, multifactorial.  Could be associated with liver disease, hypoalbuminemia, anemia, and chronic kidney disease.   - Likely DC to TCU

## 2018-07-28 NOTE — PROVIDER NOTIFICATION
MD Notification    Notified Person: MD    Notified Person Name:Ro    Notification Date/Time: 7/28/18 @ 1452    Notification Interaction: web page     Purpose of Notification: critical lab value troponin 0.148 and can you update pt and sister on plan?    Orders Received: transfer csc    Comments:

## 2018-07-28 NOTE — PROVIDER NOTIFICATION
MD Notification    Notified Person: MD    Notified Person Name:matthieu    Notification Date/Time:7/28/18    Notification Interaction:web page    Purpose of Notification:pt has congested cough, rails on R, SOB w/ exertion please address.    Orders Received:    Comments:

## 2018-07-28 NOTE — PLAN OF CARE
Problem: Patient Care Overview  Goal: Plan of Care/Patient Progress Review  Outcome: Declining   A/Ox2-4(was off about date and situation at times) forgetful. Intermittently  hypotensive 90's/50's and slightly tachycardic(low 100's), other VSS on RA.  No c/o pain.  Lung sounds : rhonchi throughout R side, crackles  LLL.  Pt has loose coarse cough.   GOMEZ, but saturations maintained on RA with activity.  Started on IV abx this afternoon.   Encouraging PO, Tolerating regular diet. Blotchy extremities w/ scattered bruised noted , skin tears BUE  Cleansed and covered w/ mepilex, CDI.  [facility] when bed available.

## 2018-07-29 ENCOUNTER — APPOINTMENT (OUTPATIENT)
Dept: CARDIOLOGY | Facility: CLINIC | Age: 73
DRG: 682 | End: 2018-07-29
Attending: INTERNAL MEDICINE
Payer: MEDICARE

## 2018-07-29 LAB
AMMONIA PLAS-SCNC: 23 UMOL/L (ref 10–50)
ANION GAP SERPL CALCULATED.3IONS-SCNC: 8 MMOL/L (ref 3–14)
BACTERIA SPEC CULT: ABNORMAL
BUN SERPL-MCNC: 9 MG/DL (ref 7–30)
CALCIUM SERPL-MCNC: 7.9 MG/DL (ref 8.5–10.1)
CHLORIDE SERPL-SCNC: 108 MMOL/L (ref 94–109)
CO2 SERPL-SCNC: 22 MMOL/L (ref 20–32)
CREAT SERPL-MCNC: 1.16 MG/DL (ref 0.66–1.25)
ERYTHROCYTE [DISTWIDTH] IN BLOOD BY AUTOMATED COUNT: 14.1 % (ref 10–15)
GFR SERPL CREATININE-BSD FRML MDRD: 62 ML/MIN/1.7M2
GLUCOSE BLDC GLUCOMTR-MCNC: 107 MG/DL (ref 70–99)
GLUCOSE BLDC GLUCOMTR-MCNC: 111 MG/DL (ref 70–99)
GLUCOSE BLDC GLUCOMTR-MCNC: 113 MG/DL (ref 70–99)
GLUCOSE BLDC GLUCOMTR-MCNC: 122 MG/DL (ref 70–99)
GLUCOSE BLDC GLUCOMTR-MCNC: 131 MG/DL (ref 70–99)
GLUCOSE SERPL-MCNC: 108 MG/DL (ref 70–99)
GRAM STN SPEC: ABNORMAL
HCT VFR BLD AUTO: 25.3 % (ref 40–53)
HGB BLD-MCNC: 8.8 G/DL (ref 13.3–17.7)
LACTATE BLD-SCNC: 1.3 MMOL/L (ref 0.7–2)
Lab: ABNORMAL
MCH RBC QN AUTO: 35.5 PG (ref 26.5–33)
MCHC RBC AUTO-ENTMCNC: 34.8 G/DL (ref 31.5–36.5)
MCV RBC AUTO: 102 FL (ref 78–100)
PLATELET # BLD AUTO: 142 10E9/L (ref 150–450)
POTASSIUM SERPL-SCNC: 3.6 MMOL/L (ref 3.4–5.3)
RBC # BLD AUTO: 2.48 10E12/L (ref 4.4–5.9)
SODIUM SERPL-SCNC: 138 MMOL/L (ref 133–144)
SPECIMEN SOURCE: ABNORMAL
SPECIMEN SOURCE: ABNORMAL
TROPONIN I SERPL-MCNC: 0.12 UG/L (ref 0–0.04)
WBC # BLD AUTO: 7.7 10E9/L (ref 4–11)

## 2018-07-29 PROCEDURE — 25000132 ZZH RX MED GY IP 250 OP 250 PS 637: Mod: GY | Performed by: PHYSICIAN ASSISTANT

## 2018-07-29 PROCEDURE — A9270 NON-COVERED ITEM OR SERVICE: HCPCS | Mod: GY | Performed by: HOSPITALIST

## 2018-07-29 PROCEDURE — 36415 COLL VENOUS BLD VENIPUNCTURE: CPT | Performed by: INTERNAL MEDICINE

## 2018-07-29 PROCEDURE — 94640 AIRWAY INHALATION TREATMENT: CPT

## 2018-07-29 PROCEDURE — 99222 1ST HOSP IP/OBS MODERATE 55: CPT | Performed by: INTERNAL MEDICINE

## 2018-07-29 PROCEDURE — A9270 NON-COVERED ITEM OR SERVICE: HCPCS | Mod: GY | Performed by: INTERNAL MEDICINE

## 2018-07-29 PROCEDURE — 85027 COMPLETE CBC AUTOMATED: CPT | Performed by: INTERNAL MEDICINE

## 2018-07-29 PROCEDURE — P9041 ALBUMIN (HUMAN),5%, 50ML: HCPCS | Performed by: INTERNAL MEDICINE

## 2018-07-29 PROCEDURE — 25000132 ZZH RX MED GY IP 250 OP 250 PS 637: Mod: GY | Performed by: INTERNAL MEDICINE

## 2018-07-29 PROCEDURE — 80048 BASIC METABOLIC PNL TOTAL CA: CPT | Performed by: INTERNAL MEDICINE

## 2018-07-29 PROCEDURE — 25000125 ZZHC RX 250: Performed by: INTERNAL MEDICINE

## 2018-07-29 PROCEDURE — 21000001 ZZH R&B HEART CARE

## 2018-07-29 PROCEDURE — 25000128 H RX IP 250 OP 636: Performed by: INTERNAL MEDICINE

## 2018-07-29 PROCEDURE — A9270 NON-COVERED ITEM OR SERVICE: HCPCS | Mod: GY | Performed by: PHYSICIAN ASSISTANT

## 2018-07-29 PROCEDURE — 00000146 ZZHCL STATISTIC GLUCOSE BY METER IP

## 2018-07-29 PROCEDURE — 25000132 ZZH RX MED GY IP 250 OP 250 PS 637: Mod: GY | Performed by: HOSPITALIST

## 2018-07-29 PROCEDURE — 99233 SBSQ HOSP IP/OBS HIGH 50: CPT | Performed by: INTERNAL MEDICINE

## 2018-07-29 PROCEDURE — 83605 ASSAY OF LACTIC ACID: CPT | Performed by: INTERNAL MEDICINE

## 2018-07-29 PROCEDURE — 82140 ASSAY OF AMMONIA: CPT | Performed by: INTERNAL MEDICINE

## 2018-07-29 PROCEDURE — 84484 ASSAY OF TROPONIN QUANT: CPT | Performed by: INTERNAL MEDICINE

## 2018-07-29 RX ORDER — MIDODRINE HYDROCHLORIDE 5 MG/1
10 TABLET ORAL
Status: DISCONTINUED | OUTPATIENT
Start: 2018-07-29 | End: 2018-08-02 | Stop reason: HOSPADM

## 2018-07-29 RX ORDER — ALBUMIN, HUMAN INJ 5% 5 %
25 SOLUTION INTRAVENOUS ONCE
Status: DISCONTINUED | OUTPATIENT
Start: 2018-07-29 | End: 2018-07-29

## 2018-07-29 RX ORDER — LEVALBUTEROL 1.25 MG/.5ML
1.25 SOLUTION, CONCENTRATE RESPIRATORY (INHALATION) EVERY 4 HOURS PRN
Status: DISCONTINUED | OUTPATIENT
Start: 2018-07-29 | End: 2018-08-02 | Stop reason: HOSPADM

## 2018-07-29 RX ORDER — ALBUMIN, HUMAN INJ 5% 5 %
25 SOLUTION INTRAVENOUS DAILY PRN
Status: DISCONTINUED | OUTPATIENT
Start: 2018-07-29 | End: 2018-07-30

## 2018-07-29 RX ADMIN — PIPERACILLIN SODIUM,TAZOBACTAM SODIUM 4.5 G: 4; .5 INJECTION, POWDER, FOR SOLUTION INTRAVENOUS at 03:12

## 2018-07-29 RX ADMIN — PANTOPRAZOLE SODIUM 40 MG: 40 INJECTION, POWDER, FOR SOLUTION INTRAVENOUS at 20:59

## 2018-07-29 RX ADMIN — LEVOTHYROXINE SODIUM 100 MCG: 100 TABLET ORAL at 08:13

## 2018-07-29 RX ADMIN — DORZOLAMIDE HYDROCHLORIDE AND TIMOLOL MALEATE 1 DROP: 20; 5 SOLUTION/ DROPS OPHTHALMIC at 21:00

## 2018-07-29 RX ADMIN — PIPERACILLIN SODIUM,TAZOBACTAM SODIUM 4.5 G: 4; .5 INJECTION, POWDER, FOR SOLUTION INTRAVENOUS at 09:36

## 2018-07-29 RX ADMIN — AMOXICILLIN AND CLAVULANATE POTASSIUM 1 TABLET: 875; 125 TABLET, FILM COATED ORAL at 14:23

## 2018-07-29 RX ADMIN — MIDODRINE HYDROCHLORIDE 10 MG: 5 TABLET ORAL at 20:59

## 2018-07-29 RX ADMIN — MIDODRINE HYDROCHLORIDE 10 MG: 5 TABLET ORAL at 14:23

## 2018-07-29 RX ADMIN — AMOXICILLIN AND CLAVULANATE POTASSIUM 1 TABLET: 875; 125 TABLET, FILM COATED ORAL at 21:00

## 2018-07-29 RX ADMIN — VITAMIN D, TAB 1000IU (100/BT) 2000 UNITS: 25 TAB at 09:37

## 2018-07-29 RX ADMIN — OMEPRAZOLE 20 MG: 20 CAPSULE, DELAYED RELEASE ORAL at 09:38

## 2018-07-29 RX ADMIN — ALBUMIN (HUMAN) 25 G: 12.5 SOLUTION INTRAVENOUS at 17:34

## 2018-07-29 RX ADMIN — SENNOSIDES AND DOCUSATE SODIUM 1 TABLET: 8.6; 5 TABLET ORAL at 09:38

## 2018-07-29 RX ADMIN — MIDODRINE HYDROCHLORIDE 10 MG: 5 TABLET ORAL at 09:40

## 2018-07-29 RX ADMIN — ASPIRIN 81 MG 81 MG: 81 TABLET ORAL at 09:38

## 2018-07-29 RX ADMIN — ALBUTEROL SULFATE 2.5 MG: 2.5 SOLUTION RESPIRATORY (INHALATION) at 03:29

## 2018-07-29 ASSESSMENT — ACTIVITIES OF DAILY LIVING (ADL)
ADLS_ACUITY_SCORE: 12

## 2018-07-29 NOTE — PROGRESS NOTES
Chart reviewed.  Patient belongs to [facility].  Please contact [facility].    Thank you for consult.    [facility]

## 2018-07-29 NOTE — CODE/RAPID RESPONSE
"Brief house [facility] note:    I was paged to the bedside to evaluate Mr. Abebe Christensen for an RRT that was paged as \"patient having trouble breathing.\" Patient is currently being treated for encephalopathy, pneumonia, and potential cardiac ischemia (elevated trop) among other problems.     On my initial exam, patient had a normal respiratory rate ~ (16-20bpm), was satting 100% on 3L NC, and appeared in no acute distress with a normal work of breathing.    Patient received lasix earlier tonight for shortness of breath, along with albuterol nebulizer prior to the RRT. Given that the patient is tachycardic, I will change the albuterol to levalbuterol. Patient appeared slightly volume overloaded on exam, with +1 pitting edema and coarse lung sounds. I could not appreciate any JVD. Patient is more tachycardic than prior to receiving lasix and was noted to be transiently hypotensive around 0300, with sBPs in the upper 80's but had received albuterol recently.    I cancelled the RRT a few minutes after my arrival. Please page me with any concerns.    PAYAL Walker, CNP  Hospitalist - House [facility]  Text Page  [phone])           "

## 2018-07-29 NOTE — PLAN OF CARE
Problem: Patient Care Overview  Goal: Plan of Care/Patient Progress Review  OT: Attempted to see this morning. Pt refused stating his sister was coming by soon, and he wanted to rest. Will continue to follow

## 2018-07-29 NOTE — PROVIDER NOTIFICATION
"MD Notification    Notified Person: MD Hospitalist    Notified Person Name:    Notification Date/Time:    Notification Interaction:    Purpose of Notification: Increased restlessness, complains of \"harder to breath\", lungs crackles throughout Lactic at 2.1 new orders to start IVF at 75 /hr - O2 sats 98% RA and patient requesting \"oxygen to feel better\". Confusion, does report \"usually have 8-10 oz vodka w/7 up at night\", noticeable tremoulous hands.     Orders Received:    Comments:    "

## 2018-07-29 NOTE — PLAN OF CARE
Problem: Hematological Disorder  Goal: Hematological Disorder  Patient comorbidity will be monitored for signs and symptoms of Hematogical condition.  Problems will be absent, minimized or managed by discharge/transition of care.   Outcome: No Change  Hem/onc following

## 2018-07-29 NOTE — PROVIDER NOTIFICATION
"MD Notification    Notified Person: MD    Notified Person Name:    Notification Date/Time:    Notification Interaction:    Purpose of Notification: Patient requested oxygen \"to help breath\", RA O2 sats were 98%, 2L NC given for comfort. Has had one time dose 20 IV lasix order by Dr Vallecillo at 2130 and RN told by Dr Vallecillo to \"hold IVFluid order at this time to allow patient to see if Lasix helps with breathing\".  Voiding WNL. Lungs are crackles/rhonchi and RT was at bedside with RN and recommended seeing if Lasix helps but also having PRN Nebs available to use needed to see if allows patient to relax. Patient baseline is confusion, restless.    Orders Received:    Comments:    "

## 2018-07-29 NOTE — PROGRESS NOTES
Patient was found by other RN and RT to be in distress with RR 40 and SBP 80's, , O2 sats 94 on 3L NC.  HOB was at 35 at that time and staff elevated patient to 60. Patient was roused to awake from what appeared to be a sleeping status and he was encouraged to cough several times. His lungs are rhonci, crackles and wheezy baseline and he did cough enough to improve his RR to 20. He appeared to calm down and be more comfortable. RRT had been called during the high RR and when they arrived he had improved. RRT was then canceled. RT had encouraged RRT per protocol for those respirations since patient had already received diuretics and nebs earlier in night and is having steady amounts frequently of urine output. Will continue to monitor.

## 2018-07-29 NOTE — PLAN OF CARE
Problem: Cardiac Disease Comorbidity  Goal: Cardiac Disease  Patient comorbidity will be monitored for signs and symptoms of Cardiac Disease.  Problems will be absent, minimized or managed by discharge/transition of care.   Outcome: No Change  Cards consult

## 2018-07-29 NOTE — PROGRESS NOTES
ON review of his HGB was 14 in 3/2018 dropped to 8.8 today. Will request GI consult today RE possible GI bleed with history of cirrhosis of Liver   Start PRotonix 40mg IV BID   FERNO  Siobhan Cline MD

## 2018-07-29 NOTE — PROGRESS NOTES
HEMATOLOGY CC:     Noted imaging done and respiratory difficulties overnight     .  Myeloproliferative neoplasm -- polycythemia vera treated previously with phlebotomy but not required for the last several years.       2.  Anemia, macrocytic, likely associated with MPN with possible progression to myelofibrosis state.  Other etiologies: chronic kidney disease associated anemia.  The drop in hemoglobin is likely dilution after admission.   - Follow CBCs  - See Dr. Ames in clinic in about 1 month. I will have the schedulers contact him.      3.  Thrombocytopenia associated with MPN  - Stable      4.  History of ascites and peritoneal nodularity, status post laparoscopic evaluation in 10/2017.  Negative for malignancy and showed cirrhosis.       5.  Weakness, multifactorial.  Could be associated with liver disease, hypoalbuminemia, anemia, and chronic kidney disease.   - Likely DC to TCU

## 2018-07-29 NOTE — CONSULTS
[facility]    Cardiology Consultation     Date of Admission:  7/23/2018    Assessment & Plan   Abebe Christensen is a 72 year old male who was admitted on 7/23/2018. I was asked to see the patient for elevated troponin and ECG changes.     Active Problems:    Elevated troponin in patient without chest pain    T wave inversions anteriorly with tachycardia    Assessment: Troponin trend 0.148, 0.136, 0.135, 0.125    Plan: It was a pleasure seeing Mr. Christensen and his sister today.  I reviewed with them his laboratory testing as well as ECG results.    He has evidence of a mild troponin elevation that is been down trending without signs of significant elevation concerning for active ischemia.  He remains completely asymptomatic and denies any chest pain or chest discomfort of any kind.  I suspect this is a troponin leak in the setting of his hypotension, tachycardia, anemia, and pneumonia.  He had a recent echocardiogram just 5 days ago that showed a preserved ejection fraction of 66-65% with no regional wall motion abnormalities.  His diastolic assessment showed no signs of elevated filling pressures.  He was incidentally found to have mild valvular aortic stenosis in the setting of a fused right and left aortic valve leaflets.  This is potentially functional over time versus a congenital bicuspid valve.    At this time I do not feel he needs invasive testing such as a coronary angiogram.  He does have significant risk factors including diabetes, ongoing tobacco use, hypertension, and hyperlipidemia.  I think that we should risk stratify him at a later date with noninvasive stress testing.  This could be either done prior to dismissal from the hospital or approximately 3-4 weeks after dismissal from the hospital after he recovers from this hospitalization and build up his strength again.    Fernando Preston MD    Code Status    Full Code    Reason for Consult   Reason for consult: Elevated troponin and ECG  changes    Primary Care Physician   Carson Fried    Chief Complaint   Elevated troponin and ECG changes    History is obtained from the patient with his sister present at bedside    History of Present Illness   Abebe Christensen is a 72 year old male with a past medical history of hypertension, hyperlipidemia, chronic kidney disease stage III, diabetes mellitus, ongoing tobacco use, hypothyroidism, cirrhosis, and myeloproliferative disorder causing both anemia and thrombocytopenia.  Cardiology was consulted for recommendations regarding EKG changes and an elevated troponin.    He was initially admitted to the hospital on 7/23/2018.  He was initially admitted for a fall, generalized weakness, and probable pneumonia.  His hospitalization has been complicated by hallucinations secondary to delirium.  During his hospitalization he had an EKG performed due to sinus tachycardia which showed some T-wave inversions inferiorly and anterolaterally.  He was completely asymptomatic at that time.  A troponin value was checked and showed a troponin of 0.148.  Follow-up testing is 0.136, 0.135, and 0.125.    At the time that I visited with him he is completely asymptomatic from a coronary perspective.  He denies any chest pain or chest discomfort of any kind.  He does have shortness of breath and cough from his pneumonia.  He denies any orthopnea or paroxysmal nocturnal dyspnea.  He has some stable lower extremity edema.    Past Medical History   I have reviewed this patient's medical history and updated it with pertinent information if needed.   Past Medical History:   Diagnosis Date     GERD (gastroesophageal reflux disease)      Hyperlipidemia      Hypertension      Hypertension      MGUS (monoclonal gammopathy of unknown significance)      Renal insufficiency, mild        Past Surgical History   I have reviewed this patient's surgical history and updated it with pertinent information if needed.  Past Surgical History:    Procedure Laterality Date     EYE SURGERY  2008    cataract and glaucoma     LAPAROSCOPIC BIOPSY LIVER N/A 10/3/2017    Procedure: LAPAROSCOPIC BIOPSY LIVER;;  Surgeon: Kendell Kenny MD;  Location: [facility] OR     LAPAROSCOPIC HERNIORRHAPHY UMBILICAL N/A 10/3/2017    Procedure: LAPAROSCOPIC HERNIORRHAPHY UMBILICAL;;  Surgeon: Kendell Kenny MD;  Location: [facility] OR     LAPAROTOMY EXPLORATORY N/A 10/3/2017    Procedure: LAPAROTOMY EXPLORATORY;;  Surgeon: Kendell Kenny MD;  Location: [facility] OR     OMENTECTOMY N/A 10/3/2017    Procedure: OMENTECTOMY;  LAPAROSCOPIC ABDOMINAL EXPLORATION; OMENTECTOMY; PERITONEAL UMBILICAL HERNIA REPAIR, WEDGE LIVER BIOPSY;  Surgeon: Kendell Kenny MD;  Location:  OR       Prior to Admission Medications   Prior to Admission Medications   Prescriptions Last Dose Informant Patient Reported? Taking?   ASPIRIN PO 7/23/2018 at Unknown time Self Yes Yes   Sig: Take 81 mg by mouth daily   CARTIA  MG 24 hr capsule 7/23/2018 at Unknown time Self No Yes   Sig: TAKE 1 CAPSULE BY MOUTH DAILY   Cholecalciferol (VITAMIN D) 2000 UNITS CAPS 7/23/2018 at Unknown time Self Yes Yes   Sig: Take 1 capsule by mouth daily   dorzolamide-timolol (COSOPT) 2-0.5 % ophthalmic solution 7/22/2018 at Unknown time Self Yes Yes   Sig: Place 1 drop into both eyes 2 times daily    folic acid (FOLVITE) 1 MG tablet 7/23/2018 at Unknown time Self No Yes   Sig: TAKE 4 TABLETS BY MOUTH DAILY   levothyroxine (SYNTHROID/LEVOTHROID) 100 MCG tablet 7/23/2018 at Unknown time Self No Yes   Sig: TAKE 1 TABLET(100 MCG) BY MOUTH DAILY   lisinopril-hydrochlorothiazide (PRINZIDE/ZESTORETIC) 20-12.5 MG per tablet 7/23/2018 at Unknown time Self No Yes   Sig: TAKE 1 TABLET BY MOUTH DAILY   metFORMIN (GLUCOPHAGE) 500 MG tablet 7/22/2018 at Unknown time Self No Yes   Sig: TAKE 1 TABLET(500 MG) BY MOUTH DAILY 20 MINUTES BEFORE DINNER   omeprazole (PRILOSEC) 20 MG CR capsule 7/23/2018 at Unknown time Self No Yes   Sig: TAKE ONE CAPSULE BY  MOUTH EVERY DAY   travoprost Z, benzalkonium, (TRAVATAN) 0.004 % ophthalmic solution 7/22/2018 at Unknown time Self Yes Yes   Sig: Place 1 drop into both eyes every evening       Facility-Administered Medications: None     Allergies   No Known Allergies    Social History   I have reviewed this patient's social history and updated it with pertinent information if needed. Abebe Christensen  reports that he has been smoking Cigarettes.  He has been smoking about 0.50 packs per day. He has never used smokeless tobacco. He reports that he drinks alcohol. He reports that he does not use illicit drugs.    Family History   I have reviewed this patient's family history and updated it with pertinent information if needed.   Family History   Problem Relation Age of Onset     Eye Disorder Mother      Cancer - colorectal Father        Review of Systems   The 10 point Review of Systems is negative other than noted in the HPI or here.     Physical Exam   Temp: 97.8  F (36.6  C) Temp src: Axillary BP: 98/62 Pulse: 107 Heart Rate: 93 Resp: 21 SpO2: 100 % O2 Device: Nasal cannula Oxygen Delivery: 3 LPM  Vital Signs with Ranges  Temp:  [96.6  F (35.9  C)-97.9  F (36.6  C)] 97.8  F (36.6  C)  Pulse:  [106-107] 107  Heart Rate:  [] 93  Resp:  [16-40] 21  BP: ()/(42-81) 98/62  SpO2:  [93 %-100 %] 100 %  178 lbs 6.4 oz    Constitutional: No apparent distress.   Eyes: No xanthelasma or conjunctivitis  HEENT: Moist oral mucosa  Respiratory: Coarse breath sounds and rochi in all lung fields.   Cardiovascular: Distant heart sounds. Regular rate and rhythm. Normal S1 and S2. No extra heart sounds.  Lymph/Hematologic: No purpura or petechiae.  Skin: No stasis dermatitis. No major rashes.  Extremities: 1+ bilateral peripheral edema.  Neurologic: Moving all extremities. No facial assymmetry.  Psychiatric: Alert and oriented. Answers questions appropriately..    Data   Results for orders placed or performed during the hospital encounter of  07/23/18 (from the past 24 hour(s))   Glucose by meter   Result Value Ref Range    Glucose 101 (H) 70 - 99 mg/dL   Troponin I   Result Value Ref Range    Troponin I ES 0.136 (HH) 0.000 - 0.045 ug/L   Basic metabolic panel   Result Value Ref Range    Sodium 134 133 - 144 mmol/L    Potassium 3.6 3.4 - 5.3 mmol/L    Chloride 106 94 - 109 mmol/L    Carbon Dioxide 17 (L) 20 - 32 mmol/L    Anion Gap 11 3 - 14 mmol/L    Glucose 121 (H) 70 - 99 mg/dL    Urea Nitrogen 9 7 - 30 mg/dL    Creatinine 1.10 0.66 - 1.25 mg/dL    GFR Estimate 66 >60 mL/min/1.7m2    GFR Estimate If Black 79 >60 mL/min/1.7m2    Calcium 7.6 (L) 8.5 - 10.1 mg/dL   Lactic acid level STAT for sepsis protocol   Result Value Ref Range    Lactate for Sepsis Protocol 2.1 (H) 0.7 - 2.0 mmol/L   Glucose by meter   Result Value Ref Range    Glucose 93 70 - 99 mg/dL   Troponin I   Result Value Ref Range    Troponin I ES 0.135 (HH) 0.000 - 0.045 ug/L   Sputum Culture Aerobic Bacterial   Result Value Ref Range    Specimen Description Sputum     Culture Micro Canceled, Test credited     Culture Micro (A)      >10 Squamous epithelial cells/low power field indicates oral contamination. Please   recollect.      Culture Micro       Notification of test cancellation was given to  Hemant Davis RN from [facility]. 7.29.18 at 0403. GR.     Gram stain   Result Value Ref Range    Specimen Description Sputum     Special Requests Screen     Gram Stain (A)      >10 Squamous epithelial cells/low power field indicates oral contamination. Please   recollect.      Gram Stain <25 PMNs/low power field     Gram Stain       Moderate  Mixed gram positive and gram negative bacteria present.     Glucose by meter   Result Value Ref Range    Glucose 131 (H) 70 - 99 mg/dL   Troponin I   Result Value Ref Range    Troponin I ES 0.125 (HH) 0.000 - 0.045 ug/L   Basic metabolic panel   Result Value Ref Range    Sodium 138 133 - 144 mmol/L    Potassium 3.6 3.4 - 5.3 mmol/L    Chloride 108 94 - 109 mmol/L     Carbon Dioxide 22 20 - 32 mmol/L    Anion Gap 8 3 - 14 mmol/L    Glucose 108 (H) 70 - 99 mg/dL    Urea Nitrogen 9 7 - 30 mg/dL    Creatinine 1.16 0.66 - 1.25 mg/dL    GFR Estimate 62 >60 mL/min/1.7m2    GFR Estimate If Black 75 >60 mL/min/1.7m2    Calcium 7.9 (L) 8.5 - 10.1 mg/dL   CBC with platelets   Result Value Ref Range    WBC 7.7 4.0 - 11.0 10e9/L    RBC Count 2.48 (L) 4.4 - 5.9 10e12/L    Hemoglobin 8.8 (L) 13.3 - 17.7 g/dL    Hematocrit 25.3 (L) 40.0 - 53.0 %     (H) 78 - 100 fl    MCH 35.5 (H) 26.5 - 33.0 pg    MCHC 34.8 31.5 - 36.5 g/dL    RDW 14.1 10.0 - 15.0 %    Platelet Count 142 (L) 150 - 450 10e9/L   Lactic acid whole blood (AM Draw)   Result Value Ref Range    Lactic Acid 1.3 0.7 - 2.0 mmol/L   Glucose by meter   Result Value Ref Range    Glucose 107 (H) 70 - 99 mg/dL   Glucose by meter   Result Value Ref Range    Glucose 122 (H) 70 - 99 mg/dL   Ammonia   Result Value Ref Range    Ammonia 23 10 - 50 umol/L

## 2018-07-29 NOTE — PROGRESS NOTES
[facility]    Hospitalist Progress Note    Assessment & Plan     Abebe Christensen is a 72 year old male, admitted on 7/23/2018,  with PMHx of CKD III, MGUS, HTN, hyperlipidemia, and GERD who presented to the ED on 7/23/18 with weakness, recurrent falls, lightheadness, and unintentional weight loss with findings of abnormal UA and SOHEILA. Registered under inpatient status for further evaluation and treatment.     Generalized weakness/memory issues/hallucinations the last 2 nights   Multifactorial in the context of below. Pt lives alone in an apartment. Recently purchased a walker/cane. No elevator in apartment.   --- PT and OT to assess, recommending TCU at discharge which patient is agreeable to  -- pt did fall at home and hit head, he has a laceration on the back of his head  -he is weak noni legs and also right arm  -sister here 7/27 and she says that pt has different speech that prior  -patient has had hallucinations night on 7/25 and 7/26  - head CT and head MRI done last night without acute hemorrhage or infarct  - OT to do cognitive eval  -pt has seen neurology in the past for neuropathy    Probable pneumonia  -more SOB today and has cough  -CXR today w/ mild infiltrate and/or atelectasis right base.  Pulm vascularity WNL  -due to his sx of cough, looks more SOB and CXR change am going to start abx  -since he has been in the hospital will start zosyn for possibe HCAP switch to oral Augmentin today  -BC x3  -afebrile, WBC normal  -will also check procalcitonin  -tobacco abuse  -sputum for gram stain and culture    Possible cardiac ischemia  -mildly tachycardic  -EKG done for the tachycardia showed sinus tachycardia but also possible ischemia with TWI inferiorly and anterolaterally  -due to EKG, ordered troponin which was elevated at 0.148  -pt denies chest pain  -will transfer to [facility] [place]  -tele  -serial troponins  -already on asa  -lipids in am  -cardiac echo  -cards to see          Orthostatic  hypotension and persistent hypotension  History of benign essential hypertension: Positive on admission. Likely secondary to SOHEILA below with ongoing PTA medication administration including BP meds, poor PO intake. Echocardiogram with EF 60-65%, no RWMA, mild valvular aortic stenosis   - Has received total of 3500 L IVF bolus plus maintenance fluids  - Continue to hold PTA BP meds (Cartia  mg po every day, Prinzide 20/12.5 mg po every day--last dose 0830 on 7/23/18)  - Encourage adequate PO intake   - saline locked 7/27  Patient was given so far multiple liters of fluids and was short of breath overnight and needed 20 mg of Lasix IV  Blood pressure low systolics in the 80s today and we do not have much room for IV fluids  So we will start him on midodrine 10 mg p.o. 3 times daily to keep systolics above 90           Acute metabolic encephalopathy, transient: Episode of delirium reported 2 nights ago  per nursing staff and had some hallucinations last 2 nights   Infectious work-up negative with BC NGTD, Urine culture with urogenital seda. CXR unremarkable. Lumbar XR unremarkable. CT A/P unremarkable, comment on trace fluid and/or capsular implants seen anterolaterally in the liver. WBC WNL. Afebrile.   -- Monitor   -- Delirium prevention protocol in place   -workup of CT of head and MRI of brain did not show anything acute  -recommend cognitive eval as outpatient    SOHEILA on CKD III, resolved: Baseline creatinine appears to be 1.1-1.2. Creatinine on admission 1.84>1.46>1.36>1.19>1.17  after IVF resuscitation. Likely related to poor PO intake in the context of nausea for the past 1-2 weeks.   -will recheck BMP today and tomorrow         Hyponatremia, resolved: Sodium 130 on admission, 137 on repeat.   Sodium  141  -- Monitor       Abnormal UA: UA with large LE. Asymptomatic; denies dysuria or increased frequency  - IV ceftriaxone empirically on admission, discontinued 7/25 as urine cultures grew urogenital seda.        Myeloproliferative disorder (2002)  History of omental lesions concerning for peritoneal mets, s/p exploratory lap  Unintentional weight loss (20-30 lbs in the last year per review of clinic notes): Previously followed by [facility]. CT and PET form 2017 showed moderate omental thickening and nodularity with low metabolic activity, moderate abdominal and pelvic ascited with intermediate metabolic activity. Pt had a lap in 10/2017 which showed cirrhosis, no malignancy. CT A/P on admission showing marked improvement in ascites form prior imaging. Trace fluid and/or capsular implants seen anterolaterally in the liver, question minimal serosal implants vs fluid adjacent to the liver, otherwise no definite omental lesions demonstrated. B12 392, Folate 23.3. XR Lumbar negative.   -- [facility]OHPA consulted, see formal note by Dr. Ames--myeloproliferative neoplasm appears to be progressed moderately with anemia. Consider aranesp if he continues to have kidney dysfunction and anemia to help with improvement in hemoglobin and associated fatigue, consider repeating bone marrow bx depending on clinical progress (outpatient)  -- Regarding malignancy w/u recommended per Oncology: EGD 6/21/17 with normal esophagus, medium hiatal hernia, normal mucosa, possible gastritis. Colonoscopy 6/21/17 with diverticulosis of the sigmoid colon, remainder of exam reported at normal. Consider low dose CT scan of chest given tobacco hx, but this can be done in the outpatient setting   -- Care Coordinator to help facilitate Oncology follow-up at discharge      Macrocytic anemia, acute   Thrombocytopenia: Platelets 172>123>142. Baseline hemoglobin in the last year has actually been around 13-14. B12 and folate WNL.   -- No active signs of bleeding, monitor   -- Heme/Onc following-appreciate assistance   hgb 9.3<9.0<9.8<9.7<9.4<11.0      Elevated BNP: 1931 on admission. Pt is euvolemic on exam. Denies chest pain, GOMEZ, SOB.   -- Echo with EF 60-65%,  "no RWMA, mild valvular aortic stenosis   -- Monitor I&Os, daily weights          NIDDM, controlled  Peripheral neuropathy: Maintained on Metformin 500 mg po every day PTA. A1C 4.0 on 7/23/18. Describes bilateral neuropathy. Previously assessed by Neurologist and diagnosed with neuropathy. Not currently taking medication for this. B12 and folate WNL.   -- PTA Metformin on hold and to be discontinued at discharge indefinitely based on A1C and sugars during this hospitalization   -- Medium dose sliding scale insulin ordered   -- BS per protocol   -- Monitor for hypoglycemia   -- Consider starting gabapentin once SOHEILA resolved   -- Neurology referral at discharge   -glucose this am 101               Cirrhosis: Prior hx of ascites with paracentesis in 8/2017 with negative cytology, 950 ccs of fluid removed. Pathology showing advanced chronic liver disease (stage IV \"cirrhosis\" with steatohepatitis. AST 51, ALT 11)   -- Defer further monitoring to outpatient setting       Hypothyroidism: TSH WNL. Continue PTA Synthroid       GERD: Continue PTA Prilosec       History of infrarenal AA: Measuring at 3 cm on CT. Stable.       Elevated AST: 51>57>61. Bili 0.3. No abdominal pain.   -- Monitor     Alcohol use disorder: Drinks one mixed drink containing 1 shot of vodka daily. Denies history of withdrawal symptoms including withdrawal seizure.   -- Monitor for signs of withdrawal      Tobacco use: 40 pack year hx.   -- Nicotine replacement available if needed       Severe malnutrition in the context of chronic illness  Hypoalbuminemia   Unintentional weight loss (20-30 lbs in the last year per review of clinic notes): % Weight Loss:  > 7.5% in 3 months (severe malnutrition)  % Intake:  </= 50% for >/= 1 month (severe malnutrition)  -- Nutrition consulted           # Pain Assessment:  Current Pain Score 7/29/2018   Patient currently in pain? denies   Pain score (0-10) -   Pain location -   Pain descriptors -       DVT Prophylaxis: " SCD  Code Status: Full Code    Disposition: Expected discharge to TCU tomorrow if blood pressure remains stable      Siobhan Cline MD    Interval History   Denies chest pain or dizziness.  denies abd pain.  Blood pressure running low with systolics in the 80s.  Patient is asymptomatic with it.  His underlying liver disease might be contributing to the hypotension.  We will start him on Midodrin 10 mg p.o. 3 times daily today    -Data reviewed today: I reviewed all new labs and imaging results over the last 24 hours. I personally reviewed the chest x-ray image(s) showing mild right base infiltrate or atelectasis.    Physical Exam   Temp: 97.9  F (36.6  C) Temp src: Axillary BP: (!) 86/65 Pulse: 107 Heart Rate: 99 Resp: 22 SpO2: 93 % O2 Device: Nasal cannula Oxygen Delivery: 3 LPM  Vitals:    07/27/18 0641 07/28/18 0500 07/28/18 1857   Weight: 77.9 kg (171 lb 11.8 oz) 77.9 kg (171 lb 11.8 oz) 80.9 kg (178 lb 6.4 oz)     Vital Signs with Ranges  Temp:  [96.6  F (35.9  C)-97.9  F (36.6  C)] 97.9  F (36.6  C)  Pulse:  [106-107] 107  Heart Rate:  [] 99  Resp:  [16-40] 22  BP: ()/(42-81) 86/65  SpO2:  [93 %-100 %] 93 %  I/O last 3 completed shifts:  In: 500 [P.O.:500]  Out: 2002 [Urine:2002]    Constitutional: alert   Respiratory: coarse breath sounds at bases   Cardiovascular: regular rate and rhythm without murmer or rub   GI:positive bowel sounds, non-tender   Skin/Integumen: no rashes    Other:        Medications       aspirin chewable tablet 81 mg  81 mg Oral Daily     cholecalciferol  2,000 Units Oral Daily     dorzolamide-timolol PF  1 drop Both Eyes BID     insulin aspart  1-7 Units Subcutaneous TID AC     insulin aspart  1-5 Units Subcutaneous At Bedtime     levothyroxine  100 mcg Oral QAM AC     midodrine  10 mg Oral TID w/meals     omeprazole  20 mg Oral Daily     piperacillin-tazobactam  4.5 g Intravenous Q6H     senna-docusate  1 tablet Oral BID    Or     senna-docusate  2 tablet Oral BID        Data     Recent Labs  Lab 07/29/18  0535 07/29/18  0230 07/28/18  2143 07/28/18  1824 07/28/18  1404 07/27/18  0710 07/26/18  0817 07/25/18  0808   WBC 7.7  --   --   --  7.8 7.4 8.1 6.9   HGB 8.8*  --   --   --  9.3* 9.0* 9.8* 9.7*   *  --   --   --  103* 103* 104* 102*   *  --   --   --  149* 125* 162 142*     --   --  134  --  141 141 141   POTASSIUM 3.6  --   --  3.6  --  3.6 3.4 3.4   CHLORIDE 108  --   --  106  --  111* 113* 113*   CO2 22  --   --  17*  --  18* 16* 17*   BUN 9  --   --  9  --  9 8 9   CR 1.16  --   --  1.10  --  1.17 1.19 1.36*   ANIONGAP 8  --   --  11  --  12 12 11   JESSICA 7.9*  --   --  7.6*  --  7.7* 7.8* 7.8*   *  --   --  121*  --  111* 101 98   ALBUMIN  --   --   --   --   --   --  2.6* 2.5*   PROTTOTAL  --   --   --   --   --   --  6.2* 6.2*   BILITOTAL  --   --   --   --   --   --  0.8 0.5   ALKPHOS  --   --   --   --   --   --  114 110   ALT  --   --   --   --   --   --  14 13   AST  --   --   --   --   --   --  61* 57*   TROPI  --  0.125* 0.135* 0.136* 0.148*  --   --   --        Recent Results (from the past 24 hour(s))   XR Chest Port 1 View    Narrative    CHEST ONE VIEW PORTABLE   7/28/2018 1:05 PM     HISTORY:  Cough. History of smoking.    COMPARISON: None.      Impression    IMPRESSION: Mild infiltrate and/or atelectasis at the right lung base  could represent a mild pneumonia. Please clinically correlate. Aortic  calcification. No pleural effusions. Heart size appears stable.  Pulmonary vascularity is within normal limits.    BULMARO DIETZ MD

## 2018-07-29 NOTE — PLAN OF CARE
Problem: Patient Care Overview  Goal: Plan of Care/Patient Progress Review  Outcome: No Change  Heart Failure Care Pathway  GOALS TO BE MET BEFORE DISCHARGE:    1. Decrease congestion and/or edema with diuretic therapy to achieve near      optimal volume status.            Dyspnea improved:  No, please explain: c/o GOMEZ            Edema improved:     No, please explain: 2-3+        Net I/O and Weights since admission:          06/28 2300 - 07/28 2259  In: 70118 [P.O.:2370; I.V.:7777]  Out: 3165 [Urine:3165]  Net: 6982            Vitals:    07/23/18 1225 07/24/18 0754 07/25/18 0615 07/26/18 0626   Weight: 72.6 kg (160 lb) 73 kg (160 lb 15 oz) 72.3 kg (159 lb 6.4 oz) 74.2 kg (163 lb 9.3 oz)    07/27/18 0641 07/28/18 0500 07/28/18 1857   Weight: 77.9 kg (171 lb 11.8 oz) 77.9 kg (171 lb 11.8 oz) 80.9 kg (178 lb 6.4 oz)       2.  O2 sats > 92% on RA or at prior home O2 therapy level.          Current oxygenation status:       SpO2: 98 %         O2 Device: None (Room air),            Able to wean O2 this shift to keep sats > 92%:  Yes       Does patient use Home O2? No    3.  Tolerates ambulation and mobility near baseline: No, please explain: GOMEZ, walker        How many times did the patient ambulate with nursing staff this shift? 1    Please review the Heart Failure Care Pathway for additional HF goal outcomes.    Em Dillon RN  7/28/2018     Neuro:forgetful  CV/Rhythm:ST  Resp/02:GOMEZ  GI/Diet:-  :voiding  Skin/Incisions/Sites:skin tears  Pulses/CMS:intact  Edema:2-3+  Activity/Falls Risk:fall risk, walker SBA  Lines/Drains/IVs:PIV  Labs/BGM:lactic 2.1  Test/Procedures:-  VS/Pain:SBP 90s, , 0/10 pain  DC Plan:TCU?  Other:transferred from station 66. Clothing bag with pt

## 2018-07-29 NOTE — PLAN OF CARE
"Problem: Patient Care Overview  Goal: Plan of Care/Patient Progress Review  Outcome: No Change  SBP ranges 89-99, HR 70's  O2 sats 94 % 3L NC, RR stable at 18-20 at this time but has had a couple episodes over nite when respirations increase to 30-40. Previous note shows RRT called at one of those times but fortunately patient stabilized after multiple coughs which loosened some phlegm in airway. Still need sputum cultures sent. Baseline disoriented to situation and time. Per prior note Hospitalist last evening ordered RN to \"hold IVF until dyspnea improves\". Will have oncoming RN clarify with MD. Lactic acid recheck this morning improved to WNL. Alarms on .       "

## 2018-07-30 ENCOUNTER — APPOINTMENT (OUTPATIENT)
Dept: SPEECH THERAPY | Facility: CLINIC | Age: 73
DRG: 682 | End: 2018-07-30
Attending: INTERNAL MEDICINE
Payer: MEDICARE

## 2018-07-30 LAB
BACTERIA SPEC CULT: ABNORMAL
BACTERIA SPEC CULT: ABNORMAL
BACTERIA SPEC CULT: NO GROWTH
GLUCOSE BLDC GLUCOMTR-MCNC: 100 MG/DL (ref 70–99)
GLUCOSE BLDC GLUCOMTR-MCNC: 108 MG/DL (ref 70–99)
GLUCOSE BLDC GLUCOMTR-MCNC: 152 MG/DL (ref 70–99)
GLUCOSE BLDC GLUCOMTR-MCNC: 183 MG/DL (ref 70–99)
GLUCOSE BLDC GLUCOMTR-MCNC: 188 MG/DL (ref 70–99)
GRAM STN SPEC: ABNORMAL
Lab: ABNORMAL
Lab: NORMAL
SPECIMEN SOURCE: ABNORMAL
SPECIMEN SOURCE: ABNORMAL
SPECIMEN SOURCE: NORMAL

## 2018-07-30 PROCEDURE — 94640 AIRWAY INHALATION TREATMENT: CPT

## 2018-07-30 PROCEDURE — A9270 NON-COVERED ITEM OR SERVICE: HCPCS | Mod: GY | Performed by: INTERNAL MEDICINE

## 2018-07-30 PROCEDURE — 92526 ORAL FUNCTION THERAPY: CPT | Mod: GN | Performed by: SPEECH-LANGUAGE PATHOLOGIST

## 2018-07-30 PROCEDURE — 21000001 ZZH R&B HEART CARE

## 2018-07-30 PROCEDURE — 25000125 ZZHC RX 250: Performed by: NURSE PRACTITIONER

## 2018-07-30 PROCEDURE — A9270 NON-COVERED ITEM OR SERVICE: HCPCS | Mod: GY | Performed by: HOSPITALIST

## 2018-07-30 PROCEDURE — 40000225 ZZH STATISTIC SLP WARD VISIT: Performed by: SPEECH-LANGUAGE PATHOLOGIST

## 2018-07-30 PROCEDURE — 94640 AIRWAY INHALATION TREATMENT: CPT | Mod: 76

## 2018-07-30 PROCEDURE — 87205 SMEAR GRAM STAIN: CPT | Performed by: INTERNAL MEDICINE

## 2018-07-30 PROCEDURE — 40000275 ZZH STATISTIC RCP TIME EA 10 MIN

## 2018-07-30 PROCEDURE — 00000146 ZZHCL STATISTIC GLUCOSE BY METER IP

## 2018-07-30 PROCEDURE — 99232 SBSQ HOSP IP/OBS MODERATE 35: CPT | Performed by: INTERNAL MEDICINE

## 2018-07-30 PROCEDURE — 25000125 ZZHC RX 250: Performed by: INTERNAL MEDICINE

## 2018-07-30 PROCEDURE — 25000132 ZZH RX MED GY IP 250 OP 250 PS 637: Mod: GY | Performed by: INTERNAL MEDICINE

## 2018-07-30 PROCEDURE — 92610 EVALUATE SWALLOWING FUNCTION: CPT | Mod: GN | Performed by: SPEECH-LANGUAGE PATHOLOGIST

## 2018-07-30 PROCEDURE — 25000128 H RX IP 250 OP 636: Performed by: INTERNAL MEDICINE

## 2018-07-30 PROCEDURE — 25000132 ZZH RX MED GY IP 250 OP 250 PS 637: Mod: GY | Performed by: HOSPITALIST

## 2018-07-30 PROCEDURE — 25000131 ZZH RX MED GY IP 250 OP 636 PS 637: Mod: GY | Performed by: HOSPITALIST

## 2018-07-30 PROCEDURE — 99233 SBSQ HOSP IP/OBS HIGH 50: CPT | Performed by: INTERNAL MEDICINE

## 2018-07-30 RX ORDER — PANTOPRAZOLE SODIUM 40 MG/1
40 TABLET, DELAYED RELEASE ORAL
Status: DISCONTINUED | OUTPATIENT
Start: 2018-07-30 | End: 2018-07-31

## 2018-07-30 RX ORDER — PREDNISONE 20 MG/1
40 TABLET ORAL DAILY
Status: DISCONTINUED | OUTPATIENT
Start: 2018-07-30 | End: 2018-08-02 | Stop reason: HOSPADM

## 2018-07-30 RX ORDER — IPRATROPIUM BROMIDE AND ALBUTEROL SULFATE 2.5; .5 MG/3ML; MG/3ML
3 SOLUTION RESPIRATORY (INHALATION)
Status: DISCONTINUED | OUTPATIENT
Start: 2018-07-30 | End: 2018-08-02 | Stop reason: HOSPADM

## 2018-07-30 RX ORDER — PIPERACILLIN SODIUM, TAZOBACTAM SODIUM 3; .375 G/15ML; G/15ML
3.38 INJECTION, POWDER, LYOPHILIZED, FOR SOLUTION INTRAVENOUS EVERY 6 HOURS
Status: DISCONTINUED | OUTPATIENT
Start: 2018-07-30 | End: 2018-07-31

## 2018-07-30 RX ADMIN — VITAMIN D, TAB 1000IU (100/BT) 2000 UNITS: 25 TAB at 09:27

## 2018-07-30 RX ADMIN — LEVOTHYROXINE SODIUM 100 MCG: 100 TABLET ORAL at 09:27

## 2018-07-30 RX ADMIN — PREDNISONE 40 MG: 20 TABLET ORAL at 10:21

## 2018-07-30 RX ADMIN — PIPERACILLIN SODIUM,TAZOBACTAM SODIUM 3.38 G: 3; .375 INJECTION, POWDER, FOR SOLUTION INTRAVENOUS at 21:54

## 2018-07-30 RX ADMIN — IPRATROPIUM BROMIDE AND ALBUTEROL SULFATE 3 ML: .5; 3 SOLUTION RESPIRATORY (INHALATION) at 19:56

## 2018-07-30 RX ADMIN — PIPERACILLIN SODIUM,TAZOBACTAM SODIUM 3.38 G: 3; .375 INJECTION, POWDER, FOR SOLUTION INTRAVENOUS at 10:21

## 2018-07-30 RX ADMIN — INSULIN ASPART 1 UNITS: 100 INJECTION, SOLUTION INTRAVENOUS; SUBCUTANEOUS at 18:05

## 2018-07-30 RX ADMIN — INSULIN ASPART 1 UNITS: 100 INJECTION, SOLUTION INTRAVENOUS; SUBCUTANEOUS at 14:08

## 2018-07-30 RX ADMIN — MIDODRINE HYDROCHLORIDE 10 MG: 5 TABLET ORAL at 09:27

## 2018-07-30 RX ADMIN — DORZOLAMIDE HYDROCHLORIDE AND TIMOLOL MALEATE 1 DROP: 20; 5 SOLUTION/ DROPS OPHTHALMIC at 10:28

## 2018-07-30 RX ADMIN — MIDODRINE HYDROCHLORIDE 10 MG: 5 TABLET ORAL at 12:35

## 2018-07-30 RX ADMIN — PIPERACILLIN SODIUM,TAZOBACTAM SODIUM 3.38 G: 3; .375 INJECTION, POWDER, FOR SOLUTION INTRAVENOUS at 16:11

## 2018-07-30 RX ADMIN — PANTOPRAZOLE SODIUM 40 MG: 40 TABLET, DELAYED RELEASE ORAL at 09:27

## 2018-07-30 RX ADMIN — MIDODRINE HYDROCHLORIDE 10 MG: 5 TABLET ORAL at 18:04

## 2018-07-30 RX ADMIN — IPRATROPIUM BROMIDE AND ALBUTEROL SULFATE 3 ML: .5; 3 SOLUTION RESPIRATORY (INHALATION) at 15:30

## 2018-07-30 RX ADMIN — FLUTICASONE FUROATE AND VILANTEROL TRIFENATATE 1 PUFF: 100; 25 POWDER RESPIRATORY (INHALATION) at 10:22

## 2018-07-30 RX ADMIN — LEVALBUTEROL HYDROCHLORIDE 1.25 MG: 1.25 SOLUTION, CONCENTRATE RESPIRATORY (INHALATION) at 05:09

## 2018-07-30 RX ADMIN — PANTOPRAZOLE SODIUM 40 MG: 40 TABLET, DELAYED RELEASE ORAL at 16:11

## 2018-07-30 RX ADMIN — IPRATROPIUM BROMIDE AND ALBUTEROL SULFATE 3 ML: .5; 3 SOLUTION RESPIRATORY (INHALATION) at 11:35

## 2018-07-30 ASSESSMENT — ACTIVITIES OF DAILY LIVING (ADL)
ADLS_ACUITY_SCORE: 9
ADLS_ACUITY_SCORE: 11
ADLS_ACUITY_SCORE: 9
ADLS_ACUITY_SCORE: 9
ADLS_ACUITY_SCORE: 12
ADLS_ACUITY_SCORE: 12

## 2018-07-30 NOTE — PLAN OF CARE
Problem: Pneumonia (Adult)  Goal: Signs and Symptoms of Listed Potential Problems Will be Absent, Minimized or Managed (Pneumonia)  Signs and symptoms of listed potential problems will be absent, minimized or managed by discharge/transition of care (reference Pneumonia (Adult) CPG).   Outcome: No Change  Pt alert and oriented this shift. He is forgetful at times but is easy to redirect.  Bedside attendant in room.  Pt calm, cooperative and agreeable.  No complaints of discomfort. SBP remains soft in 80's-90's.  Albumin completed and midodrine given as ordered.  Pt remains asymptomatic.  Pt sinus tach with rates in low 100's.  Lungs sound clear/diminished and is on 2L/NC with SPO2 in high 90's. Respiration rates 16-20. Pt has shortness of breath when active but denies it at rest.  NPO as ordered. No further issues noted.

## 2018-07-30 NOTE — PROGRESS NOTES
CLINICAL SWALLOW EVALUATION       07/30/18 1100   General Information   Onset Date 07/23/18   Start of Care Date 07/30/18   Referring Physician Dr. Cline   Patient Profile Review/OT: Additional Occupational Profile Info See Profile for full history and prior level of function   Patient/Family Goals Statement Patient would like to continue eating   Swallowing Evaluation Bedside swallow evaluation   Behaviorial Observations Alert;WFL (within functional limits)   Mode of current nutrition Oral diet   Type of oral diet Thin liquid  (Full liquids)   Respiratory Status O2 Supply   Comments Patient admitted 7/23 with weakness, anemia, and RLL. Patient's PMH is significant for myeloproliferative disorder, cirrhosis, and HTN.  Patient reports long term hx of occasional coughing during liquid intake.  He reports that for 30+ years, if he is chasing food down with liquids, he will occasionally feel a small amount of liquid going down the wrong pipe.  Patient's upper GI endoscopy in June 2017 indicated normal esophagus at that time.     Clinical Swallow Evaluation   Oral Musculature generally intact   Structural Abnormalities none present   Dentition present and adequate   Mucosal Quality good   Mandibular Strength and Mobility intact   Oral Labial Strength and Mobility WFL   Lingual Strength and Mobility WFL   Velar Elevation intact   Buccal Strength and Mobility intact   Laryngeal Function Cough;Throat clear;Swallow;Voicing initiated;Dry swallow palpated   Oral Musculature Comments Strong and clear voicing, coughing after nebulizer treatment.     Additional Documentation Yes   Additional evaluation(s) completed today No   Clinical Swallow Eval: Thin Liquid Texture Trial   Mode of Presentation, Thin Liquids cup;self-fed   Volume of Liquid or Food Presented 4 oz   Oral Phase of Swallow WFL   Pharyngeal Phase of Swallow intact   Diagnostic Statement No overt Sx of aspiration with single or consecutive sips   Clinical Swallow  Eval: Puree Solid Texture Trial   Mode of Presentation, Puree spoon;self-fed   Volume of Puree Presented 3 oz   Oral Phase, Puree WFL   Pharyngeal Phase, Puree impaired;wet vocal quality after swallow   Successful Strategies Trialed During Procedure, Puree hard swallow   Diagnostic Statement Intermittent wet vocal quality, independently cleared.   Clinical Swallow Eval: Solid Food Texture Trial   Mode of Presentation, Solid self-fed   Volume of Solid Food Presented 2 crackers   Oral Phase, Solid WFL   Pharyngeal Phase, Solid impaired;wet vocal quality after swallow   Successful Strategies Trialed During Procedure, Solid hard swallow   Diagnostic Statement Intermittent wet vocal quality, independently cleared.   Swallow Eval: Clinical Impressions   Skilled Criteria for Therapy Intervention Skilled criteria met.  Treatment indicated.   Functional Assessment Scale (FAS) 4   Dysphagia Outcome Severity Scale (AKILAH) Level 4 - AKILAH   Treatment Diagnosis Mild to moderate oropharyngeal dysphagia   Diet texture recommendations Regular diet;Thin liquids   Recommended Feeding/Eating Techniques small sips/bites;other (see comments);maintain upright posture during/after eating for 30 mins  (Double swallow)   Demonstrates Need for Referral to Another Service dietitian;social work   Therapy Frequency 5 times/wk   Predicted Duration of Therapy Intervention (days/wks) 1 week   Risks and Benefits of Treatment have been explained. Yes   Patient, family and/or staff in agreement with Plan of Care Yes   Clinical Impression Comments Patient presents with mild to moderate oropharyngeal dysphagia characterized by wet vocal quality intermittently with puree and solid trials, suspected pharyngeal residue due to generalized weakness.  No overt Sx of aspiration noted with single and consecutive sips of thin liquids today, however, cannot rule out silent aspiration at bedside.  Trained double swallow strategy.    Total Evaluation Time   Total  Evaluation Time (Minutes) 45

## 2018-07-30 NOTE — PLAN OF CARE
Problem: Patient Care Overview  Goal: Plan of Care/Patient Progress Review  Blood pressures stable today 110-120 systolic range ~ pt denies any symptoms.  Taking B/P's in left leg r/t discrepency in arms, low b/p's. Continues on Midodrine. Changed to IV antibiotics for pneumonia management.  On O2 @ 2L/NC sats mid 90's.  Harsh, occ prod cough of creamy thick phlegm.  Awaiting Kaye Scan in am and possible EGD in next few days after resolution of pneumonia. Pt alert, mostly oriented throughout day but very forgetful and repetitive with conversation.  Probable discharge to TCU in 2-3 days.   Pt is a/o x 4, forgetful. Increased forgetfulness, illogical thought process as shift continued. VSS on 2l O2 NC. Tele was NSR. Denies pain. Infrequent nonproductive cough. Long arm sit present. Up with A1, tolerating regular diet no caffeine.

## 2018-07-30 NOTE — PROGRESS NOTES
"       Assessment and Plan:     1. Pneumonia  2. Generalized weakness, weight loss, \"failure to thrive\"  3. Hypotension  4. Acute metabolic encephalopathy  5. Myeloplastic disease  6. Chronic macrocytic anemia, possibly due to myelofibrosis  7. Mild troponin elevation, 0.12-0.14 with flat trend. Not consistent with ACS. No chest pain symptoms recognized. Recent echo normal except for mild aortic stenosis  8. Abnormal ECG with anterolateral T wave inversion, possibly due to ischemia.    Possible demand ischemia in setting of pneumonia and hypotension causing slight troponin rise. ECG changes are more concerning. We need some risk stratification in this setting with a Lexiscan nuclear perfusion study. I will plan on that for tomorrow.     CHARISMA WADE MD        Interval History:   doing well; no cp, sob, n/v/d, or abd pain.          Medications:       cholecalciferol  2,000 Units Oral Daily     dorzolamide-timolol PF  1 drop Both Eyes BID     fluticasone-vilanterol  1 puff Inhalation Daily     insulin aspart  1-7 Units Subcutaneous TID AC     insulin aspart  1-5 Units Subcutaneous At Bedtime     ipratropium - albuterol 0.5 mg/2.5 mg/3 mL  3 mL Nebulization 4x daily     levothyroxine  100 mcg Oral QAM AC     midodrine  10 mg Oral TID w/meals     pantoprazole  40 mg Oral BID AC     piperacillin-tazobactam  3.375 g Intravenous Q6H     predniSONE  40 mg Oral Daily     senna-docusate  1 tablet Oral BID    Or     senna-docusate  2 tablet Oral BID     sodium chloride (PF)  3 mL Intracatheter Q8H            Physical Exam:   Blood pressure 118/66, pulse 106, temperature 98.3  F (36.8  C), temperature source Oral, resp. rate 18, height 1.854 m (6' 1\"), weight 80.8 kg (178 lb 1.6 oz), SpO2 100 %.    Vitals:    07/23/18 1225 07/24/18 0754 07/25/18 0615 07/26/18 0626   Weight: 72.6 kg (160 lb) 73 kg (160 lb 15 oz) 72.3 kg (159 lb 6.4 oz) 74.2 kg (163 lb 9.3 oz)    07/27/18 0641 07/28/18 0500 07/28/18 1857 07/30/18 0700 "   Weight: 77.9 kg (171 lb 11.8 oz) 77.9 kg (171 lb 11.8 oz) 80.9 kg (178 lb 6.4 oz) 80.8 kg (178 lb 1.6 oz)         Intake/Output Summary (Last 24 hours) at 07/30/18 1158  Last data filed at 07/30/18 0755   Gross per 24 hour   Intake                0 ml   Output              275 ml   Net             -275 ml       07/25 0700 - 07/30 0659  In: 6926 [P.O.:2280; I.V.:4646]  Out: 3467 [Urine:3467]  Net: 3459    Exam:  GENERAL APPEARANCE ADULT: Alert, in no acute distress  RESP: expiratory wheezes  CV: regular rate and rhythm, no murmur, rub, or gallop  ABDOMEN: normal bowel sounds, soft, nontender, no hepatosplenomegaly or other masses  EXTREMITIES: No edema         Data:   LABS (Last four results)  CMP  Recent Labs  Lab 07/29/18  0535 07/28/18  1824 07/27/18  0710 07/26/18  0817 07/25/18  0808 07/24/18  0815    134 141 141 141 137   POTASSIUM 3.6 3.6 3.6 3.4 3.4 3.3*   CHLORIDE 108 106 111* 113* 113* 106   CO2 22 17* 18* 16* 17* 21   ANIONGAP 8 11 12 12 11 10   * 121* 111* 101 98 100*   BUN 9 9 9 8 9 11   CR 1.16 1.10 1.17 1.19 1.36* 1.46*   GFRESTIMATED 62 66 61 60* 51* 47*   GFRESTBLACK 75 79 74 73 62 57*   JESSICA 7.9* 7.6* 7.7* 7.8* 7.8* 7.9*   PROTTOTAL  --   --   --  6.2* 6.2* 5.4*   ALBUMIN  --   --   --  2.6* 2.5* 2.4*   BILITOTAL  --   --   --  0.8 0.5 1.0   ALKPHOS  --   --   --  114 110 99   AST  --   --   --  61* 57* 51*   ALT  --   --   --  14 13 11     CBC  Recent Labs  Lab 07/29/18  0535 07/28/18  1404 07/27/18  0710 07/26/18  0817   WBC 7.7 7.8 7.4 8.1   RBC 2.48* 2.61* 2.52* 2.81*   HGB 8.8* 9.3* 9.0* 9.8*   HCT 25.3* 27.0* 26.0* 29.1*   * 103* 103* 104*   MCH 35.5* 35.6* 35.7* 34.9*   MCHC 34.8 34.4 34.6 33.7   RDW 14.1 14.0 13.8 13.7   * 149* 125* 162     INRNo lab results found in last 7 days.  TROPONINS Lab Results   Component Value Date    TROPI 0.125 () 07/29/2018    TROPI 0.135 () 07/28/2018    TROPI 0.136 () 07/28/2018    TROPI 0.148 () 07/28/2018    TROPI <0.015  07/23/2018                                                                                                               CHARISMA WADE MD

## 2018-07-30 NOTE — PLAN OF CARE
Problem: Patient Care Overview  Goal: Plan of Care/Patient Progress Review  Outcome: No Change  Albumin given for hypotension. Midodrine started today. Has productive cough. NPO for GI consult. Will start IV protonix tonight. Voiding in small amounts.

## 2018-07-30 NOTE — PLAN OF CARE
Problem: Patient Care Overview  Goal: Plan of Care/Patient Progress Review  Blood pressures stable today 110-120 systolic range ~ pt denies any symptoms.  Taking B/P's in left leg r/t discrepency in arms, low b/p's. Continues on Midodrine. Changed to IV antibiotics for pneumonia management.  On O2 @ 2L/NC sats mid 90's.  Harsh, occ prod cough of creamy thick phlegm.  Awaiting Kaye Scan in am and possible EGD in next few days after resolution of pneumonia. Pt alert, mostly oriented throughout day but very forgetful and repetitive with conversation.  Probable discharge to TCU in 2-3 days.   Blood pressure stable today 110-120 systolic range ~ pt denies any symptoms.  Taking B/P's in left leg r/t discrepency in arms, low b/p's.  Continues on Midodrine.  Changed to IV antibiotics for pneumonia management.On O2 @ 2L/NC sats mid 90's.  Harsh, occ prod cough of creamy, thick phlegm.  Awaiting Kaye Scan in am and possible EGD in fext few days after resolution of pneumonia.  Pt alert, mostly oriented throughout day but very forgetful and repetitive with conversation.  Probable discharge to TCU in 2-3 days.    Addendum:  Per Dr. Toledo still planning to do Angiogram in 1-2 days.  Will bridge with Heparin when INR less than 1.5.  Awaiting orders.

## 2018-07-30 NOTE — PROGRESS NOTES
SPIRITUAL HEALTH SERVICES Progress Note  [facility] [place]    Visited pt per length of stay. Pt bemoaned the restricted freedom of the hospital and the frequent disruptions of the alarms. Pt has one sister and several nieces and nephews who are supportive. Pt has also been visited by friends. Pt spoke about his career as an  and his global travels with his work. 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 Pt particularly enjoyed his business trips to [place]. Pt states that his life as a single person enabled him to work a job with so much traveling. Pt appreciates prayer.     provided supportive listening and prayer. Pt requested follow-up.  will follow up as available.      Mine Pike  Chaplain Resident  Pager: [phone]  Office: [phone]

## 2018-07-30 NOTE — PLAN OF CARE
Problem: Cardiac Disease Comorbidity  Goal: Cardiac Disease  Patient comorbidity will be monitored for signs and symptoms of Cardiac Disease.  Problems will be absent, minimized or managed by discharge/transition of care.   Scheduled for Kaye Scan tomorrow 7/31 to evaluate slight elevation in troponins/EKG changes.  Renal status unchanged.  Continues with Myeloproliferative dz evaluation.  .  Awaiting GI consult/recommendations.

## 2018-07-30 NOTE — PLAN OF CARE
Problem: Patient Care Overview  Goal: Plan of Care/Patient Progress Review  OT: Treatment attempt, pt declining due to fatigue and upset stomach.

## 2018-07-30 NOTE — PLAN OF CARE
Problem: Patient Care Overview  Goal: Plan of Care/Patient Progress Review    PT-  Pt refused PT at time of appt.  Reports increased fatigue and feels congested.  Encouraged activity and deep breathing.  Pt doesn't feel he can tolerate walking at this time.

## 2018-07-30 NOTE — CONSULTS
See GI consult    Anemia in setting of myeloproliferative disorder, cirrhosis and pneumonia. Admitted with weakness. Will discuss with HSx. We can ---> EGD once CV/pulm staus is better.    Jr Navarrete MD  [facility]   Office: [phone]   Pager: [phone]  Monday-Thursday 8am to 5pm, Fridays 8am to 4pm

## 2018-07-30 NOTE — CONSULTS
Consult Date:  07/30/2018      DATE OF CONSULTATION: 07/30/2018.      This patient is a 72-year-old man that we are asked to see in GI consultation because of anemia and fatigue.  By history, he is a 72-year-old male with a history of cirrhosis diagnosed over a year ago.      The patient has a known myeloproliferative disorder and this has been followed by Oncology.      However, he had been recently hospitalized over [facility] Day, in which case he had been found to have evidence of ascites and nodularity in the abdomen omentum because of this had been indeterminate.      With his increasing weakness; however, he was found to have evidence of pneumonia and has been now in the hospital.      With the anemia, he states that he has not been having any nausea or vomiting, he has had some dark stools in the last month, but not aware of any jayce melena or GI bleeding. He does take a baby aspirin.  He does not take anti-inflammatories on a regular basis.  He has no history of ulcer disease.  I note that he did have a colonoscopy and upper endoscopy in 2017, both of which were negative.      The patient has, however, now had diminished appetite.  He has had weight loss of indeterminate amount associated with all this. He had increasing weakness and has had difficulty walking.  The progressive symptoms including 100 pounds of weight loss led him to be admitted at this time.      ALLERGIES:  The patient has no allergies.      MEDICATIONS  PRIOR TO ADMISSION:  Included aspirin, Cartia, vitamins, thyroid, lisinopril, metformin, and omeprazole.      PAST SURGICAL HISTORY:  Surgeries include laparoscopic liver biopsy last year, at which time the diagnosis of cirrhosis was made. He has had umbilical hernia and ocular surgery.      MEDICAL HISTORY:  Medically, he has been treated for reflux, hyperlipidemia, hypertension, monoclonal gammopathy, renal insufficiency, organic heart disease, pneumonia and now has cirrhosis.      SOCIAL  HISTORY:  The patient states that he drinks intermittently.  He continues to smoke half a pack a day.  He is not working at the present time.      FAMILY HISTORY:  Shows cancer of the colon in his dad.  He is not aware of liver disease.      REVIEW OF SYSTEMS:  Shows increasing weight loss, shortness of breath, productive cough, fever.  No swelling in the legs or jaundice.      PHYSICAL EXAMINATION:   GENERAL:  The patient appears in no acute distress, frail, thin with what appears to be obvious muscle wasting.  He has pallor, but no jaundice.  Pharynx is clear, no adenopathy in the neck.   Thyroid is not palpable.  Carotids appear diminished.   LUNGS:  Show coarse breath sounds bilaterally.   HEART:  S1, S2 are noted.   ABDOMEN:  Not distended.  It is flat, soft.  I cannot with certainty feel an enlarged liver or spleen.  He does not have peripheral edema.  He is oriented to person, place and time.  No CVA or spinal tenderness. Ecchymosis are noted on the skin and muscle wasting is seen as well.      LABORATORY DATA:  From two days ago, mild right lower lobe infiltrate is noted.  The patient does have a CAT scan of the abdomen as well that shows only slight cirrhosis.  No other definitive process was described at that point.      Other laboratory data, however, show a hemoglobin which in March had been close to 14, now falling to 8.8.  Associated with this, his white count is 77, platelet count is minimally low at 142.      IMPRESSION:  This is a patient with known cirrhosis admitted to the hospital now with increasing weakness and pneumonia.  The cause of the anemia is indeterminate given his prior history of myeloproliferative disease.  He has not had overt bleeding but with his liver disease certainly an endoscopy can be undertaken.  I note that a year ago he did not have evidence of varices.      At the present time, he is still recovering from his pneumonia and his lungs still sound congested.      I will  speak with the Hospitalist Service once the symptoms are improving, we can make arrangements for an upper endoscopy, there is no indication for repeating the colonoscopy.      We appreciate assisting in this gentleman's care.  Further suggestions to follow.         NAPOLEON MARROQUIN MD             D: 2018   T: 2018   MT: LAYO      Name:     BAO MUNIZ   MRN:      [id number]        Account:       [id number]   :      1945           Consult Date:  2018      Document: [id number]       cc: Napoleon Marroquin MD

## 2018-07-30 NOTE — PROGRESS NOTES
[facility]    Hospitalist Progress Note    Assessment & Plan     Abebe Christensen is a 72 year old male, admitted on 7/23/2018,  with PMHx of CKD III, MGUS, HTN, hyperlipidemia, and GERD who presented to the ED on 7/23/18 with weakness, recurrent falls, lightheadness, and unintentional weight loss with findings of abnormal UA and SOHEILA. Registered under inpatient status for further evaluation and treatment.     Generalized weakness/memory issues/hallucinations the last 2 nights   Multifactorial in the context of below. Pt lives alone in an apartment. Recently purchased a walker/cane. No elevator in apartment.   --- PT and OT to assess, recommending TCU at discharge which patient is agreeable to  -- pt did fall at home and hit head, he has a laceration on the back of his head  -he is weak noni legs and also right arm  -sister here 7/27 and she says that pt has different speech that prior  -patient has had hallucinations night on 7/25 and 7/26  - head CT and head MRI done last night without acute hemorrhage or infarct  - OT to do cognitive eval  -pt has seen neurology in the past for neuropathy    Probable pneumonia with possible acute COPD exacerbation   Patient is wheezing today  He has a 30 year history of smoking  Start him on duo nebs 4 times daily  Prednisone 40 mg p.o. daily to help with the wheezing  Breo Ellipta inhaler added to help with the COPD    -CXR on 7/28 w/ mild infiltrate and/or atelectasis right base.  Pulm vascularity WNL  -due to his sx of cough, looks more SOB and CXR change am going to start abx  -since he has been in the hospital will start zosyn for possibe HCAP   -BC x3  -afebrile, WBC normal  -tobacco abuse  -sputum for gram stain and culture showed greater than 10 squamous cells  oral contamination    Mild elevated troponin  Cardiology consulted and follow on the following  Elevated troponin is thought to be due to demand ischemia in the setting of pneumonia and  hypertension  Cardiology is planning on doing Lexiscan study tomorrow  -EKG done for the tachycardia showed sinus tachycardia but also possible ischemia with TWI inferiorly and anterolaterally  -due to EKG, ordered troponin which was elevated at 0.148  Serial troponins remained flat  -pt denies chest pain          Orthostatic hypotension and persistent hypotension  History of benign essential hypertension: Positive on admission. Likely secondary to SOHEILA below with ongoing PTA medication administration including BP meds, poor PO intake. Echocardiogram with EF 60-65%, no RWMA, mild valvular aortic stenosis   - Has received total of 3500 L IVF bolus plus maintenance fluids  - Continue to hold PTA BP meds (Cartia  mg po every day, Prinzide 20/12.5 mg po every day--last dose 0830 on 7/23/18)  - Encourage adequate PO intake   - saline locked 7/27  Patient was given so far multiple liters of fluids and was short of breath overnight on 729 overnight and needed 20 mg of Lasix IV  He was started on Midodrin 10 mg p.o. 3 times daily to help with the hypotension  Blood pressure when checked in his left thigh is in the systolics in the 110s today           Acute metabolic encephalopathy, transient: Episode of delirium reported 2 nights ago  per nursing staff and had some hallucinations last 2 nights   Infectious work-up negative with BC NGTD, Urine culture with urogenital seda. CXR unremarkable. Lumbar XR unremarkable. CT A/P unremarkable, comment on trace fluid and/or capsular implants seen anterolaterally in the liver. WBC WNL. Afebrile.   -- Monitor   -- Delirium prevention protocol in place   -workup of CT of head and MRI of brain did not show anything acute  -recommend cognitive eval as outpatient    SOHEILA on CKD III, resolved: Baseline creatinine appears to be 1.1-1.2. Creatinine on admission 1.84>1.46>1.36>1.19>1.17  after IVF resuscitation. Likely related to poor PO intake in the context of nausea for the past 1-2  weeks.   Creatinine is stable         Hyponatremia, resolved: Sodium 130 on admission, 137 on repeat.   Sodium  141  -- Monitor       Abnormal UA: UA with large LE. Asymptomatic; denies dysuria or increased frequency  - IV ceftriaxone empirically on admission, discontinued 7/25 as urine cultures grew urogenital seda.       Myeloproliferative disorder (2002)  History of omental lesions concerning for peritoneal mets, s/p exploratory lap  Unintentional weight loss (20-30 lbs in the last year per review of clinic notes): Previously followed by [facility]. CT and PET form 2017 showed moderate omental thickening and nodularity with low metabolic activity, moderate abdominal and pelvic ascited with intermediate metabolic activity. Pt had a lap in 10/2017 which showed cirrhosis, no malignancy. CT A/P on admission showing marked improvement in ascites form prior imaging. Trace fluid and/or capsular implants seen anterolaterally in the liver, question minimal serosal implants vs fluid adjacent to the liver, otherwise no definite omental lesions demonstrated. B12 392, Folate 23.3. XR Lumbar negative.   -- MOHPA consulted, see formal note by Dr. Ames--myeloproliferative neoplasm appears to be progressed moderately with anemia. Consider aranesp if he continues to have kidney dysfunction and anemia to help with improvement in hemoglobin and associated fatigue, consider repeating bone marrow bx depending on clinical progress (outpatient)  -- Regarding malignancy w/u recommended per Oncology: EGD 6/21/17 with normal esophagus, medium hiatal hernia, normal mucosa, possible gastritis. Colonoscopy 6/21/17 with diverticulosis of the sigmoid colon, remainder of exam reported at normal. Consider low dose CT scan of chest given tobacco hx, but this can be done in the outpatient setting   -- Care Coordinator to help facilitate Oncology follow-up at discharge      Macrocytic anemia, acute   Thrombocytopenia: Platelets 172>123>142.  "Baseline hemoglobin in the last year has actually been around 13-14. B12 and folate WNL.   -- No active signs of bleeding, monitor   -- Heme/Onc following-appreciate assistance   hgb at 8.8  GI consulted with history of cirrhosis and recommending EGD in the future once cardiorespiratory status is stable      Elevated BNP: 1931 on admission. Pt is euvolemic on exam. Denies chest pain, GOMEZ, SOB.   -- Echo with EF 60-65%, no RWMA, mild valvular aortic stenosis   -- Monitor I&Os, daily weights          NIDDM, controlled  Peripheral neuropathy: Maintained on Metformin 500 mg po every day PTA. A1C 4.0 on 7/23/18. Describes bilateral neuropathy. Previously assessed by Neurologist and diagnosed with neuropathy. Not currently taking medication for this. B12 and folate WNL.   -- PTA Metformin on hold and to be discontinued at discharge indefinitely based on A1C and sugars during this hospitalization   -- Medium dose sliding scale insulin ordered   -- BS per protocol   -- Monitor for hypoglycemia   -- Consider starting gabapentin once SOHEILA resolved   -- Neurology referral at discharge                  Cirrhosis: Prior hx of ascites with paracentesis in 8/2017 with negative cytology, 950 ccs of fluid removed. Pathology showing advanced chronic liver disease (stage IV \"cirrhosis\" with steatohepatitis. AST 51, ALT 11)   -- Defer further monitoring to outpatient setting       Hypothyroidism: TSH WNL. Continue PTA Synthroid       GERD: Continue PTA Prilosec       History of infrarenal AA: Measuring at 3 cm on CT. Stable.       Elevated AST: 51>57>61. Bili 0.3. No abdominal pain.   -- Monitor     Alcohol use disorder: Drinks one mixed drink containing 1 shot of vodka daily. Denies history of withdrawal symptoms including withdrawal seizure.   -- Monitor for signs of withdrawal      Tobacco use: 40 pack year hx.   -- Nicotine replacement available if needed       Severe malnutrition in the context of chronic illness  Hypoalbuminemia "   Unintentional weight loss (20-30 lbs in the last year per review of clinic notes): % Weight Loss:  > 7.5% in 3 months (severe malnutrition)  % Intake:  </= 50% for >/= 1 month (severe malnutrition)  -- Nutrition consulted           # Pain Assessment:  Current Pain Score 7/30/2018   Patient currently in pain? denies   Pain score (0-10) -   Pain location -   Pain descriptors -       DVT Prophylaxis: SCD  Code Status: Full Code  Discussed with bedside RN  Disposition: Expected discharge to TCU in 1-2 days if blood pressure stays stable and respiratory status remains stable      Siobhan Cline MD    Interval History   Denies chest pain or dizziness.  Complains of shortness of breath with activity.  Coughing up clear colored sputum  -Data reviewed today: I reviewed all new labs and imaging results over the last 24 hours. I personally reviewed the chest x-ray image(s) showing mild right base infiltrate or atelectasis.    Physical Exam   Temp: 98.3  F (36.8  C) Temp src: Oral BP: 118/66 Pulse: 106 Heart Rate: 102 Resp: 18 SpO2: 100 % O2 Device: Nasal cannula Oxygen Delivery: 2 LPM  Vitals:    07/28/18 0500 07/28/18 1857 07/30/18 0700   Weight: 77.9 kg (171 lb 11.8 oz) 80.9 kg (178 lb 6.4 oz) 80.8 kg (178 lb 1.6 oz)     Vital Signs with Ranges  Temp:  [97.6  F (36.4  C)-98.3  F (36.8  C)] 98.3  F (36.8  C)  Pulse:  [100-106] 106  Heart Rate:  [101-105] 102  Resp:  [18-20] 18  BP: ()/(61-77) 118/66  SpO2:  [98 %-100 %] 100 %  I/O last 3 completed shifts:  In: 300 [P.O.:300]  Out: 575 [Urine:575]    Constitutional: alert   Respiratory: coarse breath sounds bilaterally with occasional wheezing heard on auscultation  Cardiovascular: regular rate and rhythm without murmer or rub   GI:positive bowel sounds, non-tender   Skin/Integumen: no rashes    Other:        Medications       cholecalciferol  2,000 Units Oral Daily     dorzolamide-timolol PF  1 drop Both Eyes BID     fluticasone-vilanterol  1 puff Inhalation Daily      insulin aspart  1-7 Units Subcutaneous TID AC     insulin aspart  1-5 Units Subcutaneous At Bedtime     ipratropium - albuterol 0.5 mg/2.5 mg/3 mL  3 mL Nebulization 4x daily     levothyroxine  100 mcg Oral QAM AC     midodrine  10 mg Oral TID w/meals     pantoprazole  40 mg Oral BID AC     piperacillin-tazobactam  3.375 g Intravenous Q6H     predniSONE  40 mg Oral Daily     senna-docusate  1 tablet Oral BID    Or     senna-docusate  2 tablet Oral BID     sodium chloride (PF)  3 mL Intracatheter Q8H       Data     Recent Labs  Lab 07/29/18  0535 07/29/18  0230 07/28/18  2143 07/28/18  1824 07/28/18  1404 07/27/18  0710 07/26/18  0817 07/25/18  0808   WBC 7.7  --   --   --  7.8 7.4 8.1 6.9   HGB 8.8*  --   --   --  9.3* 9.0* 9.8* 9.7*   *  --   --   --  103* 103* 104* 102*   *  --   --   --  149* 125* 162 142*     --   --  134  --  141 141 141   POTASSIUM 3.6  --   --  3.6  --  3.6 3.4 3.4   CHLORIDE 108  --   --  106  --  111* 113* 113*   CO2 22  --   --  17*  --  18* 16* 17*   BUN 9  --   --  9  --  9 8 9   CR 1.16  --   --  1.10  --  1.17 1.19 1.36*   ANIONGAP 8  --   --  11  --  12 12 11   JESSICA 7.9*  --   --  7.6*  --  7.7* 7.8* 7.8*   *  --   --  121*  --  111* 101 98   ALBUMIN  --   --   --   --   --   --  2.6* 2.5*   PROTTOTAL  --   --   --   --   --   --  6.2* 6.2*   BILITOTAL  --   --   --   --   --   --  0.8 0.5   ALKPHOS  --   --   --   --   --   --  114 110   ALT  --   --   --   --   --   --  14 13   AST  --   --   --   --   --   --  61* 57*   TROPI  --  0.125* 0.135* 0.136* 0.148*  --   --   --        No results found for this or any previous visit (from the past 24 hour(s)).

## 2018-07-30 NOTE — PROGRESS NOTES
HEMATOLOGY CC:      No new CBC for review      .  Myeloproliferative neoplasm -- polycythemia vera treated previously with phlebotomy but not required for the last several years.       2.  Anemia, macrocytic, likely associated with MPN with possible progression to myelofibrosis state.  Other etiologies: chronic kidney disease associated anemia.  The drop in hemoglobin is likely dilution after admission.   - Follow CBCs  - To see Dr. Ames in clinic in about 1 month.      3.  Thrombocytopenia associated with MPN  - Stable      4.  History of ascites and peritoneal nodularity, status post laparoscopic evaluation in 10/2017.  Negative for malignancy and showed cirrhosis.       5.  Weakness, multifactorial.  Could be associated with liver disease, hypoalbuminemia, anemia, and chronic kidney disease.   - Likely DC to [facility]     Georgette Waterman  Nurse Practitioner  [facility]

## 2018-07-30 NOTE — PROGRESS NOTES
"CLINICAL NUTRITION SERVICES - REASSESSMENT NOTE      Malnutrition: (7/24)  % Weight Loss:  > 7.5% in 3 months (severe malnutrition)  % Intake:  </= 50% for >/= 1 month (severe malnutrition)  Subcutaneous Fat Loss:  None observed  Muscle Loss:  None observed  Fluid Retention:  None noted     Malnutrition Diagnosis: Severe malnutrition  In Context of:  Chronic illness or disease       EVALUATION OF PROGRESS TOWARD GOALS   Diet:  Full Liquid + Boost BID between meals   Was on a regular diet yesterday morning and then NPO for procedures     Intake:  Visited with patient this morning - he has just received his breakfast tray of broth, apple juice, and coffee.  He states that his appetite is a little bit improved.  However, he is not fond of the food which is affecting his intake.  Patient was NPO yesterday afternoon but was able to consume \"most\" of his breakfast:  Pancakes x 2, eggs, and coffee.  He also noted that he is taking 100% of the Boost that he is receiving twice daily.       NEW FINDINGS:   Plan for EGD per GI once cardiac/pulmonary status improved     Previous Goals (7/24):   Pt will consume at least 75% of 3 meals/day and supplements  Evaluation: Not met    Previous Nutrition Diagnosis (7/24):   Inadequate oral intake related to poor appetite as evidenced by 11% wt loss x 4 months  Evaluation: Improving      CURRENT NUTRITION DIAGNOSIS  Inadequate oral intake related to recent tests (and subsequent NPO status) + dislike of the food as evidenced by consumed one meal yesterday    INTERVENTIONS  Recommendations / Nutrition Prescription  Advance diet per MD discretion   Continue Boost BID between meals     Implementation  None     Goals  Patient will consume >/= 75% meals TID and supplements BID       MONITORING AND EVALUATION:  Progress towards goals will be monitored and evaluated per protocol and Practice Guidelines    Kayla Levy RD, LD, CNSC   Clinical Dietitian - [facility]           "

## 2018-07-30 NOTE — PLAN OF CARE
Problem: Patient Care Overview  Goal: Plan of Care/Patient Progress Review  Outcome: No Change       Hypotensive overnight, asymptomatic. Dyspnea with minimal exertion. Loose productive cough, unable to provide quality sputum sample. Lungs with crackles in bases and expiratory wheezes. 2L for comfort. PRN neb given x 1. NPO for GI consult. Had brown, soft bowel movement, no dark or jayce blood noted. Oriented, but impulsive and forgetful.

## 2018-07-31 ENCOUNTER — APPOINTMENT (OUTPATIENT)
Dept: CARDIOLOGY | Facility: CLINIC | Age: 73
DRG: 682 | End: 2018-07-31
Attending: INTERNAL MEDICINE
Payer: MEDICARE

## 2018-07-31 ENCOUNTER — APPOINTMENT (OUTPATIENT)
Dept: PHYSICAL THERAPY | Facility: CLINIC | Age: 73
DRG: 682 | End: 2018-07-31
Payer: MEDICARE

## 2018-07-31 ENCOUNTER — APPOINTMENT (OUTPATIENT)
Dept: NUCLEAR MEDICINE | Facility: CLINIC | Age: 73
DRG: 682 | End: 2018-07-31
Attending: INTERNAL MEDICINE
Payer: MEDICARE

## 2018-07-31 ENCOUNTER — APPOINTMENT (OUTPATIENT)
Dept: SPEECH THERAPY | Facility: CLINIC | Age: 73
DRG: 682 | End: 2018-07-31
Attending: INTERNAL MEDICINE
Payer: MEDICARE

## 2018-07-31 ENCOUNTER — APPOINTMENT (OUTPATIENT)
Dept: OCCUPATIONAL THERAPY | Facility: CLINIC | Age: 73
DRG: 682 | End: 2018-07-31
Payer: MEDICARE

## 2018-07-31 LAB
GLUCOSE BLDC GLUCOMTR-MCNC: 124 MG/DL (ref 70–99)
GLUCOSE BLDC GLUCOMTR-MCNC: 126 MG/DL (ref 70–99)
GLUCOSE BLDC GLUCOMTR-MCNC: 160 MG/DL (ref 70–99)
GLUCOSE BLDC GLUCOMTR-MCNC: 161 MG/DL (ref 70–99)
HGB BLD-MCNC: 8.2 G/DL (ref 13.3–17.7)

## 2018-07-31 PROCEDURE — 25000132 ZZH RX MED GY IP 250 OP 250 PS 637: Mod: GY | Performed by: INTERNAL MEDICINE

## 2018-07-31 PROCEDURE — 34300033 ZZH RX 343: Performed by: HOSPITALIST

## 2018-07-31 PROCEDURE — 25000128 H RX IP 250 OP 636: Performed by: INTERNAL MEDICINE

## 2018-07-31 PROCEDURE — 25000125 ZZHC RX 250: Performed by: INTERNAL MEDICINE

## 2018-07-31 PROCEDURE — 99232 SBSQ HOSP IP/OBS MODERATE 35: CPT | Mod: 25 | Performed by: INTERNAL MEDICINE

## 2018-07-31 PROCEDURE — 93018 CV STRESS TEST I&R ONLY: CPT | Performed by: INTERNAL MEDICINE

## 2018-07-31 PROCEDURE — 40000855 ZZH STATISTIC ECHO STRESS OR NM NPI

## 2018-07-31 PROCEDURE — 40000275 ZZH STATISTIC RCP TIME EA 10 MIN

## 2018-07-31 PROCEDURE — 99233 SBSQ HOSP IP/OBS HIGH 50: CPT | Performed by: INTERNAL MEDICINE

## 2018-07-31 PROCEDURE — 97116 GAIT TRAINING THERAPY: CPT | Mod: GP

## 2018-07-31 PROCEDURE — 94640 AIRWAY INHALATION TREATMENT: CPT | Mod: 76

## 2018-07-31 PROCEDURE — 94640 AIRWAY INHALATION TREATMENT: CPT

## 2018-07-31 PROCEDURE — 36415 COLL VENOUS BLD VENIPUNCTURE: CPT | Performed by: INTERNAL MEDICINE

## 2018-07-31 PROCEDURE — 40000133 ZZH STATISTIC OT WARD VISIT

## 2018-07-31 PROCEDURE — 78452 HT MUSCLE IMAGE SPECT MULT: CPT | Mod: 26 | Performed by: INTERNAL MEDICINE

## 2018-07-31 PROCEDURE — A9270 NON-COVERED ITEM OR SERVICE: HCPCS | Mod: GY | Performed by: INTERNAL MEDICINE

## 2018-07-31 PROCEDURE — 93017 CV STRESS TEST TRACING ONLY: CPT

## 2018-07-31 PROCEDURE — 97530 THERAPEUTIC ACTIVITIES: CPT | Mod: GO

## 2018-07-31 PROCEDURE — 21000001 ZZH R&B HEART CARE

## 2018-07-31 PROCEDURE — 00000146 ZZHCL STATISTIC GLUCOSE BY METER IP

## 2018-07-31 PROCEDURE — A9270 NON-COVERED ITEM OR SERVICE: HCPCS | Mod: GY | Performed by: HOSPITALIST

## 2018-07-31 PROCEDURE — 40000225 ZZH STATISTIC SLP WARD VISIT: Performed by: SPEECH-LANGUAGE PATHOLOGIST

## 2018-07-31 PROCEDURE — A9502 TC99M TETROFOSMIN: HCPCS | Performed by: HOSPITALIST

## 2018-07-31 PROCEDURE — 40000193 ZZH STATISTIC PT WARD VISIT

## 2018-07-31 PROCEDURE — 85018 HEMOGLOBIN: CPT | Performed by: INTERNAL MEDICINE

## 2018-07-31 PROCEDURE — 78452 HT MUSCLE IMAGE SPECT MULT: CPT

## 2018-07-31 PROCEDURE — 93016 CV STRESS TEST SUPVJ ONLY: CPT | Performed by: INTERNAL MEDICINE

## 2018-07-31 PROCEDURE — 92526 ORAL FUNCTION THERAPY: CPT | Mod: GN | Performed by: SPEECH-LANGUAGE PATHOLOGIST

## 2018-07-31 PROCEDURE — 25000132 ZZH RX MED GY IP 250 OP 250 PS 637: Mod: GY | Performed by: HOSPITALIST

## 2018-07-31 RX ORDER — ACYCLOVIR 200 MG/1
0-1 CAPSULE ORAL
Status: DISCONTINUED | OUTPATIENT
Start: 2018-07-31 | End: 2018-08-01

## 2018-07-31 RX ORDER — REGADENOSON 0.08 MG/ML
0.4 INJECTION, SOLUTION INTRAVENOUS ONCE
Status: COMPLETED | OUTPATIENT
Start: 2018-07-31 | End: 2018-07-31

## 2018-07-31 RX ORDER — AMINOPHYLLINE 25 MG/ML
50-100 INJECTION, SOLUTION INTRAVENOUS
Status: DISCONTINUED | OUTPATIENT
Start: 2018-07-31 | End: 2018-08-01

## 2018-07-31 RX ORDER — ALBUTEROL SULFATE 90 UG/1
2 AEROSOL, METERED RESPIRATORY (INHALATION) EVERY 5 MIN PRN
Status: DISCONTINUED | OUTPATIENT
Start: 2018-07-31 | End: 2018-08-01

## 2018-07-31 RX ORDER — PANTOPRAZOLE SODIUM 40 MG/1
40 TABLET, DELAYED RELEASE ORAL
Status: DISCONTINUED | OUTPATIENT
Start: 2018-08-01 | End: 2018-08-02 | Stop reason: HOSPADM

## 2018-07-31 RX ADMIN — AMOXICILLIN AND CLAVULANATE POTASSIUM 1 TABLET: 875; 125 TABLET, FILM COATED ORAL at 12:35

## 2018-07-31 RX ADMIN — VITAMIN D, TAB 1000IU (100/BT) 1000 UNITS: 25 TAB at 10:57

## 2018-07-31 RX ADMIN — IPRATROPIUM BROMIDE AND ALBUTEROL SULFATE 3 ML: .5; 3 SOLUTION RESPIRATORY (INHALATION) at 19:32

## 2018-07-31 RX ADMIN — FLUTICASONE FUROATE AND VILANTEROL TRIFENATATE 1 PUFF: 100; 25 POWDER RESPIRATORY (INHALATION) at 10:57

## 2018-07-31 RX ADMIN — PREDNISONE 40 MG: 20 TABLET ORAL at 10:57

## 2018-07-31 RX ADMIN — TETROFOSMIN 10.1 MCI.: 1.38 INJECTION, POWDER, LYOPHILIZED, FOR SOLUTION INTRAVENOUS at 07:45

## 2018-07-31 RX ADMIN — AMOXICILLIN AND CLAVULANATE POTASSIUM 1 TABLET: 875; 125 TABLET, FILM COATED ORAL at 20:09

## 2018-07-31 RX ADMIN — TETROFOSMIN 30.6 MCI.: 1.38 INJECTION, POWDER, LYOPHILIZED, FOR SOLUTION INTRAVENOUS at 10:21

## 2018-07-31 RX ADMIN — IPRATROPIUM BROMIDE AND ALBUTEROL SULFATE 3 ML: .5; 3 SOLUTION RESPIRATORY (INHALATION) at 12:12

## 2018-07-31 RX ADMIN — PIPERACILLIN SODIUM,TAZOBACTAM SODIUM 3.38 G: 3; .375 INJECTION, POWDER, FOR SOLUTION INTRAVENOUS at 04:24

## 2018-07-31 RX ADMIN — MIDODRINE HYDROCHLORIDE 10 MG: 5 TABLET ORAL at 09:01

## 2018-07-31 RX ADMIN — LEVOTHYROXINE SODIUM 100 MCG: 100 TABLET ORAL at 06:46

## 2018-07-31 RX ADMIN — MIDODRINE HYDROCHLORIDE 10 MG: 5 TABLET ORAL at 12:36

## 2018-07-31 RX ADMIN — PANTOPRAZOLE SODIUM 40 MG: 40 TABLET, DELAYED RELEASE ORAL at 06:47

## 2018-07-31 RX ADMIN — REGADENOSON 0.4 MG: 0.08 INJECTION, SOLUTION INTRAVENOUS at 10:18

## 2018-07-31 RX ADMIN — PIPERACILLIN SODIUM,TAZOBACTAM SODIUM 3.38 G: 3; .375 INJECTION, POWDER, FOR SOLUTION INTRAVENOUS at 10:57

## 2018-07-31 RX ADMIN — DORZOLAMIDE HYDROCHLORIDE AND TIMOLOL MALEATE 1 DROP: 20; 5 SOLUTION/ DROPS OPHTHALMIC at 10:57

## 2018-07-31 RX ADMIN — DORZOLAMIDE HYDROCHLORIDE AND TIMOLOL MALEATE 1 DROP: 20; 5 SOLUTION/ DROPS OPHTHALMIC at 20:09

## 2018-07-31 RX ADMIN — IPRATROPIUM BROMIDE AND ALBUTEROL SULFATE 3 ML: .5; 3 SOLUTION RESPIRATORY (INHALATION) at 07:53

## 2018-07-31 RX ADMIN — IPRATROPIUM BROMIDE AND ALBUTEROL SULFATE 3 ML: .5; 3 SOLUTION RESPIRATORY (INHALATION) at 16:14

## 2018-07-31 RX ADMIN — MIDODRINE HYDROCHLORIDE 10 MG: 5 TABLET ORAL at 17:19

## 2018-07-31 RX ADMIN — INSULIN ASPART 1 UNITS: 100 INJECTION, SOLUTION INTRAVENOUS; SUBCUTANEOUS at 18:37

## 2018-07-31 ASSESSMENT — ACTIVITIES OF DAILY LIVING (ADL)
ADLS_ACUITY_SCORE: 12
ADLS_ACUITY_SCORE: 12
ADLS_ACUITY_SCORE: 11
ADLS_ACUITY_SCORE: 12
ADLS_ACUITY_SCORE: 12
ADLS_ACUITY_SCORE: 11

## 2018-07-31 NOTE — PLAN OF CARE
Problem: Patient Care Overview  Goal: Plan of Care/Patient Progress Review  PT:  Discharge Planner PT   Patient plan for discharge: Not stated  Current status: Pt SBA with sit<>stand and no assistive device. Ambulated 500 feet with SBA and FWW. No reports of dizziness, but states leg felt tired towards end of ambulation. 2 standing rest breaks needed. Session abbreviated due to pt leaving room for procedure.    Barriers to return to prior living situation: Falls risk, stairs lives alone  Recommendations for discharge: TCU  Rationale for recommendations: Pt demonstrates weakness and decreased activity tolerance which is impairing his ability to ambulate and transfer. Pt would benefit from continued skilled PT in order to address impairments and return to prior level of function.       Entered by: Vahe Sky 07/31/2018 9:10 AM

## 2018-07-31 NOTE — PLAN OF CARE
Discharge Planner OT   Patient plan for discharge: Not stated   Current status: Pt completed transfers and ambulating to bathroom with FWW and CGA. Noted decreased walker safety throughout session. On 2L of O2 and sats >90% following activity, however HR was 120 bpm.  Barriers to return to prior living situation: Decreased activity tolerance, decreased safety awareness  Recommendations for discharge: TCU  Rationale for recommendations: Would benefit from skilled OT therapy to work on I with A/IADLs       Entered by: Jazlyn Field 07/31/2018 5:39 PM

## 2018-07-31 NOTE — PROGRESS NOTES
Patient is on a 2L NC with SpO2 in the upper 90's. BS coarse diminished with an increase in aeration post neb treatments. All nebs were given as ordered.  Will cont to follow.  7/30/2018  Morena Suazo RRT

## 2018-07-31 NOTE — PROGRESS NOTES
"       Assessment and Plan:     1. Pneumonia  2. Generalized weakness, weight loss, \"failure to thrive\"  3. Hypotension  4. Acute metabolic encephalopathy  5. Myeloplastic disease  6. Chronic macrocytic anemia, possibly due to myelofibrosis  7. Mild troponin elevation, 0.12-0.14 with flat trend. Not consistent with ACS. No chest pain symptoms recognized. Recent echo normal except for mild aortic stenosis  8. Abnormal ECG with anterolateral T wave inversion, possibly due to ischemia.    Possible demand ischemia in setting of pneumonia and hypotension causing slight troponin rise. ECG changes are more concerning.    Lexiscan study was technically difficult and essentially nondiagnostic.   No clear evidence for ischemia.  I talked about the medical issues and options for evaluation with the patient and his sister in detail. I would suggest that cardiac cath would be a higher risk procedure than usual due to bleeding risk. I would recommend medical therapy and treatment of pneumonia. Pt has no chest pain or other active cardiac symptoms. I don't think an angiogram would make him feel any better. He agrees. We can reconsider angiogram in the future if he has angina.      CHARISMA WADE MD        Interval History:   doing well; no cp, sob, n/v/d, or abd pain.          Medications:       amoxicillin-clavulanate  1 tablet Oral Q12H BESS     cholecalciferol  2,000 Units Oral Daily     dorzolamide-timolol PF  1 drop Both Eyes BID     fluticasone-vilanterol  1 puff Inhalation Daily     insulin aspart  1-7 Units Subcutaneous TID AC     insulin aspart  1-5 Units Subcutaneous At Bedtime     ipratropium - albuterol 0.5 mg/2.5 mg/3 mL  3 mL Nebulization 4x daily     levothyroxine  100 mcg Oral QAM AC     midodrine  10 mg Oral TID w/meals     [START ON 8/1/2018] pantoprazole  40 mg Oral QAM AC     predniSONE  40 mg Oral Daily     senna-docusate  1 tablet Oral BID    Or     senna-docusate  2 tablet Oral BID     sodium chloride (PF) " " 3 mL Intracatheter Q8H            Physical Exam:   Blood pressure 115/78, pulse 106, temperature 97.3  F (36.3  C), temperature source Oral, resp. rate 18, height 1.854 m (6' 1\"), weight 80.2 kg (176 lb 11.2 oz), SpO2 100 %.    Vitals:    07/23/18 1225 07/24/18 0754 07/25/18 0615 07/26/18 0626   Weight: 72.6 kg (160 lb) 73 kg (160 lb 15 oz) 72.3 kg (159 lb 6.4 oz) 74.2 kg (163 lb 9.3 oz)    07/27/18 0641 07/28/18 0500 07/28/18 1857 07/30/18 0700   Weight: 77.9 kg (171 lb 11.8 oz) 77.9 kg (171 lb 11.8 oz) 80.9 kg (178 lb 6.4 oz) 80.8 kg (178 lb 1.6 oz)    07/31/18 0029   Weight: 80.2 kg (176 lb 11.2 oz)         Intake/Output Summary (Last 24 hours) at 07/30/18 1158  Last data filed at 07/30/18 0755   Gross per 24 hour   Intake                0 ml   Output              275 ml   Net             -275 ml       07/26 1500 - 07/31 1459  In: 3320 [P.O.:1120; I.V.:2200]  Out: 2777 [Urine:2777]  Net: 543    Exam:  GENERAL APPEARANCE ADULT: Alert, in no acute distress  RESP: expiratory wheezes  CV: regular rate and rhythm, no murmur, rub, or gallop  ABDOMEN: normal bowel sounds, soft, nontender, no hepatosplenomegaly or other masses  EXTREMITIES: No edema         Data:   LABS (Last four results)  CMP    Recent Labs  Lab 07/29/18  0535 07/28/18  1824 07/27/18  0710 07/26/18  0817 07/25/18  0808    134 141 141 141   POTASSIUM 3.6 3.6 3.6 3.4 3.4   CHLORIDE 108 106 111* 113* 113*   CO2 22 17* 18* 16* 17*   ANIONGAP 8 11 12 12 11   * 121* 111* 101 98   BUN 9 9 9 8 9   CR 1.16 1.10 1.17 1.19 1.36*   GFRESTIMATED 62 66 61 60* 51*   GFRESTBLACK 75 79 74 73 62   JESSICA 7.9* 7.6* 7.7* 7.8* 7.8*   PROTTOTAL  --   --   --  6.2* 6.2*   ALBUMIN  --   --   --  2.6* 2.5*   BILITOTAL  --   --   --  0.8 0.5   ALKPHOS  --   --   --  114 110   AST  --   --   --  61* 57*   ALT  --   --   --  14 13     CBC    Recent Labs  Lab 07/31/18  0625 07/29/18  0535 07/28/18  1404 07/27/18  0710 07/26/18  0817   WBC  --  7.7 7.8 7.4 8.1   RBC  --  " 2.48* 2.61* 2.52* 2.81*   HGB 8.2* 8.8* 9.3* 9.0* 9.8*   HCT  --  25.3* 27.0* 26.0* 29.1*   MCV  --  102* 103* 103* 104*   MCH  --  35.5* 35.6* 35.7* 34.9*   MCHC  --  34.8 34.4 34.6 33.7   RDW  --  14.1 14.0 13.8 13.7   PLT  --  142* 149* 125* 162     INRNo lab results found in last 7 days.  TROPONINS   Lab Results   Component Value Date    TROPI 0.125 () 07/29/2018    TROPI 0.135 () 07/28/2018    TROPI 0.136 () 07/28/2018    TROPI 0.148 () 07/28/2018    TROPI <0.015 07/23/2018                                                                                                               CHARISMA WADE MD

## 2018-07-31 NOTE — PROGRESS NOTES
Patient tolerated this portion of the stress test well.  Offered no complaints.  Patient returned to his room in the heart center.

## 2018-07-31 NOTE — PLAN OF CARE
Problem: Patient Care Overview  Goal: Plan of Care/Patient Progress Review  Blood pressures stable today 110-120 systolic range ~ pt denies any symptoms.  Taking B/P's in left leg r/t discrepency in arms, low b/p's. Continues on Midodrine. Changed to IV antibiotics for pneumonia management.  On O2 @ 2L/NC sats mid 90's.  Harsh, occ prod cough of creamy thick phlegm.  Awaiting Kaye Scan in am and possible EGD in next few days after resolution of pneumonia. Pt alert, mostly oriented throughout day but very forgetful and repetitive with conversation.  Probable discharge to TCU in 2-3 days.   Outcome: Improving  VSS on 2L O2.  A&O but forgetful.  Bed alarm on, long arm sit.  Up with assist of one, walker and gait belt.  Tele:  Sinus tachy.  B, 126,160.  IV Zosyn switched to PO Augmentin today.  Kaye scan completed today, results pending.  Probable discharge tomorrow to TCU.  Call light within reach.  Will continue to monitor.

## 2018-07-31 NOTE — PLAN OF CARE
Problem: Patient Care Overview  Goal: Plan of Care/Patient Progress Review  Outcome: No Change  A&O but forgetful at times. 2L O2 per NC. Soft BP's. BP taken on L thigh. Lung sounds coarse with expiratory wheezes. Dyspnea on exertion. Ambulating Ax1. Voiding in urinal. Incontinent of bowel x1 over night. NPO since midnight. Tele showing SR with occasional PVC. IV abx given as scheduled. Kaye scan to be done today.

## 2018-07-31 NOTE — PROGRESS NOTES
Hematology chart check:       IMPRESSION:   1.  Myeloproliferative neoplasm -- polycythemia vera treated previously with phlebotomy but not required for the last several years.   2.  Anemia, microcytic, likely associated with MPN with possible progression to myelofibrosis state.  Other etiologies: chronic kidney disease associated anemia.  The drop in hemoglobin is likely dilution after admission.   3.  Thrombocytopenia associated with MPN, and possible cirrhosis related.   4.  History of ascites and peritoneal nodularity, status post laparoscopic evaluation in 10/2017.  Negative for malignancy and showed cirrhosis.   5.  Weakness, multifactorial.  Could be associated with liver disease, hypoalbuminemia, anemia, and chronic kidney disease.       PLAN:  His myeloproliferative neoplasm appears to be progressed moderately with anemia, but his white count and platelets are adequate. There is not an urgent need to evaluate his anemia at this point.  His weight loss, hypoalbuminemia are concerning. EGD on 6/17/17 showed normal esophagus, medium hiatal hernia, normal mucosa, possible gastritis. Colonoscopy 6/21/17 with diverticulosis of the sigmoid colon, remainder of exam reported as normal. CT abdomen upon admission showed no obvious malignancy.  CXR was normal.  Given hx smoking, could consider low-dose CT of chest.      Darbepoetin can be considered if he continues to have kidney dysfunction and anemia to help with improvement in hemoglobin and associated fatigue. We will consider repeating bone marrow biopsy depending on his clinical progress, but this will be done as an outpatient.     When discharged,he should be scheduled to see Dr. Ames in one month with CBC in two weeks.     Georgette Waterman  Nurse Practitioner  [facility]

## 2018-07-31 NOTE — PROGRESS NOTES
[facility]    Hospitalist Progress Note    Assessment & Plan     Abebe Christensen is a 72 year old male, admitted on 7/23/2018,  with PMHx of CKD III, MGUS, HTN, hyperlipidemia, and GERD who presented to the ED on 7/23/18 with weakness, recurrent falls, lightheadness, and unintentional weight loss with findings of abnormal UA and SOHEILA. Registered under inpatient status for further evaluation and treatment.     Generalized weakness/memory issues/hallucinations the last 2 nights   Multifactorial in the context of below. Pt lives alone in an apartment. Recently purchased a walker/cane. No elevator in apartment.   --- PT and OT to assess, recommending TCU at discharge which patient is agreeable to  -- pt did fall at home and hit head, he has a laceration on the back of his head  -he is weak noni legs and also right arm  -sister here 7/27 and she says that pt has different speech that prior  -patient has had hallucinations night on 7/25 and 7/26  - head CT and head MRI done last night without acute hemorrhage or infarct  -pt has seen neurology in the past for neuropathy    Probable pneumonia with possible acute COPD exacerbation   Patient is wheezing today  He has a 30 year history of smoking  Start him on duo nebs 4 times daily  Prednisone 40 mg p.o. daily to help with the wheezing  Breo Ellipta inhaler added to help with the COPD  CXR on 7/28 w/ mild infiltrate and/or atelectasis right base.  Pulm vascularity WNL  -due to his sx of cough, looks more SOB and CXR change am going to start abx  -since he has been in the hospital will start zosyn for possibe HCAP   -BC x3  -afebrile, WBC normal  -tobacco abuse  -sputum for gram stain and culture showed greater than 10 squamous cells consistent with oral contamination  Shortness of breath improved today   we will switch IV Zosyn to oral Augmentin today      Mild elevated troponin  Cardiology consulted and follow on the following  Elevated troponin is thought to  be due to demand ischemia in the setting of pneumonia and hypertension  -EKG done for the tachycardia showed sinus tachycardia but also possible ischemia with TWI inferiorly and anterolaterally  -due to EKG, ordered troponin which was elevated at 0.148  Serial troponins remained flat  -pt denies chest pain  Lexiscan stress test is being done today results are pending          Orthostatic hypotension and persistent hypotension  History of benign essential hypertension: Positive on admission. Likely secondary to SOHEILA below with ongoing PTA medication administration including BP meds, poor PO intake. Echocardiogram with EF 60-65%, no RWMA, mild valvular aortic stenosis   - Has received total of 3500 L IVF bolus plus maintenance fluids  - Continue to hold PTA BP meds (Cartia  mg po every day, Prinzide 20/12.5 mg po every day--last dose 0830 on 7/23/18)  - Encourage adequate PO intake   - saline locked 7/27  Patient was given so far multiple liters of fluids and was short of breath overnight on 729 overnight and needed 20 mg of Lasix IV  He was started on Midodrin 10 mg p.o. 3 times daily to help with the hypotension  Blood pressure when checked in his left thigh is in the systolics in the 110s today           Acute metabolic encephalopathy, transient: Episode of delirium reported 2 nights ago  per nursing staff and had some hallucinations last 2 nights   Infectious work-up negative with BC NGTD, Urine culture with urogenital seda. CXR unremarkable. Lumbar XR unremarkable. CT A/P unremarkable, comment on trace fluid and/or capsular implants seen anterolaterally in the liver. WBC WNL. Afebrile.   -- Monitor   -- Delirium prevention protocol in place   -workup of CT of head and MRI of brain did not show anything acute  -recommend cognitive eval as outpatient    SOHEILA on CKD III, resolved: Baseline creatinine appears to be 1.1-1.2. Creatinine on admission 1.84>1.46>1.36>1.19>1.17  after IVF resuscitation. Likely related  to poor PO intake in the context of nausea for the past 1-2 weeks.   Creatinine is stable         Hyponatremia, resolved: Sodium 130 on admission, 137 on repeat.   Sodium  141  -- Monitor       Abnormal UA: UA with large LE. Asymptomatic; denies dysuria or increased frequency  - IV ceftriaxone empirically on admission, discontinued 7/25 as urine cultures grew urogenital seda.       Myeloproliferative disorder (2002)  History of omental lesions concerning for peritoneal mets, s/p exploratory lap  Unintentional weight loss (20-30 lbs in the last year per review of clinic notes): Previously followed by [facility]. CT and PET form 2017 showed moderate omental thickening and nodularity with low metabolic activity, moderate abdominal and pelvic ascited with intermediate metabolic activity. Pt had a lap in 10/2017 which showed cirrhosis, no malignancy. CT A/P on admission showing marked improvement in ascites form prior imaging. Trace fluid and/or capsular implants seen anterolaterally in the liver, question minimal serosal implants vs fluid adjacent to the liver, otherwise no definite omental lesions demonstrated. B12 392, Folate 23.3. XR Lumbar negative.   -- MOHPA consulted, see formal note by Dr. Ames--myeloproliferative neoplasm appears to be progressed moderately with anemia. Consider aranesp if he continues to have kidney dysfunction and anemia to help with improvement in hemoglobin and associated fatigue, consider repeating bone marrow bx depending on clinical progress (outpatient)  -- Regarding malignancy w/u recommended per Oncology: EGD 6/21/17 with normal esophagus, medium hiatal hernia, normal mucosa, possible gastritis. Colonoscopy 6/21/17 with diverticulosis of the sigmoid colon, remainder of exam reported at normal. Consider low dose CT scan of chest given tobacco hx, but this can be done in the outpatient setting   -- Care Coordinator to help facilitate Oncology follow-up at discharge  Oncology with  "[facility] following and they are recommending follow-up with Dr. lopez with CBC in 2 weeks after discharge        Macrocytic anemia, acute   Thrombocytopenia: Platelets 172>123>142. Baseline hemoglobin in the last year has actually been around 13-14. B12 and folate WNL.   -- No active signs of bleeding, monitor   -- Heme/Onc following-appreciate assistance   hgb at 8.2 today  No  Esophageal varices  on prior EGD done in June 2017  No sign of active bleeding so no need for EGD at this point per GI  Continue PPI once daily      Elevated BNP: 1931 on admission. Pt is euvolemic on exam. Denies chest pain, GOMEZ, SOB.   -- Echo with EF 60-65%, no RWMA, mild valvular aortic stenosis   -- Monitor I&Os, daily weights          NIDDM, controlled  Peripheral neuropathy: Maintained on Metformin 500 mg po every day PTA. A1C 4.0 on 7/23/18. Describes bilateral neuropathy. Previously assessed by Neurologist and diagnosed with neuropathy. Not currently taking medication for this. B12 and folate WNL.   -- PTA Metformin on hold and to be discontinued at discharge indefinitely based on A1C and sugars during this hospitalization   -- Medium dose sliding scale insulin ordered   -- BS per protocol   -- Monitor for hypoglycemia   -- Consider starting gabapentin once SOHEILA resolved   -- Neurology referral at discharge                  Cirrhosis: Prior hx of ascites with paracentesis in 8/2017 with negative cytology, 950 ccs of fluid removed. Pathology showing advanced chronic liver disease (stage IV \"cirrhosis\" with steatohepatitis. AST 51, ALT 11)   -- Defer further monitoring to outpatient setting       Hypothyroidism: TSH WNL. Continue PTA Synthroid       GERD: Continue PTA Prilosec       History of infrarenal AA: Measuring at 3 cm on CT. Stable.       Elevated AST: 51>57>61. Bili 0.3. No abdominal pain.   -- Monitor     Alcohol use disorder: Drinks one mixed drink containing 1 shot of vodka daily. Denies history of withdrawal " symptoms including withdrawal seizure.   -- Monitor for signs of withdrawal      Tobacco use: 40 pack year hx.   -- Nicotine replacement available if needed       Severe malnutrition in the context of chronic illness  Hypoalbuminemia   Unintentional weight loss (20-30 lbs in the last year per review of clinic notes): % Weight Loss:  > 7.5% in 3 months (severe malnutrition)  % Intake:  </= 50% for >/= 1 month (severe malnutrition)  -- Nutrition consulted           # Pain Assessment:  Current Pain Score 7/31/2018   Patient currently in pain? denies   Pain score (0-10) -   Pain location -   Pain descriptors -       DVT Prophylaxis: SCD  Code Status: Full Code  Discussed with bedside RN  Disposition: Expected discharge to TCU tomorrow if blood pressure stays stable and respiratory status remains stable      Siobhan Cline MD    Interval History   Denies chest pain or dizziness.  Shortness of breath improving feels much better since yesterday.  Coughing up clear colored sputum  -Data reviewed today: I reviewed all new labs and imaging results over the last 24 hours. I personally reviewed the chest x-ray image(s) showing mild right base infiltrate or atelectasis.    Physical Exam   Temp: 98  F (36.7  C) Temp src: Oral BP: 100/70   Heart Rate: 96 Resp: 18 SpO2: 100 % O2 Device: Nasal cannula Oxygen Delivery: 2 LPM  Vitals:    07/28/18 1857 07/30/18 0700 07/31/18 0029   Weight: 80.9 kg (178 lb 6.4 oz) 80.8 kg (178 lb 1.6 oz) 80.2 kg (176 lb 11.2 oz)     Vital Signs with Ranges  Temp:  [97.6  F (36.4  C)-98.6  F (37  C)] 98  F (36.7  C)  Heart Rate:  [64-96] 96  Resp:  [18-22] 18  BP: ()/(56-96) 100/70  SpO2:  [97 %-100 %] 100 %  I/O last 3 completed shifts:  In: -   Out: 200 [Urine:200]    Constitutional: alert   Respiratory: Better air movement bilaterally today.  No active wheezing heard  Cardiovascular: regular rate and rhythm without murmer or rub   GI:positive bowel sounds, non-tender   Skin/Integumen: no rashes     Other:        Medications       cholecalciferol  2,000 Units Oral Daily     dorzolamide-timolol PF  1 drop Both Eyes BID     fluticasone-vilanterol  1 puff Inhalation Daily     insulin aspart  1-7 Units Subcutaneous TID AC     insulin aspart  1-5 Units Subcutaneous At Bedtime     ipratropium - albuterol 0.5 mg/2.5 mg/3 mL  3 mL Nebulization 4x daily     levothyroxine  100 mcg Oral QAM AC     midodrine  10 mg Oral TID w/meals     pantoprazole  40 mg Oral BID AC     piperacillin-tazobactam  3.375 g Intravenous Q6H     predniSONE  40 mg Oral Daily     senna-docusate  1 tablet Oral BID    Or     senna-docusate  2 tablet Oral BID     sodium chloride (PF)  3 mL Intracatheter Q8H       Data     Recent Labs  Lab 07/31/18  0625 07/29/18  0535 07/29/18  0230 07/28/18  2143 07/28/18  1824 07/28/18  1404  07/27/18  0710 07/26/18  0817 07/25/18  0808   WBC  --  7.7  --   --   --  7.8  --  7.4 8.1 6.9   HGB 8.2* 8.8*  --   --   --  9.3*  --  9.0* 9.8* 9.7*   MCV  --  102*  --   --   --  103*  --  103* 104* 102*   PLT  --  142*  --   --   --  149*  --  125* 162 142*   NA  --  138  --   --  134  --   --  141 141 141   POTASSIUM  --  3.6  --   --  3.6  --   --  3.6 3.4 3.4   CHLORIDE  --  108  --   --  106  --   --  111* 113* 113*   CO2  --  22  --   --  17*  --   --  18* 16* 17*   BUN  --  9  --   --  9  --   --  9 8 9   CR  --  1.16  --   --  1.10  --   --  1.17 1.19 1.36*   ANIONGAP  --  8  --   --  11  --   --  12 12 11   JESSICA  --  7.9*  --   --  7.6*  --   --  7.7* 7.8* 7.8*   GLC  --  108*  --   --  121*  --   --  111* 101 98   ALBUMIN  --   --   --   --   --   --   --   --  2.6* 2.5*   PROTTOTAL  --   --   --   --   --   --   --   --  6.2* 6.2*   BILITOTAL  --   --   --   --   --   --   --   --  0.8 0.5   ALKPHOS  --   --   --   --   --   --   --   --  114 110   ALT  --   --   --   --   --   --   --   --  14 13   AST  --   --   --   --   --   --   --   --  61* 57*   TROPI  --   --  0.125* 0.135* 0.136* 0.148*  < >  --   --    --    < > = values in this interval not displayed.    No results found for this or any previous visit (from the past 24 hour(s)).

## 2018-07-31 NOTE — PROGRESS NOTES
Chart Check    Patient with continued productive cough - currently being treated for pneumonia.  Possible EGD when patient improved from pulmonary standpoint.    Eva Caraballo PA-C  [facility]

## 2018-07-31 NOTE — PLAN OF CARE
Discharge Planner SLP   Patient plan for discharge: Did not state  Current status: Swallow Tx was provided this pm with thin liquid trials.  Patient declined solids due to feeling full.  Burping and throat clear noted at times after thin liquids.  No overt coughing was observed after swallows.  Recommend a continued regular diet with soft and moist food choices and thin liquids. Patient should implement double, hard swallow for each bite/sip, alternate textures, eat only when alert, upright at 90 degrees and remaining upright for 1-2 hours after meals.  Plan to continue Tx with solid and thin liquid assessment and strategy training as able.  Barriers to return to prior living situation: Weakness  Recommendations for discharge: Continued SLP services at discharge  Rationale for recommendations: Recommend continued SLP services to ensure safe tolerance of regular diet with thin liquids and independent use of compensatory swallowing strategies.          Entered by: Yumi Gaona 07/31/2018 3:31 PM

## 2018-08-01 ENCOUNTER — APPOINTMENT (OUTPATIENT)
Dept: PHYSICAL THERAPY | Facility: CLINIC | Age: 73
DRG: 682 | End: 2018-08-01
Payer: MEDICARE

## 2018-08-01 LAB
ANION GAP SERPL CALCULATED.3IONS-SCNC: 10 MMOL/L (ref 3–14)
BUN SERPL-MCNC: 9 MG/DL (ref 7–30)
CALCIUM SERPL-MCNC: 8.3 MG/DL (ref 8.5–10.1)
CHLORIDE SERPL-SCNC: 109 MMOL/L (ref 94–109)
CO2 SERPL-SCNC: 19 MMOL/L (ref 20–32)
CREAT SERPL-MCNC: 1.23 MG/DL (ref 0.66–1.25)
GFR SERPL CREATININE-BSD FRML MDRD: 58 ML/MIN/1.7M2
GLUCOSE BLDC GLUCOMTR-MCNC: 103 MG/DL (ref 70–99)
GLUCOSE BLDC GLUCOMTR-MCNC: 113 MG/DL (ref 70–99)
GLUCOSE BLDC GLUCOMTR-MCNC: 130 MG/DL (ref 70–99)
GLUCOSE BLDC GLUCOMTR-MCNC: 142 MG/DL (ref 70–99)
GLUCOSE BLDC GLUCOMTR-MCNC: 174 MG/DL (ref 70–99)
GLUCOSE SERPL-MCNC: 119 MG/DL (ref 70–99)
INTERPRETATION ECG - MUSE: NORMAL
MAGNESIUM SERPL-MCNC: 1.6 MG/DL (ref 1.6–2.3)
POTASSIUM SERPL-SCNC: 3.9 MMOL/L (ref 3.4–5.3)
SODIUM SERPL-SCNC: 138 MMOL/L (ref 133–144)

## 2018-08-01 PROCEDURE — 40000193 ZZH STATISTIC PT WARD VISIT

## 2018-08-01 PROCEDURE — 25000125 ZZHC RX 250: Performed by: HOSPITALIST

## 2018-08-01 PROCEDURE — 83735 ASSAY OF MAGNESIUM: CPT | Performed by: INTERNAL MEDICINE

## 2018-08-01 PROCEDURE — 80048 BASIC METABOLIC PNL TOTAL CA: CPT | Performed by: INTERNAL MEDICINE

## 2018-08-01 PROCEDURE — 99233 SBSQ HOSP IP/OBS HIGH 50: CPT | Performed by: INTERNAL MEDICINE

## 2018-08-01 PROCEDURE — A9270 NON-COVERED ITEM OR SERVICE: HCPCS | Mod: GY | Performed by: INTERNAL MEDICINE

## 2018-08-01 PROCEDURE — 97116 GAIT TRAINING THERAPY: CPT | Mod: GP

## 2018-08-01 PROCEDURE — 36415 COLL VENOUS BLD VENIPUNCTURE: CPT | Performed by: INTERNAL MEDICINE

## 2018-08-01 PROCEDURE — 40000275 ZZH STATISTIC RCP TIME EA 10 MIN

## 2018-08-01 PROCEDURE — 25000132 ZZH RX MED GY IP 250 OP 250 PS 637: Mod: GY | Performed by: HOSPITALIST

## 2018-08-01 PROCEDURE — 94640 AIRWAY INHALATION TREATMENT: CPT

## 2018-08-01 PROCEDURE — 21000001 ZZH R&B HEART CARE

## 2018-08-01 PROCEDURE — A9270 NON-COVERED ITEM OR SERVICE: HCPCS | Mod: GY | Performed by: HOSPITALIST

## 2018-08-01 PROCEDURE — 25000125 ZZHC RX 250: Performed by: NURSE PRACTITIONER

## 2018-08-01 PROCEDURE — 93005 ELECTROCARDIOGRAM TRACING: CPT

## 2018-08-01 PROCEDURE — 25000125 ZZHC RX 250: Performed by: INTERNAL MEDICINE

## 2018-08-01 PROCEDURE — 25000128 H RX IP 250 OP 636: Performed by: NURSE PRACTITIONER

## 2018-08-01 PROCEDURE — 94640 AIRWAY INHALATION TREATMENT: CPT | Mod: 76

## 2018-08-01 PROCEDURE — 00000146 ZZHCL STATISTIC GLUCOSE BY METER IP

## 2018-08-01 PROCEDURE — A9270 NON-COVERED ITEM OR SERVICE: HCPCS | Mod: GY | Performed by: NURSE PRACTITIONER

## 2018-08-01 PROCEDURE — 80048 BASIC METABOLIC PNL TOTAL CA: CPT | Performed by: NURSE PRACTITIONER

## 2018-08-01 PROCEDURE — 93010 ELECTROCARDIOGRAM REPORT: CPT | Performed by: INTERNAL MEDICINE

## 2018-08-01 PROCEDURE — 25000132 ZZH RX MED GY IP 250 OP 250 PS 637: Mod: GY | Performed by: INTERNAL MEDICINE

## 2018-08-01 PROCEDURE — 25000132 ZZH RX MED GY IP 250 OP 250 PS 637: Mod: GY | Performed by: NURSE PRACTITIONER

## 2018-08-01 RX ORDER — POTASSIUM CHLORIDE 29.8 MG/ML
20 INJECTION INTRAVENOUS
Status: DISCONTINUED | OUTPATIENT
Start: 2018-08-01 | End: 2018-08-02 | Stop reason: HOSPADM

## 2018-08-01 RX ORDER — POTASSIUM CHLORIDE 7.45 MG/ML
10 INJECTION INTRAVENOUS
Status: DISCONTINUED | OUTPATIENT
Start: 2018-08-01 | End: 2018-08-02 | Stop reason: HOSPADM

## 2018-08-01 RX ORDER — POTASSIUM CHLORIDE 1500 MG/1
20-40 TABLET, EXTENDED RELEASE ORAL
Status: DISCONTINUED | OUTPATIENT
Start: 2018-08-01 | End: 2018-08-02 | Stop reason: HOSPADM

## 2018-08-01 RX ORDER — MAGNESIUM SULFATE HEPTAHYDRATE 40 MG/ML
2 INJECTION, SOLUTION INTRAVENOUS DAILY PRN
Status: DISCONTINUED | OUTPATIENT
Start: 2018-08-01 | End: 2018-08-02

## 2018-08-01 RX ORDER — ATORVASTATIN CALCIUM 20 MG/1
20 TABLET, FILM COATED ORAL EVERY EVENING
Status: DISCONTINUED | OUTPATIENT
Start: 2018-08-01 | End: 2018-08-02 | Stop reason: HOSPADM

## 2018-08-01 RX ORDER — MAGNESIUM SULFATE HEPTAHYDRATE 40 MG/ML
4 INJECTION, SOLUTION INTRAVENOUS EVERY 4 HOURS PRN
Status: DISCONTINUED | OUTPATIENT
Start: 2018-08-01 | End: 2018-08-02

## 2018-08-01 RX ORDER — POTASSIUM CL/LIDO/0.9 % NACL 10MEQ/0.1L
10 INTRAVENOUS SOLUTION, PIGGYBACK (ML) INTRAVENOUS
Status: DISCONTINUED | OUTPATIENT
Start: 2018-08-01 | End: 2018-08-02 | Stop reason: HOSPADM

## 2018-08-01 RX ORDER — ASPIRIN 81 MG/1
81 TABLET ORAL DAILY
Status: DISCONTINUED | OUTPATIENT
Start: 2018-08-01 | End: 2018-08-02 | Stop reason: HOSPADM

## 2018-08-01 RX ORDER — POTASSIUM CHLORIDE 1.5 G/1.58G
20-40 POWDER, FOR SOLUTION ORAL
Status: DISCONTINUED | OUTPATIENT
Start: 2018-08-01 | End: 2018-08-02 | Stop reason: HOSPADM

## 2018-08-01 RX ADMIN — DORZOLAMIDE HYDROCHLORIDE AND TIMOLOL MALEATE 1 DROP: 20; 5 SOLUTION/ DROPS OPHTHALMIC at 20:29

## 2018-08-01 RX ADMIN — POTASSIUM CHLORIDE 20 MEQ: 1500 TABLET, EXTENDED RELEASE ORAL at 04:41

## 2018-08-01 RX ADMIN — IPRATROPIUM BROMIDE AND ALBUTEROL SULFATE 3 ML: .5; 3 SOLUTION RESPIRATORY (INHALATION) at 11:59

## 2018-08-01 RX ADMIN — ONDANSETRON 4 MG: 4 TABLET, ORALLY DISINTEGRATING ORAL at 11:00

## 2018-08-01 RX ADMIN — FLUTICASONE FUROATE AND VILANTEROL TRIFENATATE 1 PUFF: 100; 25 POWDER RESPIRATORY (INHALATION) at 10:07

## 2018-08-01 RX ADMIN — PANTOPRAZOLE SODIUM 40 MG: 40 TABLET, DELAYED RELEASE ORAL at 10:04

## 2018-08-01 RX ADMIN — IPRATROPIUM BROMIDE AND ALBUTEROL SULFATE 3 ML: .5; 3 SOLUTION RESPIRATORY (INHALATION) at 15:51

## 2018-08-01 RX ADMIN — AMOXICILLIN AND CLAVULANATE POTASSIUM 1 TABLET: 875; 125 TABLET, FILM COATED ORAL at 20:29

## 2018-08-01 RX ADMIN — INSULIN ASPART 1 UNITS: 100 INJECTION, SOLUTION INTRAVENOUS; SUBCUTANEOUS at 17:16

## 2018-08-01 RX ADMIN — MIDODRINE HYDROCHLORIDE 10 MG: 5 TABLET ORAL at 10:05

## 2018-08-01 RX ADMIN — IPRATROPIUM BROMIDE AND ALBUTEROL SULFATE 3 ML: .5; 3 SOLUTION RESPIRATORY (INHALATION) at 07:21

## 2018-08-01 RX ADMIN — LEVALBUTEROL HYDROCHLORIDE 1.25 MG: 1.25 SOLUTION, CONCENTRATE RESPIRATORY (INHALATION) at 04:43

## 2018-08-01 RX ADMIN — LEVOTHYROXINE SODIUM 100 MCG: 100 TABLET ORAL at 10:05

## 2018-08-01 RX ADMIN — ATORVASTATIN CALCIUM 20 MG: 20 TABLET, FILM COATED ORAL at 20:29

## 2018-08-01 RX ADMIN — PREDNISONE 40 MG: 20 TABLET ORAL at 10:05

## 2018-08-01 RX ADMIN — MIDODRINE HYDROCHLORIDE 10 MG: 5 TABLET ORAL at 12:51

## 2018-08-01 RX ADMIN — MIDODRINE HYDROCHLORIDE 10 MG: 5 TABLET ORAL at 18:09

## 2018-08-01 RX ADMIN — AMOXICILLIN AND CLAVULANATE POTASSIUM 1 TABLET: 875; 125 TABLET, FILM COATED ORAL at 10:05

## 2018-08-01 RX ADMIN — ASPIRIN 81 MG: 81 TABLET, COATED ORAL at 13:30

## 2018-08-01 RX ADMIN — MAGNESIUM SULFATE HEPTAHYDRATE 2 G: 40 INJECTION, SOLUTION INTRAVENOUS at 04:41

## 2018-08-01 RX ADMIN — DORZOLAMIDE HYDROCHLORIDE AND TIMOLOL MALEATE 1 DROP: 20; 5 SOLUTION/ DROPS OPHTHALMIC at 10:07

## 2018-08-01 RX ADMIN — VITAMIN D, TAB 1000IU (100/BT) 1000 UNITS: 25 TAB at 10:05

## 2018-08-01 RX ADMIN — IPRATROPIUM BROMIDE AND ALBUTEROL SULFATE 3 ML: .5; 3 SOLUTION RESPIRATORY (INHALATION) at 20:19

## 2018-08-01 ASSESSMENT — ACTIVITIES OF DAILY LIVING (ADL)
ADLS_ACUITY_SCORE: 12

## 2018-08-01 NOTE — PROGRESS NOTES
GI Chart Check    Patient requiring 2L 02 with ongoing cough - currently being treated for pneumonia. Run of Vtach overnight. HGB overall stable. No reports of overt GI bleeding. Possible EGD when patient improved from pulmonary/cardiology standpoint.    HIEU Hernandez  [facility]  Office: [phone]

## 2018-08-01 NOTE — PLAN OF CARE
Problem: Patient Care Overview  Goal: Plan of Care/Patient Progress Review  Blood pressures stable today 110-120 systolic range ~ pt denies any symptoms.  Taking B/P's in left leg r/t discrepency in arms, low b/p's. Continues on Midodrine. Changed to IV antibiotics for pneumonia management.  On O2 @ 2L/NC sats mid 90's.  Harsh, occ prod cough of creamy thick phlegm.  Awaiting Kaye Scan in am and possible EGD in next few days after resolution of pneumonia. Pt alert, mostly oriented throughout day but very forgetful and repetitive with conversation.  Probable discharge to TCU in 2-3 days.   Outcome: No Change  Pt remains VSS on 2L per NC. Pt denies pain. Requested x1 PRN breathing treatment. Pt had run of PAT overnight, electrolytes checked and replaced. Bed alarm on. Pt remained alert but confused and forgetful during shift.

## 2018-08-01 NOTE — PROGRESS NOTES
Heme/Onc chart check:      1.  Myeloproliferative neoplasm -  - polycythemia vera treated previously with phlebotomy but not required for the last several years.   - Now presenting with anemia and thrombocytopenia  - Possible development of myelofibrosis, but could be underlying CKD, cirrhosis, hemodiluation or bleeding.  - Iron studies likely chronic disease   - SPEP m spike 0.2 g/dL  - B12 and folate normal  - GI has evaluated him, possible EGD as outpatient  - Consider SAMARIA in the future  - Possibly repeat bone marrow biopsy as outpatient  - CBC in 2 weeks (labs faxed to [facility] for Dr. Ames) and follow up with Dr. Ames in about 1 month     Harmeet MOE, CNP

## 2018-08-01 NOTE — PROGRESS NOTES
Discussed with HSx  Will be available as needed    Jr Navarrete MD  [facility]   Office: [phone]   Pager: [phone]  Monday-Thursday 8am to 5pm, Fridays 8am to 4pm

## 2018-08-01 NOTE — PLAN OF CARE
Problem: Patient Care Overview  Goal: Plan of Care/Patient Progress Review  Discharge Planner PT   Patient plan for discharge: TCU  Current status: Modified sit<>stand with FWW 3x. Ambulated 600 feet with modified independence and FWW. Ascended/descended 10 stairs with CGA and B rail support. On 2 LPM O2 throughout session.   Barriers to return to prior living situation: Fall risk, stairs  Recommendations for discharge: TCU  Rationale for recommendations: Pt requires CGA for stairs and is required to do stairs to get into his apartment. Also on 2LPM O2 during activity. Pt would benefit from continued skilled PT in order to improve strength and activity tolerance and return to prior level of functioning.       Entered by: Vahe Sky 08/01/2018 10:02 AM

## 2018-08-01 NOTE — PLAN OF CARE
Problem: Patient Care Overview  Goal: Plan of Care/Patient Progress Review  Blood pressures stable today 110-120 systolic range ~ pt denies any symptoms.  Taking B/P's in left leg r/t discrepency in arms, low b/p's. Continues on Midodrine. Changed to IV antibiotics for pneumonia management.  On O2 @ 2L/NC sats mid 90's.  Harsh, occ prod cough of creamy thick phlegm.  Awaiting Kaye Scan in am and possible EGD in next few days after resolution of pneumonia. Pt alert, mostly oriented throughout day but very forgetful and repetitive with conversation.  Probable discharge to TCU in 2-3 days.   Outcome: No Change  VSS on RA.  Pt switched from 2L NC to RA today, sats in upper 90s.  A&Ox4.  Up with assist of one.  Alarm on in bed and chair.  Tele: ST, low 100s.  ASA started today per Marlyn.  B, 113,142.  Pt had loose stool x2 today.  AM stool softener held.  Pt has lack of appetite, encourage to drink boost and order small meals.  Waiting for palliative consult.  Plan to discharge to TCU tomorrow.  Sister will provide transportation.  Call light within reach.  Will continue to monitor.

## 2018-08-01 NOTE — PROVIDER NOTIFICATION
MD Notification    Notified Person: MD    Notified Person Name: Marlyn    Notification Date/Time: 8/1/18   5883    Notification Interaction: telephone    Purpose of Notification:  ASA order clarification    Orders Received: give ASA per order    Comments:

## 2018-08-01 NOTE — PROGRESS NOTES
Hospitalist cross cover:    Run of VT approx 6 seconds. I have ordered a stat BMP and mag, along with electrolyte replacements. 12 lead ECG grossly normal, patient asymptomatic.     Please page me or the covering hospitalist with additional concerns.     PAYAL Walker, CNP  Hospitalist - [facility]  Text Page  [phone])

## 2018-08-01 NOTE — PROGRESS NOTES
Cardiology note.    Asked to review situation again after pt had brief episode of NSVT. I reviewed tele strip. I agree that he had 25 beat run of NSVT which was reportedly asymptomatic.     Nuclear study reported yesterday. Essentially nondiagnostic.  LV function normal by nuclear study and echo.     I am not inclined to alter my recommendations for a conservative approach to his cardiology evaluation and treatment based on this arryththmia. He presented with dyspnea and low BP's and has pneumonia. He has not had angina. We are evaluating his cardiac issues only because his troponin was slighly elevated and his ECG is abnormal. I strongly suspect that he has CAD, but am not inclined to be aggressive with invasive evaluation because his risk of complications is high and his benefit would be low. Medical management with ASA, statin, BB if tolerated.     Nirmal Rubio MD

## 2018-08-02 ENCOUNTER — APPOINTMENT (OUTPATIENT)
Dept: OCCUPATIONAL THERAPY | Facility: CLINIC | Age: 73
DRG: 682 | End: 2018-08-02
Payer: MEDICARE

## 2018-08-02 ENCOUNTER — PATIENT OUTREACH (OUTPATIENT)
Dept: CARE COORDINATION | Facility: CLINIC | Age: 73
End: 2018-08-02

## 2018-08-02 VITALS
WEIGHT: 178.6 LBS | BODY MASS INDEX: 23.67 KG/M2 | TEMPERATURE: 97.3 F | SYSTOLIC BLOOD PRESSURE: 93 MMHG | HEIGHT: 73 IN | OXYGEN SATURATION: 99 % | RESPIRATION RATE: 18 BRPM | DIASTOLIC BLOOD PRESSURE: 81 MMHG | HEART RATE: 106 BPM

## 2018-08-02 LAB
CHOLEST SERPL-MCNC: 107 MG/DL
ERYTHROCYTE [DISTWIDTH] IN BLOOD BY AUTOMATED COUNT: 15.8 % (ref 10–15)
GLUCOSE BLDC GLUCOMTR-MCNC: 105 MG/DL (ref 70–99)
GLUCOSE BLDC GLUCOMTR-MCNC: 115 MG/DL (ref 70–99)
HCT VFR BLD AUTO: 25.4 % (ref 40–53)
HDLC SERPL-MCNC: 34 MG/DL
HGB BLD-MCNC: 8.5 G/DL (ref 13.3–17.7)
LDLC SERPL CALC-MCNC: 55 MG/DL
MAGNESIUM SERPL-MCNC: 2 MG/DL (ref 1.6–2.3)
MCH RBC QN AUTO: 35.1 PG (ref 26.5–33)
MCHC RBC AUTO-ENTMCNC: 33.5 G/DL (ref 31.5–36.5)
MCV RBC AUTO: 105 FL (ref 78–100)
NONHDLC SERPL-MCNC: 73 MG/DL
PLATELET # BLD AUTO: 139 10E9/L (ref 150–450)
POTASSIUM SERPL-SCNC: 4.1 MMOL/L (ref 3.4–5.3)
RBC # BLD AUTO: 2.42 10E12/L (ref 4.4–5.9)
TRIGL SERPL-MCNC: 92 MG/DL
WBC # BLD AUTO: 5.9 10E9/L (ref 4–11)

## 2018-08-02 PROCEDURE — 25000132 ZZH RX MED GY IP 250 OP 250 PS 637: Mod: GY | Performed by: HOSPITALIST

## 2018-08-02 PROCEDURE — 83735 ASSAY OF MAGNESIUM: CPT | Performed by: INTERNAL MEDICINE

## 2018-08-02 PROCEDURE — 97535 SELF CARE MNGMENT TRAINING: CPT | Mod: GO

## 2018-08-02 PROCEDURE — 94640 AIRWAY INHALATION TREATMENT: CPT

## 2018-08-02 PROCEDURE — 85027 COMPLETE CBC AUTOMATED: CPT | Performed by: INTERNAL MEDICINE

## 2018-08-02 PROCEDURE — 36415 COLL VENOUS BLD VENIPUNCTURE: CPT | Performed by: INTERNAL MEDICINE

## 2018-08-02 PROCEDURE — 00000146 ZZHCL STATISTIC GLUCOSE BY METER IP

## 2018-08-02 PROCEDURE — A9270 NON-COVERED ITEM OR SERVICE: HCPCS | Mod: GY | Performed by: HOSPITALIST

## 2018-08-02 PROCEDURE — 25000132 ZZH RX MED GY IP 250 OP 250 PS 637: Mod: GY | Performed by: INTERNAL MEDICINE

## 2018-08-02 PROCEDURE — 84132 ASSAY OF SERUM POTASSIUM: CPT | Performed by: INTERNAL MEDICINE

## 2018-08-02 PROCEDURE — A9270 NON-COVERED ITEM OR SERVICE: HCPCS | Mod: GY | Performed by: INTERNAL MEDICINE

## 2018-08-02 PROCEDURE — 99239 HOSP IP/OBS DSCHRG MGMT >30: CPT | Performed by: INTERNAL MEDICINE

## 2018-08-02 PROCEDURE — 97530 THERAPEUTIC ACTIVITIES: CPT | Mod: GO

## 2018-08-02 PROCEDURE — 40000133 ZZH STATISTIC OT WARD VISIT

## 2018-08-02 PROCEDURE — 99223 1ST HOSP IP/OBS HIGH 75: CPT | Performed by: NURSE PRACTITIONER

## 2018-08-02 PROCEDURE — A9270 NON-COVERED ITEM OR SERVICE: HCPCS | Mod: GY | Performed by: PHYSICIAN ASSISTANT

## 2018-08-02 PROCEDURE — 25000125 ZZHC RX 250: Performed by: INTERNAL MEDICINE

## 2018-08-02 PROCEDURE — 80061 LIPID PANEL: CPT | Performed by: INTERNAL MEDICINE

## 2018-08-02 PROCEDURE — 40000275 ZZH STATISTIC RCP TIME EA 10 MIN

## 2018-08-02 PROCEDURE — 25000132 ZZH RX MED GY IP 250 OP 250 PS 637: Mod: GY | Performed by: PHYSICIAN ASSISTANT

## 2018-08-02 RX ORDER — ATORVASTATIN CALCIUM 20 MG/1
20 TABLET, FILM COATED ORAL EVERY EVENING
Qty: 30 TABLET | Refills: 0 | DISCHARGE
Start: 2018-08-02

## 2018-08-02 RX ORDER — IPRATROPIUM BROMIDE AND ALBUTEROL SULFATE 2.5; .5 MG/3ML; MG/3ML
3 SOLUTION RESPIRATORY (INHALATION) 4 TIMES DAILY
Qty: 360 ML | Refills: 1 | DISCHARGE
Start: 2018-08-02

## 2018-08-02 RX ORDER — PREDNISONE 10 MG/1
TABLET ORAL
Qty: 60 TABLET | Refills: 0 | DISCHARGE
Start: 2018-08-02

## 2018-08-02 RX ORDER — MIDODRINE HYDROCHLORIDE 10 MG/1
10 TABLET ORAL
Qty: 90 TABLET | Refills: 0 | DISCHARGE
Start: 2018-08-02

## 2018-08-02 RX ORDER — LEVALBUTEROL INHALATION SOLUTION 1.25 MG/3ML
1 SOLUTION RESPIRATORY (INHALATION) EVERY 4 HOURS PRN
Qty: 270 ML | Refills: 0 | DISCHARGE
Start: 2018-08-02

## 2018-08-02 RX ADMIN — DORZOLAMIDE HYDROCHLORIDE AND TIMOLOL MALEATE 1 DROP: 20; 5 SOLUTION/ DROPS OPHTHALMIC at 09:28

## 2018-08-02 RX ADMIN — LEVOTHYROXINE SODIUM 100 MCG: 100 TABLET ORAL at 06:44

## 2018-08-02 RX ADMIN — FLUTICASONE FUROATE AND VILANTEROL TRIFENATATE 1 PUFF: 100; 25 POWDER RESPIRATORY (INHALATION) at 09:23

## 2018-08-02 RX ADMIN — MIDODRINE HYDROCHLORIDE 10 MG: 5 TABLET ORAL at 13:28

## 2018-08-02 RX ADMIN — MIDODRINE HYDROCHLORIDE 10 MG: 5 TABLET ORAL at 09:23

## 2018-08-02 RX ADMIN — ASPIRIN 81 MG: 81 TABLET, COATED ORAL at 09:24

## 2018-08-02 RX ADMIN — SENNOSIDES AND DOCUSATE SODIUM 2 TABLET: 8.6; 5 TABLET ORAL at 09:23

## 2018-08-02 RX ADMIN — IPRATROPIUM BROMIDE AND ALBUTEROL SULFATE 3 ML: .5; 3 SOLUTION RESPIRATORY (INHALATION) at 07:46

## 2018-08-02 RX ADMIN — AMOXICILLIN AND CLAVULANATE POTASSIUM 1 TABLET: 875; 125 TABLET, FILM COATED ORAL at 09:23

## 2018-08-02 RX ADMIN — VITAMIN D, TAB 1000IU (100/BT) 2000 UNITS: 25 TAB at 09:24

## 2018-08-02 RX ADMIN — PANTOPRAZOLE SODIUM 40 MG: 40 TABLET, DELAYED RELEASE ORAL at 06:44

## 2018-08-02 RX ADMIN — PREDNISONE 40 MG: 20 TABLET ORAL at 09:23

## 2018-08-02 ASSESSMENT — ACTIVITIES OF DAILY LIVING (ADL)
ADLS_ACUITY_SCORE: 12

## 2018-08-02 NOTE — DISCHARGE SUMMARY
[facility]  Discharge Summary        Abebe Christensen MRN# [id number]   YOB: 1945 Age: 72 year old     Date of Admission:  7/23/2018  Date of Discharge:  8/2/2018  Admitting Physician:  Nirmal Laird DO  Discharge Physician: Siobhan Cline MD  Discharging Service: Hospitalist     Primary Provider: Carson Fried  Primary Care Physician Phone Number: [phone]         Discharge Diagnoses/Problem Oriented Hospital Course (Providers):    Abebe Christensen was admitted on 7/23/2018 by Nirmal Laird DO and I would refer you to their history and physical.  The following problems were addressed during his hospitalization:  Abebe Christensen is a 72 year old male, admitted on 7/23/2018,  with PMHx of CKD III, MGUS, HTN, hyperlipidemia, and GERD who presented to the ED on 7/23/18 with weakness, recurrent falls, lightheadness, and unintentional weight loss with findings of abnormal UA and SOHEILA. Registered under inpatient status for further evaluation and treatment.     Generalized weakness/memory issues/hallucinations the last 2 nights   Multifactorial in the context of below. Pt lives alone in an apartment. Recently purchased a walker/cane. No elevator in apartment.   --- PT and OT to assess, recommending TCU at discharge which patient is agreeable to  -- pt did fall at home and hit head, he has a laceration on the back of his head  -he is weak noni legs and also right arm  -sister here 7/27 and she says that pt has different speech that prior  -patient has had hallucinations night on 7/25 and 7/26  - head CT and head MRI done last night without acute hemorrhage or infarct  MRI of the brain showed atrophy of the brain for possible underlying dementia contributing to the confusion and memory issues  Needs outpatient evaluation by neuropsych testing for possible underlying dementia     Probable pneumonia with possible acute COPD exacerbation   Patient is wheezing today  He  has a 30 year history of smoking  Started  him on duo nebs 4 times daily  Prednisone 40 mg p.o. daily to help with the wheezing  Breo Ellipta inhaler added to help with the COPD  CXR on 7/28 w/ mild infiltrate and/or atelectasis right base.  Pulm vascularity WNL  -due to his sx of cough, looks more SOB and CXR change am going to start abx  -since he has been in the hospital will start zosyn for possibe HCAP   -BC x3 remain negative  -afebrile, WBC normal  -tobacco abuse  -sputum for gram stain and culture showed greater than 10 squamous cells consistent with oral contamination  Shortness of breath improved today   Switched  IV Zosyn to oral Augmentin on July 31.  Completed 7 day course of antibiotics during this hospitalization. So  Antibiotics were discontinued on discharge  Needs outpatient pulmonary function test        Mild elevated troponin  Cardiology consulted and follow on the following  Elevated troponin is thought to be due to demand ischemia in the setting of pneumonia and hypertension  -EKG done for the tachycardia showed sinus tachycardia but also possible ischemia with TWI inferiorly and anterolaterally  -due to EKG, ordered troponin which was elevated at 0.148  Serial troponins remained flat  -pt denies chest pain  Lexiscan stress test was done and was nondiagnostic  Echocardiogram showed normal EF  Cardiology is recommending conservative management as patient is asymptomatic and has multiple comorbid issues  We will check a fasting LPA tomorrow and start him on Lipitor 20 mg p.o. Daily  Unable to start beta-blocker due to low blood pressure issues  No aspirin due to thrombocytopenia and risk of bleeding with anemia  Nonsustained ventricular tachycardia;  He had a run of nonsustained ventricular tachycardia on July 31 overnight.  He was asymptomatic during the episode.   Electrolytes and magnesium were checked and magnesium was low at 1.6 so replacement was started  Cardiology is aware of the  ventricular tachycardia and recommending conservative management            Orthostatic hypotension and persistent hypotension due to blood pressure being low in the arms due to peripheral arterial disease with chronic smoking  History of benign essential hypertension: Positive on admission. Likely secondary to SOHEILA below with ongoing PTA medication administration including BP meds, poor PO intake. Echocardiogram with EF 60-65%, no RWMA, mild valvular aortic stenosis   - Has received total of 3500 L IVF bolus plus maintenance fluids  - Continue to hold PTA BP meds (Cartia  mg po every day, Prinzide 20/12.5 mg po every day--last dose 0830 on 7/23/18)  - Encourage adequate PO intake   - saline locked 7/27  Patient was given so far multiple liters of fluids and was short of breath overnight on 729 overnight and needed 20 mg of Lasix IV  He was started on Midodrin 10 mg p.o. 3 times daily to help with the hypotension  Blood pressure when checked in his left thigh is in the systolics in the 110s today  All blood pressure measurement should be done on left thigh             Acute metabolic encephalopathy, transient: Episode of delirium reported 2 nights ago  per nursing staff and had some hallucinations last 2 nights   Infectious work-up negative with BC NGTD, Urine culture with urogenital seda. CXR unremarkable. Lumbar XR unremarkable. CT A/P unremarkable, comment on trace fluid and/or capsular implants seen anterolaterally in the liver. WBC WNL. Afebrile.   -- Monitor   -- Delirium prevention protocol in place   -workup of CT of head and MRI of brain did not show anything acute  -recommend cognitive eval as outpatient     SOHEILA on CKD III, resolved: Baseline creatinine appears to be 1.1-1.2. Creatinine on admission 1.84>1.46>1.36>1.19>1.17  after IVF resuscitation. Likely related to poor PO intake in the context of nausea for the past 1-2 weeks.   Creatinine is stable          Hyponatremia, resolved: Sodium 130 on  admission, 137 on repeat.   Sodium  141  -- Monitor       Abnormal UA: UA with large LE. Asymptomatic; denies dysuria or increased frequency  - IV ceftriaxone empirically on admission, discontinued 7/25 as urine cultures grew urogenital seda.   Completed course of antibiotics during this hospitalization for pneumonia which would cover for a UTI as well      Myeloproliferative disorder (2002)  History of omental lesions concerning for peritoneal mets, s/p exploratory lap  Unintentional weight loss (20-30 lbs in the last year per review of clinic notes): Previously followed by [facility]. CT and PET form 2017 showed moderate omental thickening and nodularity with low metabolic activity, moderate abdominal and pelvic ascited with intermediate metabolic activity. Pt had a lap in 10/2017 which showed cirrhosis, no malignancy. CT A/P on admission showing marked improvement in ascites form prior imaging. Trace fluid and/or capsular implants seen anterolaterally in the liver, question minimal serosal implants vs fluid adjacent to the liver, otherwise no definite omental lesions demonstrated. B12 392, Folate 23.3. XR Lumbar negative.   -- [facility]OHPA consulted, see formal note by Dr. Ames--myeloproliferative neoplasm appears to be progressed moderately with anemia. Consider aranesp if he continues to have kidney dysfunction and anemia to help with improvement in hemoglobin and associated fatigue, consider repeating bone marrow bx depending on clinical progress (outpatient)  -- Regarding malignancy w/u recommended per Oncology: EGD 6/21/17 with normal esophagus, medium hiatal hernia, normal mucosa, possible gastritis. Colonoscopy 6/21/17 with diverticulosis of the sigmoid colon, remainder of exam reported at normal. Consider low dose CT scan of chest given tobacco hx, but this can be done in the outpatient setting   -- Care Coordinator to help facilitate Oncology follow-up at discharge  [facility] and they are  "recommending follow-up with Dr. Bunn  with CBC in 2 weeks after discharge         Macrocytic anemia, acute   Thrombocytopenia: Platelets 172>123>142. Baseline hemoglobin in the last year has actually been around 13-14. B12 and folate WNL.   -- No active signs of bleeding, monitor   -- Heme/Onc following-appreciate assistance   hgb at 8.2 today  No  Esophageal varices  on prior EGD done in June 2017  No sign of active bleeding so no need for EGD at this point per GI  Continue PPI once daily      Elevated BNP: 1931 on admission. Pt is euvolemic on exam. Denies chest pain, GOMEZ, SOB.   -- Echo with EF 60-65%, no RWMA, mild valvular aortic stenosis   -- Monitor I&Os, daily weights           NIDDM, controlled  Peripheral neuropathy: Maintained on Metformin 500 mg po every day PTA. A1C 4.0 on 7/23/18. Describes bilateral neuropathy. Previously assessed by Neurologist and diagnosed with neuropathy. Not currently taking medication for this. B12 and folate WNL.   -- PTA Metformin on hold and to be discontinued at discharge indefinitely based on A1C and sugars during this hospitalization   -- Medium dose sliding scale insulin ordered   -- BS per protocol   -- Monitor for hypoglycemia   -- Consider starting gabapentin once SOHEILA resolved                     Cirrhosis: Prior hx of ascites with paracentesis in 8/2017 with negative cytology, 950 ccs of fluid removed. Pathology showing advanced chronic liver disease (stage IV \"cirrhosis\" with steatohepatitis. AST 51, ALT 11)   -- Defer further monitoring to outpatient setting       Hypothyroidism: TSH WNL. Continue PTA Synthroid       GERD: Continue PTA Prilosec       History of infrarenal AA: Measuring at 3 cm on CT. Stable.       Elevated AST: 51>57>61. Bili 0.3. No abdominal pain.   -- Monitor     Alcohol use disorder: Drinks one mixed drink containing 1 shot of vodka daily. Denies history of withdrawal symptoms including withdrawal seizure.   -- Monitor for signs of " withdrawal      Tobacco use: 40 pack year hx.   -- Nicotine replacement available if needed       Severe malnutrition in the context of chronic illness  Hypoalbuminemia   Unintentional weight loss (20-30 lbs in the last year per review of clinic notes): % Weight Loss:  > 7.5% in 3 months (severe malnutrition)  % Intake:  </= 50% for >/= 1 month (severe malnutrition)  -- Nutrition consulted              # Pain Assessment:  Current Pain Score 8/1/2018   Patient currently in pain? denies   Pain score (0-10) -   Pain location -   Pain descriptors -         DVT Prophylaxis: SCD  Code Status: Full Code  Discussed with bedside RN  Disposition:  to TCU today                  Code Status:      Full Code         Important Results:      See below         Pending Results:        Unresulted Labs Ordered in the Past 30 Days of this Admission     Date and Time Order Name Status Description    7/28/2018 1355 Blood culture Preliminary     7/28/2018 1355 Blood culture Preliminary                Discharge Instructions and Follow-Up:      Follow-up Appointments     Follow Up and recommended labs and tests       Follow up with Nursing home physician in 1week.  The following labs/tests   are recommended: CBC,  BMP .  Needs f/u with  in [facility] in 1month with CBC results  Check blood pressure in left thigh only due to peripheral arterial disease  Recheck CMP in 1month as pt started on lipitor                         Discharge Disposition:      Discharged to short-term care facility         Discharge Medications:        Current Discharge Medication List      START taking these medications    Details   atorvastatin (LIPITOR) 20 MG tablet Take 1 tablet (20 mg) by mouth every evening  Qty: 30 tablet, Refills: 0    Associated Diagnoses: Hyperlipidemia LDL goal <100      fluticasone-vilanterol (BREO ELLIPTA) 200-25 MCG/INH oral inhaler Inhale 1 puff into the lungs daily  Qty: 1 Inhaler, Refills: 0    Comments: Future refills by PCP  Dr. Carson Fried with phone number [phone].  Associated Diagnoses: COPD exacerbation (H)      ipratropium - albuterol 0.5 mg/2.5 mg/3 mL (DUONEB) 0.5-2.5 (3) MG/3ML neb solution Take 1 vial (3 mLs) by nebulization 4 times daily  Qty: 360 mL, Refills: 1    Comments: Future refills by PCP Dr. Carson Fried with phone number [phone].  Associated Diagnoses: COPD exacerbation (H)      levalbuterol (XOPENEX) 1.25 MG/3ML neb solution Take 3 mLs (1.25 mg) by nebulization every 4 hours as needed for shortness of breath / dyspnea or wheezing  Qty: 270 mL, Refills: 0    Associated Diagnoses: COPD exacerbation (H)      midodrine 10 MG TABS Take 10 mg by mouth 3 times daily (with meals)  Qty: 90 tablet, Refills: 0    Comments: Future refills by PCP Dr. Carson Fried with phone number [phone].  Associated Diagnoses: Hypotension, unspecified hypotension type      predniSONE (DELTASONE) 10 MG tablet 4 tabs daily for 3 days, then 3 tabs daily for 3 days, then 2 tabs daily for 3 days, then 1 tab daily for 3 days, then stop  Qty: 60 tablet, Refills: 0    Associated Diagnoses: COPD exacerbation (H)         CONTINUE these medications which have NOT CHANGED    Details   ASPIRIN PO Take 81 mg by mouth daily      Cholecalciferol (VITAMIN D) 2000 UNITS CAPS Take 1 capsule by mouth daily      dorzolamide-timolol (COSOPT) 2-0.5 % ophthalmic solution Place 1 drop into both eyes 2 times daily     Associated Diagnoses: Hypertension      folic acid (FOLVITE) 1 MG tablet TAKE 4 TABLETS BY MOUTH DAILY  Qty: 120 tablet, Refills: 9    Associated Diagnoses: Folate deficiency      levothyroxine (SYNTHROID/LEVOTHROID) 100 MCG tablet TAKE 1 TABLET(100 MCG) BY MOUTH DAILY  Qty: 90 tablet, Refills: 2    Associated Diagnoses: Hypothyroidism, unspecified type      metFORMIN (GLUCOPHAGE) 500 MG tablet TAKE 1 TABLET(500 MG) BY MOUTH DAILY 20 MINUTES BEFORE DINNER  Qty: 90 tablet, Refills: 0    Associated Diagnoses: Type  "2 diabetes mellitus without complication, without long-term current use of insulin (H)      omeprazole (PRILOSEC) 20 MG CR capsule TAKE ONE CAPSULE BY MOUTH EVERY DAY  Qty: 90 capsule, Refills: 3    Associated Diagnoses: Essential hypertension with goal blood pressure less than 130/85      travoprost Z, benzalkonium, (TRAVATAN) 0.004 % ophthalmic solution Place 1 drop into both eyes every evening     Associated Diagnoses: Hypertension         STOP taking these medications       CARTIA  MG 24 hr capsule Comments:   Reason for Stopping:         lisinopril-hydrochlorothiazide (PRINZIDE/ZESTORETIC) 20-12.5 MG per tablet Comments:   Reason for Stopping:                    Allergies:       No Known Allergies         Consultations This Hospital Stay:      Consultation during this admission received from cardiology and gastroenterology         Condition and Physical Exam on Discharge:      Discharge condition: Stable   Discharge vitals: Blood pressure 93/81, pulse 106, temperature 97.3  F (36.3  C), temperature source Oral, resp. rate 18, height 1.854 m (6' 1\"), weight 81 kg (178 lb 9.6 oz), SpO2 99 %.     Constitutional:  alert and awake    Lungs:  clear to auscultation, no wheezing    Cardiovascular:  normal  rate rhythm regular    Abdomen: Soft, Nontender, no distension    Skin: Warm and dry    Other:            Discharge Orders for Skilled Facility (from Discharge Orders):        After Care Instructions     Activity - Up ad ellie           Advance Diet as Tolerated       Follow this diet upon discharge: regular diet            General info for SNF       Length of Stay Estimate: Short Term Care: Estimated # of Days 31-90  Condition at Discharge: Stable  Level of care:skilled   Rehabilitation Potential: Good  Admission H&P remains valid and up-to-date: Yes  Recent Chemotherapy: N/A  Use Nursing Home Standing Orders: Yes            Mantoux instructions       Give two-step Mantoux (PPD) Per Facility Policy Yes         "    Oxygen - Nasal cannula       2 Lpm by nasal cannula to keep O2 sats 92% or greater.                           Rehab orders for Skilled Facility (from Discharge Orders):      Referrals     Future Labs/Procedures    Occupational Therapy Adult Consult     Comments:    Evaluate and treat as clinically indicated.    Reason:  weakness    Physical Therapy Adult Consult     Comments:    Evaluate and treat as clinically indicated.    Reason:  weakness             Discharge Time:      Greater than 30 minutes.        Image Results From This Hospital Stay (For Non-EPIC Providers):        Results for orders placed or performed during the hospital encounter of 07/23/18   XR Chest 2 Views    Narrative    XR CHEST 2 VW 7/23/2018 2:32 PM    HISTORY: weakness hypotension;       Impression    IMPRESSION: Negative.    JULITO OREILLY MD   CT Abdomen Pelvis w/o Contrast    Narrative    CT ABDOMEN AND PELVIS WITHOUT CONTRAST 7/23/2018 9:42 PM     HISTORY:  History of omental masses, weight loss, renal failure.     COMPARISON: May 26, 2017    TECHNIQUE: Volumetric helical acquisition of CT images from the lung  bases through the symphysis pubis without intravenous contrast.  Radiation dose for this scan was reduced using automated exposure  control, adjustment of the mA and/or kV according to patient size, or  iterative reconstruction technique.    FINDINGS: Previously seen ascites is markedly improved from previous.  Trace fluid and/or capsular implants seen anterolaterally in the  liver. Minimal soft tissue stranding seen in the right paracolic  gutter near the liver. No definite focal intrahepatic lesion. Adrenal  glands, kidneys, pancreas, and spleen demonstrate no worrisome focal  lesion. There are no dilated loops of small intestine or large bowel  to suggest ileus or obstruction. There is mild diverticulosis without  evidence for diverticulitis. No free air. 3 cm infrarenal abdominal  aortic aneurysm. This is unchanged from  previous. Survey of the  visualized bony structures demonstrates no destructive bony lesions.  Lung bases clear of acute infiltrates.      Impression    IMPRESSION: Marked improvement in ascites since the comparison study.  Question some minimal serosal implants vs. fluid adjacent to the  liver. Otherwise no definite omental lesions demonstrated.    NEGIN VALDEZ MD   XR Lumbar Spine 2/3 Views    Narrative    XR LUMBAR SPINE 2-3 VIEWS 7/23/2018 9:48 PM    COMPARISON: None.    HISTORY: Back pain.      Impression    IMPRESSION: Vertebral heights are preserved without evidence of  fracture. Moderate disc space loss at L5-S1. No significant listhesis  identified at any level. Enteric contrast in the small bowel.    DELIA WALLACE MD   CT Head w/o Contrast    Narrative    CT SCAN OF THE HEAD WITHOUT CONTRAST   7/27/2018 7:29 PM     HISTORY: recent fall;     TECHNIQUE:  Axial images of the head and coronal reformations without  IV contrast material. Radiation dose for this scan was reduced using  automated exposure control, adjustment of the mA and/or kV according  to patient size, or iterative reconstruction technique.    COMPARISON: None.    FINDINGS:  There is generalized atrophy of the brain.  White matter  changes are present in the cerebral hemispheres that are consistent  with small vessel ischemic disease in this age patient. There is no  evidence of intracranial hemorrhage, mass, acute infarct or anomaly.  Coastal thickening is present in the right maxillary sinus There is no  evidence of trauma.      Impression    IMPRESSION: No acute pathology. No bleed, mass, or infarcts are seen.    No fracture is identified.      STANLEY JOSEPH MD   MR Brain w/o & w Contrast    Narrative    MRI BRAIN WITHOUT AND WITH CONTRAST  7/27/2018 11:14 PM    HISTORY:  Memory issues, also weakness of legs, right arm.      TECHNIQUE:  Multiplanar, multisequence MRI of the brain without and  with 7 mL Gadavist.    COMPARISON: CT on same  date.    FINDINGS: Diffusion-weighted images are normal. There is no evidence  for intracranial hemorrhage or any acute infarct. There is a 0.8 cm  area of increased magnetic susceptibility artifact in the right  thalamus which is felt to be due to an old bleed as there is no  definite acute bleed on CT on same date. Mild-to-moderate cerebral  atrophy is present. Several scattered white matter signal  hyperintensities are also noted on T2 and FLAIR images without  enhancement or mass effect. Postcontrast images do not show any  abnormal areas of enhancement or any focal mass lesions. There is an  air-fluid level in the right maxillary sinus. Vascular structures are  patent at the skull base.      Impression    IMPRESSION:  1. 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  2. Air-fluid level in right maxillary sinus.  3. Cerebral atrophy with scattered nonspecific white matter changes  most likely due to chronic small vessel ischemic disease.  4. No evidence for acute hemorrhage, acute infarct, any focal mass  lesions.    NAVEEN HAYNES MD   XR Chest Port 1 View    Narrative    CHEST ONE VIEW PORTABLE   7/28/2018 1:05 PM     HISTORY:  Cough. History of smoking.    COMPARISON: None.      Impression    IMPRESSION: Mild infiltrate and/or atelectasis at the right lung base  could represent a mild pneumonia. Please clinically correlate. Aortic  calcification. No pleural effusions. Heart size appears stable.  Pulmonary vascularity is within normal limits.    BULMARO DIETZ MD   NM Lexiscan stress test (nuc card)    Narrative    GATED MYOCARDIAL PERFUSION SCINTIGRAPHY WITH INTRAVENOUS PHARMACOLOGIC  VASODILATATION LEXISCAN -ONE DAY STUDY     7/31/2018 11:52 AM  BAO MUNIZ  72 years  Male  1945.    Indication/Clinical History: Abnormal echocardiogram with elevated  troponins    Impression-technically difficult study due to significant soft tissue  attenuation, primarily diaphragm attenuation with bowel uptake  artifact.  1.   Myocardial perfusion imaging using single isotope technique  demonstrated moderately large relatively fixed anterior apical defect  extending into the distal inferior wall, rest slightly greater than  stress, consistent with possible nontransmural infarct without  significant ischemia; significant decrease in specificity due to soft  tissue attenuation artifact involving diaphragm attenuation/bowel  uptake. No other significant area of ischemia or infarct noted. Also  cannot totally exclude a nonischemic cardiomyopathy with fixed defect  present..   2. Gated images demonstrated normal size left ventricle with adequate  to decreased overall contractility with mild global hypokinesis with 6  more severe hypokinesis of the anterior apex extending to the inferior  wall.  The left ventricular systolic function is low normal with  ejection fraction 52%.  3. Compared to the prior study from not applicable .    Procedure  Pharmacologic stress testing was performed with Lexiscan at a rate of  0.08 mg/ml rapid bolus injection, for 15 seconds, 0.4 mg/5ml  intravenously. Low-level exercise was not performed along with the  vasodilator infusion.  The heart rate was 99 at baseline and lakhwinder to  106 beats per minute during the Lexiscan infusion. The rest blood  pressure was 103/60 mmHg and was 97/77 mm Hg during Lexiscan infusion.  The patient experienced no symptoms  during the test.    Myocardial perfusion imaging was performed at rest, approximately 45  minutes after the injection intravenously of 10.1 mCi of Tc-99m  Myoview. At peak pharmacologic effect, 10-20 seconds after Lexiscan,   the patient was injected intravenously with 30.6 mCi of  Tc-99m  Myoview. The post-stress tomographic imaging was performed  approximately 60 minutes after stress.    EKG Findings  The resting EKG demonstrated normal sinus rhythm with diffuse inferior  and anterolateral T-wave inversion. The stress EKG demonstrated sinus  tachycardia with  persistent inferior and anterolateral T-wave  inversion without significant ST depression.    Tomographic Findings  Overall, the study quality is suboptimal due to significant soft  tissue attenuation . On the stress images, there is a moderately large  mild anterior apical defect extending into the distal inferior wall.  On the rest images, there is a moderately large mild anterior apical  defect extending the distal inferior wall . Gated images demonstrated  normal size left ventricle with adequate to slightly decreased overall  contractility with mild global hypokinesis with more severe  hypokinesis affecting the anterior apex into the inferior wall. The  left ventricular ejection fraction was calculated to be 52% with  stress 53% rest. TID was absent.    SARY BARRETO MD           Most Recent Lab Results In EPIC (For Non-EPIC Providers):    Most Recent 3 CBC's:  Recent Labs   Lab Test  08/02/18   0603  07/31/18   0625  07/29/18   0535  07/28/18   1404   WBC  5.9   --   7.7  7.8   HGB  8.5*  8.2*  8.8*  9.3*   MCV  105*   --   102*  103*   PLT  139*   --   142*  149*      Most Recent 3 BMP's:  Recent Labs   Lab Test  08/02/18   0603  08/01/18   0333  07/29/18   0535  07/28/18   1824   NA   --   138  138  134   POTASSIUM  4.1  3.9  3.6  3.6   CHLORIDE   --   109  108  106   CO2   --   19*  22  17*   BUN   --   9  9  9   CR   --   1.23  1.16  1.10   ANIONGAP   --   10  8  11   JESSICA   --   8.3*  7.9*  7.6*   GLC   --   119*  108*  121*     Most Recent 3 Troponin's:  Recent Labs   Lab Test  07/29/18   0230  07/28/18   2143  07/28/18   1824   TROPI  0.125*  0.135*  0.136*     Most Recent 3 INR's:  Recent Labs   Lab Test  08/22/17   1205   INR  1.09     Most Recent 2 LFT's:  Recent Labs   Lab Test  07/26/18   0817  07/25/18   0808   AST  61*  57*   ALT  14  13   ALKPHOS  114  110   BILITOTAL  0.8  0.5     Most Recent Cholesterol Panel:  Recent Labs   Lab Test  08/02/18   0603   CHOL  107   LDL  55   HDL  34*   TRIG  92      Most Recent 6 Bacteria Isolates From Any Culture (See EPIC Reports for Culture Details):  Recent Labs   Lab Test  07/30/18   0048  07/28/18   2305  07/28/18   1504  07/28/18   1458  07/23/18   2350  07/23/18   1505   CULT  Canceled, Test credited  >10 Squamous epithelial cells/low power field indicates oral contamination. Please   recollect.  *  Notification of test cancellation was given to  Lyubov Zhang RN at [facility] at 0400 on 07.28.18.jpg    Canceled, Test credited  >10 Squamous epithelial cells/low power field indicates oral contamination. Please   recollect.  *  Notification of test cancellation was given to  Hemant Davis RN from [facility]. 7.29.18 at 0403. GR.    No growth after 5 days  No growth after 5 days  No growth  50,000 to 100,000 colonies/mL  mixed urogenital seda    Susceptibility testing not routinely done     Most Recent TSH, T4 and HgbA1c:  Recent Labs   Lab Test  07/23/18   1225   TSH  0.77   A1C  4.0

## 2018-08-02 NOTE — PROGRESS NOTES
Care Coordination:    -A follow-up appointment has been made for you with Dr. Ames on Tuesday, September 4th at 11:00 AM at [facility]. Please arrive at 10:40 AM to have your labs drawn.  Please call the clinic at [phone] should you need to change or cancel your appointment.  Please arrive 15 minutes early to your scheduled appointment and bring your photo ID, insurance card and co-payment.     [address], [address]   [place] [address]   Phone: [phone]         Arabella Mckeon RN, BAN   Care Coordinator  Ph. [phone]

## 2018-08-02 NOTE — PROVIDER NOTIFICATION
"Pt reports that this morning he had \" medium\" blood in his stool. He states that is not unusual d/t his hemorrhoids. Dr. Cline notified; awaiting for response.     1248/ addendum: no new orders received.  1400/ addendum: Aspirin dc'd.   "

## 2018-08-02 NOTE — PLAN OF CARE
Discharge Planner OT   Patient plan for discharge: TCU   Current status: Supine<>sit Mod I for use of bed rail. Sit<>stand and amb to bathroom with FWW and CGA/VCs for walker safety. Min A for toilet transfer (sit>stand). Showing blood in stool while in bathroom, notified RN.   Barriers to return to prior living situation: Decreased balance, decreased activity tolerance, safety awareness   Recommendations for discharge: TCU   Rationale for recommendations: Would benefit from further skilled therapy to work on independence in ADLs/IADLs       Entered by: Jazlyn Field 08/02/2018 10:54 AM

## 2018-08-02 NOTE — PROGRESS NOTES
Clinic Care Coordination Contact  Care Coordination Transition Communication    Referral Source: SNF/TCU    Clinical Data: Patient was hospitalized at [facility] from 7.23.2018 to 8.2.2018 with diagnosis of Pneumonia with possible acute COPD exacerbation.     Transition to Facility:              Facility Name: [facility].              Contact name and phone number/fax:     . [phone]    Plan: RN/SW Care Coordinator will await notification from facility staff informing RN/SW Care Coordinator of patient's discharge plans/needs. RN/SW Care Coordinator will review chart and outreach to facility staff every 4 weeks and as needed.     Enrike Parker [facility]  Clinic Care Coordinator  Ph. [phone]  [email or web address]

## 2018-08-02 NOTE — LETTER
[facility]   To:             Please give to facility    From:   Narda AARON  Care Coordinator   [facility]   P: [phone]  [email or web address]   Patient Name:  Abebe Christensen YOB: 1945   Admit date: 8.2.2018      *Information Needed:  Please contact me when the patient will discharge (or if they will move to long term care)- include the discharge date, disposition, and main diagnosis   - If the patient is discharged with home care services, please provide the name of the agency    Also- Please inform me if a care conference is being held.   Phone, Fax or Email with information                              Thank you

## 2018-08-02 NOTE — PROGRESS NOTES
SW:  D:  Received discharge orders for patient.  Bed available at [facility] for today.  Patient's sister will transport between 13:00-13:30 today.  Patient and family informed of the plan and in agreement to the plan.  Call placed to update [facility] and faxed the orders and the PAS.    PAS-RR    D: Per DHS regulation, SW completed and submitted PAS-RR to [place] [employer] Direct Connect via the Senior Link[employer] Line.  PAS-RR confirmation # is : [id number]    I: JEFF spoke with patient's sister Jennifer and they are aware a PAS-RR has been submitted.  JEFF reviewed with patient's sister Jennifer that they may be contacted for a follow up appointment within 10 days of hospital discharge if their SNF stay is < 30 days.  Contact information for [employer] was also provided.    A: Patient's sister Jennifer verbalized understanding.    P: Further questions may be directed to Senior [employer] Line at #[phone], option #4 for PAS-RR staff.

## 2018-08-02 NOTE — PLAN OF CARE
"Problem: Patient Care Overview  Goal: Plan of Care/Patient Progress Review  Blood pressures stable today 110-120 systolic range ~ pt denies any symptoms.  Taking B/P's in left leg r/t discrepency in arms, low b/p's. Continues on Midodrine. Changed to IV antibiotics for pneumonia management.  On O2 @ 2L/NC sats mid 90's.  Harsh, occ prod cough of creamy thick phlegm.  Awaiting Kaye Scan in am and possible EGD in next few days after resolution of pneumonia. Pt alert, mostly oriented throughout day but very forgetful and repetitive with conversation.  Probable discharge to TCU in 2-3 days.   Outcome: No Change  Pt A/O but frequently forgetful. Up with Ax1. Incontinent of bowel. 2 BM's overnight. Denies all pain. Palliative consult planned for today. Also planned to discharge to TCU today. Tele shows [facility]. Showed some confusion overnight. 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 Was able to reorient, then understood where he was. Asked if he was at [place] multiple times. Currently resting comfortably.     /68  Pulse 106  Temp 98.7  F (37.1  C) (Oral)  Resp 18  Ht 1.854 m (6' 1\")  Wt 81 kg (178 lb 9.6 oz)  SpO2 98%  BMI 23.56 kg/m2        "

## 2018-08-02 NOTE — DISCHARGE INSTRUCTIONS
-A follow-up appointment has been made for you with Dr. Ames on Tuesday, September 4th at 11:00 AM at [facility]. Please arrive at 10:40 AM to have your labs drawn.  Please call the clinic at [phone] should you need to change or cancel your appointment.  Please arrive 15 minutes early to your scheduled appointment and bring your photo ID, insurance card and co-payment.     [address], [address]   [place] [address]   Phone: [phone]           Pt has socks and a pair of glasses with him. No other known belongings.        Patient will discharge to [facility] today via family around 13:30.  [facility] phone number is [phone].

## 2018-08-02 NOTE — PROGRESS NOTES
SPIRITUAL HEALTH SERVICES Progress Note  [facility]    Follow-up visit with pt. Pt talked about his hope for discharge today to TCU. Pt states he plans to discharge to a TCU near his home that is well known to him. Pt is looking forward to rehabilitating and getting to walk outside in the gardens there. Pt is also hoping to begin looking for a new place to live that will be more handicap-accessible.      provided listening and prayer. Pt stated no additional needs at this time.  has no plans to follow but is available upon request.      Mine Pike  Chaplain Resident  Pager: [phone]  Office: [phone]

## 2018-08-02 NOTE — PLAN OF CARE
Problem: Patient Care Overview  Goal: Plan of Care/Patient Progress Review  Blood pressures stable today 110-120 systolic range ~ pt denies any symptoms.  Taking B/P's in left leg r/t discrepency in arms, low b/p's. Continues on Midodrine. Changed to IV antibiotics for pneumonia management.  On O2 @ 2L/NC sats mid 90's.  Harsh, occ prod cough of creamy thick phlegm.  Awaiting Kaye Scan in am and possible EGD in next few days after resolution of pneumonia. Pt alert, mostly oriented throughout day but very forgetful and repetitive with conversation.  Probable discharge to TCU in 2-3 days.    Outcome: Adequate for Discharge Date Met: 08/02/18  Speech Language Therapy Discharge Summary    Reason for therapy discharge:    Discharged to transitional care facility.    Progress towards therapy goal(s). See goals on Care Plan in [facility] electronic health record for goal details.  Goals partially met.  Barriers to achieving goals:   discharge from facility.    Therapy recommendation(s):    Continued therapy is recommended.  Rationale/Recommendations:  Continue short term ST needs to insure diet tolerance of regular textures and thin liquids. .

## 2018-08-02 NOTE — PLAN OF CARE
Problem: Patient Care Overview  Goal: Plan of Care/Patient Progress Review    Physical Therapy Discharge Summary    Reason for therapy discharge:    Discharged to transitional care facility.    Progress towards therapy goal(s). See goals on Care Plan in [facility] electronic health record for goal details.  Goals partially met.  Barriers to achieving goals:   discharge from facility. Jauregui and DGI were not reassessed prior to discharge.     Therapy recommendation(s):    Continued therapy is recommended.  Rationale/Recommendations:  Pt demonstrates decreased strrength and balance which is impairing his functional mobility. He would benefit from continued skilled PT in order to address impairments and return to prior level of functioning..

## 2018-08-02 NOTE — CONSULTS
[facility]    Palliative Care Consultation     Abebe Christensen  MRN# [id number]  Date of Admission:  7/23/2018  Date of Service (when I saw the patient): 08/02/18  Reason for consult: Consulted by Dr. Cline for Goals of care    Assessment & Plan   Abebe Christensen is a 72 year old male, admitted on 7/23/2018,  with PMHx of CKD III, MGUS, HTN, hyperlipidemia, and GERD who presented to the ED on 7/23/18 with weakness, recurrent falls, lightheadness, and unintentional weight loss with findings of abnormal UA and SOHEILA.     Symptoms/Recommendations   1. Goals of Care. Met with Mr Christensen and his family including sister and niece. At this time, Mr Christensen confirms his desire to go to TCU and work towards getting better. We discussed advanced care planning and I provided him with a POLST and a long form Health Care Directive to review with his sister. I educated him on what resuscitative measures involve and shared my worry that if he did recover from a stay in the intensive care unit, returning to independent living would be very unlikely. I also assured him if he decided to change his code status to DNR/DNI, that would not change other aspects of his health care. He would still be aggressively treated for acute illnesses if he presented to the hospital in the future. Mr Christensen appreciated the information, he would like more time to discuss his wishes with his family before signing any documents.     Support/Coping  -pt's sister and niece at bedside  -Will involve Palliative LICSW, Lorin Cobb, and/or Palliative , Marlee Gallegos    Decisional Support, Goals of Care, Counseling & Coordination  Decisional Capacity Intact?  -Yes  Health Care Directive on File?  -No  Code Status/Resuscitation Preferences?  -FULL  Plan of Care?  -discharge to TCU today    Discussion  Introduced the scope of our practice to Mr Christensen and his family. Discussed our potential roles for symptom management, support/coping, and decisional  "support (aka goals of care).     Mr Christensen was seen resting comfortably in bed with his sister and niece at bedside. We reviewed his plan to discharge to TCU today. We had a discussion regarding advanced care planning and I provided him with a POLST form and a long form HCD. I explained the importance of documenting his wishes so his family does not have to make decisions in the future without knowing how he would like to be cared for (in the event that he cannot speak for himself). We discussed code status at length. He tells me he wouldn't mind \"a first go around,\" but he wouldn't want to be kept alive on machines. I acknowledged this wish and further discussed the implications of resuscitation measures. I reviewed that unfortunately these measures are invasive, uncomfortable, and often unsuccessful. I further explained that if they are successful and he survives a stay in the ICU, he would have a very long recovery following this and living independently would not likely be possible. Mr Christensen appreciated this information and was very interested in reviewing the forms with his sister and niece.     Thank you for involving us in the care of this patient and family. We will sign off at this time. Please do not hesitate to contact me with questions or concerns or the on-call provider for our team if evening or weekend.    Ania MOE CNP  Palliative Medicine   Pager [phone]    Attestation:  Total time on the floor involved in the patient's care: 75 minutes  Total time spent in counseling/care coordination: >50%    Chief Complaint   Goals of Care    History is obtained from the patient, his family, staff, and extensive chart review.     Adopted from H&P  Abebe GUSTABO Christensen is a 72 year old male who presents with weakness, falling, accompanied by sister and niece. Basically his overall health has been deteriorating over the past year with about 100 lbs of unintentional weight loss. He has now became very " unsteady, and fell 2 days ago. He lives in an apartment on the second floor and can barely navigate the steps. Family feel his blood pressure is overmedicated. He also states that he had a biopsy done last year that he never received results from. He notes that he feels some distention in his abdomen. He also has some back pain, chronically but this is worse.        Past Medical History    I have reviewed this patient's medical history and updated it with pertinent information if needed.   Past Medical History:   Diagnosis Date     GERD (gastroesophageal reflux disease)      Hyperlipidemia      Hypertension      Hypertension      MGUS (monoclonal gammopathy of unknown significance)      Renal insufficiency, mild        Past Surgical History   I have reviewed this patient's surgical history and updated it with pertinent information if needed.  Past Surgical History:   Procedure Laterality Date     EYE SURGERY  2008    cataract and glaucoma     LAPAROSCOPIC BIOPSY LIVER N/A 10/3/2017    Procedure: LAPAROSCOPIC BIOPSY LIVER;;  Surgeon: Kendell Kenny MD;  Location:  OR     LAPAROSCOPIC HERNIORRHAPHY UMBILICAL N/A 10/3/2017    Procedure: LAPAROSCOPIC HERNIORRHAPHY UMBILICAL;;  Surgeon: Kendell Kenny MD;  Location:  OR     LAPAROTOMY EXPLORATORY N/A 10/3/2017    Procedure: LAPAROTOMY EXPLORATORY;;  Surgeon: Kendell Kenny MD;  Location:  OR     OMENTECTOMY N/A 10/3/2017    Procedure: OMENTECTOMY;  LAPAROSCOPIC ABDOMINAL EXPLORATION; OMENTECTOMY; PERITONEAL UMBILICAL HERNIA REPAIR, WEDGE LIVER BIOPSY;  Surgeon: Kendell Kenny MD;  Location:  OR       Social History   Living situation: lives independently in [facility]    Family system: not , no children. Has one sister (Jennifer) a niece and nephews    Self-identified support system: family    Employment/education: previously worked as an     Activities/interests: did not assess    Use of community resources: did not assess    Religion  affiliation: did not assess    Involvement in [place] community: did not assess    Impact of illness on patient: did not assess    Family History   I have reviewed this patient's family history and updated it with pertinent information if needed.   Family History   Problem Relation Age of Onset     Eye Disorder Mother      Cancer - colorectal Father        Allergies   No Known Allergies    Medications   Current Facility-Administered Medications Ordered in Epic   Medication Dose Route Frequency Last Rate Last Dose     acetaminophen (TYLENOL) tablet 650 mg  650 mg Oral Q4H PRN         amoxicillin-clavulanate (AUGMENTIN) 875-125 MG per tablet 1 tablet  1 tablet Oral Q12H BESS   1 tablet at 08/02/18 0923     aspirin EC tablet 81 mg  81 mg Oral Daily   81 mg at 08/02/18 0924     atorvastatin (LIPITOR) tablet 20 mg  20 mg Oral QPM   20 mg at 08/01/18 2029     benztropine (COGENTIN) tablet 1-2 mg  1-2 mg Oral TID PRN         cholecalciferol (vitamin D3) tablet 2,000 Units  2,000 Units Oral Daily   2,000 Units at 08/02/18 0924     glucose gel 15-30 g  15-30 g Oral Q15 Min PRN        Or     dextrose 50 % injection 25-50 mL  25-50 mL Intravenous Q15 Min PRN        Or     glucagon injection 1 mg  1 mg Subcutaneous Q15 Min PRN         dorzolamide-timolol PF (COSOPT) opthalmic solution 1 drop  1 drop Both Eyes BID   1 drop at 08/02/18 0928     fluticasone-vilanterol (BREO ELLIPTA) 100-25 MCG/INH oral inhaler 1 puff  1 puff Inhalation Daily   1 puff at 08/02/18 0923     insulin aspart (NovoLOG) inj (RAPID ACTING)  1-7 Units Subcutaneous TID AC   1 Units at 08/01/18 1716     insulin aspart (NovoLOG) inj (RAPID ACTING)  1-5 Units Subcutaneous At Bedtime         ipratropium - albuterol 0.5 mg/2.5 mg/3 mL (DUONEB) neb solution 3 mL  3 mL Nebulization 4x daily   3 mL at 08/02/18 0746     levalbuterol (XOPENEX CONC) neb solution 1.25 mg  1.25 mg Nebulization Q4H PRN   1.25 mg at 08/01/18 0443     levothyroxine (SYNTHROID/LEVOTHROID)  tablet 100 mcg  100 mcg Oral QAM AC   100 mcg at 08/02/18 0644     melatonin tablet 1 mg  1 mg Oral At Bedtime PRN         midodrine (PROAMATINE) tablet 10 mg  10 mg Oral TID w/meals   10 mg at 08/02/18 0923     naloxone (NARCAN) injection 0.1-0.4 mg  0.1-0.4 mg Intravenous Q2 Min PRN         ondansetron (ZOFRAN-ODT) ODT tab 4 mg  4 mg Oral Q6H PRN   4 mg at 08/01/18 1100    Or     ondansetron (ZOFRAN) injection 4 mg  4 mg Intravenous Q6H PRN         pantoprazole (PROTONIX) EC tablet 40 mg  40 mg Oral QAM AC   40 mg at 08/02/18 0644     polyethylene glycol (MIRALAX/GLYCOLAX) Packet 17 g  17 g Oral Daily PRN         potassium chloride (KLOR-CON) Packet 20-40 mEq  20-40 mEq Oral or Feeding Tube Q2H PRN         potassium chloride 10 mEq in 100 mL intermittent infusion with 10 mg lidocaine  10 mEq Intravenous Q1H PRN         potassium chloride 10 mEq in 100 mL sterile water intermittent infusion (premix)  10 mEq Intravenous Q1H PRN         potassium chloride 20 mEq in 50 mL intermittent infusion  20 mEq Intravenous Q1H PRN         potassium chloride SA (K-DUR/KLOR-CON M) CR tablet 20-40 mEq  20-40 mEq Oral Q2H PRN   20 mEq at 08/01/18 0441     predniSONE (DELTASONE) tablet 40 mg  40 mg Oral Daily   40 mg at 08/02/18 0923     senna-docusate (SENOKOT-S;PERICOLACE) 8.6-50 MG per tablet 1 tablet  1 tablet Oral BID   Stopped at 07/31/18 2100    Or     senna-docusate (SENOKOT-S;PERICOLACE) 8.6-50 MG per tablet 2 tablet  2 tablet Oral BID   2 tablet at 08/02/18 0923     senna-docusate (SENOKOT-S;PERICOLACE) 8.6-50 MG per tablet 1 tablet  1 tablet Oral BID PRN        Or     senna-docusate (SENOKOT-S;PERICOLACE) 8.6-50 MG per tablet 2 tablet  2 tablet Oral BID PRN         sodium chloride (PF) 0.9% PF flush 3 mL  3 mL Intracatheter Q1H PRN         sodium chloride (PF) 0.9% PF flush 3 mL  3 mL Intracatheter Q8H   3 mL at 08/02/18 0909     Current Outpatient Prescriptions Ordered in Epic   Medication     atorvastatin (LIPITOR) 20  MG tablet     fluticasone-vilanterol (BREO ELLIPTA) 200-25 MCG/INH oral inhaler     ipratropium - albuterol 0.5 mg/2.5 mg/3 mL (DUONEB) 0.5-2.5 (3) MG/3ML neb solution     levalbuterol (XOPENEX) 1.25 MG/3ML neb solution     midodrine 10 MG TABS     predniSONE (DELTASONE) 10 MG tablet       Review of Systems   The comprehensive review of systems is negative other than noted in the assessment/plan.    Physical Exam   Temp: 97.3  F (36.3  C) Temp src: Oral BP: 93/81   Heart Rate: 86 Resp: 18 SpO2: 99 % O2 Device: None (Room air)    Vitals:    07/30/18 0700 07/31/18 0029 08/01/18 0200   Weight: 80.8 kg (178 lb 1.6 oz) 80.2 kg (176 lb 11.2 oz) 81 kg (178 lb 9.6 oz)     CONSTITUTIONAL: NAD, A&Ox3. Calm and cooperative.  HEENT: NCAT, MMM  NECK: Supple  CARDIOVASCULAR: exam deferred  RESPIRATORY: NL respiratory effort on RA  GASTROINTESTINAL: exam deferred  MUSCULOSKELETAL: No extremity edema. Moving freely in bed   SKIN: Warm and intact. No concerning lesions or rashes on exposed skin surfaces   NEUROLOGIC: Appropriately responsive during interview  PSYCH: Affect congruent    Data   Results for orders placed or performed during the hospital encounter of 07/23/18 (from the past 24 hour(s))   Glucose by meter   Result Value Ref Range    Glucose 142 (H) 70 - 99 mg/dL   Glucose by meter   Result Value Ref Range    Glucose 174 (H) 70 - 99 mg/dL   Glucose by meter   Result Value Ref Range    Glucose 115 (H) 70 - 99 mg/dL   Potassium   Result Value Ref Range    Potassium 4.1 3.4 - 5.3 mmol/L   Magnesium (AM Draw)   Result Value Ref Range    Magnesium 2.0 1.6 - 2.3 mg/dL   Lipid panel reflex to direct LDL   Result Value Ref Range    Cholesterol 107 <200 mg/dL    Triglycerides 92 <150 mg/dL    HDL Cholesterol 34 (L) >39 mg/dL    LDL Cholesterol Calculated 55 <100 mg/dL    Non HDL Cholesterol 73 <130 mg/dL   CBC with platelets   Result Value Ref Range    WBC 5.9 4.0 - 11.0 10e9/L    RBC Count 2.42 (L) 4.4 - 5.9 10e12/L    Hemoglobin  8.5 (L) 13.3 - 17.7 g/dL    Hematocrit 25.4 (L) 40.0 - 53.0 %     (H) 78 - 100 fl    MCH 35.1 (H) 26.5 - 33.0 pg    MCHC 33.5 31.5 - 36.5 g/dL    RDW 15.8 (H) 10.0 - 15.0 %    Platelet Count 139 (L) 150 - 450 10e9/L   Glucose by meter   Result Value Ref Range    Glucose 105 (H) 70 - 99 mg/dL

## 2018-08-02 NOTE — PLAN OF CARE
Problem: Patient Care Overview  Goal: Plan of Care/Patient Progress Review  Blood pressures stable today 110-120 systolic range ~ pt denies any symptoms.  Taking B/P's in left leg r/t discrepency in arms, low b/p's. Continues on Midodrine. Changed to IV antibiotics for pneumonia management.  On O2 @ 2L/NC sats mid 90's.  Harsh, occ prod cough of creamy thick phlegm.  Awaiting Kaye Scan in am and possible EGD in next few days after resolution of pneumonia. Pt alert, mostly oriented throughout day but very forgetful and repetitive with conversation.  Probable discharge to TCU in 2-3 days.   Outcome: Adequate for Discharge Date Met: 08/02/18  Pt A&O but forgetful. Soft BP but stable. On RA. Tele SR. No c/o chest pain or sob. Tolerating diet. Discharge instructions including medications and future appointments reviewed with pt and sister. All verbalized understanding of all instructions. A copy of AVS given to pt. All belongings returned. Pt discharged from floor via w/c accompanied by NA;  Sister providing transport to TCU. Discharge paperwork/envelope for TCU sent with pt.

## 2018-08-03 ENCOUNTER — AMBULATORY - HEALTHEAST (OUTPATIENT)
Dept: ADMINISTRATIVE | Facility: CLINIC | Age: 73
End: 2018-08-03

## 2018-08-03 ENCOUNTER — OFFICE VISIT - HEALTHEAST (OUTPATIENT)
Dept: GERIATRICS | Facility: CLINIC | Age: 73
End: 2018-08-03
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
## 2018-08-03 DIAGNOSIS — R44.3 HALLUCINATIONS: ICD-10-CM

## 2018-08-03 DIAGNOSIS — N39.0 URINARY TRACT INFECTION: ICD-10-CM

## 2018-08-03 DIAGNOSIS — R53.81 PHYSICAL DECONDITIONING: ICD-10-CM

## 2018-08-03 DIAGNOSIS — J18.9 PNEUMONIA: ICD-10-CM

## 2018-08-03 LAB
BACTERIA SPEC CULT: NO GROWTH
BACTERIA SPEC CULT: NO GROWTH
INTERPRETATION ECG - MUSE: NORMAL
Lab: NORMAL
Lab: NORMAL
SPECIMEN SOURCE: NORMAL
SPECIMEN SOURCE: NORMAL

## 2018-08-03 NOTE — PLAN OF CARE
Problem: Patient Care Overview  Goal: Plan of Care/Patient Progress Review  Blood pressures stable today 110-120 systolic range ~ pt denies any symptoms.  Taking B/P's in left leg r/t discrepency in arms, low b/p's. Continues on Midodrine. Changed to IV antibiotics for pneumonia management.  On O2 @ 2L/NC sats mid 90's.  Harsh, occ prod cough of creamy thick phlegm.  Awaiting Kaye Scan in am and possible EGD in next few days after resolution of pneumonia. Pt alert, mostly oriented throughout day but very forgetful and repetitive with conversation.  Probable discharge to TCU in 2-3 days.    Occupational Therapy Discharge Summary    Reason for therapy discharge:    Discharged to transitional care facility.    Progress towards therapy goal(s). See goals on Care Plan in [facility] electronic health record for goal details.  Goals partially met.  Barriers to achieving goals:   discharge from facility.    Therapy recommendation(s):    Continued therapy is recommended.  Rationale/Recommendations:  Benefit from continued therapy to address deficits in ADLs.

## 2018-08-06 ENCOUNTER — OFFICE VISIT - HEALTHEAST (OUTPATIENT)
Dept: GERIATRICS | Facility: CLINIC | Age: 73
End: 2018-08-06

## 2018-08-06 DIAGNOSIS — N18.9 CKD (CHRONIC KIDNEY DISEASE): ICD-10-CM

## 2018-08-06 DIAGNOSIS — F17.200 TOBACCO USE DISORDER: ICD-10-CM

## 2018-08-06 DIAGNOSIS — G62.89 NEUROPATHY, PERIPHERAL AXONAL: ICD-10-CM

## 2018-08-06 DIAGNOSIS — R63.4 UNINTENTIONAL WEIGHT LOSS: ICD-10-CM

## 2018-08-06 DIAGNOSIS — D47.2 MGUS (MONOCLONAL GAMMOPATHY OF UNKNOWN SIGNIFICANCE): ICD-10-CM

## 2018-08-06 DIAGNOSIS — R53.1 WEAKNESS: ICD-10-CM

## 2018-08-06 DIAGNOSIS — E03.9 HYPOTHYROID: ICD-10-CM

## 2018-08-06 DIAGNOSIS — E11.9 DM TYPE 2 (DIABETES MELLITUS, TYPE 2) (H): ICD-10-CM

## 2018-08-06 DIAGNOSIS — J44.9 COPD (CHRONIC OBSTRUCTIVE PULMONARY DISEASE) (H): ICD-10-CM

## 2018-08-06 DIAGNOSIS — K74.60 CIRRHOSIS (H): ICD-10-CM

## 2018-08-06 DIAGNOSIS — I10 HTN (HYPERTENSION): ICD-10-CM

## 2018-08-06 DIAGNOSIS — I95.9 HYPOTENSION: ICD-10-CM

## 2018-08-06 DIAGNOSIS — I71.40 AAA (ABDOMINAL AORTIC ANEURYSM) (H): ICD-10-CM

## 2018-08-08 ENCOUNTER — RECORDS - HEALTHEAST (OUTPATIENT)
Dept: LAB | Facility: CLINIC | Age: 73
End: 2018-08-08

## 2018-08-09 ENCOUNTER — OFFICE VISIT - HEALTHEAST (OUTPATIENT)
Dept: GERIATRICS | Facility: CLINIC | Age: 73
End: 2018-08-09

## 2018-08-09 DIAGNOSIS — J44.9 COPD (CHRONIC OBSTRUCTIVE PULMONARY DISEASE) (H): ICD-10-CM

## 2018-08-09 DIAGNOSIS — F10.90 ALCOHOL USE DISORDER: ICD-10-CM

## 2018-08-09 DIAGNOSIS — Z91.89 AT RISK FOR ELOPEMENT FROM HEALTHCARE SETTING: ICD-10-CM

## 2018-08-09 DIAGNOSIS — G62.89 NEUROPATHY, PERIPHERAL AXONAL: ICD-10-CM

## 2018-08-09 DIAGNOSIS — F17.200 TOBACCO DEPENDENCE: ICD-10-CM
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
## 2018-08-09 DIAGNOSIS — K74.60 CIRRHOSIS (H): ICD-10-CM

## 2018-08-09 DIAGNOSIS — R63.4 UNINTENTIONAL WEIGHT LOSS: ICD-10-CM

## 2018-08-09 DIAGNOSIS — R53.1 WEAKNESS: ICD-10-CM

## 2018-08-09 DIAGNOSIS — E11.9 DM TYPE 2 (DIABETES MELLITUS, TYPE 2) (H): ICD-10-CM

## 2018-08-09 DIAGNOSIS — I95.9 HYPOTENSION: ICD-10-CM

## 2018-08-09 LAB
ANION GAP SERPL CALCULATED.3IONS-SCNC: 6 MMOL/L (ref 5–18)
BUN SERPL-MCNC: 13 MG/DL (ref 8–28)
CALCIUM SERPL-MCNC: 10.2 MG/DL (ref 8.5–10.5)
CHLORIDE BLD-SCNC: 106 MMOL/L (ref 98–107)
CO2 SERPL-SCNC: 26 MMOL/L (ref 22–31)
CREAT SERPL-MCNC: 1.12 MG/DL (ref 0.7–1.3)
ERYTHROCYTE [DISTWIDTH] IN BLOOD BY AUTOMATED COUNT: 15.5 % (ref 11–14.5)
GFR SERPL CREATININE-BSD FRML MDRD: >60 ML/MIN/1.73M2
GLUCOSE BLD-MCNC: 97 MG/DL (ref 70–125)
HCT VFR BLD AUTO: 31.4 % (ref 40–54)
HGB BLD-MCNC: 9.9 G/DL (ref 14–18)
MCH RBC QN AUTO: 34.9 PG (ref 27–34)
MCHC RBC AUTO-ENTMCNC: 31.5 G/DL (ref 32–36)
MCV RBC AUTO: 111 FL (ref 80–100)
PLATELET # BLD AUTO: 169 THOU/UL (ref 140–440)
PMV BLD AUTO: 9.8 FL (ref 8.5–12.5)
POTASSIUM BLD-SCNC: 4 MMOL/L (ref 3.5–5)
RBC # BLD AUTO: 2.84 MILL/UL (ref 4.4–6.2)
SODIUM SERPL-SCNC: 138 MMOL/L (ref 136–145)
WBC: 8 THOU/UL (ref 4–11)

## 2018-08-13 ENCOUNTER — TRANSFERRED RECORDS (OUTPATIENT)
Dept: HEALTH INFORMATION MANAGEMENT | Facility: CLINIC | Age: 73
End: 2018-08-13

## 2018-08-13 ENCOUNTER — OFFICE VISIT - HEALTHEAST (OUTPATIENT)
Dept: GERIATRICS | Facility: CLINIC | Age: 73
End: 2018-08-13

## 2018-08-13 DIAGNOSIS — R63.4 UNINTENTIONAL WEIGHT LOSS: ICD-10-CM

## 2018-08-13 DIAGNOSIS — D47.2 MGUS (MONOCLONAL GAMMOPATHY OF UNKNOWN SIGNIFICANCE): ICD-10-CM

## 2018-08-13 DIAGNOSIS — K74.60 CIRRHOSIS (H): ICD-10-CM

## 2018-08-13 DIAGNOSIS — J44.9 COPD (CHRONIC OBSTRUCTIVE PULMONARY DISEASE) (H): ICD-10-CM
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
## 2018-08-13 DIAGNOSIS — G62.89 NEUROPATHY, PERIPHERAL AXONAL: ICD-10-CM

## 2018-08-13 DIAGNOSIS — F17.200 TOBACCO USE DISORDER: ICD-10-CM

## 2018-08-13 DIAGNOSIS — F10.90 ALCOHOL USE DISORDER: ICD-10-CM

## 2018-08-13 DIAGNOSIS — I10 HTN (HYPERTENSION): ICD-10-CM

## 2018-08-13 DIAGNOSIS — R53.1 WEAKNESS: ICD-10-CM

## 2018-08-16 ENCOUNTER — TRANSFERRED RECORDS (OUTPATIENT)
Dept: HEALTH INFORMATION MANAGEMENT | Facility: CLINIC | Age: 73
End: 2018-08-16

## 2018-08-16 ENCOUNTER — OFFICE VISIT - HEALTHEAST (OUTPATIENT)
Dept: GERIATRICS | Facility: CLINIC | Age: 73
End: 2018-08-16
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
## 2018-08-16 DIAGNOSIS — J18.9 PNEUMONIA: ICD-10-CM

## 2018-08-16 DIAGNOSIS — D47.2 MGUS (MONOCLONAL GAMMOPATHY OF UNKNOWN SIGNIFICANCE): ICD-10-CM

## 2018-08-16 DIAGNOSIS — R44.3 HALLUCINATIONS: ICD-10-CM

## 2018-08-16 DIAGNOSIS — R53.81 PHYSICAL DECONDITIONING: ICD-10-CM

## 2018-08-23 ENCOUNTER — OFFICE VISIT - HEALTHEAST (OUTPATIENT)
Dept: GERIATRICS | Facility: CLINIC | Age: 73
End: 2018-08-23
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
## 2018-08-23 DIAGNOSIS — R53.81 PHYSICAL DECONDITIONING: ICD-10-CM

## 2018-08-28 ENCOUNTER — OFFICE VISIT - HEALTHEAST (OUTPATIENT)
Dept: GERIATRICS | Facility: CLINIC | Age: 73
End: 2018-08-28

## 2018-08-28 DIAGNOSIS — R63.4 UNINTENTIONAL WEIGHT LOSS: ICD-10-CM

## 2018-08-28 DIAGNOSIS — K74.60 CIRRHOSIS (H): ICD-10-CM

## 2018-08-28 DIAGNOSIS — D47.2 MGUS (MONOCLONAL GAMMOPATHY OF UNKNOWN SIGNIFICANCE): ICD-10-CM

## 2018-08-28 DIAGNOSIS — J44.9 COPD (CHRONIC OBSTRUCTIVE PULMONARY DISEASE) (H): ICD-10-CM

## 2018-08-28 DIAGNOSIS — G62.89 NEUROPATHY, PERIPHERAL AXONAL: ICD-10-CM

## 2018-08-28 DIAGNOSIS — F10.90 ALCOHOL USE DISORDER: ICD-10-CM

## 2018-08-28 DIAGNOSIS — R53.1 WEAKNESS: ICD-10-CM

## 2018-08-28 DIAGNOSIS — F17.200 TOBACCO USE DISORDER: ICD-10-CM
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
## 2018-08-30 ENCOUNTER — RECORDS - HEALTHEAST (OUTPATIENT)
Dept: LAB | Facility: CLINIC | Age: 73
End: 2018-08-30

## 2018-08-31 LAB
ALBUMIN SERPL-MCNC: 2.4 G/DL (ref 3.5–5)
ALP SERPL-CCNC: 92 U/L (ref 45–120)
ALT SERPL W P-5'-P-CCNC: 9 U/L (ref 0–45)
ANION GAP SERPL CALCULATED.3IONS-SCNC: 6 MMOL/L (ref 5–18)
AST SERPL W P-5'-P-CCNC: 31 U/L (ref 0–40)
BILIRUB SERPL-MCNC: 0.6 MG/DL (ref 0–1)
BUN SERPL-MCNC: 11 MG/DL (ref 8–28)
CALCIUM SERPL-MCNC: 9.4 MG/DL (ref 8.5–10.5)
CHLORIDE BLD-SCNC: 108 MMOL/L (ref 98–107)
CO2 SERPL-SCNC: 22 MMOL/L (ref 22–31)
CREAT SERPL-MCNC: 0.87 MG/DL (ref 0.7–1.3)
GFR SERPL CREATININE-BSD FRML MDRD: >60 ML/MIN/1.73M2
GLUCOSE BLD-MCNC: 93 MG/DL (ref 70–125)
POTASSIUM BLD-SCNC: 4.1 MMOL/L (ref 3.5–5)
PROT SERPL-MCNC: 5.8 G/DL (ref 6–8)
SODIUM SERPL-SCNC: 136 MMOL/L (ref 136–145)

## 2018-09-04 ENCOUNTER — OFFICE VISIT - HEALTHEAST (OUTPATIENT)
Dept: GERIATRICS | Facility: CLINIC | Age: 73
End: 2018-09-04

## 2018-09-04 ENCOUNTER — TRANSFERRED RECORDS (OUTPATIENT)
Dept: HEALTH INFORMATION MANAGEMENT | Facility: CLINIC | Age: 73
End: 2018-09-04

## 2018-09-04 DIAGNOSIS — R53.1 WEAKNESS: ICD-10-CM

## 2018-09-04 DIAGNOSIS — I10 HTN (HYPERTENSION): ICD-10-CM

## 2018-09-04 DIAGNOSIS — F17.200 TOBACCO USE DISORDER: ICD-10-CM

## 2018-09-04 DIAGNOSIS — I95.89 OTHER SPECIFIED HYPOTENSION: ICD-10-CM

## 2018-09-04 DIAGNOSIS — K74.60 CIRRHOSIS (H): ICD-10-CM

## 2018-09-04 DIAGNOSIS — N18.9 CKD (CHRONIC KIDNEY DISEASE): ICD-10-CM

## 2018-09-04 DIAGNOSIS — E03.9 HYPOTHYROID: ICD-10-CM

## 2018-09-04 DIAGNOSIS — F10.90 ALCOHOL USE DISORDER: ICD-10-CM

## 2018-09-04 DIAGNOSIS — D47.2 MGUS (MONOCLONAL GAMMOPATHY OF UNKNOWN SIGNIFICANCE): ICD-10-CM

## 2018-09-04 DIAGNOSIS — E11.9 DM2 (DIABETES MELLITUS, TYPE 2) (H): ICD-10-CM

## 2018-09-04 DIAGNOSIS — R63.4 UNINTENTIONAL WEIGHT LOSS: ICD-10-CM

## 2018-09-04 DIAGNOSIS — J44.9 COPD (CHRONIC OBSTRUCTIVE PULMONARY DISEASE) (H): ICD-10-CM

## 2018-09-07 ENCOUNTER — AMBULATORY - HEALTHEAST (OUTPATIENT)
Dept: GERIATRICS | Facility: CLINIC | Age: 73
End: 2018-09-07

## 2018-09-21 ENCOUNTER — PATIENT OUTREACH (OUTPATIENT)
Dept: CARE COORDINATION | Facility: CLINIC | Age: 73
End: 2018-09-21

## 2018-09-21 NOTE — PROGRESS NOTES
Clinic Care Coordination Contact    Situation: Patient chart reviewed by care coordinator.    Background: SW CC following pt after discharging from [facility].     Assessment: Pt has established care with [facility].    Plan/Recommendations: No further outreaches will be made at this time unless a new referral is made or a change in the pt's status occurs.     AGNIESZKA Man  Clinic Care Coordinator  [phone]  [email or web address]

## 2018-11-12 DIAGNOSIS — I10 ESSENTIAL HYPERTENSION WITH GOAL BLOOD PRESSURE LESS THAN 130/85: ICD-10-CM

## 2019-04-16 NOTE — PROGRESS NOTES
" 07/25/18 1400   Quick Adds   Type of Visit Initial PT Evaluation   Living Environment   Lives With alone   Living Arrangements apartment   Home Accessibility bed and bath on same level   Number of Stairs to Enter Home 11  (B railings)   Number of Stairs Within Home 0   Transportation Available car   Living Environment Comment Sister is available as needed (retired)   Self-Care   Dominant Hand right   Usual Activity Tolerance good   Current Activity Tolerance fair   Regular Exercise no   Equipment Currently Used at Home walker, rolling;cane, straight;shower chair   Activity/Exercise/Self-Care Comment Pt states he does not typically use AD, but owns SEC/FWW/wheelchair, states he uses cane most of any.Pt does his own \"water aerobics\" in his apartment pool; states their are also swim aerobic classes that come to his apartment   Functional Level Prior   Ambulation 0-->independent   Transferring 0-->independent   Toileting 0-->independent   Bathing 1-->assistive equipment  (uses occasionally)   Dressing 0-->independent   Eating 0-->independent   Communication 0-->understands/communicates without difficulty   Swallowing 0-->swallows foods/liquids without difficulty   Cognition 0 - no cognition issues reported   Fall history within last six months yes   Number of times patient has fallen within last six months 12  (d/t dizziness per pt report)   Which of the above functional risks had a recent onset or change? ambulation;transferring;fall history   General Information   Onset of Illness/Injury or Date of Surgery - Date 07/23/18   Referring Physician Nirmal Laird, DO   Patient/Family Goals Statement To go home   Pertinent History of Current Problem (include personal factors and/or comorbidities that impact the POC) Pt is a 71yo male admitted for evaluation of weakness, lightheadedness, recurrent falls, and unintentional weight loss with findings of abnormal UA and SOHEILA.    Precautions/Limitations fall precautions "   General Observations Pt setting off bed alarm to sit EOB upon arrival of PT; pt with IV, PCDs   General Info Comments ACtivity: up with assist   Cognitive Status Examination   Orientation orientation to person, place and time   Level of Consciousness alert   Follows Commands and Answers Questions 100% of the time;able to follow single-step instructions   Personal Safety and Judgment at risk behaviors demonstrated   Cognitive Comment Slow to respond at times, giving contradictory information at times   Pain Assessment   Patient Currently in Pain No   Integumentary/Edema   Integumentary/Edema no deficits were identifed   Posture    Posture Not impaired   Range of Motion (ROM)   ROM Comment BLE WNL with AROM   Strength   Strength Comments BLE WFL for mobility, appears generally weak and deconditioned   Bed Mobility   Bed Mobility Comments SBA supine>Sit   Transfer Skills   Transfer Comments CGA sit>Stand to FWW   Gait   Gait Comments CGA 10' with FWW, forward flexed posture with moderate WB through UEs, unsteady   Balance   Balance Comments Good at EOB   Sensory Examination   Sensory Perception Comments Reports baseline neuropathy   General Therapy Interventions   Planned Therapy Interventions balance training;bed mobility training;gait training;neuromuscular re-education;ROM;strengthening;stretching;transfer training;home program guidelines;progressive activity/exercise   Clinical Impression   Criteria for Skilled Therapeutic Intervention yes, treatment indicated   PT Diagnosis Difficulty with gait   Influenced by the following impairments Impaired balance, weakness, decreased activity tolerance   Functional limitations due to impairments Decreased safety and IND with functional transfers, gait, stairs   Clinical Presentation Evolving/Changing   Clinical Presentation Rationale continues to have positive orthostatic BP   Clinical Decision Making (Complexity) Low complexity   Therapy Frequency` daily   Predicted  "Duration of Therapy Intervention (days/wks) 4 days   Anticipated Discharge Disposition Transitional Care Facility;Home with Home Therapy   Risk & Benefits of therapy have been explained Yes   Patient, Family & other staff in agreement with plan of care Yes   [facility] TM \"6 Clicks\"   2016, Trustees of [facility], under license to [employer].  All rights reserved.   6 Clicks Short Forms Basic Mobility Inpatient Short Form   [facility]  \"6 Clicks\" V.2 Basic Mobility Inpatient Short Form   1. Turning from your back to your side while in a flat bed without using bedrails? 4 - None   2. Moving from lying on your back to sitting on the side of a flat bed without using bedrails? 3 - A Little   3. Moving to and from a bed to a chair (including a wheelchair)? 3 - A Little   4. Standing up from a chair using your arms (e.g., wheelchair, or bedside chair)? 3 - A Little   5. To walk in hospital room? 3 - A Little   6. Climbing 3-5 steps with a railing? 2 - A Lot   Basic Mobility Raw Score (Score out of 24.Lower scores equate to lower levels of function) 18   Total Evaluation Time   Total Evaluation Time (Minutes) 10     " yes

## 2019-07-20 NOTE — TELEPHONE ENCOUNTER
"Requested Prescriptions   Pending Prescriptions Disp Refills     omeprazole (PRILOSEC) 20 MG CR capsule  Last Written Prescription Date:  07/19/2017  Last Fill Quantity: 90,  # refills: 3   Last office visit: 7/23/2018 with prescribing provider: PAVEL    Future Office Visit:     90 capsule 3     Sig: Take 1 capsule (20 mg) by mouth daily    PPI Protocol Passed    11/12/2018  4:24 PM       Passed - Not on Clopidogrel (unless Pantoprazole ordered)       Passed - No diagnosis of osteoporosis on record       Passed - Recent (12 mo) or future (30 days) visit within the authorizing provider's specialty    Patient had office visit in the last 12 months or has a visit in the next 30 days with authorizing provider or within the authorizing provider's specialty.  See \"Patient Info\" tab in inbasket, or \"Choose Columns\" in Meds & Orders section of the refill encounter.             Passed - Patient is age 18 or older          "
Prescription approved per [facility] Refill Protocol.  Darling Morelos RN- Triage [employer]    
RX last written by Dr Tapia  
Never

## 2021-06-01 VITALS — WEIGHT: 177.4 LBS | BODY MASS INDEX: 23.41 KG/M2

## 2021-06-02 VITALS — BODY MASS INDEX: 23.85 KG/M2 | WEIGHT: 180.8 LBS

## 2021-06-19 NOTE — PROGRESS NOTES
[facility]    Facility:   [facility] [[id number]]   Code Status: POLST AVAILABLE      CHIEF COMPLAINT/REASON FOR VISIT:  Chief Complaint   Patient presents with     Review Of Multiple Medical Conditions       HISTORY:      HPI: Abebe is a 72 y.o. male with multiple medical problems outlined in past medical history table below presenting to [facility]CU for rehabilitation s/p hospitalization at [facility] on 7/23/2018 for weakness, falls, lightheadedness and was treated for pneumonia and a UTI. He was discharged to the TCU on 8/2/2018 . His hospitalization has been summarized in previous notes.     Today Mr. Christensen is being evaluated for a review of multiple medical problems while in the TCU. He states he has been doing fairly well. He does not have any complaints or concerns to discuss today. He has been participating in PT/OT and those therapies have been going fairly well per his report. He denies any other concerns including fevers/chills, cough or cold symptoms, headaches, vision changes, chest pain/pressure, difficulty breathing, SOB, abdominal pain, nausea, vomiting, diarrhea, dysuria, increasing weakness, increasing pain.     Past Medical History:   Diagnosis Date     AAA (abdominal aortic aneurysm) (H): infranrenal      Abnormal urinalysis      SOHEILA (acute kidney injury) (H)      Cirrhosis (H)      CKD (chronic kidney disease)      COPD (chronic obstructive pulmonary disease) (H)      Falls      GERD (gastroesophageal reflux disease)      HTN (hypertension)      Hyperlipidemia      Hypotension      Hypothyroid      Lightheaded      MGUS (monoclonal gammopathy of unknown significance)      Neuropathy, peripheral axonal      Renal insufficiency      Tobacco use disorder      Unintentional weight loss      Weakness              Family History   Problem Relation Age of Onset     Other Mother      eye disorder     Colon cancer Father      Social History     Social  History     Marital status: Single     Spouse name: N/A     Number of children: N/A     Years of education: N/A     Social History Main Topics     Smoking status: Current Every Day Smoker     Packs/day: 0.50     Types: Cigarettes     Smokeless tobacco: Never Used     Alcohol use Yes      Comment: daily     Drug use: Not on file     Sexual activity: Not on file     Other Topics Concern     Not on file     Social History Narrative       REVIEW OF SYSTEM:  Pertinent items are noted in HPI.    PHYSICAL EXAM:   General appearance: alert, appears stated age and cooperative  HEENT: Head is normocephalic with normal hair distribution. No evidence of trauma. Ears: Without lesions or deformity. No acute purulent discharge. Eyes: Conjunctivae pink with no scleral icterus or erythema. Nose: Normal mucosa and septum. Oropharnyx: mmm, no lesions present.  Lungs: clear to auscultation bilaterally  Heart: regular rate and rhythm, S1, S2 normal, no murmur, click, rub or gallop  Abdomen: soft, non-tender; bowel sounds normal; no masses,  no organomegaly  Extremities: extremities normal, atraumatic, no cyanosis or edema  Pulses: 2+ and symmetric  Skin: Skin color, texture, turgor normal. No rashes or lesions  Neurologic: Grossly normal   Psych: interacts well with caregivers, does exhibit logical thought processes and connections, pleasant      LABS:   None today.     ASSESSMENT:      ICD-10-CM    1. Physical deconditioning R53.81    2. MGUS (monoclonal gammopathy of unknown significance) D47.2    3. Pneumonia J18.9    4. Fall, subsequent encounter W19.XXXD    5. Hallucinations R44.3        PLAN:    Physical Deconditioning due to Falls, Hallucinations, s/p Pneumonia, s/p UTI  -Continue PT/OT and other therapies as per care plan.  -Encouraged good nutrition and movement habits.   -Discussed care plan and expected course of stay.   -Continue to follow-up per routine schedule or sooner if needed.     Care plan evaluated and current care  plan remains appropriate.     Otherwise continue current care plan for all other chronic medical conditions, as they are stable. Encouraged patient to engage in healthy lifestyle behaviors such as engaging in social activities, exercising (PT/OT), eating well, and following care plan. Follow up for routine check-up, or sooner if needed. Will continue to monitor patient and work with nursing staff collaboratively to work toward positive patient outcomes.    Electronically signed by: Dennise Toscano CNP

## 2021-06-19 NOTE — PROGRESS NOTES
[facility]      Facility:    [facility] [[id number]]    Code Status: FULL CODE   [facility]  : 7/23  -  8/2/18      Chief Complaint/Reason for Visit:  Chief Complaint   Patient presents with     Review Of Multiple Medical Conditions     elopement with fall / alcohol abuse       HPI:   Abebe is a 72 y.o. male with a past medical history of hypertension, diabetes type 2, cirrhosis with steatohepatitis ,GERD, chronic kidney disease stage III, MGUS, hyperlipidemia hypothyroid, infrarenal aortic aneurysm     Abebe is alone, but his sister and niece say his overall health has been deteriorating over the past year, they estimate 100 pounds of unintentional weight loss.  He is very unsteady with ambulating, has difficulty going up 2 flights of stairs to his second floor apartment.  Has had dizziness for months. Saw his PCP, BP was 80/40, and was sent to [facility].  CT of the head and head MRI without acute hemorrhage or infarct.  MRI showed atrophy of the brain.  Sister felt his speech was different.  Discharge summary recommends neuropsychometric testing for possible underlying dementia.    Hospital issues:    Generalized weakness: Weight loss, suspected malignancy.  He has an oncology follow-up on 9/4/18    - possible pneumonia and acute possible COPD exacerbation: He was started on duo nebs, and given a prednisone burst, Brio Ellipta inhaler was added.  He did complete treatment for pneumonia prior to discharge    -He had a mild elevated troponin: Serial troponins were mildly elevated but remained flat, Lexiscan was done and was nondiagnostic, echocardiogram showed normal EF.  Cardiology recommended conservative management as he is asymptomatic.    - low blood pressures:  He had orthostatic hypotension and persistent hypotension: He had over 3 L of IV fluid, his medications that he was on for blood pressure prior to admission were held (Cartia   mg, Prinzide 20/12.5 mg.  He actually was started on Midrin to help with his hypotension.    -He had acute metabolic encephalopathy with a couple episodes of delirium: Infectious workup was negative blood cultures were no growth, urine culture with mixed seda, chest x-ray questionable pneumonia but otherwise unremarkable.  Lumbar x-ray unremarkable, CT of the abdomen and pelvis unremarkable although there is trace fluid and possible capsular implant seen anterior laterally on the liver.    -He had chronic kidney disease 3 with acute kidney injury on top of it.  His creatinine on admission was 1.84, was down to 1.17 after the IV fluids.  He likewise had had a low sodium on admission 130, had improved with the IV fluids and hydration.    -Myeloproliferative disorder diagnosed in 2002: History of omental lesions concerning for peritoneal metastases although exploratory laparoscopy a year ago did not showed malignant disease.  Now with unintentional weight loss  20-30 pounds per clinic notes, not the 100 family estimated) multiple did see him in consult, they feel his myeloproliferative neoplasm has progressed moderately with anemia, he could be considered for Aranesp, possible consideration of an outpatient bone marrow but they recommended follow-up as an outpatient.     -elevated BNP but the echo was normal, he appeared to be dry was not felt to be CHF.    -Diabetes type 2: He was on metformin prior to admission, hemoglobin A1c was 4.0 on 7/23/18.  Hospital discontinued metformin.    -Cirrhosis: He did have a wedge biopsy in 2017 showing advanced chronic liver disease stage IV cirrhosis with steatohepatitis    -He had severe malnutrition    Transferred to [facility] for therapy, however I have concerns about his ability to function at home, he should not be driving, and will need physical therapy, Occupational Therapy, speech language pathology to evaluate him for cognitive and physical status.    UPDATE:  Last  "night, 8/8/18, he took off his wander guard, left the building, walked  to a retaining wall at the next door assisted living.  He was sitting on the wall and lost his balance and fell onto his left side.  He told the staff when they found him that he was looking for the [employer] where he wanted to have \"night cap\".  This occurred at 1 AM on 8/8.  He had some superficial injuries, neuro checks have been normal.  Under guard was reapplied to his ankle, his room was changed further away from the exit to the outside.    He and I had a 40 minute discussion today face-to-face:  -EtOH: States he starts drinking about 7 PM at home, he wouldn't be specific about the amount of intake but his preferred drink is vodka.  He does not think he was craving but he had no awareness of the danger of leaving in the dark.  He does not remember why he lost his balance and fell.  He says he has not been in treatment before, but his niece had said he had been drinking and doing drugs when he was a [place] vet, and has been a drinker ever since. We talked about the options eg. AA, treatment if he were interested in help with abstaining from alcohol, but he indicated he was not ready to quit at this time. We discussed benzodiazepines: he was willing to try one in the evening to see if it helped decrease his evening cravings. Will add Thiamine as well.  -Tobacco: He states he smokes at least 1 ppd  of [place]. Stated he was willing to try a nicotine patch, did not want a nasal spray or gum. Again, not interested in smoking cessation at this time.  - DC planning: he thinks moving to an apartment with an elevator would be a good idea, but not MARK. I brought up that he should  give up driving due to his dizziness and gait instability, but he thinks he drives well.      Past Medical History:  Past Medical History:   Diagnosis Date     AAA (abdominal aortic aneurysm) (H): infranrenal      Abnormal urinalysis      SOHEILA (acute " kidney injury) (H)      Cirrhosis (H)      CKD (chronic kidney disease)      COPD (chronic obstructive pulmonary disease) (H)      Falls      GERD (gastroesophageal reflux disease)      HTN (hypertension)      Hyperlipidemia      Hypotension      Hypothyroid      Lightheaded      MGUS (monoclonal gammopathy of unknown significance)      Neuropathy, peripheral axonal      Renal insufficiency      Tobacco use disorder      Unintentional weight loss      Weakness            Surgical History:  Past Surgical History:   Procedure Laterality Date     EXPLORATORY LAPAROTOMY  10/03/2017     EYE SURGERY  2008    cataract and glaucoma     laparoscopic herniorrhaphy umbilical  10/03/2017     laparscopic biopsy liver   10/03/2017     OMENTECTOMY  10/03/2017              Review of Systems     Bruises are achy, doesn't feel dizziness contributed to his fall      He says he does not have diabetic neuropathy, but another name which he does not recall (medical record indicates peripheral axonal neuropathy diagnosed in 2017)      Blood pressure 126/70, pulse 90, temperature 97.4  F (36.3  C), resp. rate 18, SpO2 97 %.          Physical Exam      Constitutional:  Elderly  male, relaxed   Left eye deviated temporally   Cardiovascular: Regular rhythm and normal heart sounds.    No murmur heard.  Pulmonary/Chest: Breath sounds normal. He has no wheezes. He has no rales.   Still very decreased and distant breath sounds bilaterally   Abdominal: Soft. Bowel sounds are normal. There is no tenderness.   Musculoskeletal: He exhibits no tenderness or deformity.   2+ edema from mid calf distally    Neurological: He exhibits normal muscle tone. Coordination normal.   Word finding difficulties  Romberg test was normal  Finger-nose test normal with his eyes closed  No tremors   Skin: Skin is warm and dry. No pallor.   Scrapes on his right elbow, left forehead with some bruising, left shoulder sscratches  3 Linear scratches on his right  posterior rib cage (admits to scratching)   Psychiatric: He has a normal mood and affect. Thought content normal.   Not aware of his deficits ( vs denial). Very talkative today           No Known Allergies    Medication List:  Current Outpatient Prescriptions   Medication Sig     LORazepam (ATIVAN) 1 MG tablet Take 1 mg by mouth every evening. 20:00 h when he is used to drinking at home     nicotine (NICODERM CQ) 21 mg/24 hr Place 1 patch on the skin daily.     thiamine 100 MG tablet Take 100 mg by mouth daily.     atorvastatin (LIPITOR) 20 MG tablet Take 20 mg by mouth at bedtime.     cholecalciferol, vitamin D3, 1,000 unit tablet Take 2,000 Units by mouth daily.     dorzolamide-timolol, PF, 2-0.5 % Dpet Administer 1 drop to both eyes 2 (two) times a day.     fluticasone-vilanterol (BREO ELLIPTA) 200-25 mcg/dose DsDv inhaler Inhale 1 puff daily.     folic acid (FOLVITE) 1 MG tablet Take 1 mg by mouth daily.     ipratropium-albuterol (DUO-NEB) 0.5-2.5 mg/3 mL nebulizer 3 mL 4 (four) times a day.     levalbuterol (XOPENEX) 1.25 mg/3 mL nebulizer solution Take 1 ampule by nebulization every 4 (four) hours as needed for wheezing.     levothyroxine (SYNTHROID, LEVOTHROID) 100 MCG tablet Take 100 mcg by mouth daily.     metFORMIN (GLUCOPHAGE) 500 MG tablet Take 500 mg by mouth daily.     midodrine (PROAMATINE) 10 MG tablet Take 10 mg by mouth 3 (three) times a day.     omeprazole (PRILOSEC OTC) 20 MG tablet Take 20 mg by mouth daily before breakfast.     pneumococcal vaccine (PNEUMOVAX) 25 mcg/0.5 mL Syrg injection Inject into the shoulder, thigh, or buttocks once. 0.5 ml     predniSONE (DELTASONE) 10 mg tablet Take 40 mg by mouth daily. For 3 days  8/3-8/5/18     predniSONE (DELTASONE) 10 mg tablet Take 30 mg by mouth daily. For 3 days  8/6-8/8/18     predniSONE (DELTASONE) 10 mg tablet Take 20 mg by mouth daily. For 3 days  8/9-8/11/18     predniSONE (DELTASONE) 10 mg tablet Take 10 mg by mouth daily. For 3  days  8/12-8/14/18     travoprost (TRAVATAN Z) 0.004 % ophthalmic drops Administer 1 drop to both eyes at bedtime.       Labs:      Basic metabolic panel (07/29/2018 5:35 AM)  Basic metabolic panel (07/29/2018 5:35 AM)   Component Value Ref Range   Sodium 138 133 - 144 mmol/L   Potassium 3.6 3.4 - 5.3 mmol/L   Chloride 108 94 - 109 mmol/L   Carbon Dioxide 22 20 - 32 mmol/L   Anion Gap 8 3 - 14 mmol/L   Glucose 108 (H) 70 - 99 mg/dL   Urea Nitrogen 9 7 - 30 mg/dL   Creatinine 1.16 0.66 - 1.25 mg/dL   GFR Estimate 62Comment: Non  GFR Calc >60 mL/min/1.7m2   GFR Estimate If Black 75Comment:  GFR Calc >60 mL/min/1.7m2   Calcium 7.9 (L) 8.5 - 10.1 mg/dL     WBC 5.9, hemoglobin 8.5, , RDW 15.8, platelet count 130 9K  Albumin 2.7  Glucose is 105-170    Assessment /Plan:      ICD-10-CM    1. Fall (on)(from) escalator, subsequent encounter W10.0XXD    2. At risk for elopement from healthcare setting Z91.89    3. Weakness R53.1    4. Hypotension I95.9    5. Alcohol use disorder (H) F10.99    6. Cirrhosis (H) K74.60    7. Unintentional weight loss R63.4    8. DM type 2 (diabetes mellitus, type 2) (H) E11.9    9. COPD (chronic obstructive pulmonary disease) (H) J44.9    10. Tobacco dependence F17.200    11. Neuropathy, peripheral axonal G60.8              Total time 50 min,> 50 % with pt as above discussion and teaching about drugs newly prescribed today hospital findings and coordinating care plan        Electronically signed by: Amber Fortune MD

## 2021-06-19 NOTE — PROGRESS NOTES
"[facility]    Facility:   [facility] [[id number]]   Code Status: POLST AVAILABLE      CHIEF COMPLAINT/REASON FOR VISIT:  Chief Complaint   Patient presents with     Review Of Multiple Medical Conditions     physical deconditioning, falls, hallucinations, pneumonia, UTI       HISTORY:      HPI: Abebe is a 72 y.o. male with multiple medical problems outlined in past medical history table below presenting to [facility] for rehabilitation s/p hospitalization at [facility] on 7/23/2018 for weakness, falls, lightheadedness and was treated for pneumonia and a UTI. He was discharged to the TCU on 8/2/2018 . His hospitalization has been summarized as follows from the discharging provider:     \"Date of Admission: 7/23/2018  Date of Discharge: 8/2/2018    Discharge Diagnoses/Problem Oriented Hospital Course (Providers):   Abebe Christensen was admitted on 7/23/2018 by Nirmal Laird DO and I would refer you to their history and physical. The following problems were addressed during his hospitalization:  Abebe Christensen is a 72 year old male, admitted on 7/23/2018,  with PMHx of CKD III, MGUS, HTN, hyperlipidemia, and GERD who presented to the ED on 7/23/18 with weakness, recurrent falls, lightheadness, and unintentional weight loss with findings of abnormal UA and SOHEILA. Registered under inpatient status for further evaluation and treatment.     Generalized weakness/memory issues/hallucinations the last 2 nights   Multifactorial in the context of below. Pt lives alone in an apartment. Recently purchased a walker/cane. No elevator in apartment.   --- PT and OT to assess, recommending TCU at discharge which patient is agreeable to  -- pt did fall at home and hit head, he has a laceration on the back of his head  -he is weak noni legs and also right arm  -sister here 7/27 and she says that pt has different speech that prior  -patient has had hallucinations night on 7/25 and " 7/26  - head CT and head MRI done last night without acute hemorrhage or infarct  MRI of the brain showed atrophy of the brain for possible underlying dementia contributing to the confusion and memory issues  Needs outpatient evaluation by neuropsych testing for possible underlying dementia     Probable pneumonia with possible acute COPD exacerbation   Patient is wheezing today  He has a 30 year history of smoking  Started him on duo nebs 4 times daily  Prednisone 40 mg p.o. daily to help with the wheezing  Breo Ellipta inhaler added to help with the COPD  CXR on 7/28 w/ mild infiltrate and/or atelectasis right base. Pulm vascularity WNL  -due to his sx of cough, looks more SOB and CXR change am going to start abx  -since he has been in the hospital will start zosyn for possibe HCAP   -BC x3 remain negative  -afebrile, WBC normal  -tobacco abuse  -sputum for gram stain and culture showed greater than 10 squamous cells consistent with oral contamination  Shortness of breath improved today   Switched IV Zosyn to oral Augmentin on July 31. Completed 7 day course of antibiotics during this hospitalization. So Antibiotics were discontinued on discharge  Needs outpatient pulmonary function test     Mild elevated troponin  Cardiology consulted and follow on the following  Elevated troponin is thought to be due to demand ischemia in the setting of pneumonia and hypertension  -EKG done for the tachycardia showed sinus tachycardia but also possible ischemia with TWI inferiorly and anterolaterally  -due to EKG, ordered troponin which was elevated at 0.148  Serial troponins remained flat  -pt denies chest pain  Lexiscan stress test was done and was nondiagnostic  Echocardiogram showed normal EF  Cardiology is recommending conservative management as patient is asymptomatic and has multiple comorbid issues  We will check a fasting LPA tomorrow and start him on Lipitor 20 mg p.o. Daily  Unable to start beta-blocker due to low  blood pressure issues  No aspirin due to thrombocytopenia and risk of bleeding with anemia  Nonsustained ventricular tachycardia;  He had a run of nonsustained ventricular tachycardia on July 31 overnight. He was asymptomatic during the episode. Electrolytes and magnesium were checked and magnesium was low at 1.6 so replacement was started  Cardiology is aware of the ventricular tachycardia and recommending conservative management     Orthostatic hypotension and persistent hypotension due to blood pressure being low in the arms due to peripheral arterial disease with chronic smoking  History of benign essential hypertension: Positive on admission. Likely secondary to SOHEILA below with ongoing PTA medication administration including BP meds, poor PO intake. Echocardiogram with EF 60-65%, no RWMA, mild valvular aortic stenosis   - Has received total of 3500 L IVF bolus plus maintenance fluids  - Continue to hold PTA BP meds (Cartia  mg po every day, Prinzide 20/12.5 mg po every day--last dose 0830 on 7/23/18)  - Encourage adequate PO intake   - saline locked 7/27  Patient was given so far multiple liters of fluids and was short of breath overnight on 729 overnight and needed 20 mg of Lasix IV  He was started on Midodrin 10 mg p.o. 3 times daily to help with the hypotension  Blood pressure when checked in his left thigh is in the systolics in the 110s today  All blood pressure measurement should be done on left thigh      Acute metabolic encephalopathy, transient: Episode of delirium reported 2 nights ago  per nursing staff and had some hallucinations last 2 nights   Infectious work-up negative with BC NGTD, Urine culture with urogenital seda. CXR unremarkable. Lumbar XR unremarkable. CT A/P unremarkable, comment on trace fluid and/or capsular implants seen anterolaterally in the liver. WBC WNL. Afebrile.   -- Monitor   -- Delirium prevention protocol in place   -workup of CT of head and MRI of brain did not show  anything acute  -recommend cognitive eval as outpatient     SOHEILA on CKD III, resolved: Baseline creatinine appears to be 1.1-1.2. Creatinine on admission 1.84>1.46>1.36>1.19>1.17  after IVF resuscitation. Likely related to poor PO intake in the context of nausea for the past 1-2 weeks.   Creatinine is stable      Hyponatremia, resolved: Sodium 130 on admission, 137 on repeat.   Sodium  141  -- Monitor       Abnormal UA: UA with large LE. Asymptomatic; denies dysuria or increased frequency  - IV ceftriaxone empirically on admission, discontinued 7/25 as urine cultures grew urogenital seda.   Completed course of antibiotics during this hospitalization for pneumonia which would cover for a UTI as well      Myeloproliferative disorder (2002)  History of omental lesions concerning for peritoneal mets, s/p exploratory lap  Unintentional weight loss (20-30 lbs in the last year per review of clinic notes): [facility] and PET form 2017 showed moderate omental thickening and nodularity with low metabolic activity, moderate abdominal and pelvic ascited with intermediate metabolic activity. Pt had a lap in 10/2017 which showed cirrhosis, no malignancy. CT A/P on admission showing marked improvement in ascites form prior imaging. Trace fluid and/or capsular implants seen anterolaterally in the liver, question minimal serosal implants vs fluid adjacent to the liver, otherwise no definite omental lesions demonstrated. B12 392, Folate 23.3. XR Lumbar negative.   -- [facility] consulted, see formal note by Dr. Ames--myeloproliferative neoplasm appears to be progressed moderately with anemia. Consider aranesp if he continues to have kidney dysfunction and anemia to help with improvement in hemoglobin and associated fatigue, consider repeating bone marrow bx depending on clinical progress (outpatient)  -- Regarding malignancy w/u recommended per Oncology: EGD 6/21/17 with normal esophagus, medium hiatal hernia,  "normal mucosa, possible gastritis. Colonoscopy 6/21/17 with diverticulosis of the sigmoid colon, remainder of exam reported at normal. Consider low dose CT scan of chest given tobacco hx, but this can be done in the outpatient setting   -- Care Coordinator to help facilitate Oncology follow-up at discharge  Oncology with [facility] following and they are recommending follow-up with Dr. Bunn with CBC in 2 weeks after discharge         Macrocytic anemia, acute   Thrombocytopenia: Platelets 172>123>142. Baseline hemoglobin in the last year has actually been around 13-14. B12 and folate WNL.   -- No active signs of bleeding, monitor   -- Heme/Onc following-appreciate assistance   hgb at 8.2 today  No Esophageal varices on prior EGD done in June 2017  No sign of active bleeding so no need for EGD at this point per GI  Continue PPI once daily      Elevated BNP: 1931 on admission. Pt is euvolemic on exam. Denies chest pain, OGMEZ, SOB.   -- Echo with EF 60-65%, no RWMA, mild valvular aortic stenosis   -- Monitor I&Os, daily weights      NIDDM, controlled  Peripheral neuropathy: Maintained on Metformin 500 mg po every day PTA. A1C 4.0 on 7/23/18. Describes bilateral neuropathy. Previously assessed by Neurologist and diagnosed with neuropathy. Not currently taking medication for this. B12 and folate WNL.   -- PTA Metformin on hold and to be discontinued at discharge indefinitely based on A1C and sugars during this hospitalization   -- Medium dose sliding scale insulin ordered   -- BS per protocol   -- Monitor for hypoglycemia   -- Consider starting gabapentin once SOHEILA resolved.    Cirrhosis: Prior hx of ascites with paracentesis in 8/2017 with negative cytology, 950 ccs of fluid removed. Pathology showing advanced chronic liver disease (stage IV \"cirrhosis\" with steatohepatitis. AST 51, ALT 11)   -- Defer further monitoring to outpatient setting       Hypothyroidism: TSH WNL. Continue PTA Synthroid       GERD: Continue PTA " "Prilosec       History of infrarenal AA: Measuring at 3 cm on CT. Stable.       Elevated AST: 51>57>61. Bili 0.3. No abdominal pain.   -- Monitor     Alcohol use disorder: Drinks one mixed drink containing 1 shot of vodka daily. Denies history of withdrawal symptoms including withdrawal seizure.   -- Monitor for signs of withdrawal      Tobacco use: 40 pack year hx.   -- Nicotine replacement available if needed       Severe malnutrition in the context of chronic illness  Hypoalbuminemia   Unintentional weight loss (20-30 lbs in the last year per review of clinic notes): % Weight Loss:  > 7.5% in 3 months (severe malnutrition)  % Intake:  </= 50% for >/= 1 month (severe malnutrition)  -- Nutrition consulted\"    Today Mr. Christensen is being evaluated for an intake to the TCU. He has been doing quite well per his report since discharge from the hospital. Nursing staff also agree, however he is quite new and has not had a great deal of time with the staff. He is consulting with therapies at this point and has not really started them. He denies any other concerns including fevers/chills, cough or cold symptoms, headaches, vision changes, chest pain/pressure, difficulty breathing, SOB, abdominal pain, nausea, vomiting, diarrhea, dysuria, increasing weakness, increasing pain.     Past Medical History:   Diagnosis Date     Abnormal urinalysis      SOHEILA (acute kidney injury) (H)      CKD (chronic kidney disease)      Falls      GERD (gastroesophageal reflux disease)      HTN (hypertension)      Hyperlipidemia      Lightheaded      MGUS (monoclonal gammopathy of unknown significance)      Renal insufficiency      Unintentional weight loss      Weakness              Family History   Problem Relation Age of Onset     Other Mother      eye disorder     Colon cancer Father      Social History     Social History     Marital status: Single     Spouse name: N/A     Number of children: N/A     Years of education: N/A     Social History " Main Topics     Smoking status: Current Every Day Smoker     Packs/day: 0.50     Types: Cigarettes     Smokeless tobacco: Never Used     Alcohol use Yes      Comment: daily     Drug use: Not on file     Sexual activity: Not on file     Other Topics Concern     Not on file     Social History Narrative     No narrative on file       REVIEW OF SYSTEM:  Pertinent items are noted in HPI.    PHYSICAL EXAM:   General appearance: alert, appears stated age and cooperative  HEENT: Head is normocephalic with normal hair distribution. No evidence of trauma. Ears: Without lesions or deformity. No acute purulent discharge. Eyes: Conjunctivae pink with no scleral icterus or erythema. Nose: Normal mucosa and septum. Oropharnyx: mmm, no lesions present.  Lungs: clear to auscultation bilaterally  Heart: regular rate and rhythm, S1, S2 normal, no murmur, click, rub or gallop  Abdomen: soft, non-tender; bowel sounds normal; no masses,  no organomegaly  Extremities: extremities normal, atraumatic, no cyanosis or edema  Pulses: 2+ and symmetric  Skin: Skin color, texture, turgor normal. No rashes or lesions  Neurologic: Grossly normal   Psych: interacts well with caregivers, does exhibit logical thought processes and connections, pleasant      LABS:   None today.     ASSESSMENT:      ICD-10-CM    1. Physical deconditioning R53.81    2. Fall, subsequent encounter W19.XXXD    3. Hallucinations R44.3    4. Pneumonia J18.9    5. Urinary tract infection N39.0        PLAN:    Physical Deconditioning due to Falls, Hallucinations, s/p Pneumonia, s/p UTI  -Continue PT/OT and other therapies as per care plan.  -Encouraged good nutrition and movement habits.   -Discussed care plan and expected course of stay.   -Continue to follow-up per routine schedule or sooner if needed.     Weight Loss  -Work with SLP and dietician.   -Offer Boost three to four times daily.     Otherwise continue current care plan for all other chronic medical conditions, as  they are stable. Encouraged patient to engage in healthy lifestyle behaviors such as engaging in social activities, exercising (PT/OT), eating well, and following care plan. Follow up for routine check-up, or sooner if needed. Will continue to monitor patient and work with nursing staff collaboratively to work toward positive patient outcomes.    Greater than 35 minutes spent with patient and nursing staff with at least 55% of this time spent on review of previous records, counseling, education, and discussion of the above care plan with nursing staff and patient.     Electronically signed by: Dennise Toscano CNP

## 2021-06-19 NOTE — PROGRESS NOTES
[facility]      Facility:    [facility] [[id number]]    Code Status: FULL CODE   [facility]  : 7/23  -  8/2/18      Chief Complaint/Reason for Visit:  Chief Complaint   Patient presents with     Review Of Multiple Medical Conditions     weakness / cirrhosis / weight loss       HPI:   Abebe is a 72 y.o. male with a past medical history of hypertension, diabetes type 2, cirrhosis with steatohepatitis ,GERD, chronic kidney disease stage III, MGUS, hyperlipidemia hypothyroid, infrarenal aortic aneurysm     Abebe is alone, but his sister and niece say his overall health has been deteriorating over the past year, they estimate 100 pounds of unintentional weight loss.  He is very unsteady with ambulating, has difficulty going up 2 flights of stairs to his second floor apartment.  Has had dizziness for months. Saw his PCP, BP was 80/40, and was sent to [facility].  CT of the head and head MRI without acute hemorrhage or infarct.  MRI showed atrophy of the brain.  Sister felt his speech was different.  Discharge summary recommends neuropsychometric testing for possible underlying dementia.    Hospital issues:    Generalized weakness: Weight loss, suspected malignancy.  He has an oncology follow-up on 9/4/18    - possible pneumonia and acute possible COPD exacerbation: He was started on duo nebs, and given a prednisone burst, Brio Ellipta inhaler was added.  He did complete treatment for pneumonia prior to discharge    -He had a mild elevated troponin: Serial troponins were mildly elevated but remained flat, Lexiscan was done and was nondiagnostic, echocardiogram showed normal EF.  Cardiology recommended conservative management as he is asymptomatic.    - low blood pressures:  He had orthostatic hypotension and persistent hypotension: He had over 3 L of IV fluid, his medications that he was on for blood pressure prior to admission were held (Cartia   mg, Prinzide 20/12.5 mg.  He actually was started on Midrin to help with his hypotension.    -He had acute metabolic encephalopathy with a couple episodes of delirium: Infectious workup was negative blood cultures were no growth, urine culture with mixed seda, chest x-ray questionable pneumonia but otherwise unremarkable.  Lumbar x-ray unremarkable, CT of the abdomen and pelvis unremarkable although there is trace fluid and possible capsular implant seen anterior laterally on the liver.    -He had chronic kidney disease 3 with acute kidney injury on top of it.  His creatinine on admission was 1.84, was down to 1.17 after the IV fluids.  He likewise had had a low sodium on admission 130, had improved with the IV fluids and hydration.    -Myeloproliferative disorder diagnosed in 2002: History of omental lesions concerning for peritoneal metastases although exploratory laparoscopy a year ago did not showed malignant disease.  Now with unintentional weight loss  20-30 pounds per clinic notes, not the 100 family estimated) multiple did see him in consult, they feel his myeloproliferative neoplasm has progressed moderately with anemia, he could be considered for Aranesp, possible consideration of an outpatient bone marrow but they recommended follow-up as an outpatient.     -elevated BNP but the echo was normal, he appeared to be dry was not felt to be CHF.    -Diabetes type 2: He was on metformin prior to admission, hemoglobin A1c was 4.0 on 7/23/18.  Hospital discontinued metformin.    -Cirrhosis: He did have a wedge biopsy in 2017 showing advanced chronic liver disease stage IV cirrhosis with steatohepatitis    -He had severe malnutrition    Transferred to [facility] for therapy, however I have concerns about his ability to function at home, he should not be driving, and will need physical therapy, Occupational Therapy, speech language pathology to evaluate him for cognitive and physical status.       8/8/18, he  "took off his wander guard, left the building, walked  to a retaining wall at the next door assisted living.  He was sitting on the wall and lost his balance and fell onto his left side.  He told the staff when they found him that he was looking for the [employer] where he wanted to have \"night cap\".  This occurred at 1 AM on 8/8.  He had some superficial injuries, neuro checks have been normal.  Under guard was reapplied to his ankle, his room was changed further away from the exit to the outside.    He and I had a 40 minute discussion today face-to-face:  -EtOH: States he starts drinking about 7 PM at home, he wouldn't be specific about the amount of intake but his preferred drink is vodka.  He does not think he was craving but he had no awareness of the danger of leaving in the dark.  He does not remember why he lost his balance and fell.  He says he has not been in treatment before, but his niece had said he had been drinking and doing drugs when he was a [place] vet, and has been a drinker ever since. We talked about the options eg. AA, treatment if he were interested in help with abstaining from alcohol, but he indicated he was not ready to quit at this time. We discussed benzodiazepines: he was willing to try one in the evening to see if it helped decrease his evening cravings. Will add Thiamine as well.  -Tobacco: He states he smokes at least 1 ppd  of [place]. Stated he was willing to try a nicotine patch, did not want a nasal spray or gum. Again, not interested in smoking cessation at this time.  - DC planning: he thinks moving to an apartment with an elevator would be a good idea, but not longterm. I brought up that he should  give up driving due to his dizziness and gait instability, but he thinks he drives well..      Update:  He states his leg weakness is ongoing, especially upper thighs and low calves. Doesn't see great improvement  Admits that he absentmindedly scratches his skin, a " "\"habit, especially at night.\"    Past Medical History:  Past Medical History:   Diagnosis Date     AAA (abdominal aortic aneurysm) (H): infranrenal      Abnormal urinalysis      SOHEILA (acute kidney injury) (H)      Cirrhosis (H)      CKD (chronic kidney disease)      COPD (chronic obstructive pulmonary disease) (H)      Falls      GERD (gastroesophageal reflux disease)      HTN (hypertension)      Hyperlipidemia      Hypotension      Hypothyroid      Lightheaded      MGUS (monoclonal gammopathy of unknown significance)      Neuropathy, peripheral axonal      Renal insufficiency      Tobacco use disorder      Unintentional weight loss      Weakness            Surgical History:  Past Surgical History:   Procedure Laterality Date     EXPLORATORY LAPAROTOMY  10/03/2017     EYE SURGERY  2008    cataract and glaucoma     laparoscopic herniorrhaphy umbilical  10/03/2017     laparscopic biopsy liver   10/03/2017     OMENTECTOMY  10/03/2017              Review of Systems   As above.  Still thinks he can live independently, but wants an apartment building with an elevator.    Blood pressure 147/84, pulse 95, temperature 97.9  F (36.6  C), resp. rate 16, SpO2 94 %.          Physical Exam   Constitutional:  Elderly  male, relaxed   Left eye deviated temporally   Cardiovascular: Regular rhythm and normal heart sounds.    No murmur heard.Bradycardic, staying in the 50's  Pulmonary/Chest: Breath sounds normal. He has no wheezes. He has no rales.   Still very decreased and distant breath sounds bilaterally   Abdominal: Soft. Bowel sounds are normal. There is no tenderness.   Musculoskeletal: He exhibits no tenderness or deformity.   2+ edema from mid calf distally    Neurological: He exhibits normal muscle tone. Coordination normal.   Word finding difficulties  Romberg test was normal  Finger-nose test normal with his eyes closed  No tremors   Skin: Skin is warm and dry. No pallor.   Scrapes on his right elbow, left forehead " with some bruising, left shoulder sscratches  Picked punctate ulcerations on his dorsal  Lhand and arm (admits to scratching and picking)   Psychiatric: He has a normal mood and affect. .   Not aware of his deficits ( vs denial). Very talkative today  Down to Prednisone 10 mg today           No Known Allergies    Medication List:  Current Outpatient Prescriptions   Medication Sig     atorvastatin (LIPITOR) 20 MG tablet Take 20 mg by mouth at bedtime.     cholecalciferol, vitamin D3, 1,000 unit tablet Take 2,000 Units by mouth daily.     dorzolamide-timolol, PF, 2-0.5 % Dpet Administer 1 drop to both eyes 2 (two) times a day.     fluticasone-vilanterol (BREO ELLIPTA) 200-25 mcg/dose DsDv inhaler Inhale 1 puff daily.     folic acid (FOLVITE) 1 MG tablet Take 1 mg by mouth daily.     ipratropium-albuterol (DUO-NEB) 0.5-2.5 mg/3 mL nebulizer 3 mL 4 (four) times a day.     levalbuterol (XOPENEX) 1.25 mg/3 mL nebulizer solution Take 1 ampule by nebulization every 4 (four) hours as needed for wheezing.     levothyroxine (SYNTHROID, LEVOTHROID) 100 MCG tablet Take 100 mcg by mouth daily.     LORazepam (ATIVAN) 1 MG tablet Take 1 mg by mouth every evening. 20:00 h when he is used to drinking at home     metFORMIN (GLUCOPHAGE) 500 MG tablet Take 500 mg by mouth daily.     midodrine (PROAMATINE) 10 MG tablet Take 10 mg by mouth 3 (three) times a day.     nicotine (NICODERM CQ) 21 mg/24 hr Place 1 patch on the skin daily.     omeprazole (PRILOSEC OTC) 20 MG tablet Take 20 mg by mouth daily before breakfast.     pneumococcal vaccine (PNEUMOVAX) 25 mcg/0.5 mL Syrg injection Inject into the shoulder, thigh, or buttocks once. 0.5 ml     predniSONE (DELTASONE) 10 mg tablet Take 40 mg by mouth daily. For 3 days  8/3-8/5/18     predniSONE (DELTASONE) 10 mg tablet Take 30 mg by mouth daily. For 3 days  8/6-8/8/18     predniSONE (DELTASONE) 10 mg tablet Take 20 mg by mouth daily. For 3 days  8/9-8/11/18     predniSONE (DELTASONE) 10 mg  tablet Take 10 mg by mouth daily. For 3 days  8/12-8/14/18     thiamine 100 MG tablet Take 100 mg by mouth daily.     travoprost (TRAVATAN Z) 0.004 % ophthalmic drops Administer 1 drop to both eyes at bedtime.       Labs:     Ref Range & Units 8/9/18  9:45 AM     Sodium 136 - 145 mmol/L 138   Potassium 3.5 - 5.0 mmol/L 4.0   Chloride 98 - 107 mmol/L 106   CO2 22 - 31 mmol/L 26   Anion Gap, Calculation 5 - 18 mmol/L 6   Glucose 70 - 125 mg/dL 97   Calcium 8.5 - 10.5 mg/dL 10.2   BUN 8 - 28 mg/dL 13   Creatinine 0.70 - 1.30 mg/dL 1.12   GFR MDRD Non Af Amer >60 mL/min/1.73m2 >60          Ref Range & Units 8/9/18  9:45 AM     WBC 4.0 - 11.0 thou/uL 8.0   Hemoglobin 14.0 - 18.0 g/dL 9.9 (L)   RDW 11.0 - 14.5 % 15.5 (H)   Platelets 140 - 440 thou/uL 169                   Basic metabolic panel (07/29/2018 5:35 AM)  Basic metabolic panel (07/29/2018 5:35 AM)   Component Value Ref Range   Sodium 138 133 - 144 mmol/L   Potassium 3.6 3.4 - 5.3 mmol/L   Chloride 108 94 - 109 mmol/L   Carbon Dioxide 22 20 - 32 mmol/L   Anion Gap 8 3 - 14 mmol/L   Glucose 108 (H) 70 - 99 mg/dL   Urea Nitrogen 9 7 - 30 mg/dL   Creatinine 1.16 0.66 - 1.25 mg/dL   GFR Estimate 62Comment: Non  GFR Calc >60 mL/min/1.7m2   GFR Estimate If Black 75Comment:  GFR Calc >60 mL/min/1.7m2   Calcium 7.9 (L) 8.5 - 10.1 mg/dL     WBC 5.9, hemoglobin 8.5, , RDW 15.8, platelet count 130 9K  Albumin 2.7  Glucose is 105-170    Assessment /Plan:      ICD-10-CM    1. Alcohol use disorder (H) F10.99    2. Unintentional weight loss R63.4    3. Cirrhosis (H) K74.60    4. COPD (chronic obstructive pulmonary disease) (H) J44.9    5. Neuropathy, peripheral axonal G60.8    6. Weakness R53.1    7. HTN (hypertension) I10    8. MGUS (monoclonal gammopathy of unknown significance) D47.2    9. Tobacco use disorder F17.200    10. Fall, subsequent encounter W19.XXXD      Failing over the last year, may have malignancy, suspect significant  alcohol intake.  Does have Oncology appointment.  May be time for Hepatology visit as well.    Electronically signed by: Amber Fortune MD

## 2021-06-19 NOTE — PROGRESS NOTES
[facility]      Facility:    [facility] [[id number]]    Code Status: FULL CODE   [facility]  : 7/23  -  8/2/18      Chief Complaint/Reason for Visit:  Chief Complaint   Patient presents with     H & P     weakness / falls / cirrhosis       HPI:   Abebe is a 72 y.o. male with a past medical history of hypertension, diabetes type 2, cirrhosis with steatohepatitis ,GERD, chronic kidney disease stage III, MGUS, hyperlipidemia hypothyroid, infrarenal aortic aneurysm     Abebe is alone, but his sister and niece say his overall health has been deteriorating over the past year, they estimate 100 pounds of unintentional weight loss.  He is very unsteady with ambulating, has difficulty going up 2 flights of stairs to his second floor apartment.  Has had dizziness for months. Saw his PCP, BP was 80/40, and was sent to [facility].  CT of the head and head MRI without acute hemorrhage or infarct.  MRI showed atrophy of the brain.  Sister felt his speech was different.  Discharge summary recommends neuropsychometric testing for possible underlying dementia.    Hospital issues:    Generalized weakness: Weight loss, suspected malignancy.  He has an oncology follow-up on 9/4/18    - possible pneumonia and acute possible COPD exacerbation: He was started on duo nebs, and given a prednisone burst, Brio Ellipta inhaler was added.  He did complete treatment for pneumonia prior to discharge    -He had a mild elevated troponin: Serial troponins were mildly elevated but remained flat, Lexiscan was done and was nondiagnostic, echocardiogram showed normal EF.  Cardiology recommended conservative management as he is asymptomatic.    - low blood pressures:  He had orthostatic hypotension and persistent hypotension: He had over 3 L of IV fluid, his medications that he was on for blood pressure prior to admission were held (Cartia  mg, Prinzide 20/12.5 mg.  He actually  was started on Midrin to help with his hypotension.    -He had acute metabolic encephalopathy with a couple episodes of delirium: Infectious workup was negative blood cultures were no growth, urine culture with mixed seda, chest x-ray questionable pneumonia but otherwise unremarkable.  Lumbar x-ray unremarkable, CT of the abdomen and pelvis unremarkable although there is trace fluid and possible capsular implant seen anterior laterally on the liver.    -He had chronic kidney disease 3 with acute kidney injury on top of it.  His creatinine on admission was 1.84, was down to 1.17 after the IV fluids.  He likewise had had a low sodium on admission 130, had improved with the IV fluids and hydration.    -Myeloproliferative disorder diagnosed in 2002: History of omental lesions concerning for peritoneal metastases although exploratory laparoscopy a year ago did not showed malignant disease.  Now with unintentional weight loss  20-30 pounds per clinic notes, not the 100 family estimated) multiple did see him in consult, they feel his myeloproliferative neoplasm has progressed moderately with anemia, he could be considered for Aranesp, possible consideration of an outpatient bone marrow but they recommended follow-up as an outpatient.     -elevated BNP but the echo was normal, he appeared to be dry was not felt to be CHF.    -Diabetes type 2: He was on metformin prior to admission, hemoglobin A1c was 4.0 on 7/23/18.  Hospital discontinued metformin.    -Cirrhosis: He did have a wedge biopsy in 2017 showing advanced chronic liver disease stage IV cirrhosis with steatohepatitis    -He had severe malnutrition    Transferred to [facility] for therapy, however I have concerns about his ability to function at home, he should not be driving, and will need physical therapy, Occupational Therapy, speech language pathology to evaluate him for cognitive and physical status.      Past Medical History:  Past Medical History:    Diagnosis Date     Abnormal urinalysis      SOHEILA (acute kidney injury) (H)      CKD (chronic kidney disease)      Falls      GERD (gastroesophageal reflux disease)      HTN (hypertension)      Hyperlipidemia      Lightheaded      MGUS (monoclonal gammopathy of unknown significance)      Renal insufficiency      Unintentional weight loss      Weakness            Surgical History:  Past Surgical History:   Procedure Laterality Date     EXPLORATORY LAPAROTOMY  10/03/2017     EYE SURGERY  2008    cataract and glaucoma     laparoscopic herniorrhaphy umbilical  10/03/2017     laparscopic biopsy liver   10/03/2017     OMENTECTOMY  10/03/2017       Family History:   Family History   Problem Relation Age of Onset     Other Mother      eye disorder     Colon cancer Father        Social History:    Social History     Social History     Marital status: Single     Spouse name: N/A     Number of children: N/A     Years of education: N/A     Social History Main Topics     Smoking status: Current Every Day Smoker     Packs/day: 0.50     Types: Cigarettes     Smokeless tobacco: Never Used     Alcohol use Yes      Comment: daily     Drug use: Not on file     Sexual activity: Not on file     Other Topics Concern     Not on file     Social History Narrative     No narrative on file          Review of Systems   That he had a recent bankruptcy  He denies weight loss even though it is documented in the medical record  He smokes cigars, says he has 1 vodka drink a day  He said he was driving before the admission  He does not have children  He lives in an independent apartment  He says he does not have diabetic neuropathy, but another name which he does not recall (medical record indicates peripheral axonal neuropathy diagnosed in 2017)  The remainder  of the comprehensive review of systems is negative from his point of view      Blood pressure 125/90, pulse 88, temperature 97.7  F (36.5  C), resp. rate 18, SpO2 98 %.        Physical Exam    Constitutional: He appears well-developed.   Elderly  male, relaxed   HENT:   Nose: Nose normal.   Mouth/Throat: Oropharynx is clear and moist.   Has his own teeth   Eyes: Conjunctivae and EOM are normal. No scleral icterus.   Left eye deviated temporally   Cardiovascular: Regular rhythm and normal heart sounds.    No murmur heard.  Pulmonary/Chest: Breath sounds normal. He has no wheezes. He has no rales.   Very decreased and distant breath sounds bilaterally   Abdominal: Soft. Bowel sounds are normal. There is no tenderness.   Musculoskeletal: He exhibits no tenderness or deformity.   2+ edema from mid calf distally   Lymphadenopathy:     He has no cervical adenopathy.   Neurological: He exhibits normal muscle tone. Coordination normal.   Word finding difficulties  Romberg test was normal  Finger-nose test normal with his eyes closed  No tremors   Skin: Skin is warm and dry. No pallor.   Maculae are bright pink splotches on his chin and neck  3 Linear scratches on his right posterior rib cage (admits to scratching)   Psychiatric: He has a normal mood and affect. Thought content normal.   Not aware of his deficits  Was not, conversational         No Known Allergies    Medication List:  Current Outpatient Prescriptions   Medication Sig     atorvastatin (LIPITOR) 20 MG tablet Take 20 mg by mouth at bedtime.     cholecalciferol, vitamin D3, 1,000 unit tablet Take 2,000 Units by mouth daily.     dorzolamide-timolol, PF, 2-0.5 % Dpet Administer 1 drop to both eyes 2 (two) times a day.     fluticasone-vilanterol (BREO ELLIPTA) 200-25 mcg/dose DsDv inhaler Inhale 1 puff daily.     folic acid (FOLVITE) 1 MG tablet Take 1 mg by mouth daily.     ipratropium-albuterol (DUO-NEB) 0.5-2.5 mg/3 mL nebulizer 3 mL 4 (four) times a day.     levalbuterol (XOPENEX) 1.25 mg/3 mL nebulizer solution Take 1 ampule by nebulization every 4 (four) hours as needed for wheezing.     levothyroxine (SYNTHROID, LEVOTHROID) 100 MCG  tablet Take 100 mcg by mouth daily.     metFORMIN (GLUCOPHAGE) 500 MG tablet Take 500 mg by mouth daily.     midodrine (PROAMATINE) 10 MG tablet Take 10 mg by mouth 3 (three) times a day.     omeprazole (PRILOSEC OTC) 20 MG tablet Take 20 mg by mouth daily before breakfast.     pneumococcal vaccine (PNEUMOVAX) 25 mcg/0.5 mL Syrg injection Inject into the shoulder, thigh, or buttocks once. 0.5 ml     predniSONE (DELTASONE) 10 mg tablet Take 40 mg by mouth daily. For 3 days  8/3-8/5/18     predniSONE (DELTASONE) 10 mg tablet Take 30 mg by mouth daily. For 3 days  8/6-8/8/18     predniSONE (DELTASONE) 10 mg tablet Take 20 mg by mouth daily. For 3 days  8/9-8/11/18     predniSONE (DELTASONE) 10 mg tablet Take 10 mg by mouth daily. For 3 days  8/12-8/14/18     travoprost (TRAVATAN Z) 0.004 % ophthalmic drops Administer 1 drop to both eyes at bedtime.       Labs:      Basic metabolic panel (07/29/2018 5:35 AM)  Basic metabolic panel (07/29/2018 5:35 AM)   Component Value Ref Range   Sodium 138 133 - 144 mmol/L   Potassium 3.6 3.4 - 5.3 mmol/L   Chloride 108 94 - 109 mmol/L   Carbon Dioxide 22 20 - 32 mmol/L   Anion Gap 8 3 - 14 mmol/L   Glucose 108 (H) 70 - 99 mg/dL   Urea Nitrogen 9 7 - 30 mg/dL   Creatinine 1.16 0.66 - 1.25 mg/dL   GFR Estimate 62Comment: Non  GFR Calc >60 mL/min/1.7m2   GFR Estimate If Black 75Comment:  GFR Calc >60 mL/min/1.7m2   Calcium 7.9 (L) 8.5 - 10.1 mg/dL     WBC 5.9, hemoglobin 8.5, , RDW 15.8, platelet count 130 9K  Albumin 2.7  Glucose is 105-170    Assessment /Plan:    ICD-10-CM    1. Weakness R53.1 PT, OT, SLP   2. Unintentional weight loss R63.4    3. Hypotension I95.9 Off usual BP medications   4. MGUS (monoclonal gammopathy of unknown significance) D47.2 Sees Oncology 9/4/18   5. CKD (chronic kidney disease) N18.9    6. Cirrhosis (H) K74.60 Diagnosed with biopsy 2017   7. HTN (hypertension) I10 Off meds due to low bp   8. DM type 2 (diabetes  mellitus, type 2) (H) E11.9 Off metformin, low A1c   9. COPD (chronic obstructive pulmonary disease) (H) J44.9 On inhalers   10. Neuropathy, peripheral axonal G60.8    11. Tobacco use disorder F17.200    12. AAA (abdominal aortic aneurysm) (H) I71.4    13. Hypothyroid E03.9 Cont replacement     Total time 75 min,> 50 % with pt reviewing past health, symptoms, counseling on hospital findings and coordinating care plan        Electronically signed by: Amber Fortune MD

## 2021-06-20 NOTE — PROGRESS NOTES
[facility]    Facility:   [facility] [[id number]]   Code Status: POLST AVAILABLE      CHIEF COMPLAINT/REASON FOR VISIT:  Chief Complaint   Patient presents with     Review Of Multiple Medical Conditions     physical deconditioning, falls       HISTORY:      HPI: Abebe is a 72 y.o. male with multiple medical problems outlined in past medical history table below presenting [facility] for rehabilitation s/p hospitalization at [facility] on 7/23/2018 for weakness, falls, lightheadedness and was treated for pneumonia and a UTI. He was discharged to the TCU on 8/2/2018 . His hospitalization has been summarized in previous notes.     Today Mr. Christensen is being evaluated for a review of multiple medical problems while in the TCU. He is very energetic and engaged today. He reports he has been doing very well and is very happy with his progression. PT/OT continue. He denies any other concerns including fevers/chills, cough or cold symptoms, headaches, vision changes, chest pain/pressure, difficulty breathing, SOB, abdominal pain, nausea, vomiting, diarrhea, dysuria, increasing weakness, increasing pain.     Past Medical History:   Diagnosis Date     AAA (abdominal aortic aneurysm) (H): infranrenal      Abnormal urinalysis      SOHEILA (acute kidney injury) (H)      Cirrhosis (H)      CKD (chronic kidney disease)      COPD (chronic obstructive pulmonary disease) (H)      Falls      GERD (gastroesophageal reflux disease)      HTN (hypertension)      Hyperlipidemia      Hypotension      Hypothyroid      Lightheaded      MGUS (monoclonal gammopathy of unknown significance)      Neuropathy, peripheral axonal      Renal insufficiency      Tobacco use disorder      Unintentional weight loss      Weakness              Family History   Problem Relation Age of Onset     Other Mother      eye disorder     Colon cancer Father      Social History     Social History     Marital status: Single      Spouse name: N/A     Number of children: N/A     Years of education: N/A     Social History Main Topics     Smoking status: Current Every Day Smoker     Packs/day: 0.50     Types: Cigarettes     Smokeless tobacco: Never Used     Alcohol use Yes      Comment: daily     Drug use: Not on file     Sexual activity: Not on file     Other Topics Concern     Not on file     Social History Narrative       REVIEW OF SYSTEM:  Pertinent items are noted in HPI.    PHYSICAL EXAM:   General appearance: alert, appears stated age and cooperative  HEENT: Head is normocephalic with normal hair distribution. No evidence of trauma. Ears: Without lesions or deformity. No acute purulent discharge. Eyes: Conjunctivae pink with no scleral icterus or erythema. Nose: Normal mucosa and septum. Oropharnyx: mmm, no lesions present.  Lungs: clear to auscultation bilaterally  Heart: regular rate and rhythm, S1, S2 normal, no murmur, click, rub or gallop  Abdomen: soft, non-tender; bowel sounds normal; no masses,  no organomegaly  Extremities: extremities normal, atraumatic, no cyanosis or edema  Pulses: 2+ and symmetric  Skin: Skin color, texture, turgor normal. No rashes or lesions  Neurologic: Grossly normal   Psych: interacts well with caregivers, does exhibit logical thought processes and connections, pleasant      LABS:   None today.     ASSESSMENT:      ICD-10-CM    1. Physical deconditioning R53.81    2. Fall, subsequent encounter W19.XXXD        PLAN:    Physical Deconditioning due to Falls  -Continue PT/OT and other therapies as per care plan.  -Encouraged good nutrition and movement habits.   -Discussed care plan and expected course of stay.   -Continue to follow-up per routine schedule or sooner if needed.     Care plan evaluated and current care plan remains appropriate. No changes necessary.     Otherwise continue current care plan for all other chronic medical conditions, as they are stable. Encouraged patient to engage in  healthy lifestyle behaviors such as engaging in social activities, exercising (PT/OT), eating well, and following care plan. Follow up for routine check-up, or sooner if needed. Will continue to monitor patient and work with nursing staff collaboratively to work toward positive patient outcomes.    Greater than 25 minutes spent with patient and nursing staff with at least 55% of this time spent on review of previous records, counseling, education, and discussion of the above care plan with nursing staff and patient.     Electronically signed by: Dennise Toscano CNP

## 2021-06-20 NOTE — PROGRESS NOTES
[facility]      Facility:    [facility] [[id number]]    Code Status: FULL CODE   [facility]  : 7/23  -  8/2/18      Chief Complaint/Reason for Visit:  Chief Complaint   Patient presents with     Review Of Multiple Medical Conditions       HPI:   Abebe is a 72 y.o. male with a past medical history of hypertension, diabetes type 2, cirrhosis with steatohepatitis ,GERD, chronic kidney disease stage III, MGUS, hyperlipidemia hypothyroid, infrarenal aortic aneurysm     Abebe is alone, but his sister and niece say his overall health has been deteriorating over the past year, they estimate 100 pounds of unintentional weight loss.  He is very unsteady with ambulating, has difficulty going up 2 flights of stairs to his second floor apartment.  Has had dizziness for months. Saw his PCP, BP was 80/40, and was sent to [facility].  CT of the head and head MRI without acute hemorrhage or infarct.  MRI showed atrophy of the brain.  Sister felt his speech was different.  Discharge summary recommends neuropsychometric testing for possible underlying dementia.    Hospital issues:    Generalized weakness: Weight loss, suspected malignancy.  He has an oncology follow-up on 9/4/18    - possible pneumonia and acute possible COPD exacerbation: He was started on duo nebs, and given a prednisone burst, Brio Ellipta inhaler was added.  He did complete treatment for pneumonia prior to discharge    -He had a mild elevated troponin: Serial troponins were mildly elevated but remained flat, Lexiscan was done and was nondiagnostic, echocardiogram showed normal EF.  Cardiology recommended conservative management as he is asymptomatic.    - low blood pressures:  He had orthostatic hypotension and persistent hypotension: He had over 3 L of IV fluid, his medications that he was on for blood pressure prior to admission were held (Cartia  mg, Prinzide 20/12.5 mg.  He actually was  started on Midrin to help with his hypotension.    -He had acute metabolic encephalopathy with a couple episodes of delirium: Infectious workup was negative blood cultures were no growth, urine culture with mixed seda, chest x-ray questionable pneumonia but otherwise unremarkable.  Lumbar x-ray unremarkable, CT of the abdomen and pelvis unremarkable although there is trace fluid and possible capsular implant seen anterior laterally on the liver.    -He had chronic kidney disease 3 with acute kidney injury on top of it.  His creatinine on admission was 1.84, was down to 1.17 after the IV fluids.  He likewise had had a low sodium on admission 130, had improved with the IV fluids and hydration.    -Myeloproliferative disorder diagnosed in 2002: History of omental lesions concerning for peritoneal metastases although exploratory laparoscopy a year ago did not showed malignant disease.  Now with unintentional weight loss  20-30 pounds per clinic notes, not the 100 family estimated) Oncology did see him in consult, they feel his myeloproliferative neoplasm has progressed moderately with anemia, he could be considered for Aranesp, possible consideration of an outpatient bone marrow but they recommended follow-up as an outpatient.     -elevated BNP but the echo was normal, he appeared to be dry was not felt to be CHF.    -Diabetes type 2: He was on metformin prior to admission, hemoglobin A1c was 4.0 on 7/23/18.  Hospital discontinued metformin.    -Cirrhosis: He did have a wedge biopsy in 2017 showing advanced chronic liver disease stage IV cirrhosis with steatohepatitis    -He had severe malnutrition    Transferred to [facility] for therapy, however I have concerns about his ability to function at home, he should not be driving, and will need physical therapy, Occupational Therapy, speech language pathology to evaluate him for cognitive and physical status.       8/8/18, he took off his wander guard, left the  "building, walked  to a retaining wall at the next door assisted living.  He was sitting on the wall and lost his balance and fell onto his left side.  He told the staff when they found him that he was looking for the [employer] where he wanted to have \"night cap\".  This occurred at 1 AM on 8/8.  He had some superficial injuries, neuro checks have been normal.  Under guard was reapplied to his ankle, his room was changed further away from the exit to the outside.    He and I had a 40 minute discussion today face-to-face:  -EtOH: States he starts drinking about 7 PM at home, he wouldn't be specific about the amount of intake but his preferred drink is vodka.  He does not think he was craving but he had no awareness of the danger of leaving in the dark.  He does not remember why he lost his balance and fell.  He says he has not been in treatment before, but his niece had said he had been drinking and doing drugs when he was a [place] vet, and has been a drinker ever since. We talked about the options eg. AA, treatment if he were interested in help with abstaining from alcohol, but he indicated he was not ready to quit at this time. We discussed benzodiazepines: he was willing to try one in the evening to see if it helped decrease his evening cravings. Will add Thiamine as well.  -Tobacco: He states he smokes at least 1 ppd  of [place]. Stated he was willing to try a nicotine patch, did not want a nasal spray or gum. Again, not interested in smoking cessation at this time.  - DC planning: he thinks moving to an apartment with an elevator would be a good idea, but not MARK. I brought up that he should  give up driving due to his dizziness and gait instability, but he thinks he drives well..      Past Medical History:  Past Medical History:   Diagnosis Date     AAA (abdominal aortic aneurysm) (H): infranrenal      Abnormal urinalysis      SOHEILA (acute kidney injury) (H)      Cirrhosis (H)      CKD (chronic " kidney disease)      COPD (chronic obstructive pulmonary disease) (H)      Falls      GERD (gastroesophageal reflux disease)      HTN (hypertension)      Hyperlipidemia      Hypotension      Hypothyroid      Lightheaded      MGUS (monoclonal gammopathy of unknown significance)      Neuropathy, peripheral axonal      Renal insufficiency      Tobacco use disorder      Unintentional weight loss      Weakness            Surgical History:  Past Surgical History:   Procedure Laterality Date     EXPLORATORY LAPAROTOMY  10/03/2017     EYE SURGERY  2008    cataract and glaucoma     laparoscopic herniorrhaphy umbilical  10/03/2017     laparscopic biopsy liver   10/03/2017     OMENTECTOMY  10/03/2017              Review of Systems   Using a walker well. Starting to use a single point cane, but still getting used to it  Discussed his alcohol use disorder, that he is likely to relapse if he returns home, and offered some type of CD help, which he declined.  States his sister is back in town and touring places, he doesn't know if she is searching for MARK    Blood pressure 148/72, pulse 81, temperature 98.2  F (36.8  C), resp. rate 16, SpO2 94 %.          Physical Exam   Constitutional:  Elderly  male, relaxed , at the dining table  Left eye deviated temporally   Cardiovascular: Regular rhythm and normal heart sounds.    No murmur heard. HR not bradycardic today  Pulmonary/Chest: Breath sounds normal.  He has no rales.   Still very decreased and distant breath sounds bilaterally   Abdominal: Soft. Bowel sounds are normal. There is no tenderness.   Musculoskeletal: He exhibits no tenderness or deformity.   1+ edema from mid calf distally    Neurological: He exhibits normal muscle tone. Coordination normal.   Word finding difficulties seem more mild  No tremors   Skin: Skin is warm and dry. No pallor. Resolving scrapes on his right elbow, left forehead with some bruising, left shoulder scratches  Picked punctate ulcerations  on his dorsal  Lhand and arm (admits to scratching and picking)   Psychiatric: He has a normal mood and affect. Acknowledged alcohol could be a problem. Very talkative today        No Known Allergies    Medication List:  Current Outpatient Prescriptions   Medication Sig     atorvastatin (LIPITOR) 20 MG tablet Take 20 mg by mouth at bedtime.     cholecalciferol, vitamin D3, 1,000 unit tablet Take 2,000 Units by mouth daily.     dorzolamide-timolol, PF, 2-0.5 % Dpet Administer 1 drop to both eyes 2 (two) times a day.     fluticasone-vilanterol (BREO ELLIPTA) 200-25 mcg/dose DsDv inhaler Inhale 1 puff daily.     folic acid (FOLVITE) 1 MG tablet Take 1 mg by mouth daily.     ipratropium-albuterol (DUO-NEB) 0.5-2.5 mg/3 mL nebulizer 3 mL 4 (four) times a day.     levalbuterol (XOPENEX) 1.25 mg/3 mL nebulizer solution Take 1 ampule by nebulization every 4 (four) hours as needed for wheezing.     levothyroxine (SYNTHROID, LEVOTHROID) 100 MCG tablet Take 100 mcg by mouth daily.     LORazepam (ATIVAN) 1 MG tablet Take 1 mg by mouth every evening. 20:00 h when he is used to drinking at home     metFORMIN (GLUCOPHAGE) 500 MG tablet Take 500 mg by mouth daily.     midodrine (PROAMATINE) 10 MG tablet Take 10 mg by mouth 3 (three) times a day.     nicotine (NICODERM CQ) 21 mg/24 hr Place 1 patch on the skin daily.     omeprazole (PRILOSEC OTC) 20 MG tablet Take 20 mg by mouth daily before breakfast.     pneumococcal vaccine (PNEUMOVAX) 25 mcg/0.5 mL Syrg injection Inject into the shoulder, thigh, or buttocks once. 0.5 ml     predniSONE (DELTASONE) 10 mg tablet Take 40 mg by mouth daily. For 3 days  8/3-8/5/18     predniSONE (DELTASONE) 10 mg tablet Take 30 mg by mouth daily. For 3 days  8/6-8/8/18     predniSONE (DELTASONE) 10 mg tablet Take 20 mg by mouth daily. For 3 days  8/9-8/11/18     predniSONE (DELTASONE) 10 mg tablet Take 10 mg by mouth daily. For 3 days  8/12-8/14/18     thiamine 100 MG tablet Take 100 mg by mouth  daily.     travoprost (TRAVATAN Z) 0.004 % ophthalmic drops Administer 1 drop to both eyes at bedtime.       Labs:     Ref Range & Units 8/9/18       Sodium 136 - 145 mmol/L 138   Potassium 3.5 - 5.0 mmol/L 4.0   Chloride 98 - 107 mmol/L 106   CO2 22 - 31 mmol/L 26   Anion Gap, Calculation 5 - 18 mmol/L 6   Glucose 70 - 125 mg/dL 97   Calcium 8.5 - 10.5 mg/dL 10.2   BUN 8 - 28 mg/dL 13   Creatinine 0.70 - 1.30 mg/dL 1.12   GFR MDRD Non Af Amer >60 mL/min/1.73m2 >60          Ref Range & Units 8/9/18  9:45 AM     WBC 4.0 - 11.0 thou/uL 8.0   Hemoglobin 14.0 - 18.0 g/dL 9.9 (L)   RDW 11.0 - 14.5 % 15.5 (H)   Platelets 140 - 440 thou/uL 169                   Basic metabolic panel (07/29/2018 5:35 AM)  Basic metabolic panel (07/29/2018 5:35 AM)   Component Value Ref Range   Sodium 138 133 - 144 mmol/L   Potassium 3.6 3.4 - 5.3 mmol/L   Chloride 108 94 - 109 mmol/L   Carbon Dioxide 22 20 - 32 mmol/L   Anion Gap 8 3 - 14 mmol/L   Glucose 108 (H) 70 - 99 mg/dL   Urea Nitrogen 9 7 - 30 mg/dL   Creatinine 1.16 0.66 - 1.25 mg/dL   GFR Estimate 62Comment: Non  GFR Calc >60 mL/min/1.7m2   GFR Estimate If Black 75Comment:  GFR Calc >60 mL/min/1.7m2   Calcium 7.9 (L) 8.5 - 10.1 mg/dL     WBC 5.9, hemoglobin 8.5, , RDW 15.8, platelet count 130 9K  Albumin 2.7  Glucose is 105-170    Assessment /Plan:  1. Alcohol use disorder (H) F10.99     2. Unintentional weight loss R63.4     3. Cirrhosis (H) K74.60     4. COPD (chronic obstructive pulmonary disease) (H) J44.9     5. Neuropathy, peripheral axonal G60.8     6. Weakness R53.1     7. HTN (hypertension) I10     8. MGUS (monoclonal gammopathy of unknown significance) D47.2     9. Tobacco use disorder F17.200     10. Fall, subsequent encounter W19      Failing over the last year, may have malignancy, suspect significant alcohol intake.  Does have Oncology appointment.  May be time for Hepatology visit as well.    Electronically signed by: Amber  JUAN J Fortune MD

## 2021-06-20 NOTE — PROGRESS NOTES
[facility]    Facility:   [facility] [[id number]]     Code Status: FULL CODE  PCP: Carson Fried MD   Phone: [phone]   Fax: [phone]  [facility]  : 7/23  -  8/2/18    CHIEF COMPLAINT/REASON FOR VISIT:  Chief Complaint   Patient presents with     Discharge Summary       HISTORY COURSE:  Abebe is a 72 y.o. male with a past medical history of hypertension, diabetes type 2, cirrhosis with steatohepatitis ,GERD, chronic kidney disease stage III, MGUS, hyperlipidemia hypothyroid, infrarenal aortic aneurysm      Abebe is alone, but his sister and niece say his overall health has been deteriorating over the past year, they estimate 100 pounds of unintentional weight loss.  He is very unsteady with ambulating, has difficulty going up 2 flights of stairs to his second floor apartment.  Has had dizziness for months. Saw his PCP, BP was 80/40, and was sent to [facility].  CT of the head and head MRI without acute hemorrhage or infarct.  MRI showed atrophy of the brain.  Sister felt his speech was different.  Discharge summary recommends neuropsychometric testing for possible underlying dementia.     Hospital issues:     Generalized weakness: Weight loss, suspected malignancy.  He has an oncology follow-up on 9/4/18     - possible pneumonia and acute possible COPD exacerbation: He was started on duo nebs, and given a prednisone burst, Brio Ellipta inhaler was added.  He did complete treatment for pneumonia prior to discharge     -He had a mild elevated troponin: Serial troponins were mildly elevated but remained flat, Lexiscan was done and was nondiagnostic, echocardiogram showed normal EF.  Cardiology recommended conservative management as he is asymptomatic.     - low blood pressures:  He had orthostatic hypotension and persistent hypotension: He had over 3 L of IV fluid, his medications that he was on for blood pressure prior to admission were  held (Cartia  mg, Prinzide 20/12.5 mg.  He actually was started on Midrin to help with his hypotension.     -He had acute metabolic encephalopathy with a couple episodes of delirium: Infectious workup was negative blood cultures were no growth, urine culture with mixed seda, chest x-ray questionable pneumonia but otherwise unremarkable.  Lumbar x-ray unremarkable, CT of the abdomen and pelvis unremarkable although there is trace fluid and possible capsular implant seen anterior laterally on the liver.     -He had chronic kidney disease 3 with acute kidney injury on top of it.  His creatinine on admission was 1.84, was down to 1.17 after the IV fluids.  He likewise had had a low sodium on admission 130, had improved with the IV fluids and hydration.  Na = 138 and Cr= 1.12 on 8/9/18 at TCU     -Myeloproliferative disorder diagnosed in 2002: History of omental lesions concerning for peritoneal metastases although exploratory laparoscopy a year ago did not showed malignant disease.  Now with unintentional weight loss  20-30 pounds per clinic notes, not the 100 family estimated) multiple did see him in consult, they feel his myeloproliferative neoplasm has progressed moderately with anemia, he could be considered for Aranesp, possible consideration of an outpatient bone marrow but they recommended follow-up as an outpatient.   He saw his Oncologist 9/4: no sign of malignancy; will follow up again in 3 months     -elevated BNP but the echo was normal, he appeared to be dry was not felt to be CHF.     -Diabetes type 2: He was on metformin prior to admission, hemoglobin A1c was 4.0 on 7/23/18.  Hospital stopped metformin, resumed at discharge to TCU.     -Cirrhosis: He did have a wedge biopsy in 2017 showing advanced chronic liver disease stage IV cirrhosis with steatohepatitis    -He has Alcohol Use Disorder: Early in his TCU stay, he  left the building one night after removing his wander guard, in search of a bar.  He fell and had multiple bruises and abrasions. We discussed his cirrhosis is likely from alcohol. He was given low evening dose of Lorazepam to decrease evening craving for alcohol, but it was stopped upon discharge     -He had high BP: metoprolol tartrate 50 mg two times a day was added with     Transferred to [facility] for therapy. He was able to walk safely with a walker. He was counseled to stop smoking , and alcohol cessation support offered, but he declined both at this time. He was on a nicotine patch, and was reminded to stop the patch if he resumes smoking cigarettes    He initially was on  Prn midodrine. As he recovered, he was getting some higher systolics: metoprolol was added in a phone order, and then later  increased. Midodrine was not stopped until the day before discharge. He was advised to check his BPs and discuss with his PCP    Review of Systems   He and his sister are looking for an apartment for him where he doesn't have to do stairs.    Vitals:    09/04/18 0900   BP: 167/89   Pulse: 72   Resp: 24   Temp: 96.9  F (36.1  C)   SpO2: 97%       Physical Exam    Constitutional:   Elderly  male, reclining on his bed, sister in the room visiting  Cardiovascular: Regular rhythm and normal heart sounds.    No murmur heard, not bradycardic   Pulmonary/Chest: Breath sounds normal.  He has no rales.   Somewhat decreased breath sounds bilaterally   Abdominal: Soft. Bowel sounds are normal. There is no tenderness, no distention.   Musculoskeletal: He exhibits no tenderness or deformity.   1+ edema from mid calf distally    Neurological: He exhibits normal muscle tone. Coordination normal.   No longer has word finding difficulties   No tremors   Skin: Skin is warm and dry. No pallor. Resolving scrapes on his right elbow     Psychiatric: He has a normal mood and affect. Acknowledged alcohol could be a problem. Very talkative today    No Known Allergies      MEDICATION LIST:  Current Outpatient  Prescriptions   Medication Sig     metFORMIN (GLUCOPHAGE) 500 MG tablet Take 500 mg by mouth daily with breakfast.     metoprolol tartrate (LOPRESSOR) 50 MG tablet Take 50 mg by mouth 2 (two) times a day.     nicotine (NICODERM CQ) 14 mg/24 hr Place 1 patch on the skin daily.     atorvastatin (LIPITOR) 20 MG tablet Take 20 mg by mouth at bedtime.     cholecalciferol, vitamin D3, 1,000 unit tablet Take 2,000 Units by mouth daily.     dorzolamide-timolol, PF, 2-0.5 % Dpet Administer 1 drop to both eyes 2 (two) times a day.     fluticasone-vilanterol (BREO ELLIPTA) 200-25 mcg/dose DsDv inhaler Inhale 1 puff daily.     folic acid (FOLVITE) 1 MG tablet Take 4 mg by mouth daily.      levothyroxine (SYNTHROID, LEVOTHROID) 100 MCG tablet Take 100 mcg by mouth daily.     omeprazole (PRILOSEC OTC) 20 MG tablet Take 20 mg by mouth daily before breakfast.     pneumococcal vaccine (PNEUMOVAX) 25 mcg/0.5 mL Syrg injection Inject into the shoulder, thigh, or buttocks once. 0.5 ml     thiamine 100 MG tablet Take 100 mg by mouth daily.     travoprost (TRAVATAN Z) 0.004 % ophthalmic drops Administer 1 drop to both eyes at bedtime.       DISCHARGE DIAGNOSIS:    W19.XXD    1. Weakness / fall R53.1 PT as outpatient   2. Unintentional weight loss R63.4     3. Hypotension I95.9 Resolved,Midodrine dc'd, Beta Blocker restarted   4. MGUS (monoclonal gammopathy of unknown significance) D47.2 Saw Oncology 9/4/18   5. CKD (chronic kidney disease) N18.9     6. Cirrhosis (H) K74.60 Diagnosed with biopsy 2017; presume alcoholic   7. HTN (hypertension) I10 Back on Metoprolol   8. DM type 2 (diabetes mellitus, type 2) (H) E11.9 low A1c   9. COPD (chronic obstructive pulmonary disease) (H) J44.9 On inhalers   10. Neuropathy, peripheral axonal G60.8  On Thiamine, folate   11. Tobacco use disorder F17.200 Not interested  In quitting   12. Alcohol Use Disorder I Not ready to consider treatment   13. Hypothyroid E03.9 Cont replacement         MEDICAL  EQUIPMENT NEEDS:  None, has a walker    DISCHARGE PLAN  He will go to outpatient physical therapy to continue to work on functional mobility and balance    The patient is, or has been, under my care and I have initiated the establishment of the plan of care. This patient will be followed by a physician who will periodically review the plan of care.    Schedule follow up visit with primary care provider within 7 days to reestablish care, check BPs  Greater than 30 min spent in preparing for discharge, medication review, discussion of treatment  Electronically signed by: Amber Fortune MD

## 2021-12-14 ENCOUNTER — LAB REQUISITION (OUTPATIENT)
Dept: LAB | Facility: CLINIC | Age: 76
End: 2021-12-14
Payer: MEDICARE

## 2021-12-14 DIAGNOSIS — I25.10 ATHEROSCLEROTIC HEART DISEASE OF NATIVE CORONARY ARTERY WITHOUT ANGINA PECTORIS: ICD-10-CM

## 2021-12-15 LAB
ANION GAP SERPL CALCULATED.3IONS-SCNC: 12 MMOL/L (ref 5–18)
BUN SERPL-MCNC: 9 MG/DL (ref 8–28)
CALCIUM SERPL-MCNC: 9.7 MG/DL (ref 8.5–10.5)
CHLORIDE BLD-SCNC: 109 MMOL/L (ref 98–107)
CO2 SERPL-SCNC: 19 MMOL/L (ref 22–31)
CREAT SERPL-MCNC: 1.03 MG/DL (ref 0.7–1.3)
ERYTHROCYTE [DISTWIDTH] IN BLOOD BY AUTOMATED COUNT: 15 % (ref 10–15)
GFR SERPL CREATININE-BSD FRML MDRD: 70 ML/MIN/1.73M2
GLUCOSE BLD-MCNC: 100 MG/DL (ref 70–125)
HCT VFR BLD AUTO: 31.7 % (ref 40–53)
HGB BLD-MCNC: 9.6 G/DL (ref 13.3–17.7)
MCH RBC QN AUTO: 34.3 PG (ref 26.5–33)
MCHC RBC AUTO-ENTMCNC: 30.3 G/DL (ref 31.5–36.5)
MCV RBC AUTO: 113 FL (ref 78–100)
PLATELET # BLD AUTO: 273 10E3/UL (ref 150–450)
POTASSIUM BLD-SCNC: 3.7 MMOL/L (ref 3.5–5)
RBC # BLD AUTO: 2.8 10E6/UL (ref 4.4–5.9)
SODIUM SERPL-SCNC: 140 MMOL/L (ref 136–145)
WBC # BLD AUTO: 9.4 10E3/UL (ref 4–11)

## 2021-12-15 PROCEDURE — 36415 COLL VENOUS BLD VENIPUNCTURE: CPT | Performed by: NURSE PRACTITIONER

## 2021-12-15 PROCEDURE — P9604 ONE-WAY ALLOW PRORATED TRIP: HCPCS | Performed by: NURSE PRACTITIONER

## 2021-12-15 PROCEDURE — 85027 COMPLETE CBC AUTOMATED: CPT | Performed by: NURSE PRACTITIONER

## 2021-12-15 PROCEDURE — 80048 BASIC METABOLIC PNL TOTAL CA: CPT | Performed by: NURSE PRACTITIONER

## 2024-06-17 NOTE — PROGRESS NOTES
Here for paracentesis. Pt has never had this procedure before. Explained procedure to pt questions answered. Reviewed d/c instructions with pt, questions answered, states understanding. VSS. Lab pending.   [de-identified] : MRI Brain 3.23.23 normal MRI of the brain

## 2024-10-23 NOTE — PROGRESS NOTES
Detail Level: Detailed SW:  D:  Reviewed chart.  Initial TCU referrals were sent to [facility] and patient's discharge was delayed.  Re-sent referrals to check bed availability.  Received a call back from [facility].  They can accept patient tomorrow.  Call placed to update patient's sister Jennifer.  She is in agreement.  Jennifer states she will transport when discharge is known.  Jennifer also had questions about patient completing a healthcare directive and a power of  document and having them notarized in the hospital prior to patient being discharged.  Explained that patient can complete a health care directive for sure.  Most of the notaries at the hospital will also notarize a power of  form as well.    P:  Will continue to follow.   Depth Of Biopsy: dermis Was A Bandage Applied: Yes Size Of Lesion In Cm: 0.5 X Size Of Lesion In Cm: 0 Biopsy Type: H and E Biopsy Method: Personna blade Anesthesia Type: 1% lidocaine with epinephrine Hemostasis: Drysol Wound Care: Petrolatum Dressing: Band-Aid Destruction After The Procedure: No Type Of Destruction Used: Curettage Curettage Text: The wound bed was treated with curettage after the biopsy was performed. Cryotherapy Text: The wound bed was treated with cryotherapy after the biopsy was performed. Electrodesiccation Text: The wound bed was treated with electrodesiccation after the biopsy was performed. Electrodesiccation And Curettage Text: The wound bed was treated with electrodesiccation and curettage after the biopsy was performed. Silver Nitrate Text: The wound bed was treated with silver nitrate after the biopsy was performed. Lab: 6 Lab Facility: 3 Consent: Verbal consent was obtained and risks were reviewed including but not limited to scarring, infection, bleeding, scabbing, incomplete removal, nerve damage and allergy to anesthesia. Post-Care Instructions: I reviewed with the patient in detail post-care instructions. Patient is to keep the biopsy site clean with gentle soap and warm water and then apply aquaphor twice daily until healed. Notification Instructions: Patient will be notified of biopsy results. However, patient instructed to call the office if not contacted within 2 weeks. Billing Type: Third-Party Bill Information: Selecting Yes will display possible errors in your note based on the variables you have selected. This validation is only offered as a suggestion for you. PLEASE NOTE THAT THE VALIDATION TEXT WILL BE REMOVED WHEN YOU FINALIZE YOUR NOTE. IF YOU WANT TO FAX A PRELIMINARY NOTE YOU WILL NEED TO TOGGLE THIS TO 'NO' IF YOU DO NOT WANT IT IN YOUR FAXED NOTE.
